# Patient Record
Sex: FEMALE | Race: WHITE | NOT HISPANIC OR LATINO
[De-identification: names, ages, dates, MRNs, and addresses within clinical notes are randomized per-mention and may not be internally consistent; named-entity substitution may affect disease eponyms.]

---

## 2020-03-11 ENCOUNTER — APPOINTMENT (OUTPATIENT)
Dept: ORTHOPEDIC SURGERY | Facility: CLINIC | Age: 75
End: 2020-03-11
Payer: MEDICARE

## 2020-03-11 VITALS
WEIGHT: 175 LBS | HEART RATE: 72 BPM | SYSTOLIC BLOOD PRESSURE: 126 MMHG | HEIGHT: 66 IN | DIASTOLIC BLOOD PRESSURE: 83 MMHG | BODY MASS INDEX: 28.12 KG/M2

## 2020-03-11 DIAGNOSIS — M25.551 PAIN IN RIGHT HIP: ICD-10-CM

## 2020-03-11 DIAGNOSIS — S72.141D DISPLACED INTERTROCHANTERIC FRACTURE OF RIGHT FEMUR, SUBSEQUENT ENCOUNTER FOR CLOSED FRACTURE WITH ROUTINE HEALING: ICD-10-CM

## 2020-03-11 PROCEDURE — 73502 X-RAY EXAM HIP UNI 2-3 VIEWS: CPT | Mod: RT

## 2020-03-11 PROCEDURE — 20610 DRAIN/INJ JOINT/BURSA W/O US: CPT | Mod: RT

## 2020-03-11 PROCEDURE — 73562 X-RAY EXAM OF KNEE 3: CPT | Mod: RT

## 2020-03-11 PROCEDURE — 99204 OFFICE O/P NEW MOD 45 MIN: CPT | Mod: 25

## 2020-03-11 RX ORDER — FAMOTIDINE 10 MG/ML
VIAL (ML) INTRAVENOUS
Refills: 0 | Status: ACTIVE | COMMUNITY

## 2020-03-11 NOTE — PROCEDURE
[de-identified] : I injected the patient's right knee today with cortisone.\par \par I discussed at length with the patient the planned steroid and lidocaine injection. The risks, benefits, convalescence and alternatives were reviewed. The possible side effects discussed included but were not limited to: pain, swelling, heat, bleeding, and redness. Symptoms are generally mild but if they are extensive then contact the office. Giving pain relievers by mouth such as NSAIDs or Tylenol can generally treat the reactions to steroid and lidocaine. Rare cases of infection have been noted. Rash, hives and itching may occur post injection. If you have muscle pain or cramps, flushing and or swelling of the face, rapid heart beat, nausea, dizziness, fever, chills, headache, difficulty breathing, swelling in the arms or legs, or have a prickly feeling of your skin, contact a health care provider immediately. Following this discussion, the knee was prepped with Alcohol and under sterile condition the 80 mg Depo-Medrol and 6 cc Lidocaine injection was performed with a 20 gauge needle through a superolateral injection site. The needle was introduced into the joint, aspiration was performed to ensure intra-articular placement and the medication was injected. Upon withdrawal of the needle the site was cleaned with alcohol and a band aid applied. The patient tolerated the injection well and there were no adverse effects. Post injection instructions included no strenuous activity for 24 hours, cryotherapy and if there are any adverse effects to contact the office. \par \par \par

## 2020-03-11 NOTE — PHYSICAL EXAM
[Antalgic] : antalgic [LE] : Sensory: Intact in bilateral lower extremities [ALL] : dorsalis pedis, posterior tibial, femoral, popliteal, and radial 2+ and symmetric bilaterally [de-identified] : GENERAL APPEARANCE:   Well nourished and hydrated, pleasant, alert, and oriented x 4.  \par CARDIOVASCULAR:   No apparent abnormalities.  No lower leg edema.  No varicosities.  Pedal pulses are palpable.\par RESPIRATORY: Breathe sound clear and audible in all lobes. No wheezing, No accessory muscle use.\par NEUROLOGIC:   Sensation is normal, no muscle weakness in the upper or lower extremities.\par DERMATOLOGIC:   No apparent skin lesions, moist, warm, no rash.\par SPINE:   Cervical spine appears normal and moves freely; thoracic spine appears normal and moves freely; lumbosacral spine appears normal and moves freely, normal, nontender.\par MUSCULOSKELETAL:   Hands, wrists, and elbows are normal and move freely, shoulders are normal and move freely. \par  [de-identified] : right knee examination shows pain with straight leg raise. the knee range of motion is zero to a degree with pain at terminal flexion she has mild welling of the joint no erythema [de-identified] : 3V Xray of the right knee done in office today and reviewed by Dr. Micky Garcia demonstrates mild lateral and patellofemoral osteoarthritis of the right knee. \par 3V Xray of the right hip and pelvis done in office today and reviewed by Dr. Micky Garcia demonstrates retained intramedullary clifton with evidence of healed intertrochanteric femur fracture, no signs of hardware cutout or failure, no signs of AVN.

## 2020-03-11 NOTE — REVIEW OF SYSTEMS
[Joint Pain] : joint pain [Joint Stiffness] : joint stiffness [Joint Swelling] : joint swelling [Feeling Tired] : fatigue [Headache] : headache [Anxiety] : anxiety [Negative] : Heme/Lymph [FreeTextEntry9] : right knee

## 2020-03-11 NOTE — ADDENDUM
[FreeTextEntry1] : I, Radhames Moore, acted solely as a scribe for Dr. Micky Garcia on this date 03/11/2020.

## 2020-03-11 NOTE — HISTORY OF PRESENT ILLNESS
[Pain Location] : pain [Worsening] : worsening [___ mths] : [unfilled] month(s) ago [6] : a current pain level of 6/10 [Intermit.] : ~He/She~ states the symptoms seem to be intermittent [Walking] : worsened by walking [Heat] : relieved by heat [Rest] : relieved by rest [de-identified] : patient presents to the office with right lateral knee pain down to her right shin for in the past 2 months.\par Patient describes the pain is shooting and stabbing. if she rests and apply ice she feels better \par she  notes lateral knee swelling.\par patient has been taking ibuprofen which helps.\par patient had a history ofname of fracture from a fall  she underwent ORIF of femur fracture in 2010 by Dr. Ward\par

## 2020-03-11 NOTE — DISCUSSION/SUMMARY
[de-identified] : 74 year old  female with  mild lateral and patellofemoral osteoarthritis of the right knee. \par Today she elected to receive a cortisone injection in her right knee which she tolerated well. \par If pain persists despite cortisone injection she can call the office and we will obtain an MRI of the right knee for further evaluation.

## 2022-04-21 ENCOUNTER — EMERGENCY (EMERGENCY)
Facility: HOSPITAL | Age: 77
LOS: 1 days | Discharge: HOME | End: 2022-04-21
Attending: EMERGENCY MEDICINE
Payer: MEDICARE

## 2022-04-21 VITALS
WEIGHT: 139.99 LBS | DIASTOLIC BLOOD PRESSURE: 76 MMHG | TEMPERATURE: 98 F | SYSTOLIC BLOOD PRESSURE: 129 MMHG | RESPIRATION RATE: 18 BRPM | HEART RATE: 71 BPM | OXYGEN SATURATION: 100 % | HEIGHT: 64 IN

## 2022-04-21 PROCEDURE — 99285 EMERGENCY DEPT VISIT HI MDM: CPT | Mod: 25

## 2022-04-21 PROCEDURE — 12052 INTMD RPR FACE/MM 2.6-5.0 CM: CPT

## 2022-04-21 PROCEDURE — 72125 CT NECK SPINE W/O DYE: CPT | Mod: MA

## 2022-04-21 PROCEDURE — 70450 CT HEAD/BRAIN W/O DYE: CPT | Mod: MA

## 2022-04-21 PROCEDURE — 90715 TDAP VACCINE 7 YRS/> IM: CPT

## 2022-04-21 PROCEDURE — 72125 CT NECK SPINE W/O DYE: CPT | Mod: 26,MA

## 2022-04-21 PROCEDURE — 99284 EMERGENCY DEPT VISIT MOD MDM: CPT | Mod: 25

## 2022-04-21 PROCEDURE — 70450 CT HEAD/BRAIN W/O DYE: CPT | Mod: 26,MA

## 2022-04-21 PROCEDURE — 12042 INTMD RPR N-HF/GENIT2.6-7.5: CPT

## 2022-04-21 PROCEDURE — 90471 IMMUNIZATION ADMIN: CPT

## 2022-04-21 RX ORDER — TETANUS TOXOID, REDUCED DIPHTHERIA TOXOID AND ACELLULAR PERTUSSIS VACCINE, ADSORBED 5; 2.5; 8; 8; 2.5 [IU]/.5ML; [IU]/.5ML; UG/.5ML; UG/.5ML; UG/.5ML
0.5 SUSPENSION INTRAMUSCULAR ONCE
Refills: 0 | Status: COMPLETED | OUTPATIENT
Start: 2022-04-21 | End: 2022-04-21

## 2022-04-21 RX ADMIN — TETANUS TOXOID, REDUCED DIPHTHERIA TOXOID AND ACELLULAR PERTUSSIS VACCINE, ADSORBED 0.5 MILLILITER(S): 5; 2.5; 8; 8; 2.5 SUSPENSION INTRAMUSCULAR at 10:15

## 2022-04-21 NOTE — ED PROVIDER NOTE - PROGRESS NOTE DETAILS
Pts laceration repaired. Pts CT head and cervical spine negative for acute pathology. Pt made aware of thyroid nodule findings on CT and given copy of report to bring to her PCP for f/u. Pt stable for d/c.

## 2022-04-21 NOTE — ED PROVIDER NOTE - PHYSICAL EXAMINATION
Alert, lucid, and in no apparent distress. Pt is normocephalic, atraumatic.  3cm laceation of forehead; +muscle involvement; no step off  Pupils are equal, round,  lips pink, moist mucous membranes, tongue midline. Neck mild tenderness left lateral neck ; no midline tenderness  Lungs clear to auscultation. Heart regular rate and rhythm, normal S1, S2,  Abdomen is soft, nontender, no pulsatile mass, no masses, no distension,   Non-focal sensory, 5 out of 5 motor strength, no dysmetria, fluent, goal directed speech. CN2 to 12 intact. Skin without rash,   No submandibular adenopathy. Normal mentation, does not appear agitated

## 2022-04-21 NOTE — ED PROVIDER NOTE - NSFOLLOWUPINSTRUCTIONS_ED_ALL_ED_FT
suture removal in 5 days  head injury instructions  follow up with doctor in 3 - 5 days suture removal in 5 days  head injury instructions  follow up with doctor in 3 - 5 days    Follow up with your PCP regarding thyroid nodule findings on CT scan.

## 2022-04-21 NOTE — ED PROVIDER NOTE - CLINICAL SUMMARY MEDICAL DECISION MAKING FREE TEXT BOX
s/p fall with trauma to head + lac; suture lac, up date tetanus; ct of head and neck; ; dc with head injury and laceration instrutions

## 2022-04-21 NOTE — ED PROVIDER NOTE - PATIENT PORTAL LINK FT
You can access the FollowMyHealth Patient Portal offered by Health system by registering at the following website: [email or web address]. By joining [employer]’s FollowMyHealth portal, you will also be able to view your health information using other applications (apps) compatible with our system.

## 2022-04-21 NOTE — ED PROVIDER NOTE - CROS ED RESP ALL NEG
Continue with reflux precautions as discussed. Continue on the antacid. Please get labs done today and we will notify you of results. Make the appointment with Dr David Brothers now. Call if any questions or concerns. Return as scheduled or sooner as needed.
negative...

## 2022-04-21 NOTE — ED PROVIDER NOTE - OBJECTIVE STATEMENT
77 yo female no significant pmh  s/p mechanical fall hitting her head on a glass table; pt denies loc; however, with laceration to forehead; ; bleeding controlled;  pt noted headache and also neck pain; pt denies any chest pain, back pain or abdominal pain

## 2022-04-21 NOTE — ED PROVIDER NOTE - CARE PLAN
1 Principal Discharge DX:	Laceration of forehead without complication  Secondary Diagnosis:	Sprain of cervical neck

## 2022-07-30 ENCOUNTER — EMERGENCY (EMERGENCY)
Facility: HOSPITAL | Age: 77
LOS: 1 days | Discharge: HOME | End: 2022-07-30
Attending: EMERGENCY MEDICINE
Payer: MEDICARE

## 2022-07-30 VITALS
SYSTOLIC BLOOD PRESSURE: 102 MMHG | HEIGHT: 64 IN | WEIGHT: 145.06 LBS | RESPIRATION RATE: 20 BRPM | HEART RATE: 107 BPM | DIASTOLIC BLOOD PRESSURE: 64 MMHG | TEMPERATURE: 100 F | OXYGEN SATURATION: 95 %

## 2022-07-30 LAB
APPEARANCE UR: ABNORMAL
BACTERIA # UR AUTO: ABNORMAL
BILIRUB UR-MCNC: NEGATIVE — SIGNIFICANT CHANGE UP
COLOR SPEC: YELLOW — SIGNIFICANT CHANGE UP
DIFF PNL FLD: ABNORMAL
EPI CELLS # UR: SIGNIFICANT CHANGE UP
GLUCOSE UR QL: NEGATIVE — SIGNIFICANT CHANGE UP
KETONES UR-MCNC: NEGATIVE — SIGNIFICANT CHANGE UP
LEUKOCYTE ESTERASE UR-ACNC: ABNORMAL
NITRITE UR-MCNC: POSITIVE
PH UR: 6 — SIGNIFICANT CHANGE UP (ref 5–8)
PROT UR-MCNC: 30 MG/DL
RAPID RVP RESULT: SIGNIFICANT CHANGE UP
RBC CASTS # UR COMP ASSIST: SIGNIFICANT CHANGE UP /HPF (ref 0–4)
SARS-COV-2 RNA SPEC QL NAA+PROBE: SIGNIFICANT CHANGE UP
SP GR SPEC: 1.01 — SIGNIFICANT CHANGE UP (ref 1.01–1.02)
UROBILINOGEN FLD QL: NEGATIVE — SIGNIFICANT CHANGE UP
WBC UR QL: >50 /HPF (ref 0–5)

## 2022-07-30 PROCEDURE — 87186 SC STD MICRODIL/AGAR DIL: CPT

## 2022-07-30 PROCEDURE — 99053 MED SERV 10PM-8AM 24 HR FAC: CPT

## 2022-07-30 PROCEDURE — 99284 EMERGENCY DEPT VISIT MOD MDM: CPT

## 2022-07-30 PROCEDURE — 99285 EMERGENCY DEPT VISIT HI MDM: CPT

## 2022-07-30 PROCEDURE — 81001 URINALYSIS AUTO W/SCOPE: CPT

## 2022-07-30 PROCEDURE — 87086 URINE CULTURE/COLONY COUNT: CPT

## 2022-07-30 PROCEDURE — 0225U NFCT DS DNA&RNA 21 SARSCOV2: CPT

## 2022-07-30 RX ORDER — ACETAMINOPHEN 500 MG
975 TABLET ORAL ONCE
Refills: 0 | Status: COMPLETED | OUTPATIENT
Start: 2022-07-30 | End: 2022-07-30

## 2022-07-30 RX ORDER — CEPHALEXIN 500 MG
1 CAPSULE ORAL
Qty: 28 | Refills: 0
Start: 2022-07-30 | End: 2022-08-05

## 2022-07-30 RX ADMIN — Medication 975 MILLIGRAM(S): at 03:19

## 2022-07-30 NOTE — ED PROVIDER NOTE - CLINICAL SUMMARY MEDICAL DECISION MAKING FREE TEXT BOX
Resolved chills with borderline fever orally, constellation symptoms likely viral syndrome no other focal source of bacterial infection identified by history or exam, RVP sent for follow up, offered UA, CXR, labs however pt would just like to go home. Provided return precautions, follow up primary care.

## 2022-07-30 NOTE — ED ADULT NURSE NOTE - NSIMPLEMENTINTERV_GEN_ALL_ED
Implemented All Universal Safety Interventions:  [place] to call system. Call bell, personal items and telephone within reach. Instruct patient to call for assistance. Room bathroom lighting operational. Non-slip footwear when patient is off stretcher. Physically safe environment: no spills, clutter or unnecessary equipment. Stretcher in lowest position, wheels locked, appropriate side rails in place.

## 2022-07-30 NOTE — ED PROVIDER NOTE - PATIENT PORTAL LINK FT
You can access the FollowMyHealth Patient Portal offered by [facility] by registering at the following website: [email or web address]. By joining [employer]’s FollowMyHealth portal, you will also be able to view your health information using other applications (apps) compatible with our system.

## 2022-07-30 NOTE — ED PROVIDER NOTE - NSFOLLOWUPINSTRUCTIONS_ED_ALL_ED_FT
Take ibuprofen 600mg every 6 hours and/or acetaminophen 1000mg every 6 hours as needed for aches, pains or fever.  Follow up with your primary doctor as soon as possible  Return to the ER with any new, worsening or persistent symptoms.

## 2022-07-30 NOTE — ED ADULT TRIAGE NOTE - CHIEF COMPLAINT QUOTE
C/O fever, body aches, fatigue and headache for the past day. Skin is hot to the touch.  took Tylenol earlier tonight. Unknown sick contacts. Denies chest pain or SOB.

## 2022-07-30 NOTE — ED ADULT NURSE NOTE - OBJECTIVE STATEMENT
Patient complaint of fatigue, chills, headache and urinary frequency. Febrile at triage. States feeling better after arrival to ED . AOx3. No acute distress noted.

## 2022-07-30 NOTE — ED PROVIDER NOTE - OBJECTIVE STATEMENT
76yof w/ hypothyroid p/w 1 day of congestion and sinus pressure, tonight had a sudden onset of shaking chills prompting daughter to call 911. Feels better now without any intervention. She states she felt very fatigued and run down early today but otherwise in usual state of health. No chest pain, cough, shortness of breath, abdominal pain, urinary symptoms.

## 2022-12-14 PROBLEM — E03.9 HYPOTHYROIDISM, UNSPECIFIED: Chronic | Status: ACTIVE | Noted: 2022-07-30

## 2022-12-16 ENCOUNTER — APPOINTMENT (OUTPATIENT)
Dept: ORTHOPEDIC SURGERY | Facility: CLINIC | Age: 77
End: 2022-12-16

## 2022-12-16 VITALS
BODY MASS INDEX: 28.12 KG/M2 | SYSTOLIC BLOOD PRESSURE: 114 MMHG | HEIGHT: 66 IN | WEIGHT: 175 LBS | DIASTOLIC BLOOD PRESSURE: 68 MMHG | HEART RATE: 106 BPM

## 2022-12-16 PROCEDURE — 99214 OFFICE O/P EST MOD 30 MIN: CPT

## 2022-12-16 PROCEDURE — 73110 X-RAY EXAM OF WRIST: CPT | Mod: 50

## 2022-12-16 NOTE — DISCUSSION/SUMMARY
[FreeTextEntry1] : 1.  She will continue to wear her left wrist brace with thumb spica extension.\par \par #2 we discussed meloxicam and/or NSAIDs and other over-the-counter medications as well as the risk profile\par \par #3 I would like to see her back when she gets the results of the left wrist MRI

## 2022-12-16 NOTE — ASSESSMENT
[FreeTextEntry1] : ASSESSMENT:\par \par [She was accompanied today and was assisted with explaining their complaints today.]\par The patient comes in today with acute 1 week history of bilateral wrist pain improving on the right and severe on the left.  I am concerned that she might have either a scaphoid and/or a scapholunate interval ligament injury\par [I have diagnosed the patient today with a new diagnosis.]\par [This is likely to diminish bodily function for the extremity.] \par \par [I did send for further workup.]  This consists of an MRI of the left wrist to assess for scaphoid and/or scapholunate ligament injury\par \par She was adequately and thoroughly informed of my assessment of their current condition(s). \par \par \par \par

## 2022-12-16 NOTE — PHYSICAL EXAM
[de-identified] : She is well-appearing on examination\par \par Examination of the left wrist reveals mild to moderate tenderness with palpation of the distal radius.  She however has severe tenderness with compression of the dorsal scaphoid as well as the scapholunate interval.  She has pain with Ruvalcaba shift testing and guarding.\par \par Examination of the right wrist reveals tenderness at the level of the distal radius mild in nature. [de-identified] : [4] views of bilateral wrists were obtained today in my office and were seen by me and discussed with the patient. \par These show findings consistent with scapholunate widening on the left wrist as compared to the right.\par

## 2022-12-16 NOTE — HISTORY OF PRESENT ILLNESS
[FreeTextEntry1] : Amos is a pleasant 77-year-old female who presents 1 week after sustaining a fall onto bilateral wrists.  She has significant left wrist discomfort with swelling.  She has tried bracing however it is very painful for her.

## 2023-01-03 ENCOUNTER — APPOINTMENT (OUTPATIENT)
Dept: ORTHOPEDIC SURGERY | Facility: CLINIC | Age: 78
End: 2023-01-03
Payer: MEDICARE

## 2023-01-03 VITALS
HEART RATE: 90 BPM | SYSTOLIC BLOOD PRESSURE: 129 MMHG | DIASTOLIC BLOOD PRESSURE: 77 MMHG | BODY MASS INDEX: 28.12 KG/M2 | WEIGHT: 175 LBS | HEIGHT: 66 IN

## 2023-01-03 DIAGNOSIS — Z91.81 HISTORY OF FALLING: ICD-10-CM

## 2023-01-03 PROCEDURE — 20610 DRAIN/INJ JOINT/BURSA W/O US: CPT | Mod: RT

## 2023-01-03 PROCEDURE — 99214 OFFICE O/P EST MOD 30 MIN: CPT | Mod: 25

## 2023-01-03 PROCEDURE — 73562 X-RAY EXAM OF KNEE 3: CPT | Mod: 26,RT

## 2023-01-03 NOTE — PROCEDURE
[de-identified] : Patient received right knee 80mg cortisone injection for osteoarthritis \par I discussed at length with the patient the planned steroid and lidocaine injection. The risks, benefits, convalescence and alternatives were reviewed. The possible side effects discussed included but were not limited to: pain, swelling, heat, bleeding, and redness. Symptoms are generally mild but if they are extensive then contact the office. Giving pain relievers by mouth such as NSAIDs or Tylenol can generally treat the reactions to steroid and lidocaine. Rare cases of infection have been noted. Rash, hives and itching may occur post injection. If you have muscle pain or cramps, flushing and or swelling of the face, rapid heart beat, nausea, dizziness, fever, chills, headache, difficulty breathing, swelling in the arms or legs, or have a prickly feeling of your skin, contact a health care provider immediately. Following this discussion, the knee was prepped with Alcohol and under sterile condition the 80 mg Depo-Medrol and 6 cc Lidocaine injection was performed with a 20 gauge needle through a superolateral injection site. The needle was introduced into the joint, aspiration was performed to ensure intra-articular placement and the medication was injected. Upon withdrawal of the needle the site was cleaned with alcohol and a band aid applied. The patient tolerated the injection well and there were no adverse effects. Post injection instructions included no strenuous activity for 24 hours, cryotherapy and if there are any adverse effects to contact the office. \par

## 2023-01-03 NOTE — PHYSICAL EXAM
[de-identified] : GENERAL APPEARANCE: Well nourished and hydrated, pleasant, alert, and oriented x 3. Appears their stated age. \par HEENT: Normocephalic, extraocular eye motion intact. Nasal septum midline. Oral cavity clear. External auditory canal clear. \par RESPIRATORY: Breath sounds clear and audible in all lobes. No wheezing, No accessory muscle use.\par CARDIOVASCULAR: No apparent abnormalities. No lower leg edema. No varicosities. Pedal pulses are palpable.\par NEUROLOGIC: Sensation is normal, no muscle weakness in the upper or lower extremities.\par DERMATOLOGIC: No apparent skin lesions, moist, warm, no rash.\par SPINE: Cervical spine appears normal and moves freely; thoracic spine appears normal and moves freely; lumbosacral spine appears normal and moves freely, normal, nontender.\par MUSCULOSKELETAL: Hands, wrists, and elbows are normal and move freely, shoulders are normal and move freely. \par Musculoskeletal\par 5/5 motor strength in bilateral lower extremities. Sensory: Intact in bilateral lower extremities. DTRs: Biceps, brachioradialis, triceps, patellar, ankle and plantar 2+ and symmetric bilaterally. Pulses: dorsalis pedis, posterior tibial, femoral, popliteal, and radial 2+ and symmetric bilaterally. \par Constitutional: Alert and in no acute distress, but well-appearing. \par  [de-identified] : Right knee examination shows medial joint line tenderness she has localized mild hematoma in the medial aspect.  Range of motion is two -110 degree.  She is weightbearing and ambulating with mild antalgic gait. \par  [de-identified] : 3 views of the right knee x-ray shows moderate to advanced medial and patellofemoral osteoarthritis.  No  fracture was to visualized.\par

## 2023-01-03 NOTE — DISCUSSION/SUMMARY
[de-identified] : This is a 77-year-old female with moderate to advanced medial patellofemoral osteoarthritis with increased pain after a fall.  Her x-ray showed no fracture retained hardware is intact in the distal femur.  Patient opting for cortisone injection for temporarily pain relief I advised patients to utilize moist heat in the medial soft tissue hematoma area.  Patient is a future candidate for knee replacement but patient is not interested in a surgical treatment.  Patient will follow-up in 3 months for repeat evaluation .\par

## 2023-01-03 NOTE — HISTORY OF PRESENT ILLNESS
[Pain Location] : pain [Stable] : stable [___ mths] : [unfilled] month(s) ago [5] : a current pain level of 5/10 [de-identified] : This is a 77-year-old female who sustained a fall from missing a step onto the right knee about a month ago she has a medial knee pain she is walking without walking assistive device.  She had femur fracture with retained hardware she was worried about damaging the hardware.  She also landed on her hand with hand pain she has seen Dr. Delvalle and waiting for MRI to be scheduled.\par

## 2023-01-13 ENCOUNTER — APPOINTMENT (OUTPATIENT)
Dept: MRI IMAGING | Facility: CLINIC | Age: 78
End: 2023-01-13
Payer: MEDICARE

## 2023-01-13 ENCOUNTER — OUTPATIENT (OUTPATIENT)
Dept: OUTPATIENT SERVICES | Facility: HOSPITAL | Age: 78
LOS: 1 days | End: 2023-01-13

## 2023-01-13 DIAGNOSIS — M25.532 PAIN IN LEFT WRIST: ICD-10-CM

## 2023-01-13 PROCEDURE — 73221 MRI JOINT UPR EXTREM W/O DYE: CPT | Mod: 26,LT

## 2023-01-27 ENCOUNTER — APPOINTMENT (OUTPATIENT)
Dept: ORTHOPEDIC SURGERY | Facility: CLINIC | Age: 78
End: 2023-01-27
Payer: MEDICARE

## 2023-01-27 VITALS
BODY MASS INDEX: 22.02 KG/M2 | HEART RATE: 89 BPM | HEIGHT: 64 IN | DIASTOLIC BLOOD PRESSURE: 75 MMHG | WEIGHT: 129 LBS | SYSTOLIC BLOOD PRESSURE: 112 MMHG

## 2023-01-27 DIAGNOSIS — M25.532 PAIN IN LEFT WRIST: ICD-10-CM

## 2023-01-27 PROCEDURE — 99214 OFFICE O/P EST MOD 30 MIN: CPT

## 2023-01-27 NOTE — ASSESSMENT
[FreeTextEntry1] : ASSESSMENT:\par \par The patient comes in today with known findings consistent with a scapholunate ligament tear on clinical examination as well as radiographs and MRI.  At this point it is prudent to commence with a arthroscopy to assess the ligament and if in fact it is fully torn we will proceed with an open ligament reconstruction and capsulorrhaphy.\par \par [This is likely to diminish bodily function for the extremity.] \par \par She was adequately and thoroughly informed of my assessment of their current condition(s). \par \par Consent scapholunate repair surgery\par \par The patient presents with a [Left] sided scapholunate ligament injury.  Physical examination as well as imaging corroborate these findings.  At this point in time a discussion was had with surgical options consisting of arthroscopic assessment versus open surgery and or a combination of both.  This way the joint can be assessed as well as the ligament and repair as needed.  We had a discussion regarding the postoperative rehabilitation as well as wrist stiffness associated with this procedure and loss of motion.  We talked about the possibility of failure and widening of the scapholunate interval despite repair and the risk of chronic wrist arthritis and pain.\par \par \par Surgical Consent Discussion:\par \par I explained the risks and benefits of surgical intervention to the patient. The risks include, but are not limited to: pain, infection, swelling, stiffness, injury to underlying neurovascular structures, scar sensitivity, incomplete resolution of symptoms, the possibility of the need for future surgery and finally the need to comply with a post-operative occupational therapy protocol. The patient understands, agrees and informed consent was obtained. The patient’s surgery will be scheduled in the near future.\par \par \par

## 2023-01-27 NOTE — DISCUSSION/SUMMARY
[FreeTextEntry1] : 1.  The patient has elected to proceed with a left-sided wrist arthroscopy and scapholunate ligament repair

## 2023-01-27 NOTE — HISTORY OF PRESENT ILLNESS
[FreeTextEntry1] : mAos returns for follow-up.  She continues to complain of left wrist pain and presents with the results of an MRI.

## 2023-01-27 NOTE — PHYSICAL EXAM
[de-identified] : She is well-appearing on examination\par \par Examination of the left wrist reveals mild to moderate tenderness with palpation of the distal radius.  She however has severe tenderness with compression of the dorsal scaphoid as well as the scapholunate interval.  She has pain with Ruvalcaba shift testing with a mild palpable clunk\par  [de-identified] : We reviewed her prior x-rays from the last visit as well as showing scapholunate widening on the left wrist\par \par We also reviewed the findings of a recent left wrist MRI denoting a scapholunate ligament tearing with widening.  This I discussed with the patient

## 2023-03-22 NOTE — ED ADULT NURSE NOTE - CAS DISCH CONDITION
Ambulatory Care Coordination Note    ACM attempted 2nd outreach to reach patient for introduction to Associate Care Management related to ED visit 3/20/2023. HIPAA compliant message left requesting a return phone call at patients convenience. Unable to Reach Letter sent to patient via [employer]. Will continue to outreach patient.
Stable

## 2023-04-04 ENCOUNTER — APPOINTMENT (OUTPATIENT)
Dept: ORTHOPEDIC SURGERY | Facility: CLINIC | Age: 78
End: 2023-04-04
Payer: MEDICARE

## 2023-04-04 VITALS
HEIGHT: 64 IN | HEART RATE: 93 BPM | DIASTOLIC BLOOD PRESSURE: 76 MMHG | SYSTOLIC BLOOD PRESSURE: 121 MMHG | WEIGHT: 129 LBS | BODY MASS INDEX: 22.02 KG/M2

## 2023-04-04 PROCEDURE — 20610 DRAIN/INJ JOINT/BURSA W/O US: CPT | Mod: RT

## 2023-04-04 PROCEDURE — 99213 OFFICE O/P EST LOW 20 MIN: CPT | Mod: 25

## 2023-04-04 NOTE — DISCUSSION/SUMMARY
[de-identified] : 77 year old female with  moderate to advanced medial patellofemoral osteoarthritis and hx of fall.Patient opting for cortisone injection for temporarily pain relief she tolerated well. Patient is a future candidate for knee replacement but patient is not interested in a surgical treatment. Patient will follow-up in 3 months for repeat evaluation.\par  \par

## 2023-04-04 NOTE — HISTORY OF PRESENT ILLNESS
[Pain Location] : pain [5] : a current pain level of 5/10 [de-identified] : This is a 77-year-old female with right knee o.a in chronic pain. he has a medial knee pain she is walking without walking assistive device.  Patient describes the pain is shooting and stabbing. if she rests and apply ice she feels better \par she notes lateral knee swelling.\par patient has been taking ibuprofen which helps.\par patient had a history ofname of fracture from a fall she underwent ORIF of femur fracture in 2010 by Dr. Ward\par

## 2023-04-04 NOTE — PROCEDURE
[de-identified] : Patient received right knee 80mg cortisone injection for osteoarthritis \par I discussed at length with the patient the planned steroid and lidocaine injection. The risks, benefits, convalescence and alternatives were reviewed. The possible side effects discussed included but were not limited to: pain, swelling, heat, bleeding, and redness. Symptoms are generally mild but if they are extensive then contact the office. Giving pain relievers by mouth such as NSAIDs or Tylenol can generally treat the reactions to steroid and lidocaine. Rare cases of infection have been noted. Rash, hives and itching may occur post injection. If you have muscle pain or cramps, flushing and or swelling of the face, rapid heart beat, nausea, dizziness, fever, chills, headache, difficulty breathing, swelling in the arms or legs, or have a prickly feeling of your skin, contact a health care provider immediately. Following this discussion, the knee was prepped with Alcohol and under sterile condition the 80 mg Depo-Medrol and 6 cc Lidocaine injection was performed with a 20 gauge needle through a superolateral injection site. The needle was introduced into the joint, aspiration was performed to ensure intra-articular placement and the medication was injected. Upon withdrawal of the needle the site was cleaned with alcohol and a band aid applied. The patient tolerated the injection well and there were no adverse effects. Post injection instructions included no strenuous activity for 24 hours, cryotherapy and if there are any adverse effects to contact the office. \par

## 2023-04-04 NOTE — PHYSICAL EXAM
[de-identified] : GENERAL APPEARANCE: Well nourished and hydrated, pleasant, alert, and oriented x 3. Appears their stated age. \par HEENT: Normocephalic, extraocular eye motion intact. Nasal septum midline. Oral cavity clear. External auditory canal clear. \par RESPIRATORY: Breath sounds clear and audible in all lobes. No wheezing, No accessory muscle use.\par CARDIOVASCULAR: No apparent abnormalities. No lower leg edema. No varicosities. Pedal pulses are palpable.\par NEUROLOGIC: Sensation is normal, no muscle weakness in the upper or lower extremities.\par DERMATOLOGIC: No apparent skin lesions, moist, warm, no rash.\par SPINE: Cervical spine appears normal and moves freely; thoracic spine appears normal and moves freely; lumbosacral spine appears normal and moves freely, normal, nontender.\par MUSCULOSKELETAL: Hands, wrists, and elbows are normal and move freely, shoulders are normal and move freely. \par Musculoskeletal\par 5/5 motor strength in bilateral lower extremities. Sensory: Intact in bilateral lower extremities. DTRs: Biceps, brachioradialis, triceps, patellar, ankle and plantar 2+ and symmetric bilaterally. Pulses: dorsalis pedis, posterior tibial, femoral, popliteal, and radial 2+ and symmetric bilaterally. \par Constitutional: Alert and in no acute distress, but well-appearing. \par  [de-identified] : Right knee examination shows medial joint line tenderness she has discoloration at the old hematoma in the medial knee site. hematoma is resolved Range of motion is two -110 degree.  She is weightbearing and ambulating with mild antalgic gait. \par

## 2023-09-05 ENCOUNTER — APPOINTMENT (OUTPATIENT)
Dept: ORTHOPEDIC SURGERY | Facility: CLINIC | Age: 78
End: 2023-09-05
Payer: MEDICARE

## 2023-09-05 VITALS
SYSTOLIC BLOOD PRESSURE: 118 MMHG | BODY MASS INDEX: 22.02 KG/M2 | WEIGHT: 129 LBS | DIASTOLIC BLOOD PRESSURE: 78 MMHG | HEIGHT: 64 IN | HEART RATE: 91 BPM

## 2023-09-05 PROCEDURE — 20610 DRAIN/INJ JOINT/BURSA W/O US: CPT | Mod: RT

## 2023-09-05 NOTE — PROCEDURE
[de-identified] : Patient received right knee 80mg cortisone injection for osteoarthritis \par  I discussed at length with the patient the planned steroid and lidocaine injection. The risks, benefits, convalescence and alternatives were reviewed. The possible side effects discussed included but were not limited to: pain, swelling, heat, bleeding, and redness. Symptoms are generally mild but if they are extensive then contact the office. Giving pain relievers by mouth such as NSAIDs or Tylenol can generally treat the reactions to steroid and lidocaine. Rare cases of infection have been noted. Rash, hives and itching may occur post injection. If you have muscle pain or cramps, flushing and or swelling of the face, rapid heart beat, nausea, dizziness, fever, chills, headache, difficulty breathing, swelling in the arms or legs, or have a prickly feeling of your skin, contact a health care provider immediately. Following this discussion, the knee was prepped with Alcohol and under sterile condition the 80 mg Depo-Medrol and 6 cc Lidocaine injection was performed with a 20 gauge needle through a superolateral injection site. The needle was introduced into the joint, aspiration was performed to ensure intra-articular placement and the medication was injected. Upon withdrawal of the needle the site was cleaned with alcohol and a band aid applied. The patient tolerated the injection well and there were no adverse effects. Post injection instructions included no strenuous activity for 24 hours, cryotherapy and if there are any adverse effects to contact the office. \par

## 2023-09-05 NOTE — PHYSICAL EXAM
[de-identified] : GENERAL APPEARANCE: Well nourished and hydrated, pleasant, alert, and oriented x 3. Appears their stated age. \par  HEENT: Normocephalic, extraocular eye motion intact. Nasal septum midline. Oral cavity clear. External auditory canal clear. \par  RESPIRATORY: Breath sounds clear and audible in all lobes. No wheezing, No accessory muscle use.\par  CARDIOVASCULAR: No apparent abnormalities. No lower leg edema. No varicosities. Pedal pulses are palpable.\par  NEUROLOGIC: Sensation is normal, no muscle weakness in the upper or lower extremities.\par  DERMATOLOGIC: No apparent skin lesions, moist, warm, no rash.\par  SPINE: Cervical spine appears normal and moves freely; thoracic spine appears normal and moves freely; lumbosacral spine appears normal and moves freely, normal, nontender.\par  MUSCULOSKELETAL: Hands, wrists, and elbows are normal and move freely, shoulders are normal and move freely. \par  Musculoskeletal\par  5/5 motor strength in bilateral lower extremities. Sensory: Intact in bilateral lower extremities. DTRs: Biceps, brachioradialis, triceps, patellar, ankle and plantar 2+ and symmetric bilaterally. Pulses: dorsalis pedis, posterior tibial, femoral, popliteal, and radial 2+ and symmetric bilaterally. \par  Constitutional: Alert and in no acute distress, but well-appearing. \par   [de-identified] : Right knee examination shows medial joint line tenderness Range of motion is two -110 degree.  She is weightbearing and ambulating with mild antalgic gait.

## 2023-09-05 NOTE — DISCUSSION/SUMMARY
[de-identified] : repeat injection 77 year old female with  moderate to advanced medial patellofemoral osteoarthritis Patient opting for cortisone injection for temporarily pain relief she tolerated well. Patient is a future candidate for knee replacement but patient is not interested in a surgical treatment. Patient will follow-up in 3 months for repeat evaluation.

## 2023-09-05 NOTE — HISTORY OF PRESENT ILLNESS
[de-identified] : This is a 77-year-old female with right knee o.a in chronic pain. he has a medial knee pain she is walking without walking assistive device.  Patient describes the pain is shooting and stabbing. if she rests and apply ice she feels better  she notes lateral knee swelling. patient has been taking ibuprofen which helps. patient had a history ofname of fracture from a fall she underwent ORIF of femur fracture in 2010 by Dr. Ward  [5] : a current pain level of 5/10

## 2023-12-05 ENCOUNTER — APPOINTMENT (OUTPATIENT)
Dept: ORTHOPEDIC SURGERY | Facility: CLINIC | Age: 78
End: 2023-12-05
Payer: MEDICARE

## 2023-12-05 VITALS
BODY MASS INDEX: 22.02 KG/M2 | HEIGHT: 64 IN | DIASTOLIC BLOOD PRESSURE: 75 MMHG | WEIGHT: 129 LBS | HEART RATE: 83 BPM | SYSTOLIC BLOOD PRESSURE: 128 MMHG

## 2023-12-05 DIAGNOSIS — M17.11 UNILATERAL PRIMARY OSTEOARTHRITIS, RIGHT KNEE: ICD-10-CM

## 2023-12-05 PROCEDURE — 99213 OFFICE O/P EST LOW 20 MIN: CPT | Mod: 25

## 2023-12-05 PROCEDURE — 20610 DRAIN/INJ JOINT/BURSA W/O US: CPT | Mod: RT

## 2023-12-15 NOTE — DISCUSSION/SUMMARY
[de-identified] :  77 year old female with symptomatic right knee moderate to advanced medial  & patellofemoral osteoarthritis. She does understand definitive treatment would entail total knee arthroplasty.  She is not interested in pursuing surgical intervention at this time.  She has responded well to previous cortisone injections.    Plan: We did proceed with a repeat cortisone injection for the right knee today.  Patient will follow-up in 3 months for reevaluation.  All questions answered.

## 2023-12-15 NOTE — PHYSICAL EXAM
[de-identified] : GENERAL APPEARANCE: Well nourished and hydrated, pleasant, alert, and oriented x 3. Appears their stated age. \par  HEENT: Normocephalic, extraocular eye motion intact. Nasal septum midline. Oral cavity clear. External auditory canal clear. \par  RESPIRATORY: Breath sounds clear and audible in all lobes. No wheezing, No accessory muscle use.\par  CARDIOVASCULAR: No apparent abnormalities. No lower leg edema. No varicosities. Pedal pulses are palpable.\par  NEUROLOGIC: Sensation is normal, no muscle weakness in the upper or lower extremities.\par  DERMATOLOGIC: No apparent skin lesions, moist, warm, no rash.\par  SPINE: Cervical spine appears normal and moves freely; thoracic spine appears normal and moves freely; lumbosacral spine appears normal and moves freely, normal, nontender.\par  MUSCULOSKELETAL: Hands, wrists, and elbows are normal and move freely, shoulders are normal and move freely. \par  Musculoskeletal\par  5/5 motor strength in bilateral lower extremities. Sensory: Intact in bilateral lower extremities. DTRs: Biceps, brachioradialis, triceps, patellar, ankle and plantar 2+ and symmetric bilaterally. Pulses: dorsalis pedis, posterior tibial, femoral, popliteal, and radial 2+ and symmetric bilaterally. \par  Constitutional: Alert and in no acute distress, but well-appearing. \par   [de-identified] : Right knee examination shows no effusion, mild anterior medial and anterior lateral joint line tenderness Range of motion is 2 -110 degree.  No instability.  5 out of 5 strength.  Sensation grossly intact.

## 2023-12-15 NOTE — PROCEDURE
[de-identified] : I injected the RIGHT knee.  I discussed at length with the patient the planned steroid and lidocaine injection. The risks, benefits, convalescence and alternatives were reviewed. The possible side effects discussed included but were not limited to: pain, swelling, heat, bleeding, and redness. Symptoms are generally mild but if they are extensive then contact the office. Giving pain relievers by mouth such as NSAIDs or Tylenol can generally treat the reactions to steroid and lidocaine. Rare cases of infection have been noted. Rash, hives and itching may occur post injection. If you have muscle pain or cramps, flushing and or swelling of the face, rapid heart beat, nausea, dizziness, fever, chills, headache, difficulty breathing, swelling in the arms or legs, or have a prickly feeling of your skin, contact a health care provider immediately. Following this discussion, the knee was prepped with Alcohol and under sterile condition the 80 mg Depo-Medrol and 6 cc Lidocaine injection was performed with a 20 gauge needle through a infralateral injection site. The needle was introduced into the joint, aspiration was performed to ensure intra-articular placement and the medication was injected. Upon withdrawal of the needle the site was cleaned with alcohol and a band aid applied. The patient tolerated the injection well and there were no adverse effects. Post injection instructions included no strenuous activity for 24 hours, cryotherapy and if there are any adverse effects to contact the office.

## 2023-12-15 NOTE — HISTORY OF PRESENT ILLNESS
[de-identified] : Diamond is a  77-year-old female who does present today for follow-up evaluation of right knee osteoarthritis. patient had a history ofname of fracture from a fall she underwent ORIF of femur fracture in 2010 by Dr. Ward.  She received a cortisone injection 3 months ago which did provide her with significant relief of symptoms.  Her pain is slowly returned over the past 2 weeks.  No recent injury.  She reports intermittent, mild to moderate dull aching pain over the anterior and lateral aspects of the knee.  Exacerbated with activity.  No catching, locking or buckling.  No radiating pain or paresthesia.  She presents today inquiring about a repeat cortisone injection.

## 2024-01-30 ENCOUNTER — INPATIENT (INPATIENT)
Facility: HOSPITAL | Age: 79
LOS: 24 days | Discharge: HOME | DRG: 853 | End: 2024-02-24
Attending: SURGERY | Admitting: HOSPITALIST
Payer: MEDICARE

## 2024-01-30 VITALS
WEIGHT: 123.02 LBS | HEART RATE: 92 BPM | DIASTOLIC BLOOD PRESSURE: 86 MMHG | RESPIRATION RATE: 16 BRPM | HEIGHT: 64 IN | TEMPERATURE: 98 F | SYSTOLIC BLOOD PRESSURE: 143 MMHG

## 2024-01-30 DIAGNOSIS — K52.9 NONINFECTIVE GASTROENTERITIS AND COLITIS, UNSPECIFIED: ICD-10-CM

## 2024-01-30 LAB
ALBUMIN SERPL ELPH-MCNC: 3.4 G/DL — SIGNIFICANT CHANGE UP (ref 3.3–5.2)
ALP SERPL-CCNC: 105 U/L — SIGNIFICANT CHANGE UP (ref 40–120)
ALT FLD-CCNC: 6 U/L — SIGNIFICANT CHANGE UP
ANION GAP SERPL CALC-SCNC: 17 MMOL/L — SIGNIFICANT CHANGE UP (ref 5–17)
ANION GAP SERPL CALC-SCNC: 24 MMOL/L — HIGH (ref 5–17)
APPEARANCE UR: ABNORMAL
AST SERPL-CCNC: 15 U/L — SIGNIFICANT CHANGE UP
BACTERIA # UR AUTO: ABNORMAL /HPF
BASE EXCESS BLDV CALC-SCNC: -7 MMOL/L — LOW (ref -2–3)
BASOPHILS # BLD AUTO: 0.03 K/UL — SIGNIFICANT CHANGE UP (ref 0–0.2)
BASOPHILS NFR BLD AUTO: 0.3 % — SIGNIFICANT CHANGE UP (ref 0–2)
BILIRUB SERPL-MCNC: 0.4 MG/DL — SIGNIFICANT CHANGE UP (ref 0.4–2)
BILIRUB UR-MCNC: ABNORMAL
BUN SERPL-MCNC: 32.8 MG/DL — HIGH (ref 8–20)
BUN SERPL-MCNC: 32.9 MG/DL — HIGH (ref 8–20)
CA-I SERPL-SCNC: 1.09 MMOL/L — LOW (ref 1.15–1.33)
CALCIUM SERPL-MCNC: 8.1 MG/DL — LOW (ref 8.4–10.5)
CALCIUM SERPL-MCNC: 9.2 MG/DL — SIGNIFICANT CHANGE UP (ref 8.4–10.5)
CHLORIDE BLDV-SCNC: 107 MMOL/L — SIGNIFICANT CHANGE UP (ref 96–108)
CHLORIDE SERPL-SCNC: 101 MMOL/L — SIGNIFICANT CHANGE UP (ref 96–108)
CHLORIDE SERPL-SCNC: 104 MMOL/L — SIGNIFICANT CHANGE UP (ref 96–108)
CO2 SERPL-SCNC: 16 MMOL/L — LOW (ref 22–29)
CO2 SERPL-SCNC: 18 MMOL/L — LOW (ref 22–29)
COLOR SPEC: SIGNIFICANT CHANGE UP
CREAT SERPL-MCNC: 1.57 MG/DL — HIGH (ref 0.5–1.3)
CREAT SERPL-MCNC: 1.74 MG/DL — HIGH (ref 0.5–1.3)
DIFF PNL FLD: ABNORMAL
EGFR: 30 ML/MIN/1.73M2 — LOW
EGFR: 34 ML/MIN/1.73M2 — LOW
EOSINOPHIL # BLD AUTO: 0.28 K/UL — SIGNIFICANT CHANGE UP (ref 0–0.5)
EOSINOPHIL NFR BLD AUTO: 2.9 % — SIGNIFICANT CHANGE UP (ref 0–6)
FLUAV AG NPH QL: SIGNIFICANT CHANGE UP
FLUBV AG NPH QL: SIGNIFICANT CHANGE UP
GAS PNL BLDV: 136 MMOL/L — SIGNIFICANT CHANGE UP (ref 136–145)
GAS PNL BLDV: SIGNIFICANT CHANGE UP
GI PCR PANEL: SIGNIFICANT CHANGE UP
GLUCOSE BLDV-MCNC: 160 MG/DL — HIGH (ref 70–99)
GLUCOSE SERPL-MCNC: 122 MG/DL — HIGH (ref 70–99)
GLUCOSE SERPL-MCNC: 182 MG/DL — HIGH (ref 70–99)
GLUCOSE UR QL: NEGATIVE MG/DL — SIGNIFICANT CHANGE UP
HCO3 BLDV-SCNC: 17 MMOL/L — LOW (ref 22–29)
HCT VFR BLD CALC: 40.6 % — SIGNIFICANT CHANGE UP (ref 34.5–45)
HCT VFR BLDA CALC: 39 % — SIGNIFICANT CHANGE UP
HGB BLD CALC-MCNC: 13 G/DL — SIGNIFICANT CHANGE UP (ref 11.7–16.1)
HGB BLD-MCNC: 13.2 G/DL — SIGNIFICANT CHANGE UP (ref 11.5–15.5)
IMM GRANULOCYTES NFR BLD AUTO: 0.2 % — SIGNIFICANT CHANGE UP (ref 0–0.9)
KETONES UR-MCNC: ABNORMAL MG/DL
LACTATE BLDV-MCNC: 2.7 MMOL/L — HIGH (ref 0.5–2)
LACTATE BLDV-MCNC: 4.1 MMOL/L — CRITICAL HIGH (ref 0.5–2)
LEUKOCYTE ESTERASE UR-ACNC: ABNORMAL
LIDOCAIN IGE QN: 8 U/L — LOW (ref 22–51)
LYMPHOCYTES # BLD AUTO: 0.66 K/UL — LOW (ref 1–3.3)
LYMPHOCYTES # BLD AUTO: 6.9 % — LOW (ref 13–44)
MAGNESIUM SERPL-MCNC: 1.8 MG/DL — SIGNIFICANT CHANGE UP (ref 1.6–2.6)
MCHC RBC-ENTMCNC: 30.8 PG — SIGNIFICANT CHANGE UP (ref 27–34)
MCHC RBC-ENTMCNC: 32.5 GM/DL — SIGNIFICANT CHANGE UP (ref 32–36)
MCV RBC AUTO: 94.6 FL — SIGNIFICANT CHANGE UP (ref 80–100)
MONOCYTES # BLD AUTO: 0.29 K/UL — SIGNIFICANT CHANGE UP (ref 0–0.9)
MONOCYTES NFR BLD AUTO: 3.1 % — SIGNIFICANT CHANGE UP (ref 2–14)
NEUTROPHILS # BLD AUTO: 8.22 K/UL — HIGH (ref 1.8–7.4)
NEUTROPHILS NFR BLD AUTO: 86.6 % — HIGH (ref 43–77)
NITRITE UR-MCNC: NEGATIVE — SIGNIFICANT CHANGE UP
PCO2 BLDV: 26 MMHG — LOW (ref 39–42)
PH BLDV: 7.43 — SIGNIFICANT CHANGE UP (ref 7.32–7.43)
PH UR: 5.5 — SIGNIFICANT CHANGE UP (ref 5–8)
PLATELET # BLD AUTO: 302 K/UL — SIGNIFICANT CHANGE UP (ref 150–400)
PO2 BLDV: 134 MMHG — HIGH (ref 25–45)
POTASSIUM BLDV-SCNC: 3.5 MMOL/L — SIGNIFICANT CHANGE UP (ref 3.5–5.1)
POTASSIUM SERPL-MCNC: 3.1 MMOL/L — LOW (ref 3.5–5.3)
POTASSIUM SERPL-MCNC: 3.9 MMOL/L — SIGNIFICANT CHANGE UP (ref 3.5–5.3)
POTASSIUM SERPL-SCNC: 3.1 MMOL/L — LOW (ref 3.5–5.3)
POTASSIUM SERPL-SCNC: 3.9 MMOL/L — SIGNIFICANT CHANGE UP (ref 3.5–5.3)
PROT SERPL-MCNC: 7.6 G/DL — SIGNIFICANT CHANGE UP (ref 6.6–8.7)
PROT UR-MCNC: 300 MG/DL
RBC # BLD: 4.29 M/UL — SIGNIFICANT CHANGE UP (ref 3.8–5.2)
RBC # FLD: 14.2 % — SIGNIFICANT CHANGE UP (ref 10.3–14.5)
RBC CASTS # UR COMP ASSIST: 10 /HPF — HIGH (ref 0–4)
RSV RNA NPH QL NAA+NON-PROBE: SIGNIFICANT CHANGE UP
SAO2 % BLDV: 99.4 % — SIGNIFICANT CHANGE UP
SARS-COV-2 RNA SPEC QL NAA+PROBE: SIGNIFICANT CHANGE UP
SODIUM SERPL-SCNC: 138 MMOL/L — SIGNIFICANT CHANGE UP (ref 135–145)
SODIUM SERPL-SCNC: 141 MMOL/L — SIGNIFICANT CHANGE UP (ref 135–145)
SP GR SPEC: 1.03 — SIGNIFICANT CHANGE UP (ref 1–1.03)
SQUAMOUS # UR AUTO: 20 /HPF — HIGH (ref 0–5)
UROBILINOGEN FLD QL: 1 MG/DL — SIGNIFICANT CHANGE UP (ref 0.2–1)
WBC # BLD: 9.5 K/UL — SIGNIFICANT CHANGE UP (ref 3.8–10.5)
WBC # FLD AUTO: 9.5 K/UL — SIGNIFICANT CHANGE UP (ref 3.8–10.5)
WBC UR QL: 8 /HPF — HIGH (ref 0–5)

## 2024-01-30 PROCEDURE — 99223 1ST HOSP IP/OBS HIGH 75: CPT

## 2024-01-30 PROCEDURE — 71045 X-RAY EXAM CHEST 1 VIEW: CPT | Mod: 26

## 2024-01-30 PROCEDURE — 99285 EMERGENCY DEPT VISIT HI MDM: CPT

## 2024-01-30 PROCEDURE — 99497 ADVNCD CARE PLAN 30 MIN: CPT | Mod: 25

## 2024-01-30 PROCEDURE — 74177 CT ABD & PELVIS W/CONTRAST: CPT | Mod: 26,MA

## 2024-01-30 RX ORDER — CEFTRIAXONE 500 MG/1
1000 INJECTION, POWDER, FOR SOLUTION INTRAMUSCULAR; INTRAVENOUS EVERY 24 HOURS
Refills: 0 | Status: DISCONTINUED | OUTPATIENT
Start: 2024-01-30 | End: 2024-02-04

## 2024-01-30 RX ORDER — ONDANSETRON 8 MG/1
4 TABLET, FILM COATED ORAL ONCE
Refills: 0 | Status: COMPLETED | OUTPATIENT
Start: 2024-01-30 | End: 2024-01-30

## 2024-01-30 RX ORDER — SODIUM CHLORIDE 9 MG/ML
1000 INJECTION, SOLUTION INTRAVENOUS ONCE
Refills: 0 | Status: COMPLETED | OUTPATIENT
Start: 2024-01-30 | End: 2024-01-30

## 2024-01-30 RX ORDER — VANCOMYCIN HCL 1 G
1000 VIAL (EA) INTRAVENOUS ONCE
Refills: 0 | Status: COMPLETED | OUTPATIENT
Start: 2024-01-30 | End: 2024-01-30

## 2024-01-30 RX ORDER — MAGNESIUM SULFATE 500 MG/ML
2 VIAL (ML) INJECTION ONCE
Refills: 0 | Status: COMPLETED | OUTPATIENT
Start: 2024-01-30 | End: 2024-01-30

## 2024-01-30 RX ORDER — POTASSIUM CHLORIDE 20 MEQ
40 PACKET (EA) ORAL ONCE
Refills: 0 | Status: COMPLETED | OUTPATIENT
Start: 2024-01-30 | End: 2024-01-30

## 2024-01-30 RX ORDER — CEFEPIME 1 G/1
INJECTION, POWDER, FOR SOLUTION INTRAMUSCULAR; INTRAVENOUS
Refills: 0 | Status: DISCONTINUED | OUTPATIENT
Start: 2024-01-30 | End: 2024-01-30

## 2024-01-30 RX ORDER — HYDROMORPHONE HYDROCHLORIDE 2 MG/ML
0.5 INJECTION INTRAMUSCULAR; INTRAVENOUS; SUBCUTANEOUS EVERY 6 HOURS
Refills: 0 | Status: DISCONTINUED | OUTPATIENT
Start: 2024-01-30 | End: 2024-02-06

## 2024-01-30 RX ORDER — CEFEPIME 1 G/1
2000 INJECTION, POWDER, FOR SOLUTION INTRAMUSCULAR; INTRAVENOUS ONCE
Refills: 0 | Status: COMPLETED | OUTPATIENT
Start: 2024-01-30 | End: 2024-01-30

## 2024-01-30 RX ORDER — ACETAMINOPHEN 500 MG
1000 TABLET ORAL ONCE
Refills: 0 | Status: COMPLETED | OUTPATIENT
Start: 2024-01-30 | End: 2024-01-30

## 2024-01-30 RX ORDER — LANOLIN ALCOHOL/MO/W.PET/CERES
3 CREAM (GRAM) TOPICAL AT BEDTIME
Refills: 0 | Status: DISCONTINUED | OUTPATIENT
Start: 2024-01-30 | End: 2024-02-10

## 2024-01-30 RX ORDER — HEPARIN SODIUM 5000 [USP'U]/ML
5000 INJECTION INTRAVENOUS; SUBCUTANEOUS EVERY 12 HOURS
Refills: 0 | Status: DISCONTINUED | OUTPATIENT
Start: 2024-01-30 | End: 2024-02-10

## 2024-01-30 RX ORDER — METRONIDAZOLE 500 MG
500 TABLET ORAL ONCE
Refills: 0 | Status: COMPLETED | OUTPATIENT
Start: 2024-01-30 | End: 2024-01-30

## 2024-01-30 RX ORDER — ACETAMINOPHEN 500 MG
650 TABLET ORAL EVERY 6 HOURS
Refills: 0 | Status: DISCONTINUED | OUTPATIENT
Start: 2024-01-30 | End: 2024-02-10

## 2024-01-30 RX ORDER — METRONIDAZOLE 500 MG
500 TABLET ORAL EVERY 8 HOURS
Refills: 0 | Status: DISCONTINUED | OUTPATIENT
Start: 2024-01-30 | End: 2024-02-04

## 2024-01-30 RX ORDER — POTASSIUM CHLORIDE 20 MEQ
10 PACKET (EA) ORAL
Refills: 0 | Status: DISCONTINUED | OUTPATIENT
Start: 2024-01-30 | End: 2024-02-02

## 2024-01-30 RX ADMIN — Medication 40 MILLIEQUIVALENT(S): at 21:26

## 2024-01-30 RX ADMIN — CEFEPIME 2000 MILLIGRAM(S): 1 INJECTION, POWDER, FOR SOLUTION INTRAMUSCULAR; INTRAVENOUS at 15:39

## 2024-01-30 RX ADMIN — ONDANSETRON 4 MILLIGRAM(S): 8 TABLET, FILM COATED ORAL at 14:47

## 2024-01-30 RX ADMIN — Medication 1000 MILLIGRAM(S): at 16:28

## 2024-01-30 RX ADMIN — SODIUM CHLORIDE 1000 MILLILITER(S): 9 INJECTION, SOLUTION INTRAVENOUS at 17:34

## 2024-01-30 RX ADMIN — SODIUM CHLORIDE 1000 MILLILITER(S): 9 INJECTION, SOLUTION INTRAVENOUS at 15:29

## 2024-01-30 RX ADMIN — Medication 100 MILLIEQUIVALENT(S): at 19:45

## 2024-01-30 RX ADMIN — Medication 250 MILLIGRAM(S): at 16:37

## 2024-01-30 RX ADMIN — Medication 650 MILLIGRAM(S): at 21:51

## 2024-01-30 RX ADMIN — SODIUM CHLORIDE 1000 MILLILITER(S): 9 INJECTION, SOLUTION INTRAVENOUS at 18:36

## 2024-01-30 RX ADMIN — Medication 25 GRAM(S): at 19:48

## 2024-01-30 RX ADMIN — Medication 0.5 MILLIGRAM(S): at 14:47

## 2024-01-30 RX ADMIN — Medication 100 MILLIEQUIVALENT(S): at 21:34

## 2024-01-30 RX ADMIN — Medication 100 MILLIGRAM(S): at 15:31

## 2024-01-30 RX ADMIN — Medication 400 MILLIGRAM(S): at 15:28

## 2024-01-30 RX ADMIN — Medication 500 MILLIGRAM(S): at 17:00

## 2024-01-30 NOTE — ED PROVIDER NOTE - PROGRESS NOTE DETAILS
JK - CT c/f enterocolitis vs. terminal ileitis. CT c/f MERVAT, AGMA secondary to mervat and lactic acidosis; improving s/p IVF, triple abx coverage. Lactic acidosis improving. Electrolytes repleted. GI study sent given voluminous diarrhea. Will admit to medicine for further monitoring.

## 2024-01-30 NOTE — H&P ADULT - NSHPSOURCEINFOTX_GEN_ALL_CORE
Pt seen and examined prior to midnight. Pt seen and examined prior to midnight.  Sister Kika cell: [phone], work: [phone]

## 2024-01-30 NOTE — H&P ADULT - NSICDXPASTMEDICALHX_GEN_ALL_CORE_FT
PAST MEDICAL HISTORY:  Cigarette smoker     Hyperlipidemia     Hypertension     Hypothyroid     Major depression     Osteoarthritis     Peripheral arterial disease

## 2024-01-30 NOTE — ED ADULT NURSE REASSESSMENT NOTE - NS ED NURSE REASSESS COMMENT FT1
Assumed care of pt at 19:15 as stated in report from MICHELLE Izaguirre. Charting as noted. Patient A&O x4, pt denies any pain/discomfort, denies CP/SOB. Updated on the plan of care. Call bell within reach, bed locked in lowest position. IV site flushed w/ NS. No redness, swelling or pain noted to site. No signs of acute distress noted, safety maintained. Pt remains on CM in NSR.

## 2024-01-30 NOTE — H&P ADULT - CONVERSATION DETAILS
Pt AOx3, makes her own medical decisions.  Pt has not previously thought about her wishes in a medical emergency wants more time to think about it.  Pt amenable to receiving resuscitative measures with CPR, mechanical ventilation in event of cardiac arrest for now.  Pt aware to notify medical team of any change in decision.  Pt is FULL CODE.

## 2024-01-30 NOTE — ED PROVIDER NOTE - WR ORDER STATUS 1
Patient calling to state bed bugs have left bumps that are itchy.    Patient looking for a cream or something to help with this.    Writer sent patient to triage as well to get clinical advice and doc to help the covering provider with what's happening as the provider is out today.    Call Back   [phone]    Performed

## 2024-01-30 NOTE — ED PROVIDER NOTE - CLINICAL SUMMARY MEDICAL DECISION MAKING FREE TEXT BOX
Patient is a 77 y/o F, every-day (1/2 pack/day) smoker, w/ PMH of HTN, HLD, anxiety, and hypothyroidism who presents to the ED with a 1 day history of abdominal pain. Febrile, tachycardic, vss otherwise       Will hydrate, give antipyretics, IVF, broad spectrum abx, check labs, r.o electrolyte abnormalities, bcx vbg to r.o sepsis vs. bacteremia and end organ dysfunction, CT A/P to r.o diverticulitis vs. colitis vs. gastroenteritis, reassess.

## 2024-01-30 NOTE — H&P ADULT - NSHPLABSRESULTS_GEN_ALL_CORE
01-30    138  |  104  |  32.9<H>  ----------------------------<  122<H>  3.1<L>   |  18.0<L>  |  1.57<H>    Ca    8.1<L>      30 Jan 2024 17:40  Mg     1.8     01-30    TPro  7.6  /  Alb  3.4  /  TBili  0.4  /  DBili  x   /  AST  15  /  ALT  6   /  AlkPhos  105  01-30                            13.2   9.50  )-----------( 302      ( 30 Jan 2024 14:45 )             40.6    EKG- sinus tachycardia, , QTc 501

## 2024-01-30 NOTE — H&P ADULT - ASSESSMENT
78yoF hx active smoker, HTN, HLD, presenting with abdominal pain, non-bloody watery diarrhea, on admission found to be febrile w/ Tmax 105, tachycardic with CT A/P showing diffuse small bowel and colonic inflammation, most pronounced in the right lower quadrant involving the distal ileum concerning for Crohn’s disease    Sepsis due to enterocolitis  -CT A/P as above, etiology presumably infectious given acuity   -s/p vancomycin, flagyl, cefepime by ED  -Start ceftriaxone  -Avoid quinolone use due to prolonged QT  -Continue w/ flagyl   -Clear liquid diet for now  -Lactate 4.1, s/p 3L IVF, repeat 2.7, will trend  -Blood cx and GI PCR pending  -Check inflammatory markers  -GI consulted for AM given concern for Crohn’s on imaging    Electrolyte abnormality  -K+ 3.1, receiving IV/PO repletion by ED  -Corrected Ca WNL  -Prolonged QTc on EKG, repeat after repletion  -Telemetry    MERVAT w/ AGMA  -Pre-renal from hypovolemia  -Improving with IVF  -Hold ACEi, resume when resolves  -Avoid nephrotoxins     Hx PAD/HTN/HLD  -Resume ASA, cilostazol and statin  -Hold enalapril due to MERVAT    Hx hypothyroidism  -Levothyroxine 75mcg daily     Hx depression/anxiety  -Sertraline 200mg daily    Hx active smoker  -Nicotine patch    Prophylactic measure  -Heparin 5000U BID 78yoF hx active smoker, HTN, HLD, PAD, presenting with abdominal pain, non-bloody watery diarrhea, on admission found to be febrile w/ Tmax 105, tachycardic with CT A/P showing diffuse small bowel and colonic inflammation, most pronounced in the right lower quadrant involving the distal ileum concerning for Crohn’s disease    Sepsis due to enterocolitis  -CT A/P as above, etiology presumably infectious given acuity   -s/p vancomycin, flagyl, cefepime by ED  -Start ceftriaxone  -Avoid quinolone use due to prolonged QT  -Continue w/ flagyl   -Clear liquid diet for now  -Lactate 4.1, s/p 3L IVF, repeat 2.7, will trend  -Blood cx and GI PCR pending  -Check inflammatory markers  -GI consulted for AM given concern for Crohn’s on imaging    Electrolyte abnormality  -K+ 3.1, receiving IV/PO repletion by ED  -Corrected Ca WNL  -Prolonged QTc on EKG, repeat after repletion  -Telemetry    MERVAT w/ AGMA  -Pre-renal from hypovolemia  -Improving with IVF  -Hold ACEi, resume when resolves  -Avoid nephrotoxins     Hx PAD, HTN, HLD  -Resume ASA, cilostazol and statin  -Hold enalapril due to MERVAT    Hx hypothyroidism  -Levothyroxine 75mcg daily     Hx depression/anxiety  -Sertraline 200mg daily    Hx active smoker  -Nicotine patch    Prophylactic measure  -Heparin 5000U BID 78yoF hx active smoker, HTN, HLD, PAD, presenting with abdominal pain, non-bloody watery diarrhea, on admission found to be febrile w/ Tmax 105, tachycardic with CT A/P showing diffuse small bowel and colonic inflammation, most pronounced in the right lower quadrant involving the distal ileum concerning for Crohn’s disease    Sepsis due to enterocolitis  -CT A/P as above, etiology presumably infectious given acuity   -s/p vancomycin, flagyl, cefepime by ED  -Start ceftriaxone  -Avoid quinolone use due to prolonged QT  -Continue w/ flagyl   -Clear liquid diet for now  -Lactate 4.1, s/p 3L IVF, repeat 2.7, will trend  -Blood cx and GI PCR pending  -Check inflammatory markers  -GI consulted for AM given concern for Crohn’s on imaging    Electrolyte abnormality  -K+ 3.1, receiving IV/PO repletion by ED  -Corrected Ca WNL  -Prolonged QTc on EKG, repeat after repletion  -Telemetry    MERVAT w/ AGMA  -Pre-renal from hypovolemia  -Improving with IVF  -Hold ACEi, resume when resolves  -Avoid nephrotoxins     Hx PAD, HTN, HLD  -Resume ASA, cilostazol and statin  -Hold enalapril due to MERVAT    Hx hypothyroidism  -Levothyroxine 75mcg daily     Hx depression/anxiety  -Sertraline 200mg daily    Hx overactive bladder  -Myrbetriq 50mg daily    Hx active smoker  -Nicotine patch    Prophylactic measure  -Heparin 5000U BID

## 2024-01-30 NOTE — ED PROVIDER NOTE - PHYSICAL EXAMINATION
Gen: no acute distress  Head: normocephalic, atraumatic  Lung: CTAB, no respiratory distress, no wheezing, rales, rhonchi  CV: Tachycardic, regular rhythm   Abd: soft, ttp in the lower quadrant, no peritoneal signs   MSK: No edema, no visible deformities, full range of motion in all 4 extremities  Neuro: No focal neurologic deficits

## 2024-01-30 NOTE — ED PROVIDER NOTE - OBJECTIVE STATEMENT
Patient is a 79 y/o every-day (1/2 pack/day) smoker F w/ PMH of HTN, HLD, anxiety, and hypothyroidism who presents to the ED with a 1 day history of abdominal pain. Questionable history per pt.'s sister, who has not been with her. Sister notes patient developed severe abdominal pain yesterday afternoon; patient believes strained abdominal muscle is responsible for the pain. However, sister reports subjective fever yesterday as well persistent NB diarrhea. Sister reports no vomiting yesterday. Patient denies hx. of abdominal surgery, drinking, or recent NSAID use. Denies CP, SOB, headache, numbness, or tingling.     ED: Patient vomited NBNB in front of staff. Very anxious/shaking. Given IV zofran for nausea, IV ativan for anxiety. 1L LR bolus.     Meds: Atorvastatin 40 qD, Famotidine 20 BID, Levothyroxine 75 qD, Meloxicam 15 qD, Enalapril 5 qD, Cilostazol 100 BID, Sertraline 100 BID, ASA 81 qD, Myrbetriq ER 50 qD. Patient is a 77 y/o F, every-day (1/2 pack/day) smoker, w/ PMH of HTN, HLD, anxiety, and hypothyroidism who presents to the ED with a 1 day history of abdominal pain. Questionable history per pt.'s sister, who has not been with her during this time. Sister notes patient developed severe abdominal pain yesterday afternoon; patient believes she a abdominal muscle is responsible for her pain. However, sister reports subjective fever yesterday as well persistent non-bloody diarrhea throughout yesterday afternoon and evening. Sister denies patient vomiting yesterday. Patient denies hx. of abdominal surgery, drinking, or recent NSAID use. Denies chills, cough, CP, SOB, headache, numbness, tingling, cough, congestion, dysuria, hematuria, hematochezia, hematemesis, recent travel, sick contacts, or leg swelling.    ED: Patient vomited NBNB in front of staff. Very anxious/shaking. Given IV zofran for nausea, IV ativan for anxiety. 1L LR bolus.     Meds: Atorvastatin 40 qD, Famotidine 20 BID, Levothyroxine 75 qD, Meloxicam 15 qD, Enalapril 5 qD, Cilostazol 100 BID, Sertraline 100 BID, ASA 81 qD, Myrbetriq ER 50 qD. Patient is a 79 y/o F, every-day (1/2 pack/day) smoker, w/ PMH of HTN, HLD, anxiety, and hypothyroidism who presents to the ED with a 1 day history of abdominal pain. Questionable history per pt.'s sister, who has not been with her during this time. Sister notes patient developed severe abdominal pain yesterday afternoon; patient believes she a abdominal muscle is responsible for her pain. However, sister reports subjective fever yesterday as well persistent non-bloody diarrhea throughout yesterday afternoon and evening. Sister denies patient vomiting yesterday. Patient denies hx. of abdominal surgery, drinking, or recent NSAID use. Denies chills, cough, CP, SOB, headache, numbness, tingling, cough, congestion, dysuria, hematuria, hematochezia, hematemesis, recent travel, sick contacts, or leg swelling.    ED: Temp: 105F. Patient vomited NBNB in front of staff. Very anxious/shaking. Given IV zofran for nausea, IV ativan for anxiety. 1L LR bolus.     Meds: Atorvastatin 40 qD, Famotidine 20 BID, Levothyroxine 75 qD, Meloxicam 15 qD, Enalapril 5 qD, Cilostazol 100 BID, Sertraline 100 BID, ASA 81 qD, Myrbetriq ER 50 qD.

## 2024-01-30 NOTE — H&P ADULT - NSHPPHYSICALEXAM_GEN_ALL_CORE
Vital Signs Last 24 Hrs  T(C): 36.9 (30 Jan 2024 21:57), Max: 40.6 (30 Jan 2024 15:22)  T(F): 98.5 (30 Jan 2024 21:57), Max: 105.1 (30 Jan 2024 15:22)  HR: 99 (30 Jan 2024 21:41) (92 - 122)  BP: 118/73 (30 Jan 2024 21:41) (97/71 - 143/86)  BP(mean): --  RR: 20 (30 Jan 2024 21:41) (16 - 22)  SpO2: 96% (30 Jan 2024 21:41) (92% - 97%)    Parameters below as of 30 Jan 2024 21:41  Patient On (Oxygen Delivery Method): room air    GENERAL:  Tired appearing elderly female, not in acute distress  EYES:  Clear conjunctiva, extraocular movement intact  ENT: Dry mucous membranes  RESP:  Non-labored breathing pattern, lungs clear to ausculation in anterior fields  CV: Regular rate and rhythm, no murmurs appreciated, no lower extremity edema  GI: Soft, lower abdominal tenderness, non-distended  NEURO: Awake, alert, conversant, able to lift arms and legs against gravity, light touch sensation grossly intact  PSYCH: Calm, cooperative  SKIN: No rash or lesions, warm and dry

## 2024-01-30 NOTE — ED ADULT NURSE NOTE - OBJECTIVE STATEMENT
pt alert and oriented x4 states on friday she reached for something and has had abd pain since with  vomitting. respirations even unlabored. pt tremulous, states she has anxiety

## 2024-01-30 NOTE — ED PROVIDER NOTE - ATTENDING CONTRIBUTION TO CARE
I, Filiberto Ro, performed a face to face bedside interview with this patient regarding history of present illness, and completed an independent physical examination. I personally made/approved the management plan and take responsibility for the patient management. I have communicated the patient’s plan of care and disposition with the resident.  78 year old female presents with 1 day of abd pain, diarrhea, generalized weakness  Gen: NAD, well appearing  CV: RRR  Pul: CTA b/l  Abd: Soft, non-distended, b/l lower  quadrant ttp  Neuro: no focal deficits  Pt met sepsis criteria with suspected intra-abd source, admitted

## 2024-01-30 NOTE — H&P ADULT - HISTORY OF PRESENT ILLNESS
78yoF hx active smoker, HTN, HLD, presenting with abdominal pain and diarrhea.  She reports few days of lower abdominal pain followed by onset of watery, non-bloody diarrhea and nausea and vomiting for the past day.  Pt also reports subjective fevers.  On admission, pt febrile with Tmax of 105, tachycardic with HR in 120s.  Labs show MERVAT, AMGA, hypokalemia. CT A/P remarkable for diffuse small bowel and colonic inflammation, most pronounced in the right lower quadrant involving the distal ileum concerning for Crohn’s disease. Sister at bedside reports FHx of colitis in mother but not sure if she had IBD diagnosis. 78yoF hx active smoker, HTN, HLD, PAD, presenting with abdominal pain and diarrhea.  She reports few days of lower abdominal pain followed by onset of watery, non-bloody diarrhea and nausea and vomiting for the past day.  Pt also reports subjective fevers.  On admission, pt febrile with Tmax of 105, tachycardic with HR in 120s.  Labs show MERVAT, AMGA, hypokalemia. CT A/P remarkable for diffuse small bowel and colonic inflammation, most pronounced in the right lower quadrant involving the distal ileum concerning for Crohn’s disease. Sister at bedside reports FHx of colitis in mother but not sure if she had IBD diagnosis.

## 2024-01-30 NOTE — H&P ADULT - TIME BILLING
chart review, patient encounter, chart documentation.  Plan discussed with patient and sister at bedside.

## 2024-01-31 DIAGNOSIS — Z98.890 OTHER SPECIFIED POSTPROCEDURAL STATES: Chronic | ICD-10-CM

## 2024-01-31 LAB
ALBUMIN SERPL ELPH-MCNC: 2.8 G/DL — LOW (ref 3.3–5.2)
ALP SERPL-CCNC: 71 U/L — SIGNIFICANT CHANGE UP (ref 40–120)
ALT FLD-CCNC: 6 U/L — SIGNIFICANT CHANGE UP
ANION GAP SERPL CALC-SCNC: 15 MMOL/L — SIGNIFICANT CHANGE UP (ref 5–17)
ANISOCYTOSIS BLD QL: SLIGHT — SIGNIFICANT CHANGE UP
AST SERPL-CCNC: 21 U/L — SIGNIFICANT CHANGE UP
BASOPHILS # BLD AUTO: 0 K/UL — SIGNIFICANT CHANGE UP (ref 0–0.2)
BASOPHILS NFR BLD AUTO: 0 % — SIGNIFICANT CHANGE UP (ref 0–2)
BILIRUB SERPL-MCNC: 0.3 MG/DL — LOW (ref 0.4–2)
BUN SERPL-MCNC: 35.5 MG/DL — HIGH (ref 8–20)
CALCIUM SERPL-MCNC: 8.4 MG/DL — SIGNIFICANT CHANGE UP (ref 8.4–10.5)
CHLORIDE SERPL-SCNC: 105 MMOL/L — SIGNIFICANT CHANGE UP (ref 96–108)
CO2 SERPL-SCNC: 19 MMOL/L — LOW (ref 22–29)
CREAT SERPL-MCNC: 1.45 MG/DL — HIGH (ref 0.5–1.3)
CRP SERPL-MCNC: 360 MG/L — HIGH
EGFR: 37 ML/MIN/1.73M2 — LOW
EOSINOPHIL # BLD AUTO: 0 K/UL — SIGNIFICANT CHANGE UP (ref 0–0.5)
EOSINOPHIL NFR BLD AUTO: 0 % — SIGNIFICANT CHANGE UP (ref 0–6)
ERYTHROCYTE [SEDIMENTATION RATE] IN BLOOD: 69 MM/HR — HIGH (ref 0–20)
GLUCOSE SERPL-MCNC: 87 MG/DL — SIGNIFICANT CHANGE UP (ref 70–99)
HCT VFR BLD CALC: 32.7 % — LOW (ref 34.5–45)
HCV AB S/CO SERPL IA: 0.09 S/CO — SIGNIFICANT CHANGE UP (ref 0–0.99)
HCV AB SERPL-IMP: SIGNIFICANT CHANGE UP
HGB BLD-MCNC: 10.9 G/DL — LOW (ref 11.5–15.5)
LACTATE SERPL-SCNC: 1 MMOL/L — SIGNIFICANT CHANGE UP (ref 0.5–2)
LYMPHOCYTES # BLD AUTO: 0.55 K/UL — LOW (ref 1–3.3)
LYMPHOCYTES # BLD AUTO: 3.5 % — LOW (ref 13–44)
MACROCYTES BLD QL: SLIGHT — SIGNIFICANT CHANGE UP
MANUAL SMEAR VERIFICATION: SIGNIFICANT CHANGE UP
MCHC RBC-ENTMCNC: 31.2 PG — SIGNIFICANT CHANGE UP (ref 27–34)
MCHC RBC-ENTMCNC: 33.3 GM/DL — SIGNIFICANT CHANGE UP (ref 32–36)
MCV RBC AUTO: 93.7 FL — SIGNIFICANT CHANGE UP (ref 80–100)
METAMYELOCYTES # FLD: 1.7 % — HIGH (ref 0–0)
MONOCYTES # BLD AUTO: 0.41 K/UL — SIGNIFICANT CHANGE UP (ref 0–0.9)
MONOCYTES NFR BLD AUTO: 2.6 % — SIGNIFICANT CHANGE UP (ref 2–14)
NEUTROPHILS # BLD AUTO: 14.52 K/UL — HIGH (ref 1.8–7.4)
NEUTROPHILS NFR BLD AUTO: 73.1 % — SIGNIFICANT CHANGE UP (ref 43–77)
NEUTS BAND # BLD: 19.1 % — HIGH (ref 0–8)
OVALOCYTES BLD QL SMEAR: SLIGHT — SIGNIFICANT CHANGE UP
PLAT MORPH BLD: NORMAL — SIGNIFICANT CHANGE UP
PLATELET # BLD AUTO: 214 K/UL — SIGNIFICANT CHANGE UP (ref 150–400)
POIKILOCYTOSIS BLD QL AUTO: SLIGHT — SIGNIFICANT CHANGE UP
POLYCHROMASIA BLD QL SMEAR: SLIGHT — SIGNIFICANT CHANGE UP
POTASSIUM SERPL-MCNC: 3.8 MMOL/L — SIGNIFICANT CHANGE UP (ref 3.5–5.3)
POTASSIUM SERPL-SCNC: 3.8 MMOL/L — SIGNIFICANT CHANGE UP (ref 3.5–5.3)
PROCALCITONIN SERPL-MCNC: 57.98 NG/ML — HIGH (ref 0.02–0.1)
PROT SERPL-MCNC: 6.3 G/DL — LOW (ref 6.6–8.7)
RBC # BLD: 3.49 M/UL — LOW (ref 3.8–5.2)
RBC # FLD: 14.1 % — SIGNIFICANT CHANGE UP (ref 10.3–14.5)
RBC BLD AUTO: ABNORMAL
SODIUM SERPL-SCNC: 139 MMOL/L — SIGNIFICANT CHANGE UP (ref 135–145)
WBC # BLD: 15.75 K/UL — HIGH (ref 3.8–10.5)
WBC # FLD AUTO: 15.75 K/UL — HIGH (ref 3.8–10.5)

## 2024-01-31 PROCEDURE — 99223 1ST HOSP IP/OBS HIGH 75: CPT

## 2024-01-31 PROCEDURE — 93010 ELECTROCARDIOGRAM REPORT: CPT | Mod: 76

## 2024-01-31 PROCEDURE — 99233 SBSQ HOSP IP/OBS HIGH 50: CPT

## 2024-01-31 RX ORDER — SERTRALINE 25 MG/1
200 TABLET, FILM COATED ORAL DAILY
Refills: 0 | Status: DISCONTINUED | OUTPATIENT
Start: 2024-01-31 | End: 2024-02-10

## 2024-01-31 RX ORDER — ATORVASTATIN CALCIUM 80 MG/1
40 TABLET, FILM COATED ORAL AT BEDTIME
Refills: 0 | Status: DISCONTINUED | OUTPATIENT
Start: 2024-01-31 | End: 2024-02-10

## 2024-01-31 RX ORDER — FAMOTIDINE 10 MG/ML
20 INJECTION INTRAVENOUS
Refills: 0 | Status: DISCONTINUED | OUTPATIENT
Start: 2024-01-31 | End: 2024-02-01

## 2024-01-31 RX ORDER — MIRABEGRON 50 MG/1
50 TABLET, EXTENDED RELEASE ORAL DAILY
Refills: 0 | Status: DISCONTINUED | OUTPATIENT
Start: 2024-01-31 | End: 2024-02-10

## 2024-01-31 RX ORDER — NICOTINE POLACRILEX 2 MG
1 GUM BUCCAL DAILY
Refills: 0 | Status: DISCONTINUED | OUTPATIENT
Start: 2024-01-31 | End: 2024-02-10

## 2024-01-31 RX ORDER — ASPIRIN/CALCIUM CARB/MAGNESIUM 324 MG
81 TABLET ORAL DAILY
Refills: 0 | Status: DISCONTINUED | OUTPATIENT
Start: 2024-01-31 | End: 2024-02-10

## 2024-01-31 RX ORDER — LEVOTHYROXINE SODIUM 125 MCG
75 TABLET ORAL DAILY
Refills: 0 | Status: DISCONTINUED | OUTPATIENT
Start: 2024-01-31 | End: 2024-02-07

## 2024-01-31 RX ORDER — CILOSTAZOL 100 MG/1
100 TABLET ORAL
Refills: 0 | Status: DISCONTINUED | OUTPATIENT
Start: 2024-01-31 | End: 2024-02-10

## 2024-01-31 RX ORDER — ALPRAZOLAM 0.25 MG
0.25 TABLET ORAL
Refills: 0 | Status: DISCONTINUED | OUTPATIENT
Start: 2024-01-31 | End: 2024-02-06

## 2024-01-31 RX ORDER — SODIUM CHLORIDE 9 MG/ML
1000 INJECTION INTRAMUSCULAR; INTRAVENOUS; SUBCUTANEOUS
Refills: 0 | Status: DISCONTINUED | OUTPATIENT
Start: 2024-01-31 | End: 2024-02-01

## 2024-01-31 RX ADMIN — HEPARIN SODIUM 5000 UNIT(S): 5000 INJECTION INTRAVENOUS; SUBCUTANEOUS at 17:33

## 2024-01-31 RX ADMIN — HEPARIN SODIUM 5000 UNIT(S): 5000 INJECTION INTRAVENOUS; SUBCUTANEOUS at 05:41

## 2024-01-31 RX ADMIN — MIRABEGRON 50 MILLIGRAM(S): 50 TABLET, EXTENDED RELEASE ORAL at 11:02

## 2024-01-31 RX ADMIN — FAMOTIDINE 20 MILLIGRAM(S): 10 INJECTION INTRAVENOUS at 17:32

## 2024-01-31 RX ADMIN — HYDROMORPHONE HYDROCHLORIDE 0.5 MILLIGRAM(S): 2 INJECTION INTRAMUSCULAR; INTRAVENOUS; SUBCUTANEOUS at 14:24

## 2024-01-31 RX ADMIN — ATORVASTATIN CALCIUM 40 MILLIGRAM(S): 80 TABLET, FILM COATED ORAL at 21:47

## 2024-01-31 RX ADMIN — Medication 100 MILLIGRAM(S): at 05:42

## 2024-01-31 RX ADMIN — HYDROMORPHONE HYDROCHLORIDE 0.5 MILLIGRAM(S): 2 INJECTION INTRAMUSCULAR; INTRAVENOUS; SUBCUTANEOUS at 13:24

## 2024-01-31 RX ADMIN — CILOSTAZOL 100 MILLIGRAM(S): 100 TABLET ORAL at 17:33

## 2024-01-31 RX ADMIN — Medication 650 MILLIGRAM(S): at 11:02

## 2024-01-31 RX ADMIN — CEFTRIAXONE 1000 MILLIGRAM(S): 500 INJECTION, POWDER, FOR SOLUTION INTRAMUSCULAR; INTRAVENOUS at 01:21

## 2024-01-31 RX ADMIN — SERTRALINE 200 MILLIGRAM(S): 25 TABLET, FILM COATED ORAL at 11:01

## 2024-01-31 RX ADMIN — FAMOTIDINE 20 MILLIGRAM(S): 10 INJECTION INTRAVENOUS at 05:41

## 2024-01-31 RX ADMIN — CILOSTAZOL 100 MILLIGRAM(S): 100 TABLET ORAL at 06:22

## 2024-01-31 RX ADMIN — HYDROMORPHONE HYDROCHLORIDE 0.5 MILLIGRAM(S): 2 INJECTION INTRAMUSCULAR; INTRAVENOUS; SUBCUTANEOUS at 20:20

## 2024-01-31 RX ADMIN — Medication 75 MICROGRAM(S): at 05:41

## 2024-01-31 RX ADMIN — Medication 100 MILLIGRAM(S): at 21:47

## 2024-01-31 RX ADMIN — Medication 650 MILLIGRAM(S): at 12:02

## 2024-01-31 RX ADMIN — Medication 100 MILLIGRAM(S): at 01:20

## 2024-01-31 RX ADMIN — Medication 100 MILLIGRAM(S): at 13:25

## 2024-01-31 RX ADMIN — Medication 0.25 MILLIGRAM(S): at 13:24

## 2024-01-31 RX ADMIN — Medication 81 MILLIGRAM(S): at 11:01

## 2024-01-31 RX ADMIN — SODIUM CHLORIDE 75 MILLILITER(S): 9 INJECTION INTRAMUSCULAR; INTRAVENOUS; SUBCUTANEOUS at 05:41

## 2024-01-31 RX ADMIN — Medication 0.25 MILLIGRAM(S): at 22:23

## 2024-01-31 RX ADMIN — HYDROMORPHONE HYDROCHLORIDE 0.5 MILLIGRAM(S): 2 INJECTION INTRAMUSCULAR; INTRAVENOUS; SUBCUTANEOUS at 06:25

## 2024-01-31 NOTE — PROGRESS NOTE ADULT - SUBJECTIVE AND OBJECTIVE BOX
YAMILETH SUSAN  ----------------------------------------  The patient was seen earlier at bedside. Patient with enterocolitis and acute kidney injury. Noted some abdominal discomfort.    Vital Signs Last 24 Hrs  T(C): 37.3 (31 Jan 2024 11:06), Max: 40.6 (30 Jan 2024 15:22)  T(F): 99.1 (31 Jan 2024 11:06), Max: 105.1 (30 Jan 2024 15:22)  HR: 81 (31 Jan 2024 11:06) (81 - 122)  BP: 105/69 (31 Jan 2024 11:06) (97/71 - 121/77)  BP(mean): --  RR: 20 (31 Jan 2024 11:06) (20 - 22)  SpO2: 93% (31 Jan 2024 11:06) (92% - 97%)    Parameters below as of 31 Jan 2024 11:06  Patient On (Oxygen Delivery Method): room air    PHYSICAL EXAMINATION:  ----------------------------------------  General appearance: No acute distress, Awake, Alert  HEENT: Normocephalic, Atraumatic, Conjunctiva clear, EOMI  Neck: Supple, No JVD, No tenderness  Lungs: Breath sound equal bilaterally, No wheezes, No rales  Cardiovascular: S1S2, Regular rhythm  Abdomen: Soft, Nontender, Nondistended, No guarding/rebound, Positive bowel sounds  Extremities: No clubbing, No cyanosis, No edema, No calf tenderness  Neuro: Strength equal bilaterally, No tremors  Psychiatric: Appropriate mood, Normal affect    LABORATORY STUDIES:  ----------------------------------------             10.9   15.75 )-----------( 214      ( 31 Jan 2024 02:45 )             32.7     01-31    139  |  105  |  35.5<H>  ----------------------------<  87  3.8   |  19.0<L>  |  1.45<H>    Ca    8.4      31 Jan 2024 02:45  Mg     1.8     01-30    TPro  6.3<L>  /  Alb  2.8<L>  /  TBili  0.3<L>  /  DBili  x   /  AST  21  /  ALT  6   /  AlkPhos  71  01-31    LIVER FUNCTIONS - ( 31 Jan 2024 02:45 )  Alb: 2.8 g/dL / Pro: 6.3 g/dL / ALK PHOS: 71 U/L / ALT: 6 U/L / AST: 21 U/L / GGT: x           Urinalysis Basic - ( 31 Jan 2024 02:45 )  Color: x / Appearance: x / SG: x / pH: x  Gluc: 87 mg/dL / Ketone: x  / Bili: x / Urobili: x   Blood: x / Protein: x / Nitrite: x   Leuk Esterase: x / RBC: x / WBC x   Sq Epi: x / Non Sq Epi: x / Bacteria: x    MEDICATIONS  (STANDING):  aspirin enteric coated 81 milliGRAM(s) Oral daily  atorvastatin 40 milliGRAM(s) Oral at bedtime  cefTRIAXone Injectable. 1000 milliGRAM(s) IV Push every 24 hours  cilostazol 100 milliGRAM(s) Oral two times a day  famotidine    Tablet 20 milliGRAM(s) Oral two times a day  heparin   Injectable 5000 Unit(s) SubCutaneous every 12 hours  levothyroxine 75 MICROGram(s) Oral daily  metroNIDAZOLE  IVPB 500 milliGRAM(s) IV Intermittent every 8 hours  mirabegron ER 50 milliGRAM(s) Oral daily  nicotine -  14 mG/24Hr(s) Patch 1 Patch Transdermal daily  potassium chloride  10 mEq/100 mL IVPB 10 milliEquivalent(s) IV Intermittent every 1 hour  sertraline 200 milliGRAM(s) Oral daily  sodium chloride 0.9%. 1000 milliLiter(s) (75 mL/Hr) IV Continuous <Continuous>    MEDICATIONS  (PRN):  acetaminophen     Tablet .. 650 milliGRAM(s) Oral every 6 hours PRN Temp greater or equal to 38C (100.4F), Mild Pain (1 - 3), Moderate Pain (4 - 6)  ALPRAZolam 0.25 milliGRAM(s) Oral two times a day PRN anxiety  HYDROmorphone  Injectable 0.5 milliGRAM(s) IV Push every 6 hours PRN Severe Pain (7 - 10)  melatonin 3 milliGRAM(s) Oral at bedtime PRN Insomnia      ASSESSMENT / PLAN:  ----------------------------------------  78yoF hx active smoker, HTN, HLD, PAD, presenting with abdominal pain, non-bloody watery diarrhea, on admission found to be febrile w/ Tmax 105, tachycardic with CT A/P showing diffuse small bowel and colonic inflammation, most pronounced in the right lower quadrant involving the distal ileum concerning for Crohn’s disease    Sepsis / Enterocolitis  - CT abdomen noted small bowel and colonic thickening with inflammation and stranding  - On ceftriaxone and metronidazole  - Lactic acid level improved on repeat studies  - On a clear liquid diet  - Gastroenterology evaluation noted   - Pending stool calprotectin and Cdiff PCR    Acute kidney injury  - Enalapril was held  - Improved with intravenous fluids  - Monitoring renal function    Hypokalemia  - Improved with supplementation    Hypertension / Peripheral artery disease  - Close blood pressure monitoring  - On aspirin and cilostazol  - On atorvastatin    Hypothyroidism  - On levothyroxine    Anxiety / Depression  - On sertraline  - Alprazolam as needed    Overactive bladder  - On mirabegron

## 2024-01-31 NOTE — CONSULT NOTE ADULT - ASSESSMENT
YAMILETH DAVIS is a 78y Female with PMHx of active smoking, HTN, HLD, PAD, presenting with abdominal pain, non-bloody watery diarrhea, on admission found to be febrile w/ Tmax 105, tachycardic with CT A/P showing diffuse small bowel and colonic inflammation, most pronounced in the right lower quadrant involving the distal ileum concerning for Crohn’s disease    #Sepsis due to Enterocolitis, possible IBD (Crohns?)  CT A/P 1/30/24, etiology presumably infectious given acuity   - Continue CTX/Flagyl  - Avoid quinolone use due to prolonged QT  - Continue Clear Liquid Diet  - Lactate 4.1, s/p 3L IVF, repeat 2.7, and now 1.0 in blood  - Continue workup for infectious source, WBC uptrending  - Drop in Hgb likely due to hemoconcentration; no concerns for active bleeding  - GI PCR (-); LFTs normal; Lipase normal; no concerns for appendicitis  - Consider CDiff; Blood Cx; and further infectious workup  - Order Stool Calprotectin  - ESR: 69; Procal: 58; CRP: 360 -> can be elevated in setting of any infection, non-specific  - CT A/P shows diffuse bowel thickening, can consider eventual EGD/Colonoscopy down the line, but need to isolate infectious source  - Treatment for Crohn's can be considered further down the line given above reasons and treatment with high risks (steroids, immunomodulators)   - Will follow

## 2024-01-31 NOTE — PATIENT PROFILE ADULT - FUNCTIONAL ASSESSMENT - BASIC MOBILITY 1.
Results addressed in separate encounter. 4 = No assist / stand by assistance 3 = A little assistance

## 2024-01-31 NOTE — PATIENT PROFILE ADULT - FUNCTIONAL ASSESSMENT - BASIC MOBILITY 6.
3-calculated by average/Not able to assess (calculate score using [facility] averaging method)  3-calculated by average/Not able to assess (calculate score using [facility] averaging method)

## 2024-01-31 NOTE — PATIENT PROFILE ADULT - FALL HARM RISK - PATIENT NEEDS ASSISTANCE
Standing/Walking/Toileting Standing/Walking/Toileting/Moving from bed to chair Walking/Toileting/Moving from bed to chair

## 2024-01-31 NOTE — CONSULT NOTE ADULT - SUBJECTIVE AND OBJECTIVE BOX
Chief Complaint:  Patient is a 78y old  Female who presents with a chief complaint of Sepsis due to enterocolitis (30 Jan 2024 21:57)    Patient is a 78y old  Female who presents with a chief complaint of Sepsis due to enterocolitis (30 Jan 2024 21:57)    HPI:  78yoF with PMHx of active smoking, HTN, HLD, PAD, presenting with abdominal pain and diarrhea.  She reports 2-3 days of lower abdominal pain followed by onset of watery, non-bloody diarrhea 2-3 times per day and nausea/vomiting for the past day.  Pt also reports subjective fevers. On admission, pt febrile with Tmax of 105, tachycardic with HR in 120s.  Labs show MERVAT, AMGA, hypokalemia. CT A/P remarkable for diffuse small bowel and colonic inflammation, most pronounced in the right lower quadrant involving the distal ileum concerning for Crohn’s disease. Patient reports FHx of colitis in mother but not sure if she had IBD diagnosis. Patient has never had EGD/Colonoscopy in past      PAST MEDICAL & SURGICAL HISTORY:  Hypothyroid  Hypertension  Peripheral arterial disease  Cigarette smoker  Major depression  Osteoarthritis  Hyperlipidemia  S/P ORIF (open reduction internal fixation) fracture          REVIEW OF SYSTEMS:   General: Negative  HEENT: Negative  CV: Negative  Respiratory: Negative  GI: See HPI  : Negative  MSK: Negative  Hematologic: Negative  Skin: Negative    MEDICATIONS:   MEDICATIONS  (STANDING):  aspirin enteric coated 81 milliGRAM(s) Oral daily  atorvastatin 40 milliGRAM(s) Oral at bedtime  cefTRIAXone Injectable. 1000 milliGRAM(s) IV Push every 24 hours  cilostazol 100 milliGRAM(s) Oral two times a day  famotidine    Tablet 20 milliGRAM(s) Oral two times a day  heparin   Injectable 5000 Unit(s) SubCutaneous every 12 hours  levothyroxine 75 MICROGram(s) Oral daily  metroNIDAZOLE  IVPB 500 milliGRAM(s) IV Intermittent every 8 hours  mirabegron ER 50 milliGRAM(s) Oral daily  nicotine -  14 mG/24Hr(s) Patch 1 Patch Transdermal daily  potassium chloride  10 mEq/100 mL IVPB 10 milliEquivalent(s) IV Intermittent every 1 hour  sertraline 200 milliGRAM(s) Oral daily  sodium chloride 0.9%. 1000 milliLiter(s) (75 mL/Hr) IV Continuous <Continuous>    MEDICATIONS  (PRN):  acetaminophen     Tablet .. 650 milliGRAM(s) Oral every 6 hours PRN Temp greater or equal to 38C (100.4F), Mild Pain (1 - 3), Moderate Pain (4 - 6)  HYDROmorphone  Injectable 0.5 milliGRAM(s) IV Push every 6 hours PRN Severe Pain (7 - 10)  melatonin 3 milliGRAM(s) Oral at bedtime PRN Insomnia          DIET:  Diet, Clear Liquid (01-30-24 @ 21:08) [Active]          ALLERGIES:   Allergies    No Known Allergies    Intolerances        Substance Use:   ( X ) never used  (  ) other:  Tobacco Usage:  (   ) never smoked   (   ) former smoker   ( X  ) current smoker  (     ) pack year  (        ) last cigarette date  Alcohol Usage: None    Family History   IBD (  ) Yes   ( X ) No  GI Malignancy (  )  Yes    ( X ) No    Health Management  Last Colonoscopy: Never  Last Endoscopy: Never    VITAL SIGNS:   Vital Signs Last 24 Hrs  T(C): 37 (31 Jan 2024 07:49), Max: 40.6 (30 Jan 2024 15:22)  T(F): 98.6 (31 Jan 2024 07:49), Max: 105.1 (30 Jan 2024 15:22)  HR: 81 (31 Jan 2024 07:49) (81 - 122)  BP: 121/77 (31 Jan 2024 07:49) (97/71 - 143/86)  BP(mean): --  RR: 20 (31 Jan 2024 07:49) (16 - 22)  SpO2: 96% (31 Jan 2024 07:49) (92% - 97%)    Parameters below as of 31 Jan 2024 07:49  Patient On (Oxygen Delivery Method): room air      I&O's Summary      PHYSICAL EXAM:   GENERAL:  No acute distress  HEENT:  NC/AT, conjunctiva clear, sclera anicteric  CHEST:  No increased effort  HEART:  Regular rate  ABDOMEN:  Soft, non-distended, normoactive bowel sounds; diffuse abdominal tenderness, mostly in RLQ 8/10 with slight palpation with guarding, diffuse pain in epigastric region/LLQ/RLQ. No rebound tenderness. No pain on RUQ/LUQ  EXTREMITIES: No edema  SKIN:  Warm, dry  NEURO:  Calm, cooperative    LABS:                        10.9   15.75 )-----------( 214      ( 31 Jan 2024 02:45 )             32.7     Hemoglobin: 10.9 g/dL (01-31-24 @ 02:45)  Hemoglobin: 13.2 g/dL (01-30-24 @ 14:45)    01-31    139  |  105  |  35.5<H>  ----------------------------<  87  3.8   |  19.0<L>  |  1.45<H>    Ca    8.4      31 Jan 2024 02:45  Mg     1.8     01-30    TPro  6.3<L>  /  Alb  2.8<L>  /  TBili  0.3<L>  /  DBili  x   /  AST  21  /  ALT  6   /  AlkPhos  71  01-31    LIVER FUNCTIONS - ( 31 Jan 2024 02:45 )  Alb: 2.8 g/dL / Pro: 6.3 g/dL / ALK PHOS: 71 U/L / ALT: 6 U/L / AST: 21 U/L / GGT: x                 Lipase: 8 U/L (01-30-24 @ 14:45)      GI PCR Panel: NotDetec (01-30-24 @ 15:17)          RADIOLOGY & ADDITIONAL STUDIES:      ACC: [id number] EXAM:  CT ABDOMEN AND PELVIS IC   ORDERED BY: BERNARDO FAN     PROCEDURE DATE:  01/30/2024          INTERPRETATION:  CLINICAL INFORMATION: Abdominal pain    COMPARISON: None.    CONTRAST/COMPLICATIONS:  IV Contrast: Omnipaque 350  78 ccadministered   22 cc discarded  Oral Contrast: NONE  Complications: None reported at time of study completion    PROCEDURE:  CT of the Abdomen and Pelvis was performed.  Sagittal and coronal reformats were performed.    FINDINGS:  LOWER CHEST: Bibasilar atelectasis.    LIVER: Within normal limits.  BILE DUCTS: Normal caliber.  GALLBLADDER: Within normal limits.  SPLEEN: Within normal limits.  PANCREAS: Within normal limits.  ADRENALS: Within normal limits.  KIDNEYS/URETERS: No renal stones or hydronephrosis. Bilateral renal cysts.    BLADDER: Minimally distended.  REPRODUCTIVE ORGANS: Uterus and adnexa within normal limits.    BOWEL: Small hiatal hernia. No bowel obstruction. Diffusely dilated small   bowel with air-fluid levels measuring up to 2.4 cm in diameter. Diffuse   small bowel wall thickening with surrounding inflammation, worse in the   right lower quadrant involving the distal ileum. Colon is decompressed   but there appears to be diffuse colonic wall thickening and pericolonic  stranding. Colonic diverticulosis without evidence of acute   diverticulitis. The appendix is not seen.  PERITONEUM: Mild abdominopelvic ascites.  VESSELS: Within normal limits.  RETROPERITONEUM/LYMPH NODES: No lymphadenopathy.  ABDOMINAL WALL: Within normal limits.  BONES: Right proximal femur ORIF. Degenerative changes.    IMPRESSION:  Diffuse small bowel and colonic inflammation, most pronounced in the   right lower quadrant involving the distal ileum. Findings may be related   to infectiousor inflammatory enterocolitis (including Crohn's disease).    Small volume abdominopelvic ascites, likely reactive.    --- End of Report ---            MEGA LIEBERMAN MD; Attending Radiologist  This document has been electronically signed. Jan 30 2024  7:25PM  01-30-24 @ 19:04

## 2024-01-31 NOTE — PATIENT PROFILE ADULT - FALL HARM RISK - HARM RISK INTERVENTIONS
Assistance with ambulation/Assistance OOB with selected safe patient handling equipment/Communicate Risk of Fall with Harm to all staff/Reinforce activity limits and safety measures with patient and family/Tailored Fall Risk Interventions/Visual Cue: Yellow wristband and red socks/Bed in lowest position, wheels locked, appropriate side rails in place/Call bell, personal items and telephone in reach/Instruct patient to call for assistance before getting out of bed or chair/Non-slip footwear when patient is out of bed/[place] to call system/Physically safe environment - no spills, clutter or unnecessary equipment/Purposeful Proactive Rounding/Room/bathroom lighting operational, light cord in reach Assistance with ambulation/Assistance OOB with selected safe patient handling equipment/Communicate Risk of Fall with Harm to all staff/Discuss with provider need for PT consult/Monitor gait and stability/Provide patient with walking aids - walker, cane, crutches/Reinforce activity limits and safety measures with patient and family/Tailored Fall Risk Interventions/Visual Cue: Yellow wristband and red socks/Bed in lowest position, wheels locked, appropriate side rails in place/Call bell, personal items and telephone in reach/Instruct patient to call for assistance before getting out of bed or chair/Non-slip footwear when patient is out of bed/[place] to call system/Physically safe environment - no spills, clutter or unnecessary equipment/Purposeful Proactive Rounding/Room/bathroom lighting operational, light cord in reach Assistance with ambulation/Assistance OOB with selected safe patient handling equipment/Communicate Risk of Fall with Harm to all staff/Reinforce activity limits and safety measures with patient and family/Tailored Fall Risk Interventions/Visual Cue: Yellow wristband and red socks/Bed in lowest position, wheels locked, appropriate side rails in place/Call bell, personal items and telephone in reach/Instruct patient to call for assistance before getting out of bed or chair/Non-slip footwear when patient is out of bed/[place] to call system/Physically safe environment - no spills, clutter or unnecessary equipment/Purposeful Proactive Rounding/Room/bathroom lighting operational, light cord in reach

## 2024-01-31 NOTE — CONSULT NOTE ADULT - ATTENDING COMMENTS
I agree with assessment and plan as above. 79 yo F who presented with acute onset abdominal pain, diarrhea, nausea and vomiting. In ED, febrile to 105. Labs significant for leukocytosis and elevated inflammatory markers. GI PCR is negative. Obtain cdiff and continue full infectious work up - F/u blood and urine cultures, RVP. F/u fecal calprotectin, although would be elevated in setting of infection, regardless of possibility of underlying IBD. Continue IVF and IV antibiotics to cover for colitis. No plans for urgent endoscopic work up, would defer until resolution of colitis (6-8 wks).

## 2024-02-01 DIAGNOSIS — K52.9 NONINFECTIVE GASTROENTERITIS AND COLITIS, UNSPECIFIED: ICD-10-CM

## 2024-02-01 LAB
ANION GAP SERPL CALC-SCNC: 12 MMOL/L — SIGNIFICANT CHANGE UP (ref 5–17)
ANION GAP SERPL CALC-SCNC: 14 MMOL/L — SIGNIFICANT CHANGE UP (ref 5–17)
BUN SERPL-MCNC: 42.6 MG/DL — HIGH (ref 8–20)
BUN SERPL-MCNC: 45.6 MG/DL — HIGH (ref 8–20)
CALCIUM SERPL-MCNC: 8.1 MG/DL — LOW (ref 8.4–10.5)
CALCIUM SERPL-MCNC: 8.6 MG/DL — SIGNIFICANT CHANGE UP (ref 8.4–10.5)
CHLORIDE SERPL-SCNC: 108 MMOL/L — SIGNIFICANT CHANGE UP (ref 96–108)
CHLORIDE SERPL-SCNC: 109 MMOL/L — HIGH (ref 96–108)
CO2 SERPL-SCNC: 18 MMOL/L — LOW (ref 22–29)
CO2 SERPL-SCNC: 19 MMOL/L — LOW (ref 22–29)
CREAT SERPL-MCNC: 1.49 MG/DL — HIGH (ref 0.5–1.3)
CREAT SERPL-MCNC: 1.58 MG/DL — HIGH (ref 0.5–1.3)
CULTURE RESULTS: SIGNIFICANT CHANGE UP
EGFR: 34 ML/MIN/1.73M2 — LOW
EGFR: 36 ML/MIN/1.73M2 — LOW
GLUCOSE SERPL-MCNC: 98 MG/DL — SIGNIFICANT CHANGE UP (ref 70–99)
GLUCOSE SERPL-MCNC: 98 MG/DL — SIGNIFICANT CHANGE UP (ref 70–99)
HCT VFR BLD CALC: 29.8 % — LOW (ref 34.5–45)
HGB BLD-MCNC: 9.6 G/DL — LOW (ref 11.5–15.5)
MAGNESIUM SERPL-MCNC: 2 MG/DL — SIGNIFICANT CHANGE UP (ref 1.8–2.6)
MCHC RBC-ENTMCNC: 30 PG — SIGNIFICANT CHANGE UP (ref 27–34)
MCHC RBC-ENTMCNC: 32.2 GM/DL — SIGNIFICANT CHANGE UP (ref 32–36)
MCV RBC AUTO: 93.1 FL — SIGNIFICANT CHANGE UP (ref 80–100)
PHOSPHATE SERPL-MCNC: 2.6 MG/DL — SIGNIFICANT CHANGE UP (ref 2.4–4.7)
PLATELET # BLD AUTO: 235 K/UL — SIGNIFICANT CHANGE UP (ref 150–400)
POTASSIUM SERPL-MCNC: 3.4 MMOL/L — LOW (ref 3.5–5.3)
POTASSIUM SERPL-MCNC: 3.5 MMOL/L — SIGNIFICANT CHANGE UP (ref 3.5–5.3)
POTASSIUM SERPL-SCNC: 3.4 MMOL/L — LOW (ref 3.5–5.3)
POTASSIUM SERPL-SCNC: 3.5 MMOL/L — SIGNIFICANT CHANGE UP (ref 3.5–5.3)
RBC # BLD: 3.2 M/UL — LOW (ref 3.8–5.2)
RBC # FLD: 14.3 % — SIGNIFICANT CHANGE UP (ref 10.3–14.5)
SODIUM SERPL-SCNC: 139 MMOL/L — SIGNIFICANT CHANGE UP (ref 135–145)
SODIUM SERPL-SCNC: 140 MMOL/L — SIGNIFICANT CHANGE UP (ref 135–145)
SPECIMEN SOURCE: SIGNIFICANT CHANGE UP
WBC # BLD: 13.4 K/UL — HIGH (ref 3.8–10.5)
WBC # FLD AUTO: 13.4 K/UL — HIGH (ref 3.8–10.5)

## 2024-02-01 PROCEDURE — 99233 SBSQ HOSP IP/OBS HIGH 50: CPT

## 2024-02-01 RX ORDER — ONDANSETRON 8 MG/1
4 TABLET, FILM COATED ORAL ONCE
Refills: 0 | Status: COMPLETED | OUTPATIENT
Start: 2024-02-01 | End: 2024-02-01

## 2024-02-01 RX ORDER — SODIUM CHLORIDE 9 MG/ML
1000 INJECTION INTRAMUSCULAR; INTRAVENOUS; SUBCUTANEOUS
Refills: 0 | Status: DISCONTINUED | OUTPATIENT
Start: 2024-02-01 | End: 2024-02-02

## 2024-02-01 RX ORDER — CALCIUM CARBONATE 500(1250)
2 TABLET ORAL ONCE
Refills: 0 | Status: COMPLETED | OUTPATIENT
Start: 2024-02-01 | End: 2024-02-01

## 2024-02-01 RX ORDER — ACETAMINOPHEN 500 MG
1000 TABLET ORAL ONCE
Refills: 0 | Status: COMPLETED | OUTPATIENT
Start: 2024-02-01 | End: 2024-02-01

## 2024-02-01 RX ORDER — ONDANSETRON 8 MG/1
4 TABLET, FILM COATED ORAL ONCE
Refills: 0 | Status: DISCONTINUED | OUTPATIENT
Start: 2024-02-01 | End: 2024-02-01

## 2024-02-01 RX ORDER — SIMETHICONE 80 MG/1
80 TABLET, CHEWABLE ORAL ONCE
Refills: 0 | Status: COMPLETED | OUTPATIENT
Start: 2024-02-01 | End: 2024-02-01

## 2024-02-01 RX ORDER — PANTOPRAZOLE SODIUM 20 MG/1
40 TABLET, DELAYED RELEASE ORAL
Refills: 0 | Status: DISCONTINUED | OUTPATIENT
Start: 2024-02-01 | End: 2024-02-06

## 2024-02-01 RX ORDER — POTASSIUM CHLORIDE 20 MEQ
10 PACKET (EA) ORAL
Refills: 0 | Status: COMPLETED | OUTPATIENT
Start: 2024-02-01 | End: 2024-02-01

## 2024-02-01 RX ADMIN — Medication 100 MILLIEQUIVALENT(S): at 05:46

## 2024-02-01 RX ADMIN — Medication 400 MILLIGRAM(S): at 01:07

## 2024-02-01 RX ADMIN — ONDANSETRON 4 MILLIGRAM(S): 8 TABLET, FILM COATED ORAL at 18:38

## 2024-02-01 RX ADMIN — Medication 100 MILLIGRAM(S): at 13:50

## 2024-02-01 RX ADMIN — ONDANSETRON 4 MILLIGRAM(S): 8 TABLET, FILM COATED ORAL at 00:16

## 2024-02-01 RX ADMIN — HYDROMORPHONE HYDROCHLORIDE 0.5 MILLIGRAM(S): 2 INJECTION INTRAMUSCULAR; INTRAVENOUS; SUBCUTANEOUS at 12:23

## 2024-02-01 RX ADMIN — CILOSTAZOL 100 MILLIGRAM(S): 100 TABLET ORAL at 17:16

## 2024-02-01 RX ADMIN — CEFTRIAXONE 1000 MILLIGRAM(S): 500 INJECTION, POWDER, FOR SOLUTION INTRAMUSCULAR; INTRAVENOUS at 01:07

## 2024-02-01 RX ADMIN — Medication 100 MILLIEQUIVALENT(S): at 03:12

## 2024-02-01 RX ADMIN — CILOSTAZOL 100 MILLIGRAM(S): 100 TABLET ORAL at 05:47

## 2024-02-01 RX ADMIN — SODIUM CHLORIDE 100 MILLILITER(S): 9 INJECTION INTRAMUSCULAR; INTRAVENOUS; SUBCUTANEOUS at 13:51

## 2024-02-01 RX ADMIN — Medication 2 TABLET(S): at 20:14

## 2024-02-01 RX ADMIN — HEPARIN SODIUM 5000 UNIT(S): 5000 INJECTION INTRAVENOUS; SUBCUTANEOUS at 17:16

## 2024-02-01 RX ADMIN — SERTRALINE 200 MILLIGRAM(S): 25 TABLET, FILM COATED ORAL at 13:51

## 2024-02-01 RX ADMIN — Medication 100 MILLIGRAM(S): at 05:47

## 2024-02-01 RX ADMIN — HEPARIN SODIUM 5000 UNIT(S): 5000 INJECTION INTRAVENOUS; SUBCUTANEOUS at 05:46

## 2024-02-01 RX ADMIN — Medication 0.25 MILLIGRAM(S): at 20:19

## 2024-02-01 RX ADMIN — Medication 81 MILLIGRAM(S): at 13:51

## 2024-02-01 RX ADMIN — Medication 75 MICROGRAM(S): at 05:46

## 2024-02-01 RX ADMIN — SIMETHICONE 80 MILLIGRAM(S): 80 TABLET, CHEWABLE ORAL at 17:16

## 2024-02-01 RX ADMIN — MIRABEGRON 50 MILLIGRAM(S): 50 TABLET, EXTENDED RELEASE ORAL at 13:50

## 2024-02-01 RX ADMIN — Medication 100 MILLIGRAM(S): at 23:08

## 2024-02-01 RX ADMIN — HYDROMORPHONE HYDROCHLORIDE 0.5 MILLIGRAM(S): 2 INJECTION INTRAMUSCULAR; INTRAVENOUS; SUBCUTANEOUS at 23:09

## 2024-02-01 RX ADMIN — Medication 100 MILLIEQUIVALENT(S): at 04:17

## 2024-02-01 RX ADMIN — ATORVASTATIN CALCIUM 40 MILLIGRAM(S): 80 TABLET, FILM COATED ORAL at 23:09

## 2024-02-01 RX ADMIN — FAMOTIDINE 20 MILLIGRAM(S): 10 INJECTION INTRAVENOUS at 05:47

## 2024-02-01 RX ADMIN — HYDROMORPHONE HYDROCHLORIDE 0.5 MILLIGRAM(S): 2 INJECTION INTRAMUSCULAR; INTRAVENOUS; SUBCUTANEOUS at 13:30

## 2024-02-01 NOTE — PROGRESS NOTE ADULT - SUBJECTIVE AND OBJECTIVE BOX
Chief Complaint:  Patient is a 78y old  Female who presents with a chief complaint of Sepsis due to enterocolitis (31 Jan 2024 14:16)    Interval HPI/ 24 hr events: Patient seen and examined at bedside. Requesting to advance diet as feeling of not being full, tolerating diet. Patient with complaints of reflux. Reports 3 nonbloody bowel episodes of diarrhea with associated with abdomina cramping. Denies nausea, vomiting, hematemesis, hematochezia, melena. Vitals are overall stable, leukocytosis improving, afebrile overnight.      REVIEW OF SYSTEMS:   General: Negative  HEENT: Negative  CV: Negative  Respiratory: Negative  GI: See HPI  : Negative  MSK: Negative  Hematologic: Negative  Skin: Negative    MEDICATIONS:   MEDICATIONS  (STANDING):  aspirin enteric coated 81 milliGRAM(s) Oral daily  atorvastatin 40 milliGRAM(s) Oral at bedtime  cefTRIAXone Injectable. 1000 milliGRAM(s) IV Push every 24 hours  cilostazol 100 milliGRAM(s) Oral two times a day  famotidine    Tablet 20 milliGRAM(s) Oral two times a day  heparin   Injectable 5000 Unit(s) SubCutaneous every 12 hours  levothyroxine 75 MICROGram(s) Oral daily  metroNIDAZOLE  IVPB 500 milliGRAM(s) IV Intermittent every 8 hours  mirabegron ER 50 milliGRAM(s) Oral daily  nicotine -  14 mG/24Hr(s) Patch 1 Patch Transdermal daily  potassium chloride  10 mEq/100 mL IVPB 10 milliEquivalent(s) IV Intermittent every 1 hour  sertraline 200 milliGRAM(s) Oral daily  sodium chloride 0.9%. 1000 milliLiter(s) (100 mL/Hr) IV Continuous <Continuous>    MEDICATIONS  (PRN):  acetaminophen     Tablet .. 650 milliGRAM(s) Oral every 6 hours PRN Temp greater or equal to 38C (100.4F), Mild Pain (1 - 3), Moderate Pain (4 - 6)  ALPRAZolam 0.25 milliGRAM(s) Oral two times a day PRN anxiety  HYDROmorphone  Injectable 0.5 milliGRAM(s) IV Push every 6 hours PRN Severe Pain (7 - 10)  melatonin 3 milliGRAM(s) Oral at bedtime PRN Insomnia      DIET:  Diet, Regular:   Fiber/Residue Restricted (LOWFIBER)  Lactose Restricted (Milk Sugar Intoler.) (02-01-24 @ 09:15) [Active]        ALLERGIES:   Allergies    No Known Allergies    Intolerances        VITAL SIGNS:   Vital Signs Last 24 Hrs  T(C): 36.9 (01 Feb 2024 08:03), Max: 37.3 (31 Jan 2024 11:06)  T(F): 98.4 (01 Feb 2024 08:03), Max: 99.1 (31 Jan 2024 11:06)  HR: 67 (01 Feb 2024 08:03) (67 - 116)  BP: 99/60 (01 Feb 2024 08:03) (99/60 - 138/80)  BP(mean): --  RR: 20 (01 Feb 2024 08:03) (20 - 20)  SpO2: 97% (01 Feb 2024 08:03) (90% - 97%)    Parameters below as of 01 Feb 2024 08:03  Patient On (Oxygen Delivery Method): room air      I&O's Summary      PHYSICAL EXAM:   GENERAL:  No acute distress  HEENT:  NC/AT, conjunctiva clear, sclera anicteric  CHEST:  No increased effort  HEART:  Regular rate  ABDOMEN:  Soft, + tenderness, non-distended, normoactive bowel sounds, no rebound or guarding  EXTREMITIES: No edema  SKIN:  Warm, dry  NEURO:  Calm, cooperative    LABS:                        9.6    13.40 )-----------( 235      ( 01 Feb 2024 02:53 )             29.8     Hemoglobin: 9.6 g/dL (02-01-24 @ 02:53)  Hemoglobin: 10.9 g/dL (01-31-24 @ 02:45)  Hemoglobin: 13.2 g/dL (01-30-24 @ 14:45)    02-01    139  |  109<H>  |  45.6<H>  ----------------------------<  98  3.4<L>   |  19.0<L>  |  1.58<H>    Ca    8.1<L>      01 Feb 2024 02:53  Phos  2.6     01-31  Mg     2.0     01-31    TPro  6.3<L>  /  Alb  2.8<L>  /  TBili  0.3<L>  /  DBili  x   /  AST  21  /  ALT  6   /  AlkPhos  71  01-31    LIVER FUNCTIONS - ( 31 Jan 2024 02:45 )  Alb: 2.8 g/dL / Pro: 6.3 g/dL / ALK PHOS: 71 U/L / ALT: 6 U/L / AST: 21 U/L / GGT: x           Culture - Urine (collected 30 Jan 2024 15:40)  Source: Clean Catch Clean Catch (Midstream)  Final Report (01 Feb 2024 00:37):    <10,000 CFU/mL Normal Urogenital Nimco    Culture - Blood (collected 30 Jan 2024 15:22)  Source: .Blood Blood  Preliminary Report (31 Jan 2024 22:02):    No growth at 24 hours    Culture - Blood (collected 30 Jan 2024 15:17)  Source: .Blood Blood  Preliminary Report (31 Jan 2024 22:02):    No growth at 24 hours    Hepatitis C Virus S/CO Ratio: 0.09 S/CO (01-31-24 @ 02:45)      GI PCR Panel: NotDetec (01-30-24 @ 15:17)    RADIOLOGY & ADDITIONAL STUDIES:      ACC: [id number] EXAM:  CT ABDOMEN AND PELVIS IC   ORDERED BY: BERNARDO FAN     PROCEDURE DATE:  01/30/2024          INTERPRETATION:  CLINICAL INFORMATION: Abdominal pain    COMPARISON: None.    CONTRAST/COMPLICATIONS:  IV Contrast: Omnipaque 350  78 ccadministered   22 cc discarded  Oral Contrast: NONE  Complications: None reported at time of study completion    PROCEDURE:  CT of the Abdomen and Pelvis was performed.  Sagittal and coronal reformats were performed.    FINDINGS:  LOWER CHEST: Bibasilar atelectasis.    LIVER: Within normal limits.  BILE DUCTS: Normal caliber.  GALLBLADDER: Within normal limits.  SPLEEN: Within normal limits.  PANCREAS: Within normal limits.  ADRENALS: Within normal limits.  KIDNEYS/URETERS: No renal stones or hydronephrosis. Bilateral renal cysts.    BLADDER: Minimally distended.  REPRODUCTIVE ORGANS: Uterus and adnexa within normal limits.    BOWEL: Small hiatal hernia. No bowel obstruction. Diffusely dilated small   bowel with air-fluid levels measuring up to 2.4 cm in diameter. Diffuse   small bowel wall thickening with surrounding inflammation, worse in the   right lower quadrant involving the distal ileum. Colon is decompressed   but there appears to be diffuse colonic wall thickening and pericolonic  stranding. Colonic diverticulosis without evidence of acute   diverticulitis. The appendix is not seen.  PERITONEUM: Mild abdominopelvic ascites.  VESSELS: Within normal limits.  RETROPERITONEUM/LYMPH NODES: No lymphadenopathy.  ABDOMINAL WALL: Within normal limits.  BONES: Right proximal femur ORIF. Degenerative changes.    IMPRESSION:  Diffuse small bowel and colonic inflammation, most pronounced in the   right lower quadrant involving the distal ileum. Findings may be related   to infectiousor inflammatory enterocolitis (including Crohn's disease).    Small volume abdominopelvic ascites, likely reactive.    --- End of Report ---      MEGA LIEBERMAN MD; Attending Radiologist  This document has been electronically signed. Jan 30 2024  7:25PM  01-30-24 @ 19:04       Chief Complaint:  Patient is a 78y old  Female who presents with a chief complaint of Sepsis due to enterocolitis (31 Jan 2024 14:16)    Interval HPI/ 24 hr events: Patient seen and examined at bedside. Requesting to advance diet as feeling of not being full, tolerating diet. Patient with complaints of reflux. Reports 3 nonbloody bowel episodes of diarrhea with associated with abdomina cramping. Denies nausea, vomiting, hematemesis, hematochezia, melena. Vitals are overall stable, leukocytosis improving, afebrile overnight.      REVIEW OF SYSTEMS:   General: Negative  HEENT: Negative  CV: Negative  Respiratory: Negative  GI: See HPI  : Negative  MSK: Negative  Hematologic: Negative  Skin: Negative    MEDICATIONS:   MEDICATIONS  (STANDING):  aspirin enteric coated 81 milliGRAM(s) Oral daily  atorvastatin 40 milliGRAM(s) Oral at bedtime  cefTRIAXone Injectable. 1000 milliGRAM(s) IV Push every 24 hours  cilostazol 100 milliGRAM(s) Oral two times a day  famotidine    Tablet 20 milliGRAM(s) Oral two times a day  heparin   Injectable 5000 Unit(s) SubCutaneous every 12 hours  levothyroxine 75 MICROGram(s) Oral daily  metroNIDAZOLE  IVPB 500 milliGRAM(s) IV Intermittent every 8 hours  mirabegron ER 50 milliGRAM(s) Oral daily  nicotine -  14 mG/24Hr(s) Patch 1 Patch Transdermal daily  potassium chloride  10 mEq/100 mL IVPB 10 milliEquivalent(s) IV Intermittent every 1 hour  sertraline 200 milliGRAM(s) Oral daily  sodium chloride 0.9%. 1000 milliLiter(s) (100 mL/Hr) IV Continuous <Continuous>    MEDICATIONS  (PRN):  acetaminophen     Tablet .. 650 milliGRAM(s) Oral every 6 hours PRN Temp greater or equal to 38C (100.4F), Mild Pain (1 - 3), Moderate Pain (4 - 6)  ALPRAZolam 0.25 milliGRAM(s) Oral two times a day PRN anxiety  HYDROmorphone  Injectable 0.5 milliGRAM(s) IV Push every 6 hours PRN Severe Pain (7 - 10)  melatonin 3 milliGRAM(s) Oral at bedtime PRN Insomnia      DIET:  Diet, Regular:   Fiber/Residue Restricted (LOWFIBER)  Lactose Restricted (Milk Sugar Intoler.) (02-01-24 @ 09:15) [Active]        ALLERGIES:   Allergies    No Known Allergies    Intolerances        VITAL SIGNS:   Vital Signs Last 24 Hrs  T(C): 36.9 (01 Feb 2024 08:03), Max: 37.3 (31 Jan 2024 11:06)  T(F): 98.4 (01 Feb 2024 08:03), Max: 99.1 (31 Jan 2024 11:06)  HR: 67 (01 Feb 2024 08:03) (67 - 116)  BP: 99/60 (01 Feb 2024 08:03) (99/60 - 138/80)  BP(mean): --  RR: 20 (01 Feb 2024 08:03) (20 - 20)  SpO2: 97% (01 Feb 2024 08:03) (90% - 97%)    Parameters below as of 01 Feb 2024 08:03  Patient On (Oxygen Delivery Method): room air      I&O's Summary      PHYSICAL EXAM:   GENERAL:  No acute distress  HEENT:  NC/AT, conjunctiva clear, sclera anicteric  CHEST:  No increased effort  HEART:  Regular rate  ABDOMEN:  Soft, + tenderness, non-distended, normoactive bowel sounds, no rebound or guarding  EXTREMITIES: No edema  SKIN:  Warm, dry  NEURO:  Calm, cooperative    LABS:                        9.6    13.40 )-----------( 235      ( 01 Feb 2024 02:53 )             29.8     Hemoglobin: 9.6 g/dL (02-01-24 @ 02:53)  Hemoglobin: 10.9 g/dL (01-31-24 @ 02:45)  Hemoglobin: 13.2 g/dL (01-30-24 @ 14:45)    02-01    139  |  109<H>  |  45.6<H>  ----------------------------<  98  3.4<L>   |  19.0<L>  |  1.58<H>    Ca    8.1<L>      01 Feb 2024 02:53  Phos  2.6     01-31  Mg     2.0     01-31    TPro  6.3<L>  /  Alb  2.8<L>  /  TBili  0.3<L>  /  DBili  x   /  AST  21  /  ALT  6   /  AlkPhos  71  01-31    LIVER FUNCTIONS - ( 31 Jan 2024 02:45 )  Alb: 2.8 g/dL / Pro: 6.3 g/dL / ALK PHOS: 71 U/L / ALT: 6 U/L / AST: 21 U/L / GGT: x           Culture - Urine (collected 30 Jan 2024 15:40)  Source: Clean Catch Clean Catch (Midstream)  Final Report (01 Feb 2024 00:37):    <10,000 CFU/mL Normal Urogenital Nimco    Culture - Blood (collected 30 Jan 2024 15:22)  Source: .Blood Blood  Preliminary Report (31 Jan 2024 22:02):    No growth at 24 hours    Culture - Blood (collected 30 Jan 2024 15:17)  Source: .Blood Blood  Preliminary Report (31 Jan 2024 22:02):    No growth at 24 hours    Hepatitis C Virus S/CO Ratio: 0.09 S/CO (01-31-24 @ 02:45)      GI PCR Panel: NotDetec (01-30-24 @ 15:17)    RADIOLOGY & ADDITIONAL STUDIES:      ACC: [id number] EXAM:  CT ABDOMEN AND PELVIS IC   ORDERED BY: BERNARDO FAN     PROCEDURE DATE:  01/30/2024          INTERPRETATION:  CLINICAL INFORMATION: Abdominal pain    COMPARISON: None.    CONTRAST/COMPLICATIONS:  IV Contrast: Omnipaque 350  78 cc administered   22 cc discarded  Oral Contrast: NONE  Complications: None reported at time of study completion    PROCEDURE:  CT of the Abdomen and Pelvis was performed.  Sagittal and coronal reformats were performed.    FINDINGS:  LOWER CHEST: Bibasilar atelectasis.    LIVER: Within normal limits.  BILE DUCTS: Normal caliber.  GALLBLADDER: Within normal limits.  SPLEEN: Within normal limits.  PANCREAS: Within normal limits.  ADRENALS: Within normal limits.  KIDNEYS/URETERS: No renal stones or hydronephrosis. Bilateral renal cysts.    BLADDER: Minimally distended.  REPRODUCTIVE ORGANS: Uterus and adnexa within normal limits.    BOWEL: Small hiatal hernia. No bowel obstruction. Diffusely dilated small   bowel with air-fluid levels measuring up to 2.4 cm in diameter. Diffuse   small bowel wall thickening with surrounding inflammation, worse in the   right lower quadrant involving the distal ileum. Colon is decompressed   but there appears to be diffuse colonic wall thickening and pericolonic  stranding. Colonic diverticulosis without evidence of acute   diverticulitis. The appendix is not seen.  PERITONEUM: Mild abdominopelvic ascites.  VESSELS: Within normal limits.  RETROPERITONEUM/LYMPH NODES: No lymphadenopathy.  ABDOMINAL WALL: Within normal limits.  BONES: Right proximal femur ORIF. Degenerative changes.    IMPRESSION:  Diffuse small bowel and colonic inflammation, most pronounced in the   right lower quadrant involving the distal ileum. Findings may be related   to infectious or inflammatory enterocolitis (including Crohn's disease).    Small volume abdominopelvic ascites, likely reactive.    --- End of Report ---      MEGA LIEBERMAN MD; Attending Radiologist  This document has been electronically signed. Jan 30 2024  7:25PM  01-30-24 @ 19:04

## 2024-02-01 NOTE — PROGRESS NOTE ADULT - SUBJECTIVE AND OBJECTIVE BOX
YAMILETH SUSAN  ----------------------------------------  The patient was seen earlier at bedside. Patient with enterocolitis. Mild abdominal discomfort but hungry. Denied chest pain or dyspnea.    Vital Signs Last 24 Hrs  T(C): 36.8 (01 Feb 2024 12:17), Max: 37.1 (31 Jan 2024 23:37)  T(F): 98.2 (01 Feb 2024 12:17), Max: 98.8 (31 Jan 2024 23:37)  HR: 105 (01 Feb 2024 12:17) (67 - 116)  BP: 121/75 (01 Feb 2024 12:17) (99/60 - 138/80)  BP(mean): 90 (01 Feb 2024 12:17) (90 - 90)  RR: 20 (01 Feb 2024 12:17) (20 - 20)  SpO2: 93% (01 Feb 2024 12:17) (90% - 97%)    Parameters below as of 01 Feb 2024 12:17  Patient On (Oxygen Delivery Method): nasal cannula  O2 Flow (L/min): 2    PHYSICAL EXAMINATION:  ----------------------------------------  General appearance: No acute distress, Awake, Alert  HEENT: Normocephalic, Atraumatic, Conjunctiva clear, EOMI  Neck: Supple, No JVD, No tenderness  Lungs: Breath sound equal bilaterally, No wheezes, No rales  Cardiovascular: S1S2, Regular rhythm  Abdomen: Soft, Nontender, Nondistended, No guarding/rebound, Positive bowel sounds  Extremities: No clubbing, No cyanosis, No edema, No calf tenderness  Neuro: Strength equal bilaterally, No tremors  Psychiatric: Appropriate mood, Normal affect    LABORATORY STUDIES:  ----------------------------------------             9.6    13.40 )-----------( 235      ( 01 Feb 2024 02:53 )             29.8     02-01    139  |  109<H>  |  45.6<H>  ----------------------------<  98  3.4<L>   |  19.0<L>  |  1.58<H>    Ca    8.1<L>      01 Feb 2024 02:53  Phos  2.6     01-31  Mg     2.0     01-31    TPro  6.3<L>  /  Alb  2.8<L>  /  TBili  0.3<L>  /  DBili  x   /  AST  21  /  ALT  6   /  AlkPhos  71  01-31    LIVER FUNCTIONS - ( 31 Jan 2024 02:45 )  Alb: 2.8 g/dL / Pro: 6.3 g/dL / ALK PHOS: 71 U/L / ALT: 6 U/L / AST: 21 U/L / GGT: x           Urinalysis Basic - ( 01 Feb 2024 02:53 )  Color: x / Appearance: x / SG: x / pH: x  Gluc: 98 mg/dL / Ketone: x  / Bili: x / Urobili: x   Blood: x / Protein: x / Nitrite: x   Leuk Esterase: x / RBC: x / WBC x   Sq Epi: x / Non Sq Epi: x / Bacteria: x    Culture - Urine (collected 30 Jan 2024 15:40)  Source: Clean Catch Clean Catch (Midstream)  Final Report (01 Feb 2024 00:37):    <10,000 CFU/mL Normal Urogenital Nimco    Culture - Blood (collected 30 Jan 2024 15:22)  Source: .Blood Blood  Preliminary Report (31 Jan 2024 22:02):    No growth at 24 hours    Culture - Blood (collected 30 Jan 2024 15:17)  Source: .Blood Blood  Preliminary Report (31 Jan 2024 22:02):    No growth at 24 hours    MEDICATIONS  (STANDING):  aspirin enteric coated 81 milliGRAM(s) Oral daily  atorvastatin 40 milliGRAM(s) Oral at bedtime  cefTRIAXone Injectable. 1000 milliGRAM(s) IV Push every 24 hours  cilostazol 100 milliGRAM(s) Oral two times a day  heparin   Injectable 5000 Unit(s) SubCutaneous every 12 hours  levothyroxine 75 MICROGram(s) Oral daily  metroNIDAZOLE  IVPB 500 milliGRAM(s) IV Intermittent every 8 hours  mirabegron ER 50 milliGRAM(s) Oral daily  nicotine -  14 mG/24Hr(s) Patch 1 Patch Transdermal daily  pantoprazole    Tablet 40 milliGRAM(s) Oral before breakfast  potassium chloride  10 mEq/100 mL IVPB 10 milliEquivalent(s) IV Intermittent every 1 hour  sertraline 200 milliGRAM(s) Oral daily  sodium chloride 0.9%. 1000 milliLiter(s) (100 mL/Hr) IV Continuous <Continuous>    MEDICATIONS  (PRN):  acetaminophen     Tablet .. 650 milliGRAM(s) Oral every 6 hours PRN Temp greater or equal to 38C (100.4F), Mild Pain (1 - 3), Moderate Pain (4 - 6)  ALPRAZolam 0.25 milliGRAM(s) Oral two times a day PRN anxiety  HYDROmorphone  Injectable 0.5 milliGRAM(s) IV Push every 6 hours PRN Severe Pain (7 - 10)  melatonin 3 milliGRAM(s) Oral at bedtime PRN Insomnia      ASSESSMENT / PLAN:  ----------------------------------------  78yoF hx active smoker, HTN, HLD, PAD, presenting with abdominal pain, non-bloody watery diarrhea, on admission found to be febrile w/ Tmax 105, tachycardic with CT A/P showing diffuse small bowel and colonic inflammation, most pronounced in the right lower quadrant involving the distal ileum concerning for Crohn’s disease    Sepsis / Enterocolitis  - CT abdomen noted small bowel and colonic thickening with inflammation and stranding  - On ceftriaxone and metronidazole  - Lactic acid level improved on repeat studies  - Discussed with Gastroenterology, to advance the diet as tolerated  - Pending stool calprotectin results    Acute kidney injury  - Enalapril was held  - Improved with intravenous fluids  - Monitoring renal function    Hypokalemia  - Improved with supplementation    Hypertension / Peripheral artery disease  - Close blood pressure monitoring  - On aspirin and cilostazol  - On atorvastatin    Hypothyroidism  - On levothyroxine    Anxiety / Depression  - On sertraline  - Alprazolam as needed    Overactive bladder  - On mirabegron YAMILETH SUSAN  ----------------------------------------  The patient was seen earlier at bedside. Patient with enterocolitis. Mild abdominal discomfort but hungry. Denied chest pain or dyspnea.    Vital Signs Last 24 Hrs  T(C): 36.8 (01 Feb 2024 12:17), Max: 37.1 (31 Jan 2024 23:37)  T(F): 98.2 (01 Feb 2024 12:17), Max: 98.8 (31 Jan 2024 23:37)  HR: 105 (01 Feb 2024 12:17) (67 - 116)  BP: 121/75 (01 Feb 2024 12:17) (99/60 - 138/80)  BP(mean): 90 (01 Feb 2024 12:17) (90 - 90)  RR: 20 (01 Feb 2024 12:17) (20 - 20)  SpO2: 93% (01 Feb 2024 12:17) (90% - 97%)    Parameters below as of 01 Feb 2024 12:17  Patient On (Oxygen Delivery Method): nasal cannula  O2 Flow (L/min): 2    PHYSICAL EXAMINATION:  ----------------------------------------  General appearance: No acute distress, Awake, Alert  HEENT: Normocephalic, Atraumatic, Conjunctiva clear, EOMI  Neck: Supple, No JVD, No tenderness  Lungs: Breath sound equal bilaterally, No wheezes, No rales  Cardiovascular: S1S2, Regular rhythm  Abdomen: Soft, Nontender, Nondistended, No guarding/rebound, Positive bowel sounds  Extremities: No clubbing, No cyanosis, No edema, No calf tenderness  Neuro: Strength equal bilaterally, No tremors  Psychiatric: Appropriate mood, Normal affect    LABORATORY STUDIES:  ----------------------------------------             9.6    13.40 )-----------( 235      ( 01 Feb 2024 02:53 )             29.8     02-01    139  |  109<H>  |  45.6<H>  ----------------------------<  98  3.4<L>   |  19.0<L>  |  1.58<H>    Ca    8.1<L>      01 Feb 2024 02:53  Phos  2.6     01-31  Mg     2.0     01-31    TPro  6.3<L>  /  Alb  2.8<L>  /  TBili  0.3<L>  /  DBili  x   /  AST  21  /  ALT  6   /  AlkPhos  71  01-31    LIVER FUNCTIONS - ( 31 Jan 2024 02:45 )  Alb: 2.8 g/dL / Pro: 6.3 g/dL / ALK PHOS: 71 U/L / ALT: 6 U/L / AST: 21 U/L / GGT: x           Urinalysis Basic - ( 01 Feb 2024 02:53 )  Color: x / Appearance: x / SG: x / pH: x  Gluc: 98 mg/dL / Ketone: x  / Bili: x / Urobili: x   Blood: x / Protein: x / Nitrite: x   Leuk Esterase: x / RBC: x / WBC x   Sq Epi: x / Non Sq Epi: x / Bacteria: x    Culture - Urine (collected 30 Jan 2024 15:40)  Source: Clean Catch Clean Catch (Midstream)  Final Report (01 Feb 2024 00:37):    <10,000 CFU/mL Normal Urogenital Nimco    Culture - Blood (collected 30 Jan 2024 15:22)  Source: .Blood Blood  Preliminary Report (31 Jan 2024 22:02):    No growth at 24 hours    Culture - Blood (collected 30 Jan 2024 15:17)  Source: .Blood Blood  Preliminary Report (31 Jan 2024 22:02):    No growth at 24 hours    MEDICATIONS  (STANDING):  aspirin enteric coated 81 milliGRAM(s) Oral daily  atorvastatin 40 milliGRAM(s) Oral at bedtime  cefTRIAXone Injectable. 1000 milliGRAM(s) IV Push every 24 hours  cilostazol 100 milliGRAM(s) Oral two times a day  heparin   Injectable 5000 Unit(s) SubCutaneous every 12 hours  levothyroxine 75 MICROGram(s) Oral daily  metroNIDAZOLE  IVPB 500 milliGRAM(s) IV Intermittent every 8 hours  mirabegron ER 50 milliGRAM(s) Oral daily  nicotine -  14 mG/24Hr(s) Patch 1 Patch Transdermal daily  pantoprazole    Tablet 40 milliGRAM(s) Oral before breakfast  potassium chloride  10 mEq/100 mL IVPB 10 milliEquivalent(s) IV Intermittent every 1 hour  sertraline 200 milliGRAM(s) Oral daily  sodium chloride 0.9%. 1000 milliLiter(s) (100 mL/Hr) IV Continuous <Continuous>    MEDICATIONS  (PRN):  acetaminophen     Tablet .. 650 milliGRAM(s) Oral every 6 hours PRN Temp greater or equal to 38C (100.4F), Mild Pain (1 - 3), Moderate Pain (4 - 6)  ALPRAZolam 0.25 milliGRAM(s) Oral two times a day PRN anxiety  HYDROmorphone  Injectable 0.5 milliGRAM(s) IV Push every 6 hours PRN Severe Pain (7 - 10)  melatonin 3 milliGRAM(s) Oral at bedtime PRN Insomnia      ASSESSMENT / PLAN:  ----------------------------------------  78yoF hx active smoker, HTN, HLD, PAD, presenting with abdominal pain, non-bloody watery diarrhea, on admission found to be febrile w/ Tmax 105, tachycardic with CT A/P showing diffuse small bowel and colonic inflammation, most pronounced in the right lower quadrant involving the distal ileum concerning for Crohn’s disease    Sepsis / Enterocolitis  - Some improvement in leukocytosis, afebrile  - CT abdomen noted small bowel and colonic thickening with inflammation and stranding  - On ceftriaxone and metronidazole  - Lactic acid level improved on repeat studies  - Discussed with Gastroenterology, to advance the diet as tolerated  - Pending stool calprotectin results    Acute kidney injury  - Enalapril was held  - Improved with intravenous fluids  - Monitoring renal function    Hypokalemia  - Improved with supplementation    Hypertension / Peripheral artery disease  - Close blood pressure monitoring  - On aspirin and cilostazol  - On atorvastatin    Hypothyroidism  - On levothyroxine    Anxiety / Depression  - On sertraline  - Alprazolam as needed    Overactive bladder  - On mirabegron

## 2024-02-01 NOTE — CHART NOTE - NSCHARTNOTEFT_GEN_A_CORE
PA NOTE-MEDICINE (LATE ENTRY)    Called by RN due to Pt experiencing frequent PVCs along with a run of Bigeminy.    79 yo F hx active smoker, HTN, HLD, PAD, presenting with abdominal pain, non-bloody watery diarrhea, on admission found to be febrile w/ Tmax 105, tachycardic with CT A/P showing diffuse small bowel and colonic inflammation, most pronounced in the right lower quadrant involving the distal ileum concerning for Crohn’s disease Dx with Sepsis / Enterocolitis  CT abdomen noted small bowel and colonic thickening with inflammation and stranding.  Currently receiving ceftriaxone and metronidazole for Colitis.     Pt Denies: CP SOB Feelings of Palpations Dizziness But states + Nausea     T(C): 37.1 (31 Jan 2024 23:37), Max: 37.3 (31 Jan 2024 11:06)  T(F): 98.8 (31 Jan 2024 23:37), Max: 99.1 (31 Jan 2024 11:06)  HR: 116 (31 Jan 2024 23:37) (81 - 116)  BP: 138/80 (31 Jan 2024 23:37) (103/68 - 138/80)  RR: 20 (31 Jan 2024 23:37) (20 - 20)  SpO2: 90% (31 Jan 2024 23:37) (90% - 96%) RA   Ordered 2 L nc     General: WDWN Female Uncomfortable States + Abd Pain + Anxious     Cardiac: S1S2 + = Sl Tachy   Lungs: CTA No R/R/W or Crackles B/L   Abd: ND + Sl tenderness Lower mid Abdomen No Rigidity/Rebound + BS x  4 Q   Integument: No pallor Warm/dry  Ext: No C/C/E x 4 Ext     A/P Eval Pt due to Run of frequent PVCs and short self limiting run of Bigeminy   Pt given PRN Xanax  EKG Stat  BMP Stat  Mag Stat  Phos Stat    EKG: Sinus @ 121 No Acute Changes from previous (Unofficial-Official read Pending)                      01-31    140  |  108  |  42.6<H>  ----------------------------<  98  3.5   |  18.0<L>  |  1.49<H>    Ca    8.6      31 Jan 2024 23:54  Phos  2.6     01-31  Mg     2.0     01-31    Repleted K to Maintain level > 4 (K Riders 10 meq x 3 ordered stat)   Gave additional Dilaudid 0.5 mg IVP x 1 Stat  Zofran 4 mg IVP x 1 Stat  With good effect     Continue to Monitor Pt  Recall PA for any Reoccurrence or for any changes in Pt status  Will sign out to AM Team

## 2024-02-01 NOTE — PROGRESS NOTE ADULT - PROBLEM SELECTOR PLAN 1
continue antibiotics  C diff pending. GI PCR negative  diarrhea and cramping improving. No fevers  advance diet to low fiber, lactose free. I discussed this with Dr foster today

## 2024-02-01 NOTE — PROGRESS NOTE ADULT - ASSESSMENT
78yoF hx active smoker, HTN, HLD, PAD, presenting with abdominal pain, non-bloody watery diarrhea, on admission found to be febrile w/ Tmax 105, tachycardic.    #Sepsis due to enterocolitis  - 1/30/2024 CT A/P w/ IC: Diffuse small bowel and colonic inflammation, most pronounced in the   right lower quadrant involving the distal ileum  - afebrile overnight  - leukocytosis improving, WBC 13.40  - GI PCR : negative  - Calprotectin pending  - C. diff cancelled by lab as sample deemed inappropriate  - Recommend advancing diet to low fiber, lactose free  - Continue antibiotics  - Recommend PPI   - Follow up outpatient 6-8 weeks for endoscopic work- up    #GERD  - Recommend PPI  - elevate head of bed  -  78yoF hx active smoker, HTN, HLD, PAD, presenting with abdominal pain, non-bloody watery diarrhea, on admission found to be febrile w/ Tmax 105, tachycardic.    #Sepsis due to enterocolitis  - 1/30/2024 CT A/P w/ IC: Diffuse small bowel and colonic inflammation, most pronounced in the   right lower quadrant involving the distal ileum  - afebrile overnight  - leukocytosis improving, WBC 13.40  - GI PCR : negative  - Calprotectin pending  - C. diff cancelled by lab as sample deemed inappropriate  - blood culture X 2: negative  - Urine culture: negative   - Recommend advancing diet to low fiber, lactose free  - Continue CTX/Flagyl  - Continue IVF  - Recommend PPI, elevate head of bed  - Follow up outpatient 6-8 weeks for endoscopic work- up  _________________________________________________________________  Assessment and recommendations are final when note is signed by the attending physician.

## 2024-02-02 DIAGNOSIS — I47.10 SUPRAVENTRICULAR TACHYCARDIA, UNSPECIFIED: ICD-10-CM

## 2024-02-02 LAB
ALBUMIN SERPL ELPH-MCNC: 2.6 G/DL — LOW (ref 3.3–5.2)
ALBUMIN SERPL ELPH-MCNC: 2.7 G/DL — LOW (ref 3.3–5.2)
ANION GAP SERPL CALC-SCNC: 11 MMOL/L — SIGNIFICANT CHANGE UP (ref 5–17)
ANION GAP SERPL CALC-SCNC: 13 MMOL/L — SIGNIFICANT CHANGE UP (ref 5–17)
ANION GAP SERPL CALC-SCNC: 14 MMOL/L — SIGNIFICANT CHANGE UP (ref 5–17)
BUN SERPL-MCNC: 35 MG/DL — HIGH (ref 8–20)
BUN SERPL-MCNC: 38.2 MG/DL — HIGH (ref 8–20)
BUN SERPL-MCNC: 42.8 MG/DL — HIGH (ref 8–20)
CALCIUM SERPL-MCNC: 8.8 MG/DL — SIGNIFICANT CHANGE UP (ref 8.4–10.5)
CALCIUM SERPL-MCNC: 8.9 MG/DL — SIGNIFICANT CHANGE UP (ref 8.4–10.5)
CALCIUM SERPL-MCNC: 9.3 MG/DL — SIGNIFICANT CHANGE UP (ref 8.4–10.5)
CHLORIDE SERPL-SCNC: 108 MMOL/L — SIGNIFICANT CHANGE UP (ref 96–108)
CHLORIDE SERPL-SCNC: 110 MMOL/L — HIGH (ref 96–108)
CHLORIDE SERPL-SCNC: 111 MMOL/L — HIGH (ref 96–108)
CO2 SERPL-SCNC: 17 MMOL/L — LOW (ref 22–29)
CO2 SERPL-SCNC: 18 MMOL/L — LOW (ref 22–29)
CO2 SERPL-SCNC: 18 MMOL/L — LOW (ref 22–29)
CREAT SERPL-MCNC: 1.07 MG/DL — SIGNIFICANT CHANGE UP (ref 0.5–1.3)
CREAT SERPL-MCNC: 1.12 MG/DL — SIGNIFICANT CHANGE UP (ref 0.5–1.3)
CREAT SERPL-MCNC: 1.28 MG/DL — SIGNIFICANT CHANGE UP (ref 0.5–1.3)
EGFR: 43 ML/MIN/1.73M2 — LOW
EGFR: 51 ML/MIN/1.73M2 — LOW
EGFR: 54 ML/MIN/1.73M2 — LOW
GLUCOSE SERPL-MCNC: 118 MG/DL — HIGH (ref 70–99)
GLUCOSE SERPL-MCNC: 81 MG/DL — SIGNIFICANT CHANGE UP (ref 70–99)
GLUCOSE SERPL-MCNC: 89 MG/DL — SIGNIFICANT CHANGE UP (ref 70–99)
HCT VFR BLD CALC: 27.8 % — LOW (ref 34.5–45)
HGB BLD-MCNC: 8.7 G/DL — LOW (ref 11.5–15.5)
MAGNESIUM SERPL-MCNC: 1.9 MG/DL — SIGNIFICANT CHANGE UP (ref 1.6–2.6)
MAGNESIUM SERPL-MCNC: 2 MG/DL — SIGNIFICANT CHANGE UP (ref 1.8–2.6)
MAGNESIUM SERPL-MCNC: 2.1 MG/DL — SIGNIFICANT CHANGE UP (ref 1.8–2.6)
MCHC RBC-ENTMCNC: 30 PG — SIGNIFICANT CHANGE UP (ref 27–34)
MCHC RBC-ENTMCNC: 31.3 GM/DL — LOW (ref 32–36)
MCV RBC AUTO: 95.9 FL — SIGNIFICANT CHANGE UP (ref 80–100)
PHOSPHATE SERPL-MCNC: 2.5 MG/DL — SIGNIFICANT CHANGE UP (ref 2.4–4.7)
PHOSPHATE SERPL-MCNC: 2.6 MG/DL — SIGNIFICANT CHANGE UP (ref 2.4–4.7)
PHOSPHATE SERPL-MCNC: 2.6 MG/DL — SIGNIFICANT CHANGE UP (ref 2.4–4.7)
PLATELET # BLD AUTO: 219 K/UL — SIGNIFICANT CHANGE UP (ref 150–400)
POTASSIUM SERPL-MCNC: 3.3 MMOL/L — LOW (ref 3.5–5.3)
POTASSIUM SERPL-MCNC: 3.8 MMOL/L — SIGNIFICANT CHANGE UP (ref 3.5–5.3)
POTASSIUM SERPL-MCNC: 3.9 MMOL/L — SIGNIFICANT CHANGE UP (ref 3.5–5.3)
POTASSIUM SERPL-SCNC: 3.3 MMOL/L — LOW (ref 3.5–5.3)
POTASSIUM SERPL-SCNC: 3.8 MMOL/L — SIGNIFICANT CHANGE UP (ref 3.5–5.3)
POTASSIUM SERPL-SCNC: 3.9 MMOL/L — SIGNIFICANT CHANGE UP (ref 3.5–5.3)
RBC # BLD: 2.9 M/UL — LOW (ref 3.8–5.2)
RBC # FLD: 14.5 % — SIGNIFICANT CHANGE UP (ref 10.3–14.5)
SODIUM SERPL-SCNC: 139 MMOL/L — SIGNIFICANT CHANGE UP (ref 135–145)
SODIUM SERPL-SCNC: 140 MMOL/L — SIGNIFICANT CHANGE UP (ref 135–145)
SODIUM SERPL-SCNC: 141 MMOL/L — SIGNIFICANT CHANGE UP (ref 135–145)
WBC # BLD: 16.16 K/UL — HIGH (ref 3.8–10.5)
WBC # FLD AUTO: 16.16 K/UL — HIGH (ref 3.8–10.5)

## 2024-02-02 PROCEDURE — 99233 SBSQ HOSP IP/OBS HIGH 50: CPT

## 2024-02-02 PROCEDURE — 93010 ELECTROCARDIOGRAM REPORT: CPT

## 2024-02-02 PROCEDURE — 99222 1ST HOSP IP/OBS MODERATE 55: CPT

## 2024-02-02 PROCEDURE — 99254 IP/OBS CNSLTJ NEW/EST MOD 60: CPT

## 2024-02-02 RX ORDER — POTASSIUM PHOSPHATE, MONOBASIC POTASSIUM PHOSPHATE, DIBASIC 236; 224 MG/ML; MG/ML
15 INJECTION, SOLUTION INTRAVENOUS ONCE
Refills: 0 | Status: COMPLETED | OUTPATIENT
Start: 2024-02-02 | End: 2024-02-03

## 2024-02-02 RX ORDER — SODIUM CHLORIDE 9 MG/ML
500 INJECTION, SOLUTION INTRAVENOUS
Refills: 0 | Status: COMPLETED | OUTPATIENT
Start: 2024-02-02 | End: 2024-02-02

## 2024-02-02 RX ORDER — POTASSIUM CHLORIDE 20 MEQ
10 PACKET (EA) ORAL
Refills: 0 | Status: COMPLETED | OUTPATIENT
Start: 2024-02-02 | End: 2024-02-02

## 2024-02-02 RX ORDER — MAGNESIUM OXIDE 400 MG ORAL TABLET 241.3 MG
400 TABLET ORAL
Refills: 0 | Status: COMPLETED | OUTPATIENT
Start: 2024-02-02 | End: 2024-02-02

## 2024-02-02 RX ORDER — SODIUM,POTASSIUM PHOSPHATES 278-250MG
1 POWDER IN PACKET (EA) ORAL ONCE
Refills: 0 | Status: COMPLETED | OUTPATIENT
Start: 2024-02-02 | End: 2024-02-02

## 2024-02-02 RX ORDER — MAGNESIUM OXIDE 400 MG ORAL TABLET 241.3 MG
400 TABLET ORAL ONCE
Refills: 0 | Status: COMPLETED | OUTPATIENT
Start: 2024-02-02 | End: 2024-02-02

## 2024-02-02 RX ORDER — MAGNESIUM SULFATE 500 MG/ML
1 VIAL (ML) INJECTION ONCE
Refills: 0 | Status: COMPLETED | OUTPATIENT
Start: 2024-02-02 | End: 2024-02-02

## 2024-02-02 RX ADMIN — Medication 100 MILLIGRAM(S): at 22:30

## 2024-02-02 RX ADMIN — Medication 1 PACKET(S): at 18:08

## 2024-02-02 RX ADMIN — HYDROMORPHONE HYDROCHLORIDE 0.5 MILLIGRAM(S): 2 INJECTION INTRAMUSCULAR; INTRAVENOUS; SUBCUTANEOUS at 18:24

## 2024-02-02 RX ADMIN — Medication 100 MILLIGRAM(S): at 13:29

## 2024-02-02 RX ADMIN — MAGNESIUM OXIDE 400 MG ORAL TABLET 400 MILLIGRAM(S): 241.3 TABLET ORAL at 18:14

## 2024-02-02 RX ADMIN — Medication 100 GRAM(S): at 18:11

## 2024-02-02 RX ADMIN — CILOSTAZOL 100 MILLIGRAM(S): 100 TABLET ORAL at 18:10

## 2024-02-02 RX ADMIN — Medication 100 MILLIEQUIVALENT(S): at 10:33

## 2024-02-02 RX ADMIN — MAGNESIUM OXIDE 400 MG ORAL TABLET 400 MILLIGRAM(S): 241.3 TABLET ORAL at 13:29

## 2024-02-02 RX ADMIN — CEFTRIAXONE 1000 MILLIGRAM(S): 500 INJECTION, POWDER, FOR SOLUTION INTRAMUSCULAR; INTRAVENOUS at 01:03

## 2024-02-02 RX ADMIN — ATORVASTATIN CALCIUM 40 MILLIGRAM(S): 80 TABLET, FILM COATED ORAL at 22:30

## 2024-02-02 RX ADMIN — SERTRALINE 200 MILLIGRAM(S): 25 TABLET, FILM COATED ORAL at 18:12

## 2024-02-02 RX ADMIN — Medication 0.25 MILLIGRAM(S): at 18:08

## 2024-02-02 RX ADMIN — Medication 81 MILLIGRAM(S): at 13:29

## 2024-02-02 RX ADMIN — Medication 100 MILLIEQUIVALENT(S): at 09:39

## 2024-02-02 RX ADMIN — MIRABEGRON 50 MILLIGRAM(S): 50 TABLET, EXTENDED RELEASE ORAL at 18:10

## 2024-02-02 RX ADMIN — CILOSTAZOL 100 MILLIGRAM(S): 100 TABLET ORAL at 05:44

## 2024-02-02 RX ADMIN — HEPARIN SODIUM 5000 UNIT(S): 5000 INJECTION INTRAVENOUS; SUBCUTANEOUS at 18:11

## 2024-02-02 RX ADMIN — PANTOPRAZOLE SODIUM 40 MILLIGRAM(S): 20 TABLET, DELAYED RELEASE ORAL at 05:44

## 2024-02-02 RX ADMIN — HYDROMORPHONE HYDROCHLORIDE 0.5 MILLIGRAM(S): 2 INJECTION INTRAMUSCULAR; INTRAVENOUS; SUBCUTANEOUS at 18:09

## 2024-02-02 RX ADMIN — SODIUM CHLORIDE 50 MILLILITER(S): 9 INJECTION, SOLUTION INTRAVENOUS at 08:52

## 2024-02-02 RX ADMIN — HEPARIN SODIUM 5000 UNIT(S): 5000 INJECTION INTRAVENOUS; SUBCUTANEOUS at 05:44

## 2024-02-02 RX ADMIN — Medication 75 MICROGRAM(S): at 05:44

## 2024-02-02 RX ADMIN — Medication 100 MILLIGRAM(S): at 05:43

## 2024-02-02 RX ADMIN — Medication 100 MILLIEQUIVALENT(S): at 08:52

## 2024-02-02 RX ADMIN — Medication 1 PACKET(S): at 08:37

## 2024-02-02 RX ADMIN — MAGNESIUM OXIDE 400 MG ORAL TABLET 400 MILLIGRAM(S): 241.3 TABLET ORAL at 08:59

## 2024-02-02 NOTE — DIETITIAN NUTRITION RISK NOTIFICATION - TREATMENT: THE FOLLOWING DIET HAS BEEN RECOMMENDED
Diet, Regular:   Fiber/Residue Restricted (LOWFIBER)  Lactose Restricted (Milk Sugar Intoler.) (02-01-24 @ 09:15) [Active]

## 2024-02-02 NOTE — CONSULT NOTE ADULT - ASSESSMENT
78y old  Female PMH active smoker, HTN, HLD, PAD, presenting with abdominal pain and diarrhea. Being treated for enterocolitis. Cardiology consulted for three episodes of paroxysmal SVT, rates up to 150.  Denies symptoms during episodes, was in the bed at time of occurrence.

## 2024-02-02 NOTE — CHART NOTE - NSCHARTNOTEFT_GEN_A_CORE
Provider first notified by RN via MS teams message at ~08:20 for a patient with burst of PSVT with  for ~5seconds. Per RN patient in NAD, VSS, resting comfortably in bed without any complaints or exam changes. Provider immediately assessed patient bedside who is appreciated to be in NAD, HR elevated to 110s - 120s on arrival, VS otherwise stable, resting comfortably in bed without any complaints or exam changes. Patient adamantly denies: chest pain, palpitations, SOB, difficulty breathing, lightheadedness, dizziness, and weakness. Per patient "I feel fine and I've always had a galloping heart." Patient denies all symptoms and reports that she was completely asymptomatic at the time of the event.     Vital Signs Last 24 Hrs  T(C): 36.9 (02 Feb 2024 08:10), Max: 37.6 (02 Feb 2024 05:00)  T(F): 98.5 (02 Feb 2024 08:10), Max: 99.6 (02 Feb 2024 05:00)  HR: 116 (02 Feb 2024 08:10) (66 - 120)  BP: 140/71 (02 Feb 2024 08:10) (102/64 - 140/71)  BP(mean): 90 (01 Feb 2024 12:17) (90 - 90)  RR: 18 (02 Feb 2024 08:10) (18 - 20)  SpO2: 93% (02 Feb 2024 08:10) (91% - 97%)    Parameters below as of 02 Feb 2024 08:10  Patient On (Oxygen Delivery Method): nasal cannula  O2 Flow (L/min): 2    Physical Exam:  General: NAD, NCAT, resting comfortably in bed  Cardio: normal S1, S2, no MRG appreciated  Lungs: CTAB, no abnormal breath sounds appreciated, respirations unlabored on 2L NC   Abdomen: soft, non-tender, non-distended, +BS  Extremities: no pedal edema appreciated b/l, negative Homans sign b/l  Neuro: AOX4, no focal deficits appreciated     Plan:  -STAT EKG ordered and reviewed, EKG is grossly unchanged from prior study -rhythm noted to be sinus tachycardia with .   -Electrolytes reviewed and supplemented.  -Potassium level noted to be 3.3 and Phos level 2.5 - repleted with 10meq K-Riders 1 dose q1 hr x 3 doses.  Additionally, one potassium phos packet also ordered to allow for adequate supplementation.   -Mg level 2.0, however, based on telemetry review patient is appreciated to have had several episodes of NSVT. Magnesium Oxide 400mg x 1 STAT ordered and Magnesium Oxide 400mg at lunch ordered x 1 dose to maintain Mg level >2.0  -Keep K >4.0, Keep Phos >3.0 and Mg >2.0  -Repeat labs ordered for 15:00  -AM labs ordered  -Cardiology consult called   -TTE ordered STAT d/w RN patient must go on monitor  -RN to continue to monitor  -RN to call provider with any acute changes / questions /concerns      The above patient and plan was discussed at length with Dr. Orellana and MICHELLE Erwin.

## 2024-02-02 NOTE — CONSULT NOTE ADULT - SUBJECTIVE AND OBJECTIVE BOX
[facility]                                              CARDIOLOGY AT [place] [address]                                             Telephone: [phone]. Fax:[phone]                                                       CARDIOLOGY CONSULTATION NOTE                                                                                             History obtained by: Patient and medical record  Community Cardiologist: none   obtained: Yes [  ] No [  x]  Reason for Consultation: SVT  Available out pt records reviewed: Yes [x  ] No [  ]    HPI:  Patient is a 78y old  Female PMH active smoker, HTN, HLD, PAD, presenting with abdominal pain and diarrhea. Being treated for enterocolitis. Cardiology consulted for three episodes of paroxysmal SVT, rates up to 150.  Denies symptoms during episodes, was in the bed at time of occurrence.  Denies CP, SOB, LE edema,          CARDIAC TESTING   ECHO:    STRESS:    CATH:     ELECTROPHYSIOLOGY:     PAST MEDICAL HISTORY  Hypothyroid    Hypertension    Peripheral arterial disease    Cigarette smoker    Major depression    Osteoarthritis    Hyperlipidemia        PAST SURGICAL HISTORY  S/P ORIF (open reduction internal fixation) fracture        SOCIAL HISTORY:  Denies alcohol/drugs  CIGARETTES:   active smoker   ALCOHOL:  DRUGS:    FAMILY HISTORY:  Family history of colitis in mother      Family History of Cardiovascular Disease:  Yes [  ] No [x ]  Coronary Artery Disease in first degree relative: Yes [  ] No [  x]  Sudden Cardiac Death in First degree relative: Yes [  ] No [ x ]    HOME MEDICATIONS:  aspirin 81 mg oral tablet: 1 tab(s) orally once a day (31 Jan 2024 01:03)  atorvastatin 40 mg oral tablet: 1 tab(s) orally once a day (30 Jan 2024 20:44)  cilostazol 100 mg oral tablet: 1 tab(s) orally 2 times a day (30 Jan 2024 20:44)  enalapril 5 mg oral tablet: 1 tab(s) orally once a day (30 Jan 2024 20:44)  famotidine 20 mg oral tablet: 1 tab(s) orally 2 times a day (30 Jan 2024 20:44)  levothyroxine 75 mcg (0.075 mg) oral tablet: 1 tab(s) orally once a day (30 Jan 2024 20:44)  meloxicam 15 mg oral tablet: 1 tab(s) orally once a day (30 Jan 2024 20:44)  Myrbetriq 50 mg oral tablet, extended release: 1 tab(s) orally once a day (30 Jan 2024 20:44)  sertraline 100 mg oral tablet: 2 tab(s) orally once a day (30 Jan 2024 20:44)      CURRENT CARDIAC MEDICATIONS:      CURRENT OTHER MEDICATIONS:  acetaminophen     Tablet .. 650 milliGRAM(s) Oral every 6 hours PRN Temp greater or equal to 38C (100.4F), Mild Pain (1 - 3), Moderate Pain (4 - 6)  ALPRAZolam 0.25 milliGRAM(s) Oral two times a day PRN anxiety  HYDROmorphone  Injectable 0.5 milliGRAM(s) IV Push every 6 hours PRN Severe Pain (7 - 10)  melatonin 3 milliGRAM(s) Oral at bedtime PRN Insomnia  sertraline 200 milliGRAM(s) Oral daily  pantoprazole    Tablet 40 milliGRAM(s) Oral before breakfast  aspirin enteric coated 81 milliGRAM(s) Oral daily  atorvastatin 40 milliGRAM(s) Oral at bedtime  cefTRIAXone Injectable. 1000 milliGRAM(s) IV Push every 24 hours, Stop order after: 7 Days  cilostazol 100 milliGRAM(s) Oral two times a day  heparin   Injectable 5000 Unit(s) SubCutaneous every 12 hours  lactated ringers. 500 milliLiter(s) (50 mL/Hr) IV Continuous <Continuous>, Stop order after: 3 Hours  levothyroxine 75 MICROGram(s) Oral daily  magnesium oxide 400 milliGRAM(s) Oral with lunch, Stop order after: 1 Doses  metroNIDAZOLE  IVPB 500 milliGRAM(s) IV Intermittent every 8 hours  mirabegron ER 50 milliGRAM(s) Oral daily  nicotine -  14 mG/24Hr(s) Patch 1 Patch Transdermal daily  potassium chloride  10 mEq/100 mL IVPB 10 milliEquivalent(s) IV Intermittent every 1 hour, Stop order after: 3 Doses      ALLERGIES:   No Known Allergies      REVIEW OF SYMPTOMS:   CONSTITUTIONAL: No fever, no chills, no weight loss, no weight gain, no fatigue   ENMT:  No vertigo; No sinus or throat pain  NECK: No pain or stiffness  CARDIOVASCULAR: No chest pain, no dyspnea, no syncope/presyncope, no palpitations, no dizziness, no Orthopnea, no Paroxsymal nocturnal dyspnea  RESPIRATORY: no Shortness of breath, no cough, no wheezing  : No dysuria, no hematuria   GI: No dark color stool, no nausea, no diarrhea, no constipation, no abdominal pain   NEURO: No headache, no slurred speech   MUSCULOSKELETAL: No joint pain or swelling; No muscle, back, or extremity pain  PSYCH: No agitation, no anxiety.    ALL OTHER REVIEW OF SYSTEMS ARE NEGATIVE.    VITAL SIGNS:  T(C): 36.9 (02-02-24 @ 08:10), Max: 37.6 (02-02-24 @ 05:00)  T(F): 98.5 (02-02-24 @ 08:10), Max: 99.6 (02-02-24 @ 05:00)  HR: 116 (02-02-24 @ 08:10) (66 - 120)  BP: 140/71 (02-02-24 @ 08:10) (102/64 - 140/71)  RR: 18 (02-02-24 @ 08:10) (18 - 20)  SpO2: 93% (02-02-24 @ 08:10) (91% - 97%)    INTAKE AND OUTPUT:       PHYSICAL EXAM:  Constitutional: Comfortable . No acute distress.   HEENT: Atraumatic and normocephalic , neck is supple . no JVD. No carotid bruit.  CNS: A&Ox3. No focal deficits.   Respiratory: CTAB, unlabored   Cardiovascular: RRR normal s1 s2. No murmur. No rubs or gallop.  Gastrointestinal: Soft, non-tender. +Bowel sounds.   Extremities: 2+ Peripheral Pulses, No clubbing, cyanosis, or edema  Psychiatric: Calm . no agitation.   Skin: Warm and dry, no ulcers on extremities     LABS:                            8.7    16.16 )-----------( 219      ( 02 Feb 2024 05:43 )             27.8     02-02    141  |  110<H>  |  42.8<H>  ----------------------------<  89  3.3<L>   |  18.0<L>  |  1.28    Ca    8.8      02 Feb 2024 05:43  Phos  2.5     02-02  Mg     2.0     02-02        Urinalysis Basic - ( 02 Feb 2024 05:43 )    Color: x / Appearance: x / SG: x / pH: x  Gluc: 89 mg/dL / Ketone: x  / Bili: x / Urobili: x   Blood: x / Protein: x / Nitrite: x   Leuk Esterase: x / RBC: x / WBC x   Sq Epi: x / Non Sq Epi: x / Bacteria: x              INTERPRETATION OF TELEMETRY: SR / SVT    ECG: SR  Prior ECG: Yes [ x ] No [  ]                                                    [facility]                                              CARDIOLOGY AT [place] [address]                                             Telephone: [phone]. Fax:[phone]                                                       CARDIOLOGY CONSULTATION NOTE                                                                                             History obtained by: Patient and medical record  Community Cardiologist: none   obtained: Yes [  ] No [  x]  Reason for Consultation: SVT  Available out pt records reviewed: Yes [x  ] No [  ]    HPI:  Patient is a 78y old  Female PMH active smoker, HTN, HLD, PAD, presenting with abdominal pain and diarrhea. Being treated for enterocolitis. Cardiology consulted for three episodes of paroxysmal SVT, rates up to 150.  Denies symptoms during episodes, was in the bed at time of occurrence.  Denies CP, SOB, LE edema,          CARDIAC TESTING   ECHO:    STRESS:    CATH:     ELECTROPHYSIOLOGY:     PAST MEDICAL HISTORY  Hypothyroid    Hypertension    Peripheral arterial disease    Cigarette smoker    Major depression    Osteoarthritis    Hyperlipidemia        PAST SURGICAL HISTORY  S/P ORIF (open reduction internal fixation) fracture        SOCIAL HISTORY:  Denies alcohol/drugs  CIGARETTES:   active smoker   ALCOHOL:  DRUGS:    FAMILY HISTORY:  Family history of colitis in mother      Family History of Cardiovascular Disease:  Yes [  ] No [x ]  Coronary Artery Disease in first degree relative: Yes [  ] No [  x]  Sudden Cardiac Death in First degree relative: Yes [  ] No [ x ]    HOME MEDICATIONS:  aspirin 81 mg oral tablet: 1 tab(s) orally once a day (31 Jan 2024 01:03)  atorvastatin 40 mg oral tablet: 1 tab(s) orally once a day (30 Jan 2024 20:44)  cilostazol 100 mg oral tablet: 1 tab(s) orally 2 times a day (30 Jan 2024 20:44)  enalapril 5 mg oral tablet: 1 tab(s) orally once a day (30 Jan 2024 20:44)  famotidine 20 mg oral tablet: 1 tab(s) orally 2 times a day (30 Jan 2024 20:44)  levothyroxine 75 mcg (0.075 mg) oral tablet: 1 tab(s) orally once a day (30 Jan 2024 20:44)  meloxicam 15 mg oral tablet: 1 tab(s) orally once a day (30 Jan 2024 20:44)  Myrbetriq 50 mg oral tablet, extended release: 1 tab(s) orally once a day (30 Jan 2024 20:44)  sertraline 100 mg oral tablet: 2 tab(s) orally once a day (30 Jan 2024 20:44)      CURRENT CARDIAC MEDICATIONS:      CURRENT OTHER MEDICATIONS:  acetaminophen     Tablet .. 650 milliGRAM(s) Oral every 6 hours PRN Temp greater or equal to 38C (100.4F), Mild Pain (1 - 3), Moderate Pain (4 - 6)  ALPRAZolam 0.25 milliGRAM(s) Oral two times a day PRN anxiety  HYDROmorphone  Injectable 0.5 milliGRAM(s) IV Push every 6 hours PRN Severe Pain (7 - 10)  melatonin 3 milliGRAM(s) Oral at bedtime PRN Insomnia  sertraline 200 milliGRAM(s) Oral daily  pantoprazole    Tablet 40 milliGRAM(s) Oral before breakfast  aspirin enteric coated 81 milliGRAM(s) Oral daily  atorvastatin 40 milliGRAM(s) Oral at bedtime  cefTRIAXone Injectable. 1000 milliGRAM(s) IV Push every 24 hours, Stop order after: 7 Days  cilostazol 100 milliGRAM(s) Oral two times a day  heparin   Injectable 5000 Unit(s) SubCutaneous every 12 hours  lactated ringers. 500 milliLiter(s) (50 mL/Hr) IV Continuous <Continuous>, Stop order after: 3 Hours  levothyroxine 75 MICROGram(s) Oral daily  magnesium oxide 400 milliGRAM(s) Oral with lunch, Stop order after: 1 Doses  metroNIDAZOLE  IVPB 500 milliGRAM(s) IV Intermittent every 8 hours  mirabegron ER 50 milliGRAM(s) Oral daily  nicotine -  14 mG/24Hr(s) Patch 1 Patch Transdermal daily  potassium chloride  10 mEq/100 mL IVPB 10 milliEquivalent(s) IV Intermittent every 1 hour, Stop order after: 3 Doses      ALLERGIES:   No Known Allergies      REVIEW OF SYMPTOMS:   CONSTITUTIONAL: No fever, no chills, no weight loss, no weight gain, no fatigue   ENMT:  No vertigo; No sinus or throat pain  NECK: No pain or stiffness  CARDIOVASCULAR: No chest pain, no dyspnea, no syncope/presyncope, no palpitations, no dizziness, no Orthopnea, no Paroxsymal nocturnal dyspnea  RESPIRATORY: no Shortness of breath, no cough, no wheezing  : No dysuria, no hematuria   GI: No dark color stool, no nausea, no diarrhea, no constipation, no abdominal pain   NEURO: No headache, no slurred speech   MUSCULOSKELETAL: No joint pain or swelling; No muscle, back, or extremity pain  PSYCH: No agitation, no anxiety.    ALL OTHER REVIEW OF SYSTEMS ARE NEGATIVE.    VITAL SIGNS:  T(C): 36.9 (02-02-24 @ 08:10), Max: 37.6 (02-02-24 @ 05:00)  T(F): 98.5 (02-02-24 @ 08:10), Max: 99.6 (02-02-24 @ 05:00)  HR: 116 (02-02-24 @ 08:10) (66 - 120)  BP: 140/71 (02-02-24 @ 08:10) (102/64 - 140/71)  RR: 18 (02-02-24 @ 08:10) (18 - 20)  SpO2: 93% (02-02-24 @ 08:10) (91% - 97%)    INTAKE AND OUTPUT:       PHYSICAL EXAM:  Constitutional: Comfortable . No acute distress.   HEENT: Atraumatic and normocephalic , neck is supple . no JVD. No carotid bruit.  CNS: A&Ox3. No focal deficits.   Respiratory: CTAB, unlabored   Cardiovascular: RRR normal s1 s2. +murmur. No rubs or gallop.  Gastrointestinal: Soft, non-tender. +Bowel sounds.   Extremities: 2+ Peripheral Pulses, No clubbing, cyanosis, or edema  Psychiatric: Calm . no agitation.   Skin: Warm and dry, no ulcers on extremities     LABS:                            8.7    16.16 )-----------( 219      ( 02 Feb 2024 05:43 )             27.8     02-02    141  |  110<H>  |  42.8<H>  ----------------------------<  89  3.3<L>   |  18.0<L>  |  1.28    Ca    8.8      02 Feb 2024 05:43  Phos  2.5     02-02  Mg     2.0     02-02        Urinalysis Basic - ( 02 Feb 2024 05:43 )    Color: x / Appearance: x / SG: x / pH: x  Gluc: 89 mg/dL / Ketone: x  / Bili: x / Urobili: x   Blood: x / Protein: x / Nitrite: x   Leuk Esterase: x / RBC: x / WBC x   Sq Epi: x / Non Sq Epi: x / Bacteria: x              INTERPRETATION OF TELEMETRY: SR / SVT    ECG: SR  Prior ECG: Yes [ x ] No [  ]

## 2024-02-02 NOTE — PROGRESS NOTE ADULT - SUBJECTIVE AND OBJECTIVE BOX
YAMILETH SUSAN  ----------------------------------------  The patient was seen earlier at bedside. Patient with enterocolitis. Offered no complaints. Denied abdominal pain, tolerated oral intake. Denied chest pain or palpitations. Episode of tachycardia noted.    Vital Signs Last 24 Hrs  T(C): 36.9 (02 Feb 2024 08:10), Max: 37.6 (02 Feb 2024 05:00)  T(F): 98.5 (02 Feb 2024 08:10), Max: 99.6 (02 Feb 2024 05:00)  HR: 116 (02 Feb 2024 08:10) (66 - 120)  BP: 140/71 (02 Feb 2024 08:10) (102/64 - 140/71)  BP(mean): 90 (01 Feb 2024 12:17) (90 - 90)  RR: 18 (02 Feb 2024 08:10) (18 - 20)  SpO2: 93% (02 Feb 2024 08:10) (91% - 97%)    Parameters below as of 02 Feb 2024 08:10  Patient On (Oxygen Delivery Method): nasal cannula  O2 Flow (L/min): 2    PHYSICAL EXAMINATION:  ----------------------------------------  General appearance: No acute distress, Awake, Alert  HEENT: Normocephalic, Atraumatic, Conjunctiva clear, EOMI  Neck: Supple, No JVD, No tenderness  Lungs: Breath sound equal bilaterally, No wheezes, No rales  Cardiovascular: S1S2, Regular rhythm  Abdomen: Soft, Nontender, Nondistended, No guarding/rebound, Positive bowel sounds  Extremities: No clubbing, No cyanosis, No edema, No calf tenderness  Neuro: Strength equal bilaterally, No tremors  Psychiatric: Appropriate mood, Normal affect    LABORATORY STUDIES:  ----------------------------------------             8.7    16.16 )-----------( 219      ( 02 Feb 2024 05:43 )             27.8     02-02    141  |  110<H>  |  42.8<H>  ----------------------------<  89  3.3<L>   |  18.0<L>  |  1.28    Ca    8.8      02 Feb 2024 05:43  Phos  2.5     02-02  Mg     2.0     02-02    Urinalysis Basic - ( 02 Feb 2024 05:43 )  Color: x / Appearance: x / SG: x / pH: x  Gluc: 89 mg/dL / Ketone: x  / Bili: x / Urobili: x   Blood: x / Protein: x / Nitrite: x   Leuk Esterase: x / RBC: x / WBC x   Sq Epi: x / Non Sq Epi: x / Bacteria: x    Culture - Urine (collected 30 Jan 2024 15:40)  Source: Clean Catch Clean Catch (Midstream)  Final Report (01 Feb 2024 00:37):    <10,000 CFU/mL Normal Urogenital Nimco    Culture - Blood (collected 30 Jan 2024 15:22)  Source: .Blood Blood  Preliminary Report (01 Feb 2024 22:02):    No growth at 48 Hours    Culture - Blood (collected 30 Jan 2024 15:17)  Source: .Blood Blood  Preliminary Report (01 Feb 2024 22:02):    No growth at 48 Hours    MEDICATIONS  (STANDING):  aspirin enteric coated 81 milliGRAM(s) Oral daily  atorvastatin 40 milliGRAM(s) Oral at bedtime  cefTRIAXone Injectable. 1000 milliGRAM(s) IV Push every 24 hours  cilostazol 100 milliGRAM(s) Oral two times a day  heparin   Injectable 5000 Unit(s) SubCutaneous every 12 hours  lactated ringers. 500 milliLiter(s) (50 mL/Hr) IV Continuous <Continuous>  levothyroxine 75 MICROGram(s) Oral daily  magnesium oxide 400 milliGRAM(s) Oral with lunch  metroNIDAZOLE  IVPB 500 milliGRAM(s) IV Intermittent every 8 hours  mirabegron ER 50 milliGRAM(s) Oral daily  nicotine -  14 mG/24Hr(s) Patch 1 Patch Transdermal daily  pantoprazole    Tablet 40 milliGRAM(s) Oral before breakfast  sertraline 200 milliGRAM(s) Oral daily    MEDICATIONS  (PRN):  acetaminophen     Tablet .. 650 milliGRAM(s) Oral every 6 hours PRN Temp greater or equal to 38C (100.4F), Mild Pain (1 - 3), Moderate Pain (4 - 6)  ALPRAZolam 0.25 milliGRAM(s) Oral two times a day PRN anxiety  HYDROmorphone  Injectable 0.5 milliGRAM(s) IV Push every 6 hours PRN Severe Pain (7 - 10)  melatonin 3 milliGRAM(s) Oral at bedtime PRN Insomnia      ASSESSMENT / PLAN:  ----------------------------------------  78yoF hx active smoker, HTN, HLD, PAD, presenting with abdominal pain, non-bloody watery diarrhea, on admission found to be febrile w/ Tmax 105, tachycardic with CT A/P showing diffuse small bowel and colonic inflammation, most pronounced in the right lower quadrant involving the distal ileum concerning for Crohn’s disease. Empiric antibiotics were initiated for enterocolitis and intravenous fluids for acute kidney injury. The diet was advanced with improving symptoms.    Sepsis / Enterocolitis  - Afebrile with persistent leukocytosis  - CT abdomen noted small bowel and colonic thickening with inflammation and stranding  - On ceftriaxone and metronidazole  - Lactic acid level improved on repeat studies  - Tolerating oral intake  - Pending stool calprotectin results    SVT  - Potassium and magnesium supplementation  - Pending echocardiogram  - Cardiology evaluation noted    Acute kidney injury  - Enalapril was held on admission  - Renal function improved with intravenous fluids    Hypokalemia  - For further supplementation of potassium    Hypertension / Peripheral artery disease  - Close blood pressure monitoring  - On aspirin and cilostazol  - On atorvastatin    Hypothyroidism  - On levothyroxine    Anxiety / Depression  - On sertraline  - Alprazolam as needed    Overactive bladder  - On mirabegron

## 2024-02-02 NOTE — DIETITIAN INITIAL EVALUATION ADULT - OTHER INFO
78yoF hx active smoker, HTN, HLD, PAD, presenting with abdominal pain, non-bloody watery diarrhea, on admission found to be febrile w/ Tmax 105, tachycardic with CT A/P showing diffuse small bowel and colonic inflammation, most pronounced in the right lower quadrant involving the distal ileum concerning for Crohn’s disease.

## 2024-02-02 NOTE — CONSULT NOTE ADULT - PROBLEM SELECTOR RECOMMENDATION 9
-Three episodes up to 150, self resolved  -Asymptomatic  -Having potassium repleted. On PO mag  -Diarrhea is improving. Is eating  -Noted anemia, may be a driving factor  -TTE pend -Three episodes up to 150, self resolved  -Asymptomatic  -Having potassium repleted. On PO mag  -Diarrhea is improving. Is eating  -Noted anemia, may be a driving factor  -TTE pend  -If having recurrent episodes despite electrolyte supplementation. Start toprol 25

## 2024-02-02 NOTE — DIETITIAN INITIAL EVALUATION ADULT - PERTINENT LABORATORY DATA
02-02    141  |  110<H>  |  42.8<H>  ----------------------------<  89  3.3<L>   |  18.0<L>  |  1.28    Ca    8.8      02 Feb 2024 05:43  Phos  2.5     02-02  Mg     2.0     02-02

## 2024-02-02 NOTE — DIETITIAN INITIAL EVALUATION ADULT - PERTINENT MEDS FT
MEDICATIONS  (STANDING):  aspirin enteric coated 81 milliGRAM(s) Oral daily  atorvastatin 40 milliGRAM(s) Oral at bedtime  cefTRIAXone Injectable. 1000 milliGRAM(s) IV Push every 24 hours  cilostazol 100 milliGRAM(s) Oral two times a day  heparin   Injectable 5000 Unit(s) SubCutaneous every 12 hours  lactated ringers. 500 milliLiter(s) (50 mL/Hr) IV Continuous <Continuous>  levothyroxine 75 MICROGram(s) Oral daily  magnesium oxide 400 milliGRAM(s) Oral with lunch  metroNIDAZOLE  IVPB 500 milliGRAM(s) IV Intermittent every 8 hours  mirabegron ER 50 milliGRAM(s) Oral daily  nicotine -  14 mG/24Hr(s) Patch 1 Patch Transdermal daily  pantoprazole    Tablet 40 milliGRAM(s) Oral before breakfast  potassium chloride  10 mEq/100 mL IVPB 10 milliEquivalent(s) IV Intermittent every 1 hour  sertraline 200 milliGRAM(s) Oral daily    MEDICATIONS  (PRN):  acetaminophen     Tablet .. 650 milliGRAM(s) Oral every 6 hours PRN Temp greater or equal to 38C (100.4F), Mild Pain (1 - 3), Moderate Pain (4 - 6)  ALPRAZolam 0.25 milliGRAM(s) Oral two times a day PRN anxiety  HYDROmorphone  Injectable 0.5 milliGRAM(s) IV Push every 6 hours PRN Severe Pain (7 - 10)  melatonin 3 milliGRAM(s) Oral at bedtime PRN Insomnia

## 2024-02-02 NOTE — DIETITIAN INITIAL EVALUATION ADULT - ADD RECOMMEND
Continue diet as ordered  Diet education provided  Provide HBV protein sources  Bowel regimen as needed

## 2024-02-02 NOTE — DIETITIAN INITIAL EVALUATION ADULT - ORAL INTAKE PTA/DIET HISTORY
Spoke with pt who reports weight loss and decreased po intake with abd pain and diarrhea now improving. Pt currently follows low fiber, lactose free diet and provided pt with reinforcement education materials.

## 2024-02-03 LAB
ALBUMIN SERPL ELPH-MCNC: 2.8 G/DL — LOW (ref 3.3–5.2)
ANION GAP SERPL CALC-SCNC: 13 MMOL/L — SIGNIFICANT CHANGE UP (ref 5–17)
ANION GAP SERPL CALC-SCNC: 15 MMOL/L — SIGNIFICANT CHANGE UP (ref 5–17)
BUN SERPL-MCNC: 31.5 MG/DL — HIGH (ref 8–20)
BUN SERPL-MCNC: 32.4 MG/DL — HIGH (ref 8–20)
CALCIUM SERPL-MCNC: 8.7 MG/DL — SIGNIFICANT CHANGE UP (ref 8.4–10.5)
CALCIUM SERPL-MCNC: 9 MG/DL — SIGNIFICANT CHANGE UP (ref 8.4–10.5)
CHLORIDE SERPL-SCNC: 110 MMOL/L — HIGH (ref 96–108)
CHLORIDE SERPL-SCNC: 111 MMOL/L — HIGH (ref 96–108)
CO2 SERPL-SCNC: 18 MMOL/L — LOW (ref 22–29)
CO2 SERPL-SCNC: 18 MMOL/L — LOW (ref 22–29)
CREAT SERPL-MCNC: 1.01 MG/DL — SIGNIFICANT CHANGE UP (ref 0.5–1.3)
CREAT SERPL-MCNC: 1.12 MG/DL — SIGNIFICANT CHANGE UP (ref 0.5–1.3)
EGFR: 51 ML/MIN/1.73M2 — LOW
EGFR: 58 ML/MIN/1.73M2 — LOW
GLUCOSE SERPL-MCNC: 105 MG/DL — HIGH (ref 70–99)
GLUCOSE SERPL-MCNC: 94 MG/DL — SIGNIFICANT CHANGE UP (ref 70–99)
HCT VFR BLD CALC: 28 % — LOW (ref 34.5–45)
HGB BLD-MCNC: 9.2 G/DL — LOW (ref 11.5–15.5)
MAGNESIUM SERPL-MCNC: 1.9 MG/DL — SIGNIFICANT CHANGE UP (ref 1.6–2.6)
MAGNESIUM SERPL-MCNC: 2 MG/DL — SIGNIFICANT CHANGE UP (ref 1.6–2.6)
MCHC RBC-ENTMCNC: 30 PG — SIGNIFICANT CHANGE UP (ref 27–34)
MCHC RBC-ENTMCNC: 32.9 GM/DL — SIGNIFICANT CHANGE UP (ref 32–36)
MCV RBC AUTO: 91.2 FL — SIGNIFICANT CHANGE UP (ref 80–100)
PHOSPHATE SERPL-MCNC: 2.7 MG/DL — SIGNIFICANT CHANGE UP (ref 2.4–4.7)
PHOSPHATE SERPL-MCNC: 3.7 MG/DL — SIGNIFICANT CHANGE UP (ref 2.4–4.7)
PLATELET # BLD AUTO: 280 K/UL — SIGNIFICANT CHANGE UP (ref 150–400)
POTASSIUM SERPL-MCNC: 3.8 MMOL/L — SIGNIFICANT CHANGE UP (ref 3.5–5.3)
POTASSIUM SERPL-MCNC: 3.8 MMOL/L — SIGNIFICANT CHANGE UP (ref 3.5–5.3)
POTASSIUM SERPL-SCNC: 3.8 MMOL/L — SIGNIFICANT CHANGE UP (ref 3.5–5.3)
POTASSIUM SERPL-SCNC: 3.8 MMOL/L — SIGNIFICANT CHANGE UP (ref 3.5–5.3)
RBC # BLD: 3.07 M/UL — LOW (ref 3.8–5.2)
RBC # FLD: 14.8 % — HIGH (ref 10.3–14.5)
SODIUM SERPL-SCNC: 141 MMOL/L — SIGNIFICANT CHANGE UP (ref 135–145)
SODIUM SERPL-SCNC: 144 MMOL/L — SIGNIFICANT CHANGE UP (ref 135–145)
WBC # BLD: 20.61 K/UL — HIGH (ref 3.8–10.5)
WBC # FLD AUTO: 20.61 K/UL — HIGH (ref 3.8–10.5)

## 2024-02-03 PROCEDURE — 99233 SBSQ HOSP IP/OBS HIGH 50: CPT

## 2024-02-03 RX ORDER — ONDANSETRON 8 MG/1
4 TABLET, FILM COATED ORAL ONCE
Refills: 0 | Status: DISCONTINUED | OUTPATIENT
Start: 2024-02-03 | End: 2024-02-03

## 2024-02-03 RX ORDER — ONDANSETRON 8 MG/1
4 TABLET, FILM COATED ORAL ONCE
Refills: 0 | Status: COMPLETED | OUTPATIENT
Start: 2024-02-03 | End: 2024-02-03

## 2024-02-03 RX ORDER — METOCLOPRAMIDE HCL 10 MG
10 TABLET ORAL ONCE
Refills: 0 | Status: COMPLETED | OUTPATIENT
Start: 2024-02-03 | End: 2024-02-03

## 2024-02-03 RX ORDER — POTASSIUM CHLORIDE 20 MEQ
40 PACKET (EA) ORAL ONCE
Refills: 0 | Status: COMPLETED | OUTPATIENT
Start: 2024-02-03 | End: 2024-02-03

## 2024-02-03 RX ORDER — MAGNESIUM SULFATE 500 MG/ML
1 VIAL (ML) INJECTION ONCE
Refills: 0 | Status: COMPLETED | OUTPATIENT
Start: 2024-02-03 | End: 2024-02-03

## 2024-02-03 RX ORDER — POTASSIUM CHLORIDE 20 MEQ
10 PACKET (EA) ORAL
Refills: 0 | Status: COMPLETED | OUTPATIENT
Start: 2024-02-03 | End: 2024-02-03

## 2024-02-03 RX ADMIN — HEPARIN SODIUM 5000 UNIT(S): 5000 INJECTION INTRAVENOUS; SUBCUTANEOUS at 17:15

## 2024-02-03 RX ADMIN — Medication 100 MILLIGRAM(S): at 21:15

## 2024-02-03 RX ADMIN — Medication 81 MILLIGRAM(S): at 17:14

## 2024-02-03 RX ADMIN — Medication 75 MICROGRAM(S): at 05:49

## 2024-02-03 RX ADMIN — Medication 100 MILLIEQUIVALENT(S): at 12:51

## 2024-02-03 RX ADMIN — Medication 100 MILLIGRAM(S): at 13:45

## 2024-02-03 RX ADMIN — ONDANSETRON 4 MILLIGRAM(S): 8 TABLET, FILM COATED ORAL at 12:51

## 2024-02-03 RX ADMIN — Medication 100 GRAM(S): at 21:04

## 2024-02-03 RX ADMIN — CEFTRIAXONE 1000 MILLIGRAM(S): 500 INJECTION, POWDER, FOR SOLUTION INTRAMUSCULAR; INTRAVENOUS at 01:31

## 2024-02-03 RX ADMIN — SERTRALINE 200 MILLIGRAM(S): 25 TABLET, FILM COATED ORAL at 13:45

## 2024-02-03 RX ADMIN — Medication 100 MILLIEQUIVALENT(S): at 10:59

## 2024-02-03 RX ADMIN — HEPARIN SODIUM 5000 UNIT(S): 5000 INJECTION INTRAVENOUS; SUBCUTANEOUS at 05:49

## 2024-02-03 RX ADMIN — Medication 40 MILLIEQUIVALENT(S): at 21:03

## 2024-02-03 RX ADMIN — ATORVASTATIN CALCIUM 40 MILLIGRAM(S): 80 TABLET, FILM COATED ORAL at 21:04

## 2024-02-03 RX ADMIN — Medication 10 MILLIGRAM(S): at 16:40

## 2024-02-03 RX ADMIN — CILOSTAZOL 100 MILLIGRAM(S): 100 TABLET ORAL at 17:14

## 2024-02-03 RX ADMIN — POTASSIUM PHOSPHATE, MONOBASIC POTASSIUM PHOSPHATE, DIBASIC 62.5 MILLIMOLE(S): 236; 224 INJECTION, SOLUTION INTRAVENOUS at 01:31

## 2024-02-03 RX ADMIN — PANTOPRAZOLE SODIUM 40 MILLIGRAM(S): 20 TABLET, DELAYED RELEASE ORAL at 05:50

## 2024-02-03 RX ADMIN — Medication 0.25 MILLIGRAM(S): at 21:43

## 2024-02-03 RX ADMIN — SODIUM CHLORIDE 50 MILLILITER(S): 9 INJECTION, SOLUTION INTRAVENOUS at 13:54

## 2024-02-03 RX ADMIN — Medication 0.25 MILLIGRAM(S): at 14:42

## 2024-02-03 RX ADMIN — MIRABEGRON 50 MILLIGRAM(S): 50 TABLET, EXTENDED RELEASE ORAL at 13:45

## 2024-02-03 RX ADMIN — Medication 100 MILLIEQUIVALENT(S): at 13:53

## 2024-02-03 RX ADMIN — CILOSTAZOL 100 MILLIGRAM(S): 100 TABLET ORAL at 05:49

## 2024-02-03 RX ADMIN — Medication 100 MILLIGRAM(S): at 05:48

## 2024-02-03 NOTE — PROGRESS NOTE ADULT - ASSESSMENT
78yoF hx active smoker, HTN, HLD, PAD, presenting with abdominal pain, non-bloody watery diarrhea, on admission found to be febrile w/ Tmax 105, tachycardic with CT A/P showing diffuse small bowel and colonic inflammation, most pronounced in the right lower quadrant involving the distal ileum concerning for Crohn’s disease. Empiric antibiotics were initiated for enterocolitis and intravenous fluids for acute kidney injury. The diet was advanced with improving symptoms.    Enterocolitis  - Sepsis improved   - Afebrile but persistent leukocytosis (worse to 20 today)   - CT abdomen noted small bowel and colonic thickening with inflammation and stranding  - On ceftriaxone and metronidazole  - Lactic acid level improved on repeat studies  - Pt reports she is hungry but also nauseous   - Pending stool calprotectin results  - If leukocytosis continues to worsen will recall GI tomorrow     SVT  - Potassium and magnesium supplementation  - Pending echocardiogram  - Cardiology evaluation noted  - Keep K > 4 and Mag > 2   - Per Cardio, if after repletion of electrolytes if there is still pSVT would recommend Lopressor 25mg po q 12hrs     Acute kidney injury  - Enalapril was held on admission  - Renal function improved with intravenous fluids    Hypokalemia  - c/w supplementation of potassium and monitor     Hypertension / Peripheral artery disease  - Monitor blood pressure   - On aspirin and cilostazol  - On atorvastatin    Hypothyroidism  - On levothyroxine    Anxiety / Depression  - On sertraline  - Alprazolam as needed    Overactive bladder  - On mirabegron    DVT ppx - Heparin SQ

## 2024-02-03 NOTE — PROGRESS NOTE ADULT - SUBJECTIVE AND OBJECTIVE BOX
[facility] Division of Hospital Medicine    Chief Complaint: Enterocolitis     SUBJECTIVE / OVERNIGHT EVENTS:   Pt reports nausea but also is hungry and wants to eat     Patient denies chest pain, SOB, abd pain, fever, chills, dysuria or any other complaints. All remainder ROS negative.     MEDICATIONS  (STANDING):  aspirin enteric coated 81 milliGRAM(s) Oral daily  atorvastatin 40 milliGRAM(s) Oral at bedtime  cefTRIAXone Injectable. 1000 milliGRAM(s) IV Push every 24 hours  cilostazol 100 milliGRAM(s) Oral two times a day  heparin   Injectable 5000 Unit(s) SubCutaneous every 12 hours  levothyroxine 75 MICROGram(s) Oral daily  metroNIDAZOLE  IVPB 500 milliGRAM(s) IV Intermittent every 8 hours  mirabegron ER 50 milliGRAM(s) Oral daily  nicotine -  14 mG/24Hr(s) Patch 1 Patch Transdermal daily  pantoprazole    Tablet 40 milliGRAM(s) Oral before breakfast  sertraline 200 milliGRAM(s) Oral daily    MEDICATIONS  (PRN):  acetaminophen     Tablet .. 650 milliGRAM(s) Oral every 6 hours PRN Temp greater or equal to 38C (100.4F), Mild Pain (1 - 3), Moderate Pain (4 - 6)  ALPRAZolam 0.25 milliGRAM(s) Oral two times a day PRN anxiety  HYDROmorphone  Injectable 0.5 milliGRAM(s) IV Push every 6 hours PRN Severe Pain (7 - 10)  melatonin 3 milliGRAM(s) Oral at bedtime PRN Insomnia        I&O's Summary    02 Feb 2024 07:01  -  03 Feb 2024 07:00  --------------------------------------------------------  IN: 0 mL / OUT: 1100 mL / NET: -1100 mL    03 Feb 2024 07:01  -  03 Feb 2024 16:46  --------------------------------------------------------  IN: 0 mL / OUT: 500 mL / NET: -500 mL        PHYSICAL EXAM:  Vital Signs Last 24 Hrs  T(C): 37.2 (03 Feb 2024 11:41), Max: 37.5 (03 Feb 2024 07:49)  T(F): 99 (03 Feb 2024 11:41), Max: 99.5 (03 Feb 2024 07:49)  HR: 86 (03 Feb 2024 11:41) (63 - 120)  BP: 158/93 (03 Feb 2024 14:42) (125/75 - 158/93)  BP(mean): --  RR: 18 (03 Feb 2024 11:41) (18 - 18)  SpO2: 99% (03 Feb 2024 11:41) (91% - 99%)    Parameters below as of 03 Feb 2024 11:41  Patient On (Oxygen Delivery Method): nasal cannula  O2 Flow (L/min): 3        CONSTITUTIONAL: NAD, lying in bed  RESPIRATORY: Normal respiratory effort; lungs are clear to auscultation bilaterally  CARDIOVASCULAR: Regular rate and rhythm, normal S1 and S2   ABDOMEN: Nontender to palpation, normoactive bowel sounds  MUSCULOSKELETAL:  No clubbing or cyanosis of digits   PSYCH: A+O to person, place, and time; anxious affect   NEUROLOGY: No gross sensory or motor deficits         LABS:                        9.2    20.61 )-----------( 280      ( 03 Feb 2024 06:30 )             28.0     02-03    144  |  111<H>  |  32.4<H>  ----------------------------<  94  3.8   |  18.0<L>  |  1.12    Ca    9.0      03 Feb 2024 06:30  Phos  3.7     02-03  Mg     2.0     02-03    TPro  x   /  Alb  2.8<L>  /  TBili  x   /  DBili  x   /  AST  x   /  ALT  x   /  AlkPhos  x   02-03          Urinalysis Basic - ( 03 Feb 2024 06:30 )    Color: x / Appearance: x / SG: x / pH: x  Gluc: 94 mg/dL / Ketone: x  / Bili: x / Urobili: x   Blood: x / Protein: x / Nitrite: x   Leuk Esterase: x / RBC: x / WBC x   Sq Epi: x / Non Sq Epi: x / Bacteria: x

## 2024-02-04 DIAGNOSIS — R10.9 UNSPECIFIED ABDOMINAL PAIN: ICD-10-CM

## 2024-02-04 DIAGNOSIS — R14.0 ABDOMINAL DISTENSION (GASEOUS): ICD-10-CM

## 2024-02-04 DIAGNOSIS — K52.9 NONINFECTIVE GASTROENTERITIS AND COLITIS, UNSPECIFIED: ICD-10-CM

## 2024-02-04 LAB
ANION GAP SERPL CALC-SCNC: 16 MMOL/L — SIGNIFICANT CHANGE UP (ref 5–17)
BUN SERPL-MCNC: 31.1 MG/DL — HIGH (ref 8–20)
CALCIUM SERPL-MCNC: 9 MG/DL — SIGNIFICANT CHANGE UP (ref 8.4–10.5)
CHLORIDE SERPL-SCNC: 112 MMOL/L — HIGH (ref 96–108)
CO2 SERPL-SCNC: 17 MMOL/L — LOW (ref 22–29)
CREAT SERPL-MCNC: 1.15 MG/DL — SIGNIFICANT CHANGE UP (ref 0.5–1.3)
CRP SERPL-MCNC: 380 MG/L — HIGH
CULTURE RESULTS: SIGNIFICANT CHANGE UP
CULTURE RESULTS: SIGNIFICANT CHANGE UP
EGFR: 49 ML/MIN/1.73M2 — LOW
ERYTHROCYTE [SEDIMENTATION RATE] IN BLOOD: 114 MM/HR — HIGH (ref 0–20)
GLUCOSE SERPL-MCNC: 96 MG/DL — SIGNIFICANT CHANGE UP (ref 70–99)
HCT VFR BLD CALC: 26.7 % — LOW (ref 34.5–45)
HGB BLD-MCNC: 8.7 G/DL — LOW (ref 11.5–15.5)
LACTATE SERPL-SCNC: 1.1 MMOL/L — SIGNIFICANT CHANGE UP (ref 0.5–2)
MAGNESIUM SERPL-MCNC: 2.2 MG/DL — SIGNIFICANT CHANGE UP (ref 1.6–2.6)
MCHC RBC-ENTMCNC: 29.7 PG — SIGNIFICANT CHANGE UP (ref 27–34)
MCHC RBC-ENTMCNC: 32.6 GM/DL — SIGNIFICANT CHANGE UP (ref 32–36)
MCV RBC AUTO: 91.1 FL — SIGNIFICANT CHANGE UP (ref 80–100)
PHOSPHATE SERPL-MCNC: 3 MG/DL — SIGNIFICANT CHANGE UP (ref 2.4–4.7)
PLATELET # BLD AUTO: 310 K/UL — SIGNIFICANT CHANGE UP (ref 150–400)
POTASSIUM SERPL-MCNC: 4.2 MMOL/L — SIGNIFICANT CHANGE UP (ref 3.5–5.3)
POTASSIUM SERPL-SCNC: 4.2 MMOL/L — SIGNIFICANT CHANGE UP (ref 3.5–5.3)
RBC # BLD: 2.93 M/UL — LOW (ref 3.8–5.2)
RBC # FLD: 14.8 % — HIGH (ref 10.3–14.5)
SODIUM SERPL-SCNC: 145 MMOL/L — SIGNIFICANT CHANGE UP (ref 135–145)
SPECIMEN SOURCE: SIGNIFICANT CHANGE UP
SPECIMEN SOURCE: SIGNIFICANT CHANGE UP
WBC # BLD: 19.89 K/UL — HIGH (ref 3.8–10.5)
WBC # FLD AUTO: 19.89 K/UL — HIGH (ref 3.8–10.5)

## 2024-02-04 PROCEDURE — 99233 SBSQ HOSP IP/OBS HIGH 50: CPT

## 2024-02-04 PROCEDURE — 93306 TTE W/DOPPLER COMPLETE: CPT | Mod: 26

## 2024-02-04 RX ORDER — PIPERACILLIN AND TAZOBACTAM 4; .5 G/20ML; G/20ML
3.38 INJECTION, POWDER, LYOPHILIZED, FOR SOLUTION INTRAVENOUS ONCE
Refills: 0 | Status: COMPLETED | OUTPATIENT
Start: 2024-02-05 | End: 2024-02-05

## 2024-02-04 RX ORDER — SODIUM BICARBONATE 1 MEQ/ML
650 SYRINGE (ML) INTRAVENOUS THREE TIMES A DAY
Refills: 0 | Status: DISCONTINUED | OUTPATIENT
Start: 2024-02-04 | End: 2024-02-07

## 2024-02-04 RX ORDER — PIPERACILLIN AND TAZOBACTAM 4; .5 G/20ML; G/20ML
3.38 INJECTION, POWDER, LYOPHILIZED, FOR SOLUTION INTRAVENOUS ONCE
Refills: 0 | Status: DISCONTINUED | OUTPATIENT
Start: 2024-02-04 | End: 2024-02-04

## 2024-02-04 RX ORDER — PIPERACILLIN AND TAZOBACTAM 4; .5 G/20ML; G/20ML
3.38 INJECTION, POWDER, LYOPHILIZED, FOR SOLUTION INTRAVENOUS EVERY 8 HOURS
Refills: 0 | Status: DISCONTINUED | OUTPATIENT
Start: 2024-02-05 | End: 2024-02-10

## 2024-02-04 RX ORDER — SODIUM CHLORIDE 9 MG/ML
1000 INJECTION, SOLUTION INTRAVENOUS
Refills: 0 | Status: DISCONTINUED | OUTPATIENT
Start: 2024-02-04 | End: 2024-02-04

## 2024-02-04 RX ORDER — SODIUM CHLORIDE 9 MG/ML
1000 INJECTION, SOLUTION INTRAVENOUS
Refills: 0 | Status: DISCONTINUED | OUTPATIENT
Start: 2024-02-04 | End: 2024-02-07

## 2024-02-04 RX ORDER — ACETAMINOPHEN 500 MG
1000 TABLET ORAL ONCE
Refills: 0 | Status: COMPLETED | OUTPATIENT
Start: 2024-02-04 | End: 2024-02-04

## 2024-02-04 RX ADMIN — Medication 100 MILLIGRAM(S): at 14:51

## 2024-02-04 RX ADMIN — Medication 650 MILLIGRAM(S): at 14:50

## 2024-02-04 RX ADMIN — SODIUM CHLORIDE 125 MILLILITER(S): 9 INJECTION, SOLUTION INTRAVENOUS at 16:19

## 2024-02-04 RX ADMIN — MIRABEGRON 50 MILLIGRAM(S): 50 TABLET, EXTENDED RELEASE ORAL at 11:41

## 2024-02-04 RX ADMIN — Medication 650 MILLIGRAM(S): at 02:54

## 2024-02-04 RX ADMIN — CILOSTAZOL 100 MILLIGRAM(S): 100 TABLET ORAL at 05:43

## 2024-02-04 RX ADMIN — Medication 81 MILLIGRAM(S): at 17:14

## 2024-02-04 RX ADMIN — Medication 650 MILLIGRAM(S): at 01:04

## 2024-02-04 RX ADMIN — CEFTRIAXONE 1000 MILLIGRAM(S): 500 INJECTION, POWDER, FOR SOLUTION INTRAMUSCULAR; INTRAVENOUS at 01:10

## 2024-02-04 RX ADMIN — HEPARIN SODIUM 5000 UNIT(S): 5000 INJECTION INTRAVENOUS; SUBCUTANEOUS at 05:43

## 2024-02-04 RX ADMIN — ATORVASTATIN CALCIUM 40 MILLIGRAM(S): 80 TABLET, FILM COATED ORAL at 21:30

## 2024-02-04 RX ADMIN — Medication 75 MICROGRAM(S): at 05:42

## 2024-02-04 RX ADMIN — PANTOPRAZOLE SODIUM 40 MILLIGRAM(S): 20 TABLET, DELAYED RELEASE ORAL at 05:43

## 2024-02-04 RX ADMIN — CILOSTAZOL 100 MILLIGRAM(S): 100 TABLET ORAL at 17:14

## 2024-02-04 RX ADMIN — HEPARIN SODIUM 5000 UNIT(S): 5000 INJECTION INTRAVENOUS; SUBCUTANEOUS at 17:14

## 2024-02-04 RX ADMIN — SERTRALINE 200 MILLIGRAM(S): 25 TABLET, FILM COATED ORAL at 11:42

## 2024-02-04 RX ADMIN — Medication 400 MILLIGRAM(S): at 20:03

## 2024-02-04 RX ADMIN — Medication 100 MILLIGRAM(S): at 05:45

## 2024-02-04 RX ADMIN — Medication 650 MILLIGRAM(S): at 21:30

## 2024-02-04 RX ADMIN — Medication 1000 MILLIGRAM(S): at 21:00

## 2024-02-04 NOTE — PROGRESS NOTE ADULT - SUBJECTIVE AND OBJECTIVE BOX
Pt seen and examined  for worsening diffuse abdominal pain and distention and nausea.    Patient is a 78y old  Female who presents with a chief complaint of Sepsis due to enterocolitis. GI asked to re-evaluate pt. for worsening abdominal pain and distention.    HPI:  78yoF with PMHx of active smoking, HTN, HLD, PAD, presenting with abdominal pain and diarrhea.  She reports 2-3 days of lower abdominal pain followed by onset of watery, non-bloody diarrhea 2-3 times per day and nausea/vomiting for the past day.  Pt also reports subjective fevers. On admission, pt febrile with Tmax of 105, tachycardic with HR in 120s.  Labs on admission showed MERVAT, AMGA, hypokalemia. CT A/P done on admission, 01/30/24, remarkable for diffuse small bowel and colonic inflammation, most pronounced in the right lower quadrant involving the distal ileum concerning for Crohn’s disease. Patient reports FHx of colitis in mother but not sure if she had IBD diagnosis. Patient has never had EGD/Colonoscopy in past. She was treated on admission with IV Ceftriaxone and Metronidazole with symptomatic improvement but today began experiencing worsening abdominal pain and distention and nausea According to her nurse she had a large watery BM earlier today. Stool for GI PCR negative. Fecal calprotectin still pending. Stool for [facility]. [facility] PCR rejected because stool was too formed. Pt. appears in distress when seen just now. Family and nurse at bedside when seen.      PAST MEDICAL & SURGICAL HISTORY:  Hypothyroid  Hypertension  Peripheral arterial disease  Cigarette smoker  Major depression  Osteoarthritis  Hyperlipidemia  S/P ORIF (open reduction internal fixation) fracture          REVIEW OF SYSTEMS:   General: Negative  HEENT: Negative  CV: Negative  Respiratory: Negative  GI: Worsening diffuse abdominal pain and distention with nausea w/o vomiting with large watery BM earlier today.  : Negative  MSK: Negative  Hematologic: Negative  Skin: Negative  Remainder of 14 point ROS: Negative.        MEDICATIONS:  MEDICATIONS  (STANDING):  aspirin enteric coated 81 milliGRAM(s) Oral daily  atorvastatin 40 milliGRAM(s) Oral at bedtime  cefTRIAXone Injectable. 1000 milliGRAM(s) IV Push every 24 hours  cilostazol 100 milliGRAM(s) Oral two times a day  heparin   Injectable 5000 Unit(s) SubCutaneous every 12 hours  lactated ringers. 1000 milliLiter(s) (75 mL/Hr) IV Continuous <Continuous>  levothyroxine 75 MICROGram(s) Oral daily  metroNIDAZOLE  IVPB 500 milliGRAM(s) IV Intermittent every 8 hours  mirabegron ER 50 milliGRAM(s) Oral daily  nicotine -  14 mG/24Hr(s) Patch 1 Patch Transdermal daily  pantoprazole    Tablet 40 milliGRAM(s) Oral before breakfast  sertraline 200 milliGRAM(s) Oral daily  sodium bicarbonate 650 milliGRAM(s) Oral three times a day    MEDICATIONS  (PRN):  acetaminophen     Tablet .. 650 milliGRAM(s) Oral every 6 hours PRN Temp greater or equal to 38C (100.4F), Mild Pain (1 - 3), Moderate Pain (4 - 6)  ALPRAZolam 0.25 milliGRAM(s) Oral two times a day PRN anxiety  HYDROmorphone  Injectable 0.5 milliGRAM(s) IV Push every 6 hours PRN Severe Pain (7 - 10)  melatonin 3 milliGRAM(s) Oral at bedtime PRN Insomnia      Allergies    No Known Allergies    Intolerances        Vital Signs Last 24 Hrs  T(C): 36.8 (04 Feb 2024 11:50), Max: 38 (04 Feb 2024 01:18)  T(F): 98.3 (04 Feb 2024 11:50), Max: 100.4 (04 Feb 2024 01:18)  HR: 103 (04 Feb 2024 11:50) (73 - 114)  BP: 140/80 (04 Feb 2024 11:50) (112/70 - 154/83)  BP(mean): --  RR: 18 (04 Feb 2024 11:50) (18 - 20)  SpO2: 94% (04 Feb 2024 11:50) (93% - 119%)    Parameters below as of 04 Feb 2024 11:50  Patient On (Oxygen Delivery Method): nasal cannula  O2 Flow (L/min): 3      02-03 @ 07:01  -  02-04 @ 07:00  --------------------------------------------------------  IN: 0 mL / OUT: 800 mL / NET: -800 mL    02-04 @ 07:01  -  02-04 @ 15:00  --------------------------------------------------------  IN: 0 mL / OUT: 250 mL / NET: -250 mL        PHYSICAL EXAM:    General: Well developed; in distress when seen.  HEENT: MMM, conjunctiva pink and sclera anicteric.  Lungs: clear to auscultation bilaterally.  Cor: RRR S1, S2 only.  Gastrointestinal: Abdomen: Significantly distended with diffuse severe abdominal tenderness, hypoactive bowel sounds; No hepatosplenomegaly  Extremities: no cyanosis, clubbing or edema.  Skin: Warm and dry. No obvious rash  Neuro: Pt. a + o x 3.    LABS:      CBC Full  -  ( 04 Feb 2024 06:02 )  WBC Count : 19.89 K/uL  RBC Count : 2.93 M/uL  Hemoglobin : 8.7 g/dL  Hematocrit : 26.7 %  Platelet Count - Automated : 310 K/uL  Mean Cell Volume : 91.1 fl  Mean Cell Hemoglobin : 29.7 pg  Mean Cell Hemoglobin Concentration : 32.6 gm/dL  Auto Neutrophil # : x  Auto Lymphocyte # : x  Auto Monocyte # : x  Auto Eosinophil # : x  Auto Basophil # : x  Auto Neutrophil % : x  Auto Lymphocyte % : x  Auto Monocyte % : x  Auto Eosinophil % : x  Auto Basophil % : x    02-04    145  |  112<H>  |  31.1<H>  ----------------------------<  96  4.2   |  17.0<L>  |  1.15    Ca    9.0      04 Feb 2024 06:02  Phos  3.0     02-04  Mg     2.2     02-04    TPro  x   /  Alb  2.8<L>  /  TBili  x   /  DBili  x   /  AST  x   /  ALT  x   /  AlkPhos  x   02-03          Urinalysis Basic - ( 04 Feb 2024 06:02 )    Color: x / Appearance: x / SG: x / pH: x  Gluc: 96 mg/dL / Ketone: x  / Bili: x / Urobili: x   Blood: x / Protein: x / Nitrite: x   Leuk Esterase: x / RBC: x / WBC x   Sq Epi: x / Non Sq Epi: x / Bacteria: x                RADIOLOGY & ADDITIONAL STUDIES (The following images were personally reviewed):< from: CT Abdomen and Pelvis w/ IV Cont (01.30.24 @ 19:04) >  ACC: [id number] EXAM:  CT ABDOMEN AND PELVIS IC   ORDERED BY: BERNARDO FAN     PROCEDURE DATE:  01/30/2024          INTERPRETATION:  CLINICAL INFORMATION: Abdominal pain    COMPARISON: None.    CONTRAST/COMPLICATIONS:  IV Contrast: Omnipaque 350  78 ccadministered   22 cc discarded  Oral Contrast: NONE  Complications: None reported at time of study completion    PROCEDURE:  CT of the Abdomen and Pelvis was performed.  Sagittal and coronal reformats were performed.    FINDINGS:  LOWER CHEST: Bibasilar atelectasis.    LIVER: Within normal limits.  BILE DUCTS: Normal caliber.  GALLBLADDER: Within normal limits.  SPLEEN: Within normal limits.  PANCREAS: Within normal limits.  ADRENALS: Within normal limits.  KIDNEYS/URETERS: No renal stones or hydronephrosis. Bilateral renal cysts.    BLADDER: Minimally distended.  REPRODUCTIVE ORGANS: Uterus and adnexa within normal limits.    BOWEL: Small hiatal hernia. No bowel obstruction. Diffusely dilated small   bowel with air-fluid levels measuring up to 2.4 cm in diameter. Diffuse   small bowel wall thickening with surrounding inflammation, worse in the   right lower quadrant involving the distal ileum. Colon is decompressed   but there appears to be diffuse colonic wall thickening and pericolonic  stranding. Colonic diverticulosis without evidence of acute   diverticulitis. The appendix is not seen.  PERITONEUM: Mild abdominopelvic ascites.  VESSELS: Within normal limits.  RETROPERITONEUM/LYMPH NODES: No lymphadenopathy.  ABDOMINAL WALL: Within normal limits.  BONES: Right proximal femur ORIF. Degenerative changes.    IMPRESSION:  Diffuse small bowel and colonic inflammation, most pronounced in the   right lower quadrant involving the distal ileum. Findings may be related   to infectiousor inflammatory enterocolitis (including Crohn's disease).    Small volume abdominopelvic ascites, likely reactive.    --- End of Report ---            MEGA LIEBERMAN MD; Attending Radiologist  This document has been electronically signed. Jan 30 2024  7:25PM    < end of copied text >

## 2024-02-04 NOTE — PROGRESS NOTE ADULT - ASSESSMENT
78yoF hx active smoker, HTN, HLD, PAD, presenting with abdominal pain, non-bloody watery diarrhea, on admission found to be febrile w/ Tmax 105, tachycardic with CT A/P showing diffuse small bowel and colonic inflammation, most pronounced in the right lower quadrant involving the distal ileum concerning for Crohn’s disease. Empiric antibiotics were initiated for enterocolitis and intravenous fluids for acute kidney injury. The diet was advanced with improving symptoms.    Enterocolitis  - worsening abd pain and leukocytosis   - CT abdomen initially noted small bowel and colonic thickening with inflammation and stranding  - On ceftriaxone and metronidazole, change to Zosyn per GI recs today   - serial abd exams   - Lactic acid , ESR, CRP ordered   - Pending stool calprotectin results  - NPO, IVF ordered  - repeat CT A/P ordered  - GI follow up appreciated  - Surgery consulted     SVT  - Potassium and magnesium supplementation  - Echocardiogram reviewed   - Cardiology evaluation noted  - Keep K > 4 and Mag > 2   - Per Cardio, if after repletion of electrolytes if there is still pSVT would recommend Lopressor 25mg po q 12hrs     Acute kidney injury  - Enalapril was held on admission  - Renal function improved with intravenous fluids    Hypokalemia  - c/w supplementation of potassium and monitor     Hypertension / Peripheral artery disease  - Monitor blood pressure   - On aspirin and cilostazol  - On atorvastatin    Hypothyroidism  - On levothyroxine    Anxiety / Depression  - On sertraline  - Alprazolam as needed    Overactive bladder  - On mirabegron    DVT ppx - Heparin SQ

## 2024-02-04 NOTE — PROGRESS NOTE ADULT - PROBLEM SELECTOR PLAN 1
Pt was improving with IV antibiotics but today had significant worsening of her condition with the development of severe diffuse abdominal pain and distention with nausea and leukocytosis. She did have a large watery BM earlier today. Rule out worsening enterocolitis +/or partial SBO +/or bowel perforation. Recommend urgent repeat abdominal and pelvic CT with oral contrast to re-evaluate her previously seen enterocolitis on CT scan done 01/30/24. Keep NPO. Would also obtain surgical consult in the event she has either an SBO or bowel perforation. IV hydration. Case and management d/w Dr. Ward and pt's sister and nurse who were at the bedside. Repeat labs ordered for the AM. Would also change antibiotics from current IV Ceftriaxone and Metronidazole to IV Zosyn. Pt was improving with IV antibiotics but today had significant worsening of her condition with the development of severe diffuse abdominal pain and distention with nausea and leukocytosis. She did have a large watery BM earlier today. Rule out worsening enterocolitis +/or partial SBO +/or bowel perforation. Recommend urgent repeat abdominal and pelvic CT with oral contrast to re-evaluate her previously seen enterocolitis on CT scan done 01/30/24. Keep NPO. Would also obtain surgical consult in the event she has either an SBO or bowel perforation. IV hydration. Case and management d/w Dr. Ward and pt's sister and nurse who were at the bedside. Repeat labs ordered for the AM. Would also change antibiotics from current IV Ceftriaxone and Metronidazole to IV Zosyn. IVF rate increased from LR 75 cc./hr. to LR @ 125 cc./hr. CRP and ESR and serum lactate also ordered.

## 2024-02-04 NOTE — CONSULT NOTE ADULT - ASSESSMENT
ASSESSMENT: 77yo F admitted with likely infectious enterocolitis now found to have increasing abdominal distension and pain. Pt is not peritonitic on exam at this time, no need for emergent surgical intervention. Will await results of CT A/P with PO and IV consult for further recommendations. Our team has called CT scan to expedite.     PLAN:   **to be updated once scan has resulted**   ASSESSMENT: 77yo F admitted with likely infectious enterocolitis now found to have increasing abdominal distension and pain. Pt is not peritonitic on exam at this time, no need for emergent surgical intervention. CT scan demonstrating dilated bowel with transition point in RLQ with small amount of contrast distal to transition point suggesting partial SBO.     PLAN:   - recommend NGT placement to wall suction  - monitor bowel function  - serial abdominal exams  - recommend continued abx  - NPO, IVF  - surgery will follow    Pt seen and plan discussed with attending, Dr. Miller

## 2024-02-04 NOTE — PROGRESS NOTE ADULT - SUBJECTIVE AND OBJECTIVE BOX
[facility] Division of Hospital Medicine    Chief Complaint: Enterocolitis     SUBJECTIVE / OVERNIGHT EVENTS:   Pt reports nausea and worsening abd pain today  had a large BM this afternoon     Patient denies chest pain, SOB, fever, chills, dysuria or any other complaints. All remainder ROS negative.     MEDICATIONS  (STANDING):  aspirin enteric coated 81 milliGRAM(s) Oral daily  atorvastatin 40 milliGRAM(s) Oral at bedtime  cilostazol 100 milliGRAM(s) Oral two times a day  heparin   Injectable 5000 Unit(s) SubCutaneous every 12 hours  lactated ringers. 1000 milliLiter(s) (125 mL/Hr) IV Continuous <Continuous>  levothyroxine 75 MICROGram(s) Oral daily  mirabegron ER 50 milliGRAM(s) Oral daily  nicotine -  14 mG/24Hr(s) Patch 1 Patch Transdermal daily  pantoprazole    Tablet 40 milliGRAM(s) Oral before breakfast  sertraline 200 milliGRAM(s) Oral daily  sodium bicarbonate 650 milliGRAM(s) Oral three times a day    MEDICATIONS  (PRN):  acetaminophen     Tablet .. 650 milliGRAM(s) Oral every 6 hours PRN Temp greater or equal to 38C (100.4F), Mild Pain (1 - 3), Moderate Pain (4 - 6)  ALPRAZolam 0.25 milliGRAM(s) Oral two times a day PRN anxiety  HYDROmorphone  Injectable 0.5 milliGRAM(s) IV Push every 6 hours PRN Severe Pain (7 - 10)  melatonin 3 milliGRAM(s) Oral at bedtime PRN Insomnia        I&O's Summary    03 Feb 2024 07:01  -  04 Feb 2024 07:00  --------------------------------------------------------  IN: 0 mL / OUT: 800 mL / NET: -800 mL    04 Feb 2024 07:01  -  04 Feb 2024 16:50  --------------------------------------------------------  IN: 0 mL / OUT: 250 mL / NET: -250 mL        PHYSICAL EXAM:  Vital Signs Last 24 Hrs  T(C): 36.9 (04 Feb 2024 16:35), Max: 38 (04 Feb 2024 01:18)  T(F): 98.4 (04 Feb 2024 16:35), Max: 100.4 (04 Feb 2024 01:18)  HR: 113 (04 Feb 2024 16:35) (73 - 114)  BP: 154/84 (04 Feb 2024 16:35) (112/70 - 154/84)  BP(mean): --  RR: 18 (04 Feb 2024 16:35) (18 - 20)  SpO2: 91% (04 Feb 2024 16:35) (91% - 119%)    Parameters below as of 04 Feb 2024 16:35  Patient On (Oxygen Delivery Method): nasal cannula  O2 Flow (L/min): 3        CONSTITUTIONAL: NAD, lying in bed  RESPIRATORY: Normal respiratory effort; lungs are clear to auscultation bilaterally  CARDIOVASCULAR: Regular rate and rhythm, normal S1 and S2   ABDOMEN: distended, TTP diffusely   MUSCULOSKELETAL:  No clubbing or cyanosis of digits   PSYCH: A+O to person, place, and time; anxious affect   NEUROLOGY: No gross sensory or motor deficits         LABS:                        8.7    19.89 )-----------( 310      ( 04 Feb 2024 06:02 )             26.7     02-04    145  |  112<H>  |  31.1<H>  ----------------------------<  96  4.2   |  17.0<L>  |  1.15    Ca    9.0      04 Feb 2024 06:02  Phos  3.0     02-04  Mg     2.2     02-04    TPro  x   /  Alb  2.8<L>  /  TBili  x   /  DBili  x   /  AST  x   /  ALT  x   /  AlkPhos  x   02-03          Urinalysis Basic - ( 04 Feb 2024 06:02 )    Color: x / Appearance: x / SG: x / pH: x  Gluc: 96 mg/dL / Ketone: x  / Bili: x / Urobili: x   Blood: x / Protein: x / Nitrite: x   Leuk Esterase: x / RBC: x / WBC x   Sq Epi: x / Non Sq Epi: x / Bacteria: x

## 2024-02-04 NOTE — CONSULT NOTE ADULT - SUBJECTIVE AND OBJECTIVE BOX
**delayed entry note as pt was seen at 1700 today**    SUBJECTIVE: 77yo F admitted for septic enterocolitis having continued abdominal pain and distension. Pt admitted to medicine service 1/30 presenting with abdominal pain, watery diarrhea, Tmax 105, tachycardia with diffuse small bowel and colonic inflammation most pronounced in the lower quadrant. Also found to have MERVAT with electrolyte abnormalities. She was started on antibiotics and resuscitated appropriately. GI was consulted and deemed this likely infectious enteritis and she was continued on antibiotics. She was tolerating low fiber diet. Today, surgery consulted given increasing abdominal distension and pain. Pt states that she has been eating and having bowel movements but is feeling more bloated and pain has worsened. WBC has uptrended to 20. CT A/P with PO contrast pending.     Vitals:  T(C): 37.3 (02-04-24 @ 19:03), Max: 38 (02-04-24 @ 01:18)  T(F): 99.2 (02-04-24 @ 19:03), Max: 100.4 (02-04-24 @ 01:18)  HR: 140 (02-04-24 @ 19:03) (101 - 140)  BP: 148/91 (02-04-24 @ 19:03) (112/70 - 154/84)  ABP: --  ABP(mean): --  RR: 18 (02-04-24 @ 19:03) (18 - 20)  SpO2: 93% (02-04-24 @ 19:03) (91% - 94%)    LABS:  cret                        8.7    19.89 )-----------( 310      ( 04 Feb 2024 06:02 )             26.7     02-04    145  |  112<H>  |  31.1<H>  ----------------------------<  96  4.2   |  17.0<L>  |  1.15    Ca    9.0      04 Feb 2024 06:02  Phos  3.0     02-04  Mg     2.2     02-04    TPro  x   /  Alb  2.8<L>  /  TBili  x   /  DBili  x   /  AST  x   /  ALT  x   /  AlkPhos  x   02-03        GENERAL: NAD, lying in bed comfortably  HEAD:  Atraumatic, normocephalic  NECK: Supple, no JVD  HEART: RRR  LUNGS: Unlabored respirations. No conversational dyspnea.   ABDOMEN: Soft, distended, diffusely tender to palpation. No peritoneal signs. No guarding, no rebound tenderness.   EXTREMITIES: No clubbing, cyanosis, or edema  NERVOUS SYSTEM:  A&Ox3, no focal deficits   SKIN: No rashes or lesions

## 2024-02-04 NOTE — CHART NOTE - NSCHARTNOTEFT_GEN_A_CORE
Called by RN for tachycardia 140s.     Pt seen and examined at bedside with sister Kika present. On monitor pt is ST in the 120s. Pt complaining of  mod to severe abd pain that has been worsening throughout the day. Had an episode of loose BM earlier today. Pt denies any chest pain, SOB, N/V, dizziness or any other complaints at this time. Pt has CT scan w/ oral contrast pending to r/o SBO/perforation. Surgery was consulted during the day.    Vital Signs  T(C): 37.3 (04 Feb 2024 19:03), Max: 38 (04 Feb 2024 01:18)  T(F): 99.2 (04 Feb 2024 19:03), Max: 100.4 (04 Feb 2024 01:18)  HR: 140 (04 Feb 2024 19:03) (101 - 140)  BP: 148/91 (04 Feb 2024 19:03) (112/70 - 154/84)  RR: 18 (04 Feb 2024 19:03) (18 - 20)  SpO2: 93% (04 Feb 2024 19:03) (91% - 94%)    Parameters below as of 04 Feb 2024 19:03  Patient On (Oxygen Delivery Method): nasal cannula  O2 Flow (L/min): 3    PHYSICAL EXAM:  GENERAL: Pt lying in bed, uncomfortable in NAD  CHEST/LUNG: Unlabored respirations. Lungs clear to auscultation bilaterally; No rales, rhonchi or wheezing.   HEART: +S1, S2, Tachycardic, regular rhythm   ABDOMEN: Hypoactive BS. +mod abd distension, severe abd tenderness in epigastric and lower Rt and left quadrants. No guarding or rigidity    EXTREMITIES:  2+ Peripheral Pulses, brisk capillary refill.   NERVOUS SYSTEM: speech clear. Answers questions appropriately.     Spoke with Rei from CT scan at [phone] to express urgent need for scan and he will be sending down oral contrast now  CT scan ordered with PO contrast, will add IV contrast as well to r/o AAA  Tachycardia 2/2 pain. Ofirmev Ig given  Discussed with Daughter Kika who would like updates throughout the night, will follow up with her with results  Pt was seen by Surgery who was consulted, awaiting further recs   Discussed above with RN and Dr. Coleman  Will cont to monitor closely

## 2024-02-05 LAB
ANION GAP SERPL CALC-SCNC: 15 MMOL/L — SIGNIFICANT CHANGE UP (ref 5–17)
BASOPHILS # BLD AUTO: 0.02 K/UL — SIGNIFICANT CHANGE UP (ref 0–0.2)
BASOPHILS NFR BLD AUTO: 0.1 % — SIGNIFICANT CHANGE UP (ref 0–2)
BUN SERPL-MCNC: 31.3 MG/DL — HIGH (ref 8–20)
BURR CELLS BLD QL SMEAR: PRESENT — SIGNIFICANT CHANGE UP
CALCIUM SERPL-MCNC: 8.8 MG/DL — SIGNIFICANT CHANGE UP (ref 8.4–10.5)
CALPROTECTIN STL-MCNT: 252 UG/G — HIGH (ref 0–120)
CHLORIDE SERPL-SCNC: 111 MMOL/L — HIGH (ref 96–108)
CO2 SERPL-SCNC: 19 MMOL/L — LOW (ref 22–29)
CREAT SERPL-MCNC: 1 MG/DL — SIGNIFICANT CHANGE UP (ref 0.5–1.3)
CRP SERPL-MCNC: 327 MG/L — HIGH
EGFR: 58 ML/MIN/1.73M2 — LOW
EOSINOPHIL # BLD AUTO: 0.01 K/UL — SIGNIFICANT CHANGE UP (ref 0–0.5)
EOSINOPHIL NFR BLD AUTO: 0.1 % — SIGNIFICANT CHANGE UP (ref 0–6)
ERYTHROCYTE [SEDIMENTATION RATE] IN BLOOD: 116 MM/HR — HIGH (ref 0–20)
GLUCOSE SERPL-MCNC: 96 MG/DL — SIGNIFICANT CHANGE UP (ref 70–99)
HCT VFR BLD CALC: 26.3 % — LOW (ref 34.5–45)
HGB BLD-MCNC: 8.9 G/DL — LOW (ref 11.5–15.5)
IMM GRANULOCYTES NFR BLD AUTO: 0.6 % — SIGNIFICANT CHANGE UP (ref 0–0.9)
LACTATE SERPL-SCNC: 0.8 MMOL/L — SIGNIFICANT CHANGE UP (ref 0.5–2)
LACTATE SERPL-SCNC: 1 MMOL/L — SIGNIFICANT CHANGE UP (ref 0.5–2)
LYMPHOCYTES # BLD AUTO: 0.46 K/UL — LOW (ref 1–3.3)
LYMPHOCYTES # BLD AUTO: 2.9 % — LOW (ref 13–44)
MAGNESIUM SERPL-MCNC: 2 MG/DL — SIGNIFICANT CHANGE UP (ref 1.6–2.6)
MANUAL SMEAR VERIFICATION: SIGNIFICANT CHANGE UP
MCHC RBC-ENTMCNC: 31.1 PG — SIGNIFICANT CHANGE UP (ref 27–34)
MCHC RBC-ENTMCNC: 33.8 GM/DL — SIGNIFICANT CHANGE UP (ref 32–36)
MCV RBC AUTO: 92 FL — SIGNIFICANT CHANGE UP (ref 80–100)
MONOCYTES # BLD AUTO: 1.39 K/UL — HIGH (ref 0–0.9)
MONOCYTES NFR BLD AUTO: 8.7 % — SIGNIFICANT CHANGE UP (ref 2–14)
NEUTROPHILS # BLD AUTO: 13.99 K/UL — HIGH (ref 1.8–7.4)
NEUTROPHILS NFR BLD AUTO: 87.6 % — HIGH (ref 43–77)
PHOSPHATE SERPL-MCNC: 3.4 MG/DL — SIGNIFICANT CHANGE UP (ref 2.4–4.7)
PLAT MORPH BLD: NORMAL — SIGNIFICANT CHANGE UP
PLATELET # BLD AUTO: 329 K/UL — SIGNIFICANT CHANGE UP (ref 150–400)
POIKILOCYTOSIS BLD QL AUTO: SIGNIFICANT CHANGE UP
POTASSIUM SERPL-MCNC: 3.9 MMOL/L — SIGNIFICANT CHANGE UP (ref 3.5–5.3)
POTASSIUM SERPL-SCNC: 3.9 MMOL/L — SIGNIFICANT CHANGE UP (ref 3.5–5.3)
RBC # BLD: 2.86 M/UL — LOW (ref 3.8–5.2)
RBC # FLD: 14.9 % — HIGH (ref 10.3–14.5)
RBC BLD AUTO: ABNORMAL
SODIUM SERPL-SCNC: 145 MMOL/L — SIGNIFICANT CHANGE UP (ref 135–145)
WBC # BLD: 15.97 K/UL — HIGH (ref 3.8–10.5)
WBC # FLD AUTO: 15.97 K/UL — HIGH (ref 3.8–10.5)

## 2024-02-05 PROCEDURE — 71045 X-RAY EXAM CHEST 1 VIEW: CPT | Mod: 26

## 2024-02-05 PROCEDURE — 99233 SBSQ HOSP IP/OBS HIGH 50: CPT

## 2024-02-05 PROCEDURE — 74177 CT ABD & PELVIS W/CONTRAST: CPT | Mod: 26

## 2024-02-05 PROCEDURE — 99232 SBSQ HOSP IP/OBS MODERATE 35: CPT

## 2024-02-05 RX ADMIN — Medication 0.25 MILLIGRAM(S): at 00:08

## 2024-02-05 RX ADMIN — HEPARIN SODIUM 5000 UNIT(S): 5000 INJECTION INTRAVENOUS; SUBCUTANEOUS at 17:33

## 2024-02-05 RX ADMIN — SODIUM CHLORIDE 125 MILLILITER(S): 9 INJECTION, SOLUTION INTRAVENOUS at 17:33

## 2024-02-05 RX ADMIN — PIPERACILLIN AND TAZOBACTAM 25 GRAM(S): 4; .5 INJECTION, POWDER, LYOPHILIZED, FOR SOLUTION INTRAVENOUS at 13:29

## 2024-02-05 RX ADMIN — HEPARIN SODIUM 5000 UNIT(S): 5000 INJECTION INTRAVENOUS; SUBCUTANEOUS at 06:39

## 2024-02-05 RX ADMIN — PIPERACILLIN AND TAZOBACTAM 25 GRAM(S): 4; .5 INJECTION, POWDER, LYOPHILIZED, FOR SOLUTION INTRAVENOUS at 03:28

## 2024-02-05 RX ADMIN — HYDROMORPHONE HYDROCHLORIDE 0.5 MILLIGRAM(S): 2 INJECTION INTRAMUSCULAR; INTRAVENOUS; SUBCUTANEOUS at 18:37

## 2024-02-05 RX ADMIN — HYDROMORPHONE HYDROCHLORIDE 0.5 MILLIGRAM(S): 2 INJECTION INTRAMUSCULAR; INTRAVENOUS; SUBCUTANEOUS at 18:22

## 2024-02-05 RX ADMIN — PIPERACILLIN AND TAZOBACTAM 25 GRAM(S): 4; .5 INJECTION, POWDER, LYOPHILIZED, FOR SOLUTION INTRAVENOUS at 21:39

## 2024-02-05 RX ADMIN — SODIUM CHLORIDE 125 MILLILITER(S): 9 INJECTION, SOLUTION INTRAVENOUS at 03:06

## 2024-02-05 NOTE — PROGRESS NOTE ADULT - SUBJECTIVE AND OBJECTIVE BOX
Chief Complaint:  Patient is a 78y old  Female who presents with a chief complaint of Sepsis due to enterocolitis (04 Feb 2024 23:38)      HPI/ 24 hr events: Patient seen and examined at bedside. She had NG tube placement and thinks that her abdominal distention is a little better since placement. CT AP done for worsening pain and distention shows partial SBO.       REVIEW OF SYSTEMS:   General: Negative  HEENT: Negative  CV: Negative  Respiratory: Negative  GI: See HPI  : Negative  MSK: Negative  Hematologic: Negative  Skin: Negative    MEDICATIONS:   MEDICATIONS  (STANDING):  aspirin enteric coated 81 milliGRAM(s) Oral daily  atorvastatin 40 milliGRAM(s) Oral at bedtime  cilostazol 100 milliGRAM(s) Oral two times a day  heparin   Injectable 5000 Unit(s) SubCutaneous every 12 hours  lactated ringers. 1000 milliLiter(s) (125 mL/Hr) IV Continuous <Continuous>  levothyroxine 75 MICROGram(s) Oral daily  mirabegron ER 50 milliGRAM(s) Oral daily  nicotine -  14 mG/24Hr(s) Patch 1 Patch Transdermal daily  pantoprazole    Tablet 40 milliGRAM(s) Oral before breakfast  piperacillin/tazobactam IVPB.. 3.375 Gram(s) IV Intermittent every 8 hours  sertraline 200 milliGRAM(s) Oral daily  sodium bicarbonate 650 milliGRAM(s) Oral three times a day    MEDICATIONS  (PRN):  acetaminophen     Tablet .. 650 milliGRAM(s) Oral every 6 hours PRN Temp greater or equal to 38C (100.4F), Mild Pain (1 - 3), Moderate Pain (4 - 6)  ALPRAZolam 0.25 milliGRAM(s) Oral two times a day PRN anxiety  HYDROmorphone  Injectable 0.5 milliGRAM(s) IV Push every 6 hours PRN Severe Pain (7 - 10)  melatonin 3 milliGRAM(s) Oral at bedtime PRN Insomnia            DIET:  Diet, NPO:   Except Medications (02-04-24 @ 14:31) [Active]          ALLERGIES:   Allergies    No Known Allergies    Intolerances        VITAL SIGNS:   Vital Signs Last 24 Hrs  T(C): 36.6 (05 Feb 2024 04:45), Max: 37.3 (04 Feb 2024 19:03)  T(F): 97.8 (05 Feb 2024 04:45), Max: 99.2 (04 Feb 2024 19:03)  HR: 114 (05 Feb 2024 04:45) (103 - 140)  BP: 163/81 (05 Feb 2024 04:45) (140/80 - 163/81)  BP(mean): --  RR: 18 (05 Feb 2024 04:45) (18 - 18)  SpO2: 93% (05 Feb 2024 04:45) (91% - 94%)    Parameters below as of 05 Feb 2024 04:45  Patient On (Oxygen Delivery Method): nasal cannula  O2 Flow (L/min): 3    I&O's Summary    04 Feb 2024 07:01  -  05 Feb 2024 07:00  --------------------------------------------------------  IN: 0 mL / OUT: 450 mL / NET: -450 mL        PHYSICAL EXAM:   GENERAL:  No acute distress  HEENT:  NC/AT, conjunctiva clear, sclera anicteric  CHEST:  No increased effort  HEART:  Regular rate  ABDOMEN:  Soft, non-tender, non-distended, normoactive bowel sounds, no rebound or guarding  EXTREMITIES: No edema  SKIN:  Warm, dry  NEURO:  Calm, cooperative    LABS:                        8.9    15.97 )-----------( 329      ( 05 Feb 2024 04:40 )             26.3     Hemoglobin: 8.9 g/dL (02-05-24 @ 04:40)  Hemoglobin: 8.7 g/dL (02-04-24 @ 06:02)  Hemoglobin: 9.2 g/dL (02-03-24 @ 06:30)    02-05    145  |  111<H>  |  31.3<H>  ----------------------------<  96  3.9   |  19.0<L>  |  1.00    Ca    8.8      05 Feb 2024 04:40  Phos  3.4     02-05  Mg     2.0     02-05                    C-Reactive Protein, Serum: 327 mg/L (02-05-24 @ 04:40)  C-Reactive Protein, Serum: 380 mg/L (02-04-24 @ 15:56)                                RADIOLOGY & ADDITIONAL STUDIES:      ******PRELIMINARY REPORT******      ******PRELIMINARY REPORT******         ACC: [id number] EXAM:  CT ABDOMEN AND PELVIS OC IC   ORDERED BY: MARICARMEN MEYER     PROCEDURE DATE:  02/05/2024    ******PRELIMINARY REPORT******      ******PRELIMINARY REPORT******           INTERPRETATION:  PRELIM:    Comparison to CT of the abdomen and pelvis from 1/30/2024.    Persistent small bilateral pleural effusions with adjacent compressive   atelectasis.    Increased dilatation of proximal loops of small bowel to a transition   point in the right lower quadrant (series 4, images ) with small   amount of administered intravenous contrast transiting past the   transition point suggesting a partial small bowel obstruction.    No pneumoperitoneum.    Small volume abdominal and pelvic ascites.    No abdominal aortic aneurysm.    Findings discussed with DUKE Meyer at 3:29 AM on 2/5/2024.        ******PRELIMINARY REPORT******      ******PRELIMINARY REPORT******         AMEENA GUPTA MD; Attending Radiologist  This document is a PRELIMINARY interpretation and is pending final   attending approval. Feb 5 2024  3:34AM  02-05-24 @ 02:56               Chief Complaint:  Patient is a 78y old  Female who presents with a chief complaint of Sepsis due to enterocolitis (04 Feb 2024 23:38)      HPI/ 24 hr events: Patient seen and examined at bedside. She had NG tube placement and thinks that her abdominal distention is a little better since placement. CT AP done for worsening pain and distention shows partial SBO.       REVIEW OF SYSTEMS:   General: Negative  HEENT: Negative  CV: Negative  Respiratory: Negative  GI: See HPI  : Negative  MSK: Negative  Hematologic: Negative  Skin: Negative    MEDICATIONS:   MEDICATIONS  (STANDING):  aspirin enteric coated 81 milliGRAM(s) Oral daily  atorvastatin 40 milliGRAM(s) Oral at bedtime  cilostazol 100 milliGRAM(s) Oral two times a day  heparin   Injectable 5000 Unit(s) SubCutaneous every 12 hours  lactated ringers. 1000 milliLiter(s) (125 mL/Hr) IV Continuous <Continuous>  levothyroxine 75 MICROGram(s) Oral daily  mirabegron ER 50 milliGRAM(s) Oral daily  nicotine -  14 mG/24Hr(s) Patch 1 Patch Transdermal daily  pantoprazole    Tablet 40 milliGRAM(s) Oral before breakfast  piperacillin/tazobactam IVPB.. 3.375 Gram(s) IV Intermittent every 8 hours  sertraline 200 milliGRAM(s) Oral daily  sodium bicarbonate 650 milliGRAM(s) Oral three times a day    MEDICATIONS  (PRN):  acetaminophen     Tablet .. 650 milliGRAM(s) Oral every 6 hours PRN Temp greater or equal to 38C (100.4F), Mild Pain (1 - 3), Moderate Pain (4 - 6)  ALPRAZolam 0.25 milliGRAM(s) Oral two times a day PRN anxiety  HYDROmorphone  Injectable 0.5 milliGRAM(s) IV Push every 6 hours PRN Severe Pain (7 - 10)  melatonin 3 milliGRAM(s) Oral at bedtime PRN Insomnia            DIET:  Diet, NPO:   Except Medications (02-04-24 @ 14:31) [Active]          ALLERGIES:   Allergies    No Known Allergies    Intolerances        VITAL SIGNS:   Vital Signs Last 24 Hrs  T(C): 36.6 (05 Feb 2024 04:45), Max: 37.3 (04 Feb 2024 19:03)  T(F): 97.8 (05 Feb 2024 04:45), Max: 99.2 (04 Feb 2024 19:03)  HR: 114 (05 Feb 2024 04:45) (103 - 140)  BP: 163/81 (05 Feb 2024 04:45) (140/80 - 163/81)  BP(mean): --  RR: 18 (05 Feb 2024 04:45) (18 - 18)  SpO2: 93% (05 Feb 2024 04:45) (91% - 94%)    Parameters below as of 05 Feb 2024 04:45  Patient On (Oxygen Delivery Method): nasal cannula  O2 Flow (L/min): 3    I&O's Summary    04 Feb 2024 07:01  -  05 Feb 2024 07:00  --------------------------------------------------------  IN: 0 mL / OUT: 450 mL / NET: -450 mL        PHYSICAL EXAM:   GENERAL:  No acute distress  HEENT:  NG tube in place  CHEST:  No increased effort  HEART:  Regular rate  ABDOMEN:  Soft, minimally distended, diffuse TTP, no rebound or guarding  EXTREMITIES: No edema  SKIN:  Warm, dry  NEURO:  Calm, cooperative    LABS:                        8.9    15.97 )-----------( 329      ( 05 Feb 2024 04:40 )             26.3     Hemoglobin: 8.9 g/dL (02-05-24 @ 04:40)  Hemoglobin: 8.7 g/dL (02-04-24 @ 06:02)  Hemoglobin: 9.2 g/dL (02-03-24 @ 06:30)    02-05    145  |  111<H>  |  31.3<H>  ----------------------------<  96  3.9   |  19.0<L>  |  1.00    Ca    8.8      05 Feb 2024 04:40  Phos  3.4     02-05  Mg     2.0     02-05                    C-Reactive Protein, Serum: 327 mg/L (02-05-24 @ 04:40)  C-Reactive Protein, Serum: 380 mg/L (02-04-24 @ 15:56)                                RADIOLOGY & ADDITIONAL STUDIES:      ******PRELIMINARY REPORT******      ******PRELIMINARY REPORT******         ACC: [id number] EXAM:  CT ABDOMEN AND PELVIS OC IC   ORDERED BY: MARICARMEN MEYER     PROCEDURE DATE:  02/05/2024    ******PRELIMINARY REPORT******      ******PRELIMINARY REPORT******           INTERPRETATION:  PRELIM:    Comparison to CT of the abdomen and pelvis from 1/30/2024.    Persistent small bilateral pleural effusions with adjacent compressive   atelectasis.    Increased dilatation of proximal loops of small bowel to a transition   point in the right lower quadrant (series 4, images ) with small   amount of administered intravenous contrast transiting past the   transition point suggesting a partial small bowel obstruction.    No pneumoperitoneum.    Small volume abdominal and pelvic ascites.    No abdominal aortic aneurysm.    Findings discussed with DUKE Meyer at 3:29 AM on 2/5/2024.        ******PRELIMINARY REPORT******      ******PRELIMINARY REPORT******         AMEENA GUPTA MD; Attending Radiologist  This document is a PRELIMINARY interpretation and is pending final   attending approval. Feb 5 2024  3:34AM  02-05-24 @ 02:56

## 2024-02-05 NOTE — PROGRESS NOTE ADULT - SUBJECTIVE AND OBJECTIVE BOX
[facility] Division of Hospital Medicine    Chief Complaint: Enterocolitis     SUBJECTIVE / OVERNIGHT EVENTS:   Pt reports she feels slightly better today     Patient denies chest pain, SOB, fever, chills, dysuria or any other complaints. All remainder ROS negative.     MEDICATIONS  (STANDING):  aspirin enteric coated 81 milliGRAM(s) Oral daily  atorvastatin 40 milliGRAM(s) Oral at bedtime  cilostazol 100 milliGRAM(s) Oral two times a day  heparin   Injectable 5000 Unit(s) SubCutaneous every 12 hours  lactated ringers. 1000 milliLiter(s) (125 mL/Hr) IV Continuous <Continuous>  levothyroxine 75 MICROGram(s) Oral daily  mirabegron ER 50 milliGRAM(s) Oral daily  nicotine -  14 mG/24Hr(s) Patch 1 Patch Transdermal daily  pantoprazole    Tablet 40 milliGRAM(s) Oral before breakfast  piperacillin/tazobactam IVPB.. 3.375 Gram(s) IV Intermittent every 8 hours  sertraline 200 milliGRAM(s) Oral daily  sodium bicarbonate 650 milliGRAM(s) Oral three times a day    MEDICATIONS  (PRN):  acetaminophen     Tablet .. 650 milliGRAM(s) Oral every 6 hours PRN Temp greater or equal to 38C (100.4F), Mild Pain (1 - 3), Moderate Pain (4 - 6)  ALPRAZolam 0.25 milliGRAM(s) Oral two times a day PRN anxiety  HYDROmorphone  Injectable 0.5 milliGRAM(s) IV Push every 6 hours PRN Severe Pain (7 - 10)  melatonin 3 milliGRAM(s) Oral at bedtime PRN Insomnia        I&O's Summary    04 Feb 2024 07:01  -  05 Feb 2024 07:00  --------------------------------------------------------  IN: 0 mL / OUT: 450 mL / NET: -450 mL        PHYSICAL EXAM:  Vital Signs Last 24 Hrs  T(C): 36.8 (05 Feb 2024 16:50), Max: 36.9 (04 Feb 2024 20:00)  T(F): 98.3 (05 Feb 2024 16:50), Max: 98.4 (04 Feb 2024 20:00)  HR: 112 (05 Feb 2024 16:50) (112 - 118)  BP: 157/87 (05 Feb 2024 16:50) (149/87 - 165/94)  BP(mean): --  RR: 18 (05 Feb 2024 16:50) (18 - 18)  SpO2: 98% (05 Feb 2024 16:50) (88% - 98%)    Parameters below as of 05 Feb 2024 16:50  Patient On (Oxygen Delivery Method): nasal cannula        CONSTITUTIONAL: NAD, lying in bed  RESPIRATORY: Normal respiratory effort; lungs are clear to auscultation bilaterally  CARDIOVASCULAR: Regular rate and rhythm, normal S1 and S2   ABDOMEN: distention improved, TTP diffusely   MUSCULOSKELETAL:  No clubbing or cyanosis of digits   PSYCH: A+O to person, place, and time; anxious affect   NEUROLOGY: No gross sensory or motor deficits         LABS:                        8.9    15.97 )-----------( 329      ( 05 Feb 2024 04:40 )             26.3     02-05    145  |  111<H>  |  31.3<H>  ----------------------------<  96  3.9   |  19.0<L>  |  1.00    Ca    8.8      05 Feb 2024 04:40  Phos  3.4     02-05  Mg     2.0     02-05        Urinalysis Basic - ( 05 Feb 2024 04:40 )    Color: x / Appearance: x / SG: x / pH: x  Gluc: 96 mg/dL / Ketone: x  / Bili: x / Urobili: x   Blood: x / Protein: x / Nitrite: x   Leuk Esterase: x / RBC: x / WBC x   Sq Epi: x / Non Sq Epi: x / Bacteria: x

## 2024-02-05 NOTE — PROGRESS NOTE ADULT - ASSESSMENT
79 yo F who presented with R sided abdominal pain, found to have sepsis and CT finding of enterocolitis. Initially improving on IV antibiotics, but over the weekend began having worsening abd pain and distention. CT AP done shows partial SBO. Now s/p NGT placement.      79 yo F who presented with R sided abdominal pain, found to have sepsis and CT finding of enterocolitis. Initially improving on IV antibiotics, but over the weekend began having worsening abd pain and distention. Repeat CT AP done shows partial SBO. Now s/p NGT placement.     - Continue NGT to LIWS  - Keep NPO   - Serial abd exams  - Continue IV antibiotics  - Monitor for bm  - Surgery is following, follow up recs  - No plans for colonoscopy at this time in setting of SBO and active colitis

## 2024-02-05 NOTE — PROGRESS NOTE ADULT - SUBJECTIVE AND OBJECTIVE BOX
SURGERY PROGRESS NOTE    Subjective:   Patient examined at bedside this AM. Reports she is uncomfortable 2/2 tube and abdominal distension. Cannot recall last flatus or BM. No N/V     Objective:  Vital Signs  T(C): 36.7 (02-05 @ 08:32), Max: 37.3 (02-04 @ 19:03)  HR: 118 (02-05 @ 08:32) (113 - 140)  BP: 165/94 (02-05 @ 08:32) (148/91 - 165/94)  RR: 18 (02-05 @ 08:32) (18 - 18)  SpO2: 88% (02-05 @ 08:32) (88% - 94%)  02-04-24 @ 07:01  -  02-05-24 @ 07:00  --------------------------------------------------------  IN:  Total IN: 0 mL    OUT:    Voided (mL): 450 mL  Total OUT: 450 mL    Total NET: -450 mL          Physical Exam:  General: alert and oriented, NAD  Resp: airway patent, respirations unlabored  CVS: regular rate and rhythm  Abdomen: softly distended, tender to palpation throughout abdomen, guarding but not peritonitic  Extremities: no edema  Skin: warm, dry, appropriate color    Labs:                        8.9    15.97 )-----------( 329      ( 05 Feb 2024 04:40 )             26.3   02-05    145  |  111<H>  |  31.3<H>  ----------------------------<  96  3.9   |  19.0<L>  |  1.00    Ca    8.8      05 Feb 2024 04:40  Phos  3.4     02-05  Mg     2.0     02-05

## 2024-02-05 NOTE — PROGRESS NOTE ADULT - ASSESSMENT
78yoF hx active smoker, HTN, HLD, PAD, presenting with abdominal pain, non-bloody watery diarrhea, on admission found to be febrile w/ Tmax 105, tachycardic with CT A/P showing diffuse small bowel and colonic inflammation, most pronounced in the right lower quadrant involving the distal ileum concerning for Crohn’s disease. Empiric antibiotics were initiated for enterocolitis and intravenous fluids for acute kidney injury.     Partial SBO  - CT A/P 2/4 showing partial SBO   - Now s/p NGT placement  - NPO, IVF   - Serial abd exams  - Continue with IV Zosyn   - Appreciate GI and Surgery follow     Enterocolitis  - CT abdomen initially noted small bowel and colonic thickening with inflammation and stranding  - On ceftriaxone and metronidazole, changed to Zosyn per GI recs  - Pending stool calprotectin results  - GI follow up appreciated    SVT  - Potassium and magnesium supplementation  - Echocardiogram reviewed   - Cardiology evaluation noted  - Keep K > 4 and Mag > 2   - Per Cardio, if after repletion of electrolytes if there is still pSVT would recommend Lopressor 25mg po q 12hrs     Acute kidney injury  - Enalapril on hold   - Renal function improved with intravenous fluids    Hypokalemia  - c/w supplementation of potassium and monitor     Hypertension / Peripheral artery disease  - Monitor blood pressure   - On aspirin and cilostazol  - On atorvastatin    Hypothyroidism  - On levothyroxine    Anxiety / Depression  - On sertraline  - Alprazolam as needed    Overactive bladder  - On mirabegron    DVT ppx - Heparin SQ

## 2024-02-05 NOTE — PROGRESS NOTE ADULT - ASSESSMENT
Assessment:   77yo Female admitted with enterocolitis with increasing abdominal distension and pain CT c/f dilated bowel with TP in RLQ s/o partial SBO.    Plan:   - No acute surgical intervention  - NGT to wall suction, LIWS vs LCWS; should be functional for benefits  - cont abx   - NPO/IVF  - Serial abdominal exams

## 2024-02-06 LAB
ANION GAP SERPL CALC-SCNC: 14 MMOL/L — SIGNIFICANT CHANGE UP (ref 5–17)
BASOPHILS # BLD AUTO: 0.02 K/UL — SIGNIFICANT CHANGE UP (ref 0–0.2)
BASOPHILS NFR BLD AUTO: 0.1 % — SIGNIFICANT CHANGE UP (ref 0–2)
BUN SERPL-MCNC: 27.2 MG/DL — HIGH (ref 8–20)
CALCIUM SERPL-MCNC: 8.6 MG/DL — SIGNIFICANT CHANGE UP (ref 8.4–10.5)
CHLORIDE SERPL-SCNC: 112 MMOL/L — HIGH (ref 96–108)
CO2 SERPL-SCNC: 22 MMOL/L — SIGNIFICANT CHANGE UP (ref 22–29)
CREAT SERPL-MCNC: 0.97 MG/DL — SIGNIFICANT CHANGE UP (ref 0.5–1.3)
EGFR: 61 ML/MIN/1.73M2 — SIGNIFICANT CHANGE UP
EOSINOPHIL # BLD AUTO: 0.04 K/UL — SIGNIFICANT CHANGE UP (ref 0–0.5)
EOSINOPHIL NFR BLD AUTO: 0.3 % — SIGNIFICANT CHANGE UP (ref 0–6)
GLUCOSE SERPL-MCNC: 93 MG/DL — SIGNIFICANT CHANGE UP (ref 70–99)
HCT VFR BLD CALC: 29.9 % — LOW (ref 34.5–45)
HGB BLD-MCNC: 9.6 G/DL — LOW (ref 11.5–15.5)
IMM GRANULOCYTES NFR BLD AUTO: 1 % — HIGH (ref 0–0.9)
LYMPHOCYTES # BLD AUTO: 0.56 K/UL — LOW (ref 1–3.3)
LYMPHOCYTES # BLD AUTO: 3.8 % — LOW (ref 13–44)
MAGNESIUM SERPL-MCNC: 1.8 MG/DL — SIGNIFICANT CHANGE UP (ref 1.6–2.6)
MCHC RBC-ENTMCNC: 29.4 PG — SIGNIFICANT CHANGE UP (ref 27–34)
MCHC RBC-ENTMCNC: 32.1 GM/DL — SIGNIFICANT CHANGE UP (ref 32–36)
MCV RBC AUTO: 91.7 FL — SIGNIFICANT CHANGE UP (ref 80–100)
MONOCYTES # BLD AUTO: 1.25 K/UL — HIGH (ref 0–0.9)
MONOCYTES NFR BLD AUTO: 8.5 % — SIGNIFICANT CHANGE UP (ref 2–14)
NEUTROPHILS # BLD AUTO: 12.71 K/UL — HIGH (ref 1.8–7.4)
NEUTROPHILS NFR BLD AUTO: 86.3 % — HIGH (ref 43–77)
PHOSPHATE SERPL-MCNC: 3.4 MG/DL — SIGNIFICANT CHANGE UP (ref 2.4–4.7)
PLATELET # BLD AUTO: 384 K/UL — SIGNIFICANT CHANGE UP (ref 150–400)
POTASSIUM SERPL-MCNC: 3.6 MMOL/L — SIGNIFICANT CHANGE UP (ref 3.5–5.3)
POTASSIUM SERPL-SCNC: 3.6 MMOL/L — SIGNIFICANT CHANGE UP (ref 3.5–5.3)
RBC # BLD: 3.26 M/UL — LOW (ref 3.8–5.2)
RBC # FLD: 14.9 % — HIGH (ref 10.3–14.5)
SODIUM SERPL-SCNC: 148 MMOL/L — HIGH (ref 135–145)
WBC # BLD: 14.72 K/UL — HIGH (ref 3.8–10.5)
WBC # FLD AUTO: 14.72 K/UL — HIGH (ref 3.8–10.5)

## 2024-02-06 PROCEDURE — 99233 SBSQ HOSP IP/OBS HIGH 50: CPT

## 2024-02-06 RX ORDER — PANTOPRAZOLE SODIUM 20 MG/1
40 TABLET, DELAYED RELEASE ORAL
Refills: 0 | Status: DISCONTINUED | OUTPATIENT
Start: 2024-02-06 | End: 2024-02-10

## 2024-02-06 RX ADMIN — HYDROMORPHONE HYDROCHLORIDE 0.5 MILLIGRAM(S): 2 INJECTION INTRAMUSCULAR; INTRAVENOUS; SUBCUTANEOUS at 21:16

## 2024-02-06 RX ADMIN — Medication 0.25 MILLIGRAM(S): at 14:17

## 2024-02-06 RX ADMIN — PIPERACILLIN AND TAZOBACTAM 25 GRAM(S): 4; .5 INJECTION, POWDER, LYOPHILIZED, FOR SOLUTION INTRAVENOUS at 05:26

## 2024-02-06 RX ADMIN — PIPERACILLIN AND TAZOBACTAM 25 GRAM(S): 4; .5 INJECTION, POWDER, LYOPHILIZED, FOR SOLUTION INTRAVENOUS at 14:07

## 2024-02-06 RX ADMIN — SODIUM CHLORIDE 125 MILLILITER(S): 9 INJECTION, SOLUTION INTRAVENOUS at 13:37

## 2024-02-06 RX ADMIN — HEPARIN SODIUM 5000 UNIT(S): 5000 INJECTION INTRAVENOUS; SUBCUTANEOUS at 18:22

## 2024-02-06 RX ADMIN — HYDROMORPHONE HYDROCHLORIDE 0.5 MILLIGRAM(S): 2 INJECTION INTRAMUSCULAR; INTRAVENOUS; SUBCUTANEOUS at 20:32

## 2024-02-06 RX ADMIN — HEPARIN SODIUM 5000 UNIT(S): 5000 INJECTION INTRAVENOUS; SUBCUTANEOUS at 05:26

## 2024-02-06 RX ADMIN — PIPERACILLIN AND TAZOBACTAM 25 GRAM(S): 4; .5 INJECTION, POWDER, LYOPHILIZED, FOR SOLUTION INTRAVENOUS at 21:14

## 2024-02-06 NOTE — PROGRESS NOTE ADULT - ASSESSMENT
78yoF hx active smoker, HTN, HLD, PAD, presenting with abdominal pain, non-bloody watery diarrhea, on admission found to be febrile w/ Tmax 105, tachycardic with CT A/P showing diffuse small bowel and colonic inflammation, most pronounced in the right lower quadrant involving the distal ileum concerning for Crohn’s disease. Empiric antibiotics were initiated for enterocolitis and intravenous fluids for acute kidney injury.     Partial SBO  - CT A/P 2/4 showing partial SBO   - Now s/p NGT placement   - NPO, IVF   - Serial abd exams   - Continue with IV Zosyn   - Appreciate GI and Surgery follow     Enterocolitis  - CT abdomen initially noted small bowel and colonic thickening with inflammation and stranding  - On ceftriaxone and metronidazole, changed to Zosyn per GI recs  - Pending stool calprotectin results  - GI follow up appreciated    SVT  - Potassium and magnesium supplementation  - Echocardiogram reviewed   - Cardiology evaluation noted  - Keep K > 4 and Mag > 2   - Per Cardio, if after repletion of electrolytes if there is still pSVT would recommend Lopressor 25mg po q 12hrs     Acute kidney injury  - Enalapril on hold   - Renal function improved with intravenous fluids    Hypokalemia  - c/w supplementation of potassium and monitor     Hypertension / Peripheral artery disease  - Monitor blood pressure   - On aspirin and cilostazol  - On atorvastatin    Hypothyroidism  - On levothyroxine    Anxiety / Depression  - On sertraline  - Alprazolam as needed    Overactive bladder  - On mirabegron    DVT ppx - Heparin SQ   Pt wants sister to be called for updates Kika Silva C [phone] / Office [phone]

## 2024-02-06 NOTE — PROGRESS NOTE ADULT - SUBJECTIVE AND OBJECTIVE BOX
[facility] Division of Hospital Medicine     Chief Complaint: Enterocolitis     SUBJECTIVE / OVERNIGHT EVENTS:   Pt reports she feels better today   Abd pain is improved     Patient denies chest pain, SOB, fever, chills, dysuria or any other complaints. All remainder ROS negative.     MEDICATIONS  (STANDING):   aspirin enteric coated 81 milliGRAM(s) Oral daily  atorvastatin 40 milliGRAM(s) Oral at bedtime  cilostazol 100 milliGRAM(s) Oral two times a day  heparin   Injectable 5000 Unit(s) SubCutaneous every 12 hours  lactated ringers. 1000 milliLiter(s) (125 mL/Hr) IV Continuous <Continuous>  levothyroxine 75 MICROGram(s) Oral daily  mirabegron ER 50 milliGRAM(s) Oral daily  nicotine -  14 mG/24Hr(s) Patch 1 Patch Transdermal daily  pantoprazole    Tablet 40 milliGRAM(s) Oral before breakfast  piperacillin/tazobactam IVPB.. 3.375 Gram(s) IV Intermittent every 8 hours  sertraline 200 milliGRAM(s) Oral daily  sodium bicarbonate 650 milliGRAM(s) Oral three times a day    MEDICATIONS  (PRN):   acetaminophen     Tablet .. 650 milliGRAM(s) Oral every 6 hours PRN Temp greater or equal to 38C (100.4F), Mild Pain (1 - 3), Moderate Pain (4 - 6)  HYDROmorphone  Injectable 0.5 milliGRAM(s) IV Push every 6 hours PRN Severe Pain (7 - 10)  LORazepam   Injectable 0.25 milliGRAM(s) IV Push two times a day PRN Anxiety  melatonin 3 milliGRAM(s) Oral at bedtime PRN Insomnia        I&O's Summary     05 Feb 2024 07:01  -  06 Feb 2024 07:00  --------------------------------------------------------  IN: 0 mL / OUT: 850 mL / NET: -850 mL        PHYSICAL EXAM:   Vital Signs Last 24 Hrs  T(C): 36.7 (06 Feb 2024 13:20), Max: 36.9 (06 Feb 2024 00:19)  T(F): 98.1 (06 Feb 2024 13:20), Max: 98.5 (06 Feb 2024 00:19)  HR: 96 (06 Feb 2024 13:20) (96 - 112)  BP: 156/84 (06 Feb 2024 13:20) (124/80 - 168/91)  BP(mean): --  RR: 18 (06 Feb 2024 13:20) (18 - 18)  SpO2: 94% (06 Feb 2024 13:20) (93% - 98%)    Parameters below as of 06 Feb 2024 13:20  Patient On (Oxygen Delivery Method): nasal cannula  O2 Flow (L/min): 3        CONSTITUTIONAL: NAD, lying in bed   ENMT: +NGT in place   RESPIRATORY: Normal respiratory effort; lungs are clear to auscultation bilaterally  CARDIOVASCULAR: Regular rate and rhythm, normal S1 and S2   ABDOMEN: distention improved, TTP diffusely   MUSCULOSKELETAL:  No clubbing or cyanosis of digits   PSYCH: A+O to person, place, and time; anxious affect   NEUROLOGY: No gross sensory or motor deficits         LABS:                        9.6    14.72 )-----------( 384      ( 06 Feb 2024 06:00 )             29.9     02-06    148<H>  |  112<H>  |  27.2<H>  ----------------------------<  93  3.6   |  22.0  |  0.97    Ca    8.6      06 Feb 2024 06:00  Phos  3.4     02-06  Mg     1.8     02-06            Urinalysis Basic - ( 06 Feb 2024 06:00 )    Color: x / Appearance: x / SG: x / pH: x  Gluc: 93 mg/dL / Ketone: x  / Bili: x / Urobili: x   Blood: x / Protein: x / Nitrite: x   Leuk Esterase: x / RBC: x / WBC x   Sq Epi: x / Non Sq Epi: x / Bacteria: x

## 2024-02-06 NOTE — PROGRESS NOTE PEDS - NUTRITIONAL ASSESSMENT
This patient has been assessed with a concern for Malnutrition and has been determined to have a diagnosis/diagnoses of Moderate protein-calorie malnutrition.    This patient is being managed with:   Diet NPO-  Entered: Feb 5 2024 12:02PM

## 2024-02-06 NOTE — PROGRESS NOTE PEDS - ASSESSMENT
79yo Female admitted with enterocolitis with increasing abdominal distension and pain CT c/f dilated bowel with TP in RLQ s/o partial SBO.    Plan:   - No acute surgical intervention  - NGT to wall suction, LIWS vs LCWS; should be functional for benefits  - PLEASE REPLACE on PPI  PLEASE ENSURE THE NGT IS ATTACHED TO THE CORRECT WALL SUCTION  - cont abx   - NPO/IVF  - Serial abdominal exams

## 2024-02-06 NOTE — PROGRESS NOTE PEDS - SUBJECTIVE AND OBJECTIVE BOX
INTERVAL HPI/OVERNIGHT EVENTS:    Patient evaluated at bedside. No acute distress. No acute events overnight.  Patient reports feeling better  still has bowel function  NGT output 850    MEDICATIONS  (STANDING):  aspirin enteric coated 81 milliGRAM(s) Oral daily  atorvastatin 40 milliGRAM(s) Oral at bedtime  cilostazol 100 milliGRAM(s) Oral two times a day  heparin   Injectable 5000 Unit(s) SubCutaneous every 12 hours  lactated ringers. 1000 milliLiter(s) (125 mL/Hr) IV Continuous <Continuous>  levothyroxine 75 MICROGram(s) Oral daily  mirabegron ER 50 milliGRAM(s) Oral daily  nicotine -  14 mG/24Hr(s) Patch 1 Patch Transdermal daily  pantoprazole    Tablet 40 milliGRAM(s) Oral before breakfast  piperacillin/tazobactam IVPB.. 3.375 Gram(s) IV Intermittent every 8 hours  sertraline 200 milliGRAM(s) Oral daily  sodium bicarbonate 650 milliGRAM(s) Oral three times a day    MEDICATIONS  (PRN):  acetaminophen     Tablet .. 650 milliGRAM(s) Oral every 6 hours PRN Temp greater or equal to 38C (100.4F), Mild Pain (1 - 3), Moderate Pain (4 - 6)  ALPRAZolam 0.25 milliGRAM(s) Oral two times a day PRN anxiety  HYDROmorphone  Injectable 0.5 milliGRAM(s) IV Push every 6 hours PRN Severe Pain (7 - 10)  melatonin 3 milliGRAM(s) Oral at bedtime PRN Insomnia      Vital Signs Last 24 Hrs  T(C): 36.7 (06 Feb 2024 05:15), Max: 36.9 (06 Feb 2024 00:19)  T(F): 98.1 (06 Feb 2024 05:15), Max: 98.5 (06 Feb 2024 00:19)  HR: 96 (06 Feb 2024 05:15) (96 - 118)  BP: 163/93 (06 Feb 2024 05:15) (124/80 - 168/91)  BP(mean): --  RR: 18 (06 Feb 2024 05:15) (18 - 18)  SpO2: 96% (06 Feb 2024 05:15) (88% - 98%)    Parameters below as of 06 Feb 2024 05:15  Patient On (Oxygen Delivery Method): nasal cannula  O2 Flow (L/min): 3      Physical Exam:  General: alert and oriented, NAD  NGT to wall suction with thick gasrtic output  Resp: airway patent, respirations unlabored  CVS: regular rate and rhythm  Abdomen: softly distended, tender to palpation throughout abdomen, guarding but not peritonitic  Extremities: no edema  Skin: warm, dry, appropriate color      I&O's Detail    05 Feb 2024 07:01  -  06 Feb 2024 07:00  --------------------------------------------------------  IN:  Total IN: 0 mL    OUT:    Nasogastric/Oral tube (mL): 850 mL  Total OUT: 850 mL    Total NET: -850 mL          LABS:                        9.6    14.72 )-----------( 384      ( 06 Feb 2024 06:00 )             29.9     02-06    148<H>  |  112<H>  |  27.2<H>  ----------------------------<  93  3.6   |  22.0  |  0.97    Ca    8.6      06 Feb 2024 06:00  Phos  3.4     02-06  Mg     1.8     02-06        Urinalysis Basic - ( 06 Feb 2024 06:00 )    Color: x / Appearance: x / SG: x / pH: x  Gluc: 93 mg/dL / Ketone: x  / Bili: x / Urobili: x   Blood: x / Protein: x / Nitrite: x   Leuk Esterase: x / RBC: x / WBC x   Sq Epi: x / Non Sq Epi: x / Bacteria: x        RADIOLOGY & ADDITIONAL STUDIES:

## 2024-02-07 DIAGNOSIS — K56.609 UNSPECIFIED INTESTINAL OBSTRUCTION, UNSPECIFIED AS TO PARTIAL VERSUS COMPLETE OBSTRUCTION: ICD-10-CM

## 2024-02-07 LAB
ANION GAP SERPL CALC-SCNC: 14 MMOL/L — SIGNIFICANT CHANGE UP (ref 5–17)
BASOPHILS # BLD AUTO: 0.02 K/UL — SIGNIFICANT CHANGE UP (ref 0–0.2)
BASOPHILS NFR BLD AUTO: 0.1 % — SIGNIFICANT CHANGE UP (ref 0–2)
BUN SERPL-MCNC: 21.9 MG/DL — HIGH (ref 8–20)
CALCIUM SERPL-MCNC: 8.6 MG/DL — SIGNIFICANT CHANGE UP (ref 8.4–10.5)
CHLORIDE SERPL-SCNC: 109 MMOL/L — HIGH (ref 96–108)
CO2 SERPL-SCNC: 25 MMOL/L — SIGNIFICANT CHANGE UP (ref 22–29)
CREAT SERPL-MCNC: 0.7 MG/DL — SIGNIFICANT CHANGE UP (ref 0.5–1.3)
EGFR: 90 ML/MIN/1.73M2 — SIGNIFICANT CHANGE UP
EOSINOPHIL # BLD AUTO: 0.07 K/UL — SIGNIFICANT CHANGE UP (ref 0–0.5)
EOSINOPHIL NFR BLD AUTO: 0.4 % — SIGNIFICANT CHANGE UP (ref 0–6)
GLUCOSE BLDC GLUCOMTR-MCNC: 103 MG/DL — HIGH (ref 70–99)
GLUCOSE SERPL-MCNC: 87 MG/DL — SIGNIFICANT CHANGE UP (ref 70–99)
HCT VFR BLD CALC: 35.5 % — SIGNIFICANT CHANGE UP (ref 34.5–45)
HGB BLD-MCNC: 11.2 G/DL — LOW (ref 11.5–15.5)
IMM GRANULOCYTES NFR BLD AUTO: 0.9 % — SIGNIFICANT CHANGE UP (ref 0–0.9)
LYMPHOCYTES # BLD AUTO: 0.61 K/UL — LOW (ref 1–3.3)
LYMPHOCYTES # BLD AUTO: 3.6 % — LOW (ref 13–44)
MAGNESIUM SERPL-MCNC: 1.7 MG/DL — LOW (ref 1.8–2.6)
MCHC RBC-ENTMCNC: 29.6 PG — SIGNIFICANT CHANGE UP (ref 27–34)
MCHC RBC-ENTMCNC: 31.5 GM/DL — LOW (ref 32–36)
MCV RBC AUTO: 93.9 FL — SIGNIFICANT CHANGE UP (ref 80–100)
MONOCYTES # BLD AUTO: 1.09 K/UL — HIGH (ref 0–0.9)
MONOCYTES NFR BLD AUTO: 6.5 % — SIGNIFICANT CHANGE UP (ref 2–14)
NEUTROPHILS # BLD AUTO: 14.9 K/UL — HIGH (ref 1.8–7.4)
NEUTROPHILS NFR BLD AUTO: 88.5 % — HIGH (ref 43–77)
PHOSPHATE SERPL-MCNC: 3 MG/DL — SIGNIFICANT CHANGE UP (ref 2.4–4.7)
PLATELET # BLD AUTO: 399 K/UL — SIGNIFICANT CHANGE UP (ref 150–400)
POTASSIUM SERPL-MCNC: 3.3 MMOL/L — LOW (ref 3.5–5.3)
POTASSIUM SERPL-SCNC: 3.3 MMOL/L — LOW (ref 3.5–5.3)
RBC # BLD: 3.78 M/UL — LOW (ref 3.8–5.2)
RBC # FLD: 15 % — HIGH (ref 10.3–14.5)
SODIUM SERPL-SCNC: 148 MMOL/L — HIGH (ref 135–145)
WBC # BLD: 16.85 K/UL — HIGH (ref 3.8–10.5)
WBC # FLD AUTO: 16.85 K/UL — HIGH (ref 3.8–10.5)

## 2024-02-07 PROCEDURE — 99233 SBSQ HOSP IP/OBS HIGH 50: CPT

## 2024-02-07 PROCEDURE — 74018 RADEX ABDOMEN 1 VIEW: CPT | Mod: 26

## 2024-02-07 RX ORDER — POTASSIUM CHLORIDE 20 MEQ
10 PACKET (EA) ORAL
Refills: 0 | Status: COMPLETED | OUTPATIENT
Start: 2024-02-07 | End: 2024-02-07

## 2024-02-07 RX ORDER — MAGNESIUM SULFATE 500 MG/ML
1 VIAL (ML) INJECTION ONCE
Refills: 0 | Status: DISCONTINUED | OUTPATIENT
Start: 2024-02-07 | End: 2024-02-10

## 2024-02-07 RX ORDER — BUDESONIDE AND FORMOTEROL FUMARATE DIHYDRATE 160; 4.5 UG/1; UG/1
2 AEROSOL RESPIRATORY (INHALATION)
Refills: 0 | Status: DISCONTINUED | OUTPATIENT
Start: 2024-02-07 | End: 2024-02-10

## 2024-02-07 RX ORDER — IPRATROPIUM/ALBUTEROL SULFATE 18-103MCG
3 AEROSOL WITH ADAPTER (GRAM) INHALATION EVERY 6 HOURS
Refills: 0 | Status: DISCONTINUED | OUTPATIENT
Start: 2024-02-07 | End: 2024-02-10

## 2024-02-07 RX ORDER — LEVOTHYROXINE SODIUM 125 MCG
37.5 TABLET ORAL AT BEDTIME
Refills: 0 | Status: DISCONTINUED | OUTPATIENT
Start: 2024-02-07 | End: 2024-02-10

## 2024-02-07 RX ORDER — ACETAMINOPHEN 500 MG
1000 TABLET ORAL ONCE
Refills: 0 | Status: COMPLETED | OUTPATIENT
Start: 2024-02-07 | End: 2024-02-07

## 2024-02-07 RX ORDER — SODIUM CHLORIDE 9 MG/ML
1000 INJECTION, SOLUTION INTRAVENOUS
Refills: 0 | Status: DISCONTINUED | OUTPATIENT
Start: 2024-02-07 | End: 2024-02-10

## 2024-02-07 RX ORDER — IOHEXOL 300 MG/ML
60 INJECTION, SOLUTION INTRAVENOUS ONCE
Refills: 0 | Status: DISCONTINUED | OUTPATIENT
Start: 2024-02-07 | End: 2024-02-10

## 2024-02-07 RX ADMIN — PANTOPRAZOLE SODIUM 40 MILLIGRAM(S): 20 TABLET, DELAYED RELEASE ORAL at 09:13

## 2024-02-07 RX ADMIN — PIPERACILLIN AND TAZOBACTAM 25 GRAM(S): 4; .5 INJECTION, POWDER, LYOPHILIZED, FOR SOLUTION INTRAVENOUS at 05:10

## 2024-02-07 RX ADMIN — HEPARIN SODIUM 5000 UNIT(S): 5000 INJECTION INTRAVENOUS; SUBCUTANEOUS at 17:35

## 2024-02-07 RX ADMIN — HEPARIN SODIUM 5000 UNIT(S): 5000 INJECTION INTRAVENOUS; SUBCUTANEOUS at 05:11

## 2024-02-07 RX ADMIN — BUDESONIDE AND FORMOTEROL FUMARATE DIHYDRATE 2 PUFF(S): 160; 4.5 AEROSOL RESPIRATORY (INHALATION) at 20:55

## 2024-02-07 RX ADMIN — Medication 1000 MILLIGRAM(S): at 17:05

## 2024-02-07 RX ADMIN — Medication 3 MILLILITER(S): at 20:57

## 2024-02-07 RX ADMIN — Medication 100 MILLIEQUIVALENT(S): at 13:45

## 2024-02-07 RX ADMIN — Medication 37.5 MICROGRAM(S): at 22:30

## 2024-02-07 RX ADMIN — PIPERACILLIN AND TAZOBACTAM 25 GRAM(S): 4; .5 INJECTION, POWDER, LYOPHILIZED, FOR SOLUTION INTRAVENOUS at 22:39

## 2024-02-07 RX ADMIN — PIPERACILLIN AND TAZOBACTAM 25 GRAM(S): 4; .5 INJECTION, POWDER, LYOPHILIZED, FOR SOLUTION INTRAVENOUS at 13:42

## 2024-02-07 RX ADMIN — Medication 400 MILLIGRAM(S): at 15:51

## 2024-02-07 RX ADMIN — SODIUM CHLORIDE 75 MILLILITER(S): 9 INJECTION, SOLUTION INTRAVENOUS at 15:53

## 2024-02-07 NOTE — PROGRESS NOTE ADULT - SUBJECTIVE AND OBJECTIVE BOX
HOSPITALIST PROGRESS NOTE    YAMILETH DE JESUSR  [id number]  78yFemale    Patient is a 78y old  Female who presents with a chief complaint of Sepsis due to enterocolitis (07 Feb 2024 12:38)      SUBJECTIVE:   Chart reviewed since admission, discussed with Dr Álvarez    78 year old female with HTN, Dyslipidemia, PAD, Hypothyroidism, Overactive bladder and Anxiety presented with abdominal pain, fever and diarrhea.   She was admitted for Enterocolitis and treated for sepsis and MERVAT. Hospital course then significant for bowel obstruction.    Patient seen and examined at bedside earlier today for enterocolitis, SBO, Hypernatremia, hypokalemia.  Thirsty, feels hungry.   Denies any nausea, vomiting, abdominal pain  Denies passing stool or flatus  Denies any fever or chills          OBJECTIVE:  Vital Signs Last 24 Hrs  T(C): 36.6 (07 Feb 2024 08:47), Max: 36.7 (07 Feb 2024 04:30)  T(F): 97.9 (07 Feb 2024 08:47), Max: 98 (07 Feb 2024 04:30)  HR: 93 (07 Feb 2024 08:47) (93 - 96)  BP: 167/96 (07 Feb 2024 08:47) (162/95 - 167/96)   RR: 18 (07 Feb 2024 08:47) (18 - 18)  SpO2: 93% (07 Feb 2024 08:47) (93% - 98%)    Parameters below as of 07 Feb 2024 08:47  Patient On (Oxygen Delivery Method): nasal cannula  O2 Flow (L/min): 3      PHYSICAL EXAMINATION  General: Elderly ill appearing female sitting up in bed, sister at bedside  HEENT:  NGT with dark bilious drainage. 3LNC  NECK:  Supple  CVS: regular rate and rhythm S1 S2  RESP:  Fair air entry bilaterally  GI:  Soft with tenderness diffusely on palpation. BS+  : No suprapubic tenderness  MSK:  Moves all extremities  CNS:  Awake, aware of being in hospital, follows commands though generalized weakness  INTEG:  Warm dry skin  PSYCH:  Fatigued    MONITOR:  CAPILLARY BLOOD GLUCOSE            I&O's Summary    06 Feb 2024 07:01  -  07 Feb 2024 07:00  --------------------------------------------------------  IN: 0 mL / OUT: 350 mL / NET: -350 mL                            11.2   16.85 )-----------( 399      ( 07 Feb 2024 06:37 )             35.5       02-07    148<H>  |  109<H>  |  21.9<H>  ----------------------------<  87  3.3<L>   |  25.0  |  0.70    Ca    8.6      07 Feb 2024 06:37  Phos  3.0     02-07  Mg     1.7     02-07          Urinalysis Basic - ( 07 Feb 2024 06:37 )    Color: x / Appearance: x / SG: x / pH: x  Gluc: 87 mg/dL / Ketone: x  / Bili: x / Urobili: x   Blood: x / Protein: x / Nitrite: x   Leuk Esterase: x / RBC: x / WBC x   Sq Epi: x / Non Sq Epi: x / Bacteria: x      Calprotectin, Stool: 252: Concentration Interpretation Follow-Up GI PCR Panel Stool (01.30.24 @ 15:17)   GI PCR Panel: NotDetec:     Culture:  Culture - Blood (01.30.24 @ 15:22)   Specimen Source: .Blood Blood  Culture Results:   No growth at 5 days    Culture - Blood (01.30.24 @ 15:17)   Specimen Source: .Blood Blood  Culture Results:   No growth at 5 days    Culture - Urine (01.30.24 @ 15:40)   Specimen Source: Clean Catch Clean Catch (Midstream)  Culture Results:   <10,000 CFU/mL Normal Urogenital Nimco    TTE:  < from: TTE W or WO Ultrasound Enhancing Agent (02.04.24 @ 12:12) >  CONCLUSIONS:      1. Left ventricular wall thickness is normal. Left ventricular systolic function is normal with an ejection fraction visually estimated at 60 to 65 %.   2. There is mild (grade 1) left ventricular diastolic dysfunction, with normal filling pressure.   3. Normal right ventricular cavity size and normal systolic function.   4. The left atrium is normal.   5. There is mild calcification of the mitral valve annulus.   6. Mild mitral valve leaflet calcification.   7. No mitral regurgitation.   8. Mild aortic stenosis.   9. Trace aortic regurgitation.  10. Mild tricuspid regurgitation.  11. No pericardial effusion seen.  12. Estimated pulmonary artery systolic pressure is 52 mmHg, consistent with moderate pulmonary hypertension.    < end of copied text >      RADIOLOGY    < from: Xray Chest 1 View- PORTABLE-Urgent (Xray Chest 1 View- PORTABLE-Urgent .) (01.30.24 @ 15:41) >  IMPRESSION: Minimal left base linear atelectasis at this time.    < end of copied text >    < from: CT Abdomen and Pelvis w/ IV Cont (01.30.24 @ 19:04) >  IMPRESSION:  Diffuse small bowel and colonic inflammation, most pronounced in the   right lower quadrant involving the distal ileum. Findings may be related   to infectiousor inflammatory enterocolitis (including Crohn's disease).    Small volume abdominopelvic ascites, likely reactive.    --- End of Report ---    < end of copied text >    < from: CT Abdomen and Pelvis w/ Oral Cont and w/ IV Cont (02.05.24 @ 02:56) >  IMPRESSION:    Findings suggestive of at least a partial small bowel obstruction.    Nonspecific mesenteric edema/interloop free fluid.    < end of copied text >      < from: Xray Chest 1 View- PORTABLE-Urgent (Xray Chest 1 View- PORTABLE-Urgent .) (02.05.24 @ 06:42) >  IMPRESSION:  Distal portion of enteric tube is looped on itself in the   stomach. The tip is in the stomach.    Low lung volumes.    Bibasilar subsegmental atelectasis and bilateral trace pleural effusions.    < end of copied text >    MEDICATIONS  (STANDING):  aspirin enteric coated 81 milliGRAM(s) Oral daily  atorvastatin 40 milliGRAM(s) Oral at bedtime  cilostazol 100 milliGRAM(s) Oral two times a day  dextrose 5% 1000 milliLiter(s) (75 mL/Hr) IV Continuous <Continuous>  enalapril 5 milliGRAM(s) Oral daily  heparin   Injectable 5000 Unit(s) SubCutaneous every 12 hours  levothyroxine 75 MICROGram(s) Oral daily  magnesium sulfate  IVPB 1 Gram(s) IV Intermittent once  mirabegron ER 50 milliGRAM(s) Oral daily  nicotine -  14 mG/24Hr(s) Patch 1 Patch Transdermal daily  pantoprazole  Injectable 40 milliGRAM(s) IV Push with breakfast  piperacillin/tazobactam IVPB.. 3.375 Gram(s) IV Intermittent every 8 hours  sertraline 200 milliGRAM(s) Oral daily  sodium bicarbonate 650 milliGRAM(s) Oral three times a day      MEDICATIONS  (PRN):  acetaminophen     Tablet .. 650 milliGRAM(s) Oral every 6 hours PRN Temp greater or equal to 38C (100.4F), Mild Pain (1 - 3), Moderate Pain (4 - 6)  LORazepam   Injectable 0.25 milliGRAM(s) IV Push two times a day PRN Anxiety  melatonin 3 milliGRAM(s) Oral at bedtime PRN Insomnia

## 2024-02-07 NOTE — PROGRESS NOTE ADULT - SUBJECTIVE AND OBJECTIVE BOX
SURGERY PROGRESS NOTE    Subjective:   Patient examined at bedside this AM. Reports she is feeling well this morning. No N/V, fevers, chills overnight. Pain is well controlled and resolving. No flatus or BM to report. No acute events overnight    Objective:  Vital Signs  T(C): 36.6 (02-07 @ 08:47), Max: 36.7 (02-06 @ 13:20)  HR: 93 (02-07 @ 08:47) (93 - 96)  BP: 167/96 (02-07 @ 08:47) (156/84 - 167/96)  RR: 18 (02-07 @ 08:47) (18 - 18)  SpO2: 93% (02-07 @ 08:47) (93% - 98%)  02-06-24 @ 07:01  -  02-07-24 @ 07:00  --------------------------------------------------------  IN:  Total IN: 0 mL    OUT:    Nasogastric/Oral tube (mL): 350 mL  Total OUT: 350 mL    Total NET: -350 mL      Physical Exam:  General: alert and oriented, NAD  Resp: airway patent, respirations unlabored  Abdomen: soft, nontender, nondistended, NGT in place with scant content in canister  Extremities: no edema  Skin: warm, dry, appropriate color      Labs:                        11.2   16.85 )-----------( 399      ( 07 Feb 2024 06:37 )             35.5   02-07    148<H>  |  109<H>  |  21.9<H>  ----------------------------<  87  3.3<L>   |  25.0  |  0.70    Ca    8.6      07 Feb 2024 06:37  Phos  3.0     02-07  Mg     1.7     02-07

## 2024-02-07 NOTE — CHART NOTE - NSCHARTNOTEFT_GEN_A_CORE
Source: Patient, chart, and family     Current Diet: Diet, NPO (02-05-24 @ 12:02)    Patient with no complaints at this time     PO intake: NPO    Current Weight:  1/30 123 lbs    2/2 136 lbs     Pertinent Medications: MEDICATIONS  (STANDING):  dextrose 5% 1000 milliLiter(s) (75 mL/Hr) IV Continuous <Continuous>  levothyroxine 75 MICROGram(s) Oral daily  magnesium sulfate  IVPB 1 Gram(s) IV Intermittent once  pantoprazole  Injectable 40 milliGRAM(s) IV Push with breakfast  piperacillin/tazobactam IVPB.. 3.375 Gram(s) IV Intermittent every 8 hours  sodium bicarbonate 650 milliGRAM(s) Oral three times a day    Pertinent Labs: 02-07 Na148 mmol/L<H> Glu 87 mg/dL K+ 3.3 mmol/L<L> Cr  0.70 mg/dL BUN 21.9 mg/dL<H> Phos 3.0 mg/dL    Nutrition focused physical exam conducted previously - with signs of malnutrition present    Subcutaneous fat loss: [x ] Orbital fat pads region, [x ]Buccal fat region, [ ]Triceps region,  [ ]Ribs region    Muscle wasting: [ x]Temples region, [ x]Clavicle region, [x ]Shoulder region, [ ]Scapula region, [ ]Interosseous region,  [ ]thigh region, [ ]Calf region    Hospital Course: 78yoF hx active smoker, HTN, HLD, PAD, presenting with abdominal pain, non-bloody watery diarrhea, on admission found to be febrile w/ Tmax 105, tachycardic with CT A/P showing diffuse small bowel and colonic inflammation, most pronounced in the right lower quadrant involving the distal ileum concerning for Crohn’s disease. Empiric antibiotics were initiated for enterocolitis and intravenous fluids for acute kidney injury.     Estimated Needs:   [x ] no change since previous assessment  [ ] recalculated:     Current Nutrition Diagnosis: Chronic Moderate Malnutrition  related to inadequate energy intake in setting of enteritis, possible Crohns dx as evidenced by altered GI function, 20% weight loss, meets<75%EER>1 mo, mod fat/muscle depletion.    Patient currently NPO secondary to partial SBO, NGT in place for suction. Pt reports she is looking forward to food, previously receiving fiber restricted diet. Recommend resuming diet when medically appropriate. When pt was eating she had poor appetite/PO intakes, only eating about 1 meal per day. Pt requesting Ensure oral nutrition supplement BID when diet is advanced. No current c/o N/V/C/D. RD remains available as needed.      Recommendations:   1) Resume previously diet when medically possible.  2) Add Ensure Plus High Protein BID when diet is advanced.   3) Add MVI/MIN.  4) Obtain daily weights to monitor trends.     Monitoring and Evaluation:   [ ] PO intake [ ] Tolerance to diet prescription [X] Weights  [X] Follow up per protocol [X] Labs: chem 8, mg, phos, H/H

## 2024-02-07 NOTE — PROGRESS NOTE ADULT - ASSESSMENT
79 y/o F who presented with R sided abdominal pain, found to have sepsis and CT finding of enterocolitis.    #Enterocolitis  #SBO  CT A/P w/ IV Cont (01.30.24)- Diffuse small bowel and colonic inflammation, most pronounced in the RLQ involving the distal ileum. Findings may be related to infectious or inflammatory enterocolitis (including Crohn's disease).  CT A/P w/ Oral Cont and w/ IV Cont (02.05.24)- partial small bowel obstruction, nonspecific mesenteric edema/interloop free fluid.    - Surgery team recommendations appreciated  - Monitor for return of bowel function  - Serial abdominal exams  - Continue IV antibiotics    - No plans for colonoscopy at this time in setting of SBO and active colitis, will need to follow up as an outpatient for colonoscopy  _________________________________________________________________  Assessment and recommendations are final when note is signed by the attending physician.

## 2024-02-07 NOTE — PROGRESS NOTE ADULT - ASSESSMENT
Assessment:   79yo Female admitted for enteritis causing partial bowel obstruction.     Plan:   - NGT can be removed  - Start reglan 10mg/day  - Start on CLD  - Will continue to follow

## 2024-02-07 NOTE — PROGRESS NOTE ADULT - ASSESSMENT
78yoF hx active smoker, HTN, HLD, PAD, presenting with abdominal pain, non-bloody watery diarrhea  Febrile (Tm105) and tachycardic. MERVAT, AGMA and Hyperkalemia present  CT A/P showing diffuse small bowel and colonic inflammation, most pronounced in the right lower quadrant involving the distal ileum concerning for Crohn’s disease.   Empiric antibiotics were initiated for enterocolitis and intravenous fluids for acute kidney injury.   Hospital stay then complicated by SBO and patient made NPO, NGT placed      # Partial SBO  CT A/P 2/4 showing partial SBO   - Now s/p NGT placement   - NPO, IVF   - Serial abd exams   - Serial imaging  - GI and Surgery follow     # Enterocolitis  CT abdomen initially noted small bowel and colonic thickening with inflammation and stranding  GI PCR (-), Blood Culture (-)  Elevated Fecal Calprotectin 252  - On ceftriaxone and metronidazole, changed to Zosyn per GI recs  - GI follow up appreciated    # Hypernatremia  # Hypokalemia  # hypomagnesemia  - IVF changed to D5W with potassium  - Magnesium Sulfate  - Monitor lytes    # SVT  TTE with normal function, moderate pulmonary hypertension  Currently monitor SR with PAC  - Potassium and magnesium supplementation  - Cardiology evaluation noted  - Keep K > 4 and Mag > 2   - Per Cardio, if after repletion of electrolytes if there is still pSVT would recommend Lopressor 25mg po q 12hrs     # Hypoxia, likely underlying COPD  # Smoker  - Supplemental O2  - Add ICS/LAMA  - Albuterol PRN  - Nicotine replacement     # Acute kidney injury  Resolved  - Renal function improved with intravenous fluids     # Hypertension / Peripheral artery disease  - Monitor blood pressure   - On aspirin and cilostazol, atorvastatin (medications to be resumed once on diet)    # Hypothyroidism  - On levothyroxine    # Anxiety / Depression  - On sertraline  - Alprazolam as needed    # Overactive bladder  - On mirabegron    VTE prophylaxis - Heparin SQ   Pt wants sister to be called for updates Kika Silva C [phone] / Office [phone]    78yoF hx active smoker, HTN, HLD, PAD, presenting with abdominal pain, non-bloody watery diarrhea  Febrile (Tm105) and tachycardic. MERVAT, AGMA and Hyperkalemia present  CT A/P showing diffuse small bowel and colonic inflammation, most pronounced in the right lower quadrant involving the distal ileum concerning for Crohn’s disease.   Empiric antibiotics were initiated for enterocolitis and intravenous fluids for acute kidney injury.   Hospital stay then complicated by SBO and patient made NPO, NGT placed      # Partial SBO  CT A/P 2/4 showing partial SBO   - Now s/p NGT placement   - NPO, IVF (oral medications on hold for now)  - Serial abd exams   - Serial imaging  - GI and Surgery follow     # Enterocolitis  CT abdomen initially noted small bowel and colonic thickening with inflammation and stranding  GI PCR (-), Blood Culture (-)  Elevated Fecal Calprotectin 252  - On ceftriaxone and metronidazole, changed to Zosyn per GI recs  - GI follow up appreciated    # Hypernatremia  # Hypokalemia  # hypomagnesemia  - IVF changed to D5W with potassium  - Magnesium Sulfate  - Monitor lytes    # SVT  TTE with normal function, moderate pulmonary hypertension  Currently monitor SR with PAC  - Potassium and magnesium supplementation  - Cardiology evaluation noted  - Keep K > 4 and Mag > 2   - Per Cardio, if after repletion of electrolytes if there is still PSVT would recommend Lopressor 25mg po q 12hrs     # Hypoxia, likely underlying COPD  # Smoker  - Supplemental O2  - Add ICS/LAMA  - Albuterol PRN  - Nicotine replacement     # Acute kidney injury  Resolved  - Renal function improved with intravenous fluids     # Hypertension / Peripheral artery disease  - Monitor blood pressure   - On aspirin and cilostazol, atorvastatin (medications to be resumed once on diet)    # Hypothyroidism  - On levothyroxine    # Anxiety / Depression  - On sertraline  - Alprazolam as needed    # Overactive bladder  - On mirabegron    VTE prophylaxis - Heparin SQ   Pt wants sister to be called for updates Kika Silva C [phone] / Office [phone]     Discussed with patient and sister at bedside

## 2024-02-07 NOTE — PROGRESS NOTE ADULT - SUBJECTIVE AND OBJECTIVE BOX
Chief Complaint:  Patient is a 78y old  Female who presents with a chief complaint of Sepsis due to enterocolitis (07 Feb 2024 10:57)      HPI/ 24 hr events: Patient seen and examined at bedside. She has not passed flatus or had BM. Her abdominal pain is improved. NGT remains in place. Denies vomiting. Vitals are overall stable.     REVIEW OF SYSTEMS:   General: Negative  HEENT: Negative  CV: Negative  Respiratory: Negative  GI: See HPI  : Negative  MSK: Negative  Hematologic: Negative  Skin: Negative    MEDICATIONS:   MEDICATIONS  (STANDING):  aspirin enteric coated 81 milliGRAM(s) Oral daily  atorvastatin 40 milliGRAM(s) Oral at bedtime  cilostazol 100 milliGRAM(s) Oral two times a day  dextrose 5% 1000 milliLiter(s) (75 mL/Hr) IV Continuous <Continuous>  enalapril 5 milliGRAM(s) Oral daily  heparin   Injectable 5000 Unit(s) SubCutaneous every 12 hours  levothyroxine 75 MICROGram(s) Oral daily  magnesium sulfate  IVPB 1 Gram(s) IV Intermittent once  mirabegron ER 50 milliGRAM(s) Oral daily  nicotine -  14 mG/24Hr(s) Patch 1 Patch Transdermal daily  pantoprazole  Injectable 40 milliGRAM(s) IV Push with breakfast  piperacillin/tazobactam IVPB.. 3.375 Gram(s) IV Intermittent every 8 hours  potassium chloride  10 mEq/100 mL IVPB 10 milliEquivalent(s) IV Intermittent every 1 hour  sertraline 200 milliGRAM(s) Oral daily  sodium bicarbonate 650 milliGRAM(s) Oral three times a day    MEDICATIONS  (PRN):  acetaminophen     Tablet .. 650 milliGRAM(s) Oral every 6 hours PRN Temp greater or equal to 38C (100.4F), Mild Pain (1 - 3), Moderate Pain (4 - 6)  LORazepam   Injectable 0.25 milliGRAM(s) IV Push two times a day PRN Anxiety  melatonin 3 milliGRAM(s) Oral at bedtime PRN Insomnia            DIET:  Diet, NPO (02-05-24 @ 12:02) [Active]          ALLERGIES:   Allergies    No Known Allergies    Intolerances        VITAL SIGNS:   Vital Signs Last 24 Hrs  T(C): 36.6 (07 Feb 2024 08:47), Max: 36.7 (06 Feb 2024 13:20)  T(F): 97.9 (07 Feb 2024 08:47), Max: 98.1 (06 Feb 2024 13:20)  HR: 93 (07 Feb 2024 08:47) (93 - 96)  BP: 167/96 (07 Feb 2024 08:47) (156/84 - 167/96)  BP(mean): --  RR: 18 (07 Feb 2024 08:47) (18 - 18)  SpO2: 93% (07 Feb 2024 08:47) (93% - 98%)    Parameters below as of 07 Feb 2024 08:47  Patient On (Oxygen Delivery Method): nasal cannula  O2 Flow (L/min): 3    I&O's Summary    06 Feb 2024 07:01  -  07 Feb 2024 07:00  --------------------------------------------------------  IN: 0 mL / OUT: 350 mL / NET: -350 mL        PHYSICAL EXAM:   GENERAL:  No acute distress  HEENT:  NC/AT, conjunctiva clear, sclera anicteric, NGT present   CHEST:  No increased effort  HEART:  Regular rate  ABDOMEN:  Soft, non-tender, non-distended, no rebound or guarding  SKIN:  Warm, dry  NEURO:  Calm, cooperative    LABS:                        11.2   16.85 )-----------( 399      ( 07 Feb 2024 06:37 )             35.5     Hemoglobin: 11.2 g/dL (02-07-24 @ 06:37)  Hemoglobin: 9.6 g/dL (02-06-24 @ 06:00)  Hemoglobin: 8.9 g/dL (02-05-24 @ 04:40)    02-07    148<H>  |  109<H>  |  21.9<H>  ----------------------------<  87  3.3<L>   |  25.0  |  0.70    Ca    8.6      07 Feb 2024 06:37  Phos  3.0     02-07  Mg     1.7     02-07                                                  RADIOLOGY & ADDITIONAL STUDIES:      ACC: [id number] EXAM:  CT ABDOMEN AND PELVIS OC IC   ORDERED BY: MARICARMEN MARIN     PROCEDURE DATE:  02/05/2024          INTERPRETATION:  PRELIM:    Comparison to CT of the abdomen and pelvis from 1/30/2024.    Persistent small bilateral pleural effusions with adjacent compressive   atelectasis.    CLINICAL INFORMATION: Worsening abdominal pain and distention.  Suspected infectious enterocolitis    COMPARISON: CT scan abdomen and pelvis 1/30/2024.    CONTRAST/COMPLICATIONS:  IV Contrast: Omnipaque 350  85 cc administered   15 cc discarded  Oral Contrast: Omnipaque 300   Fruit 2o  Complications: None reported at time of study completion    PROCEDURE:  CT of the Abdomen and Pelvis was performed.  Sagittal and coronal reformats were performed.    FINDINGS:    LOWER CHEST:  Small bilateral pleural effusions with bibasilar compressive atelectasis.    LIVER: Within normal limits.  BILE DUCTS: Normal caliber.  GALLBLADDER: Distended.  SPLEEN: Within normal limits.  PANCREAS: Within normal limits.  ADRENALS:  1.9 cm indeterminate left adrenal nodule.  KIDNEYS/URETERS:  Bilateral renal cysts.  1.6 cm indeterminate hypodense lesion mid to lower pole left kidney.  Small hypodense lesions bilaterally are too small for characterization.  No hydronephrosis.    BLADDER: Within normal limits.  REPRODUCTIVE ORGANS:  The uterus and adnexa appear within normal limits.    BOWEL:  Distended stomach with dilated air-fluid filled small bowel loops.  There appears to be at least one transition in the right lower quadrant   (4:87, 5:25).  There appears to be small amount of enteric contrast transited distal to   this point.  The right colon is fluid-filled.  There is left-sided colonic diverticulosis.  The appendix is not clearly visualized.  PERITONEUM:  There is small amount of abdominal pelvic free fluid.   Mesenteric edema and interloop fluid.  No extraluminal gas/air.    VESSELS: Atherosclerotic changes.  RETROPERITONEUM/LYMPH NODES: No lymphadenopathy.    ABDOMINAL WALL: Within normal limits.    BONES: Degenerative changes.  Partially imaged ORIF right proximal femur.    IMPRESSION:    Findings suggestive of at least a partial small bowel obstruction.    Nonspecific mesenteric edema/interloop free fluid.    Other findings as discussed above.    This study was preliminary reported by the ED radiologist on 2/5/2024,   3:29 AM.      --- End of Report ---            HONEY SALGUERO MD; Attending Radiologist  This document has been electronically signed. Feb 5 2024 11:20AM  02-05-24 @ 02:56               Chief Complaint:  Patient is a 78y old  Female who presents with a chief complaint of Sepsis due to enterocolitis (07 Feb 2024 10:57)      HPI/ 24 hr events: Patient seen and examined at bedside. She has not passed flatus or had BM.  ( the flow chart said 1 BM, but as per pt, sister at bedside and nurse, there was no BM) Her abdominal pain is improved. NGT remains in place. Denies vomiting. Vitals are overall stable.     REVIEW OF SYSTEMS:   General: Negative  HEENT: Negative  CV: Negative  Respiratory: Negative  GI: See HPI  : Negative  MSK: Negative  Hematologic: Negative  Skin: Negative    MEDICATIONS:   MEDICATIONS  (STANDING):  aspirin enteric coated 81 milliGRAM(s) Oral daily  atorvastatin 40 milliGRAM(s) Oral at bedtime  cilostazol 100 milliGRAM(s) Oral two times a day  dextrose 5% 1000 milliLiter(s) (75 mL/Hr) IV Continuous <Continuous>  enalapril 5 milliGRAM(s) Oral daily  heparin   Injectable 5000 Unit(s) SubCutaneous every 12 hours  levothyroxine 75 MICROGram(s) Oral daily  magnesium sulfate  IVPB 1 Gram(s) IV Intermittent once  mirabegron ER 50 milliGRAM(s) Oral daily  nicotine -  14 mG/24Hr(s) Patch 1 Patch Transdermal daily  pantoprazole  Injectable 40 milliGRAM(s) IV Push with breakfast  piperacillin/tazobactam IVPB.. 3.375 Gram(s) IV Intermittent every 8 hours  potassium chloride  10 mEq/100 mL IVPB 10 milliEquivalent(s) IV Intermittent every 1 hour  sertraline 200 milliGRAM(s) Oral daily  sodium bicarbonate 650 milliGRAM(s) Oral three times a day    MEDICATIONS  (PRN):  acetaminophen     Tablet .. 650 milliGRAM(s) Oral every 6 hours PRN Temp greater or equal to 38C (100.4F), Mild Pain (1 - 3), Moderate Pain (4 - 6)  LORazepam   Injectable 0.25 milliGRAM(s) IV Push two times a day PRN Anxiety  melatonin 3 milliGRAM(s) Oral at bedtime PRN Insomnia            DIET:  Diet, NPO (02-05-24 @ 12:02) [Active]          ALLERGIES:   Allergies    No Known Allergies    Intolerances        VITAL SIGNS:   Vital Signs Last 24 Hrs  T(C): 36.6 (07 Feb 2024 08:47), Max: 36.7 (06 Feb 2024 13:20)  T(F): 97.9 (07 Feb 2024 08:47), Max: 98.1 (06 Feb 2024 13:20)  HR: 93 (07 Feb 2024 08:47) (93 - 96)  BP: 167/96 (07 Feb 2024 08:47) (156/84 - 167/96)  BP(mean): --  RR: 18 (07 Feb 2024 08:47) (18 - 18)  SpO2: 93% (07 Feb 2024 08:47) (93% - 98%)    Parameters below as of 07 Feb 2024 08:47  Patient On (Oxygen Delivery Method): nasal cannula  O2 Flow (L/min): 3    I&O's Summary    06 Feb 2024 07:01  -  07 Feb 2024 07:00  --------------------------------------------------------  IN: 0 mL / OUT: 350 mL / NET: -350 mL        PHYSICAL EXAM:   GENERAL:  No acute distress  HEENT:  NC/AT, conjunctiva clear, sclera anicteric, NGT present   CHEST:  No increased effort  HEART:  Regular rate  ABDOMEN:  Soft, non-tender, non-distended, no rebound or guarding  SKIN:  Warm, dry  NEURO:  Calm, cooperative    LABS:                        11.2   16.85 )-----------( 399      ( 07 Feb 2024 06:37 )             35.5     Hemoglobin: 11.2 g/dL (02-07-24 @ 06:37)  Hemoglobin: 9.6 g/dL (02-06-24 @ 06:00)  Hemoglobin: 8.9 g/dL (02-05-24 @ 04:40)    02-07    148<H>  |  109<H>  |  21.9<H>  ----------------------------<  87  3.3<L>   |  25.0  |  0.70    Ca    8.6      07 Feb 2024 06:37  Phos  3.0     02-07  Mg     1.7     02-07                                                  RADIOLOGY & ADDITIONAL STUDIES:      ACC: [id number] EXAM:  CT ABDOMEN AND PELVIS OC IC   ORDERED BY: MARICARMEN MARIN     PROCEDURE DATE:  02/05/2024          INTERPRETATION:  PRELIM:    Comparison to CT of the abdomen and pelvis from 1/30/2024.    Persistent small bilateral pleural effusions with adjacent compressive   atelectasis.    CLINICAL INFORMATION: Worsening abdominal pain and distention.  Suspected infectious enterocolitis    COMPARISON: CT scan abdomen and pelvis 1/30/2024.    CONTRAST/COMPLICATIONS:  IV Contrast: Omnipaque 350  85 cc administered   15 cc discarded  Oral Contrast: Omnipaque 300   Fruit 2o  Complications: None reported at time of study completion    PROCEDURE:  CT of the Abdomen and Pelvis was performed.  Sagittal and coronal reformats were performed.    FINDINGS:    LOWER CHEST:  Small bilateral pleural effusions with bibasilar compressive atelectasis.    LIVER: Within normal limits.  BILE DUCTS: Normal caliber.  GALLBLADDER: Distended.  SPLEEN: Within normal limits.  PANCREAS: Within normal limits.  ADRENALS:  1.9 cm indeterminate left adrenal nodule.  KIDNEYS/URETERS:  Bilateral renal cysts.  1.6 cm indeterminate hypodense lesion mid to lower pole left kidney.  Small hypodense lesions bilaterally are too small for characterization.  No hydronephrosis.    BLADDER: Within normal limits.  REPRODUCTIVE ORGANS:  The uterus and adnexa appear within normal limits.    BOWEL:  Distended stomach with dilated air-fluid filled small bowel loops.  There appears to be at least one transition in the right lower quadrant   (4:87, 5:25).  There appears to be small amount of enteric contrast transited distal to   this point.  The right colon is fluid-filled.  There is left-sided colonic diverticulosis.  The appendix is not clearly visualized.  PERITONEUM:  There is small amount of abdominal pelvic free fluid.   Mesenteric edema and interloop fluid.  No extraluminal gas/air.    VESSELS: Atherosclerotic changes.  RETROPERITONEUM/LYMPH NODES: No lymphadenopathy.    ABDOMINAL WALL: Within normal limits.    BONES: Degenerative changes.  Partially imaged ORIF right proximal femur.    IMPRESSION:    Findings suggestive of at least a partial small bowel obstruction.    Nonspecific mesenteric edema/interloop free fluid.    Other findings as discussed above.    This study was preliminary reported by the ED radiologist on 2/5/2024,   3:29 AM.      --- End of Report ---            HONEY SALGUERO MD; Attending Radiologist  This document has been electronically signed. Feb 5 2024 11:20AM  02-05-24 @ 02:56

## 2024-02-07 NOTE — PROGRESS NOTE ADULT - PROBLEM SELECTOR PLAN 1
SBO   Small bowel obstruction–transition zone in the right lower quadrant  No history of Crohn's disease in the past    Clinically not much improvement.  No bowel movement, no flatus, abdomen is distended.  Does say abdominal discomfort is somewhat better    Abdominal exam–abdomen is still distended with hypoactive, high-pitched bowel sounds.  NG tube to suction    Assessment and plan  Enteritis/small bowel obstruction  Not much improvement with NG tube suction.  Patient has been here for about 1 week  Leukocytosis 16,000 continue to be elevated  CBC ordered for tomorrow  I reviewed the surgical note (has not been cosigned by the attending yet ).  They suggest D/Cing  NG tube and starting clear liquids.  However I spoke to surgical attending Dr. Perez.  I am concerned as patient still has abdominal distention 1 week after admission with exam still suggestive of of small bowel obstruction.  CAT scan x 2 showing transition zone in the right lower quadrant.  Dr. Peralta will reevaluate  continue NGT suction for now

## 2024-02-08 LAB
ALBUMIN SERPL ELPH-MCNC: 2.1 G/DL — LOW (ref 3.3–5.2)
ALP SERPL-CCNC: 64 U/L — SIGNIFICANT CHANGE UP (ref 40–120)
ALT FLD-CCNC: 9 U/L — SIGNIFICANT CHANGE UP
ANION GAP SERPL CALC-SCNC: 10 MMOL/L — SIGNIFICANT CHANGE UP (ref 5–17)
AST SERPL-CCNC: 20 U/L — SIGNIFICANT CHANGE UP
BASOPHILS # BLD AUTO: 0.03 K/UL — SIGNIFICANT CHANGE UP (ref 0–0.2)
BASOPHILS NFR BLD AUTO: 0.2 % — SIGNIFICANT CHANGE UP (ref 0–2)
BILIRUB SERPL-MCNC: 0.3 MG/DL — LOW (ref 0.4–2)
BUN SERPL-MCNC: 18.6 MG/DL — SIGNIFICANT CHANGE UP (ref 8–20)
CALCIUM SERPL-MCNC: 8.3 MG/DL — LOW (ref 8.4–10.5)
CHLORIDE SERPL-SCNC: 105 MMOL/L — SIGNIFICANT CHANGE UP (ref 96–108)
CO2 SERPL-SCNC: 28 MMOL/L — SIGNIFICANT CHANGE UP (ref 22–29)
CREAT SERPL-MCNC: 0.75 MG/DL — SIGNIFICANT CHANGE UP (ref 0.5–1.3)
EGFR: 82 ML/MIN/1.73M2 — SIGNIFICANT CHANGE UP
EOSINOPHIL # BLD AUTO: 0.05 K/UL — SIGNIFICANT CHANGE UP (ref 0–0.5)
EOSINOPHIL NFR BLD AUTO: 0.3 % — SIGNIFICANT CHANGE UP (ref 0–6)
GLUCOSE BLDC GLUCOMTR-MCNC: 120 MG/DL — HIGH (ref 70–99)
GLUCOSE BLDC GLUCOMTR-MCNC: 91 MG/DL — SIGNIFICANT CHANGE UP (ref 70–99)
GLUCOSE BLDC GLUCOMTR-MCNC: 93 MG/DL — SIGNIFICANT CHANGE UP (ref 70–99)
GLUCOSE BLDC GLUCOMTR-MCNC: 99 MG/DL — SIGNIFICANT CHANGE UP (ref 70–99)
GLUCOSE SERPL-MCNC: 123 MG/DL — HIGH (ref 70–99)
HCT VFR BLD CALC: 31.3 % — LOW (ref 34.5–45)
HGB BLD-MCNC: 10.5 G/DL — LOW (ref 11.5–15.5)
IMM GRANULOCYTES NFR BLD AUTO: 0.9 % — SIGNIFICANT CHANGE UP (ref 0–0.9)
LYMPHOCYTES # BLD AUTO: 0.63 K/UL — LOW (ref 1–3.3)
LYMPHOCYTES # BLD AUTO: 3.6 % — LOW (ref 13–44)
MCHC RBC-ENTMCNC: 30.7 PG — SIGNIFICANT CHANGE UP (ref 27–34)
MCHC RBC-ENTMCNC: 33.5 GM/DL — SIGNIFICANT CHANGE UP (ref 32–36)
MCV RBC AUTO: 91.5 FL — SIGNIFICANT CHANGE UP (ref 80–100)
MONOCYTES # BLD AUTO: 0.97 K/UL — HIGH (ref 0–0.9)
MONOCYTES NFR BLD AUTO: 5.5 % — SIGNIFICANT CHANGE UP (ref 2–14)
NEUTROPHILS # BLD AUTO: 15.76 K/UL — HIGH (ref 1.8–7.4)
NEUTROPHILS NFR BLD AUTO: 89.5 % — HIGH (ref 43–77)
PLATELET # BLD AUTO: 379 K/UL — SIGNIFICANT CHANGE UP (ref 150–400)
POTASSIUM SERPL-MCNC: 3.5 MMOL/L — SIGNIFICANT CHANGE UP (ref 3.5–5.3)
POTASSIUM SERPL-SCNC: 3.5 MMOL/L — SIGNIFICANT CHANGE UP (ref 3.5–5.3)
PROT SERPL-MCNC: 5.6 G/DL — LOW (ref 6.6–8.7)
RBC # BLD: 3.42 M/UL — LOW (ref 3.8–5.2)
RBC # FLD: 14.6 % — HIGH (ref 10.3–14.5)
SODIUM SERPL-SCNC: 143 MMOL/L — SIGNIFICANT CHANGE UP (ref 135–145)
WBC # BLD: 17.6 K/UL — HIGH (ref 3.8–10.5)
WBC # FLD AUTO: 17.6 K/UL — HIGH (ref 3.8–10.5)

## 2024-02-08 PROCEDURE — 74018 RADEX ABDOMEN 1 VIEW: CPT | Mod: 26,76

## 2024-02-08 PROCEDURE — 99233 SBSQ HOSP IP/OBS HIGH 50: CPT

## 2024-02-08 RX ORDER — HYDROMORPHONE HYDROCHLORIDE 2 MG/ML
1 INJECTION INTRAMUSCULAR; INTRAVENOUS; SUBCUTANEOUS EVERY 4 HOURS
Refills: 0 | Status: DISCONTINUED | OUTPATIENT
Start: 2024-02-08 | End: 2024-02-10

## 2024-02-08 RX ORDER — HYDROMORPHONE HYDROCHLORIDE 2 MG/ML
0.5 INJECTION INTRAMUSCULAR; INTRAVENOUS; SUBCUTANEOUS
Refills: 0 | Status: DISCONTINUED | OUTPATIENT
Start: 2024-02-08 | End: 2024-02-10

## 2024-02-08 RX ADMIN — Medication 3 MILLILITER(S): at 03:26

## 2024-02-08 RX ADMIN — HEPARIN SODIUM 5000 UNIT(S): 5000 INJECTION INTRAVENOUS; SUBCUTANEOUS at 05:09

## 2024-02-08 RX ADMIN — PIPERACILLIN AND TAZOBACTAM 25 GRAM(S): 4; .5 INJECTION, POWDER, LYOPHILIZED, FOR SOLUTION INTRAVENOUS at 22:00

## 2024-02-08 RX ADMIN — PIPERACILLIN AND TAZOBACTAM 25 GRAM(S): 4; .5 INJECTION, POWDER, LYOPHILIZED, FOR SOLUTION INTRAVENOUS at 05:09

## 2024-02-08 RX ADMIN — Medication 3 MILLILITER(S): at 09:07

## 2024-02-08 RX ADMIN — PIPERACILLIN AND TAZOBACTAM 25 GRAM(S): 4; .5 INJECTION, POWDER, LYOPHILIZED, FOR SOLUTION INTRAVENOUS at 14:56

## 2024-02-08 RX ADMIN — Medication 3 MILLILITER(S): at 15:38

## 2024-02-08 RX ADMIN — Medication 37.5 MICROGRAM(S): at 22:00

## 2024-02-08 RX ADMIN — Medication 0.25 MILLIGRAM(S): at 21:57

## 2024-02-08 RX ADMIN — HEPARIN SODIUM 5000 UNIT(S): 5000 INJECTION INTRAVENOUS; SUBCUTANEOUS at 18:19

## 2024-02-08 RX ADMIN — SODIUM CHLORIDE 75 MILLILITER(S): 9 INJECTION, SOLUTION INTRAVENOUS at 05:09

## 2024-02-08 RX ADMIN — BUDESONIDE AND FORMOTEROL FUMARATE DIHYDRATE 2 PUFF(S): 160; 4.5 AEROSOL RESPIRATORY (INHALATION) at 09:07

## 2024-02-08 NOTE — PROVIDER CONTACT NOTE (OTHER) - SITUATION
MT alerted RN that pt had 9 beats of vtach on the monitor, VSS, leads checked for right placement. pt denies CP or SOB, pt went right into baseline rhythm. MD Avery Brown made awar
MT alerted RN that pt had a burst of PSVT , pt was asleep, RN assessed pt at bedside

## 2024-02-08 NOTE — PROGRESS NOTE ADULT - ATTENDING COMMENTS
8yoF hx active smoker, HTN, HLD, PAD, presenting with abdominal pain, non-bloody watery diarrhea  Febrile (Tm105) and tachycardic. MERVAT, AGMA and Hyperkalemia present  CT A/P showing diffuse small bowel and colonic inflammation, most pronounced in the right lower quadrant involving the distal ileum concerning for Crohn’s disease.   Empiric antibiotics were initiated for enterocolitis and intravenous fluids for acute kidney injury.   Hospital stay then complicated by SBO and patient made NPO, NGT placed.  NGT clamped earlier today; patient admits to flatus and bowel movement, though unclear about consistency      # Partial SBO  # Enterocolitis  # Hypernatremia  # Hypokalemia  # hypomagnesemia  # SVT  # Hypoxia, likely underlying COPD  # Smoker  # Acute kidney injury. Resolved  # Hypertension / Peripheral artery disease  # Hypothyroidism  # Anxiety / Depression  # Overactive bladder  - NGT clamped, small sips liquids  - For OR in am for exploratory laparotomy; no absolute medical contraindication  - IV antibiotics  - IVF changed to D5W , replenish lytes  - Supplemental O2  - Added ICS/LAMA  - Albuterol PRN  - Nicotine replacement   - Antihypertensives  - LT4  - VTE prophylaxis

## 2024-02-08 NOTE — PROGRESS NOTE ADULT - ASSESSMENT
77yo Female admitted for enteritis causing partial bowel obstruction.     Plan:     - recommend CLD/ NGT removal    - monitor BF   - replete electrolytes PRN   - encourage OOB/ ambulate   - rest of care by primary team    79yo Female admitted for enteritis causing partial bowel obstruction.     Plan:     - NPO after midnight for diagnostic lap tomorrow 2/9  -please preop accordingly

## 2024-02-08 NOTE — PROGRESS NOTE ADULT - SUBJECTIVE AND OBJECTIVE BOX
Patient is a 78y old  Female who presents with a chief complaint of Sepsis due to enterocolitis (08 Feb 2024 09:01)    INTERVAL HPI/OVERNIGHT EVENTS:  - No acute events overnight    SUBJECTIVE:   -Patient seen and examined at bedside.     ROS: All negative except as listed above.    VITAL SIGNS:  ICU Vital Signs Last 24 Hrs  T(C): 36.6 (08 Feb 2024 08:39), Max: 36.6 (07 Feb 2024 16:10)  T(F): 97.8 (08 Feb 2024 08:39), Max: 97.9 (07 Feb 2024 20:55)  HR: 90 (08 Feb 2024 09:23) (78 - 99)  BP: 133/81 (08 Feb 2024 08:39) (132/83 - 150/80)  RR: 18 (08 Feb 2024 08:39) (18 - 18)  SpO2: 93% (08 Feb 2024 08:39) (93% - 97%)    O2 Parameters below as of 08 Feb 2024 09:23  Patient On (Oxygen Delivery Method): nasal cannula, 2L    Plateau pressure:   P/F ratio:     02-07 @ 07:01  -  02-08 @ 07:00  --------------------------------------------------------  IN: 0 mL / OUT: 300 mL / NET: -300 mL    CAPILLARY BLOOD GLUCOSE    POCT Blood Glucose.: 91 mg/dL (08 Feb 2024 12:04)    ECG: reviewed.    PHYSICAL EXAM:    General: Elderly ill appearing female sitting up in bed, sister at bedside  HEENT:  NGT. +NC  NECK:  Supple  CVS: regular rate and rhythm S1 S2  RESP:  Fair air entry bilaterally  GI:  Soft with tenderness diffusely on palpation. BS+  : No suprapubic tenderness  MSK:  Moves all extremities  CNS:  Awake, aware of being in hospital, follows commands though generalized weakness  INTEG:  Warm dry skin  PSYCH:  Fatigued    MEDICATIONS:  MEDICATIONS  (STANDING):  albuterol/ipratropium for Nebulization 3 milliLiter(s) Nebulizer every 6 hours  aspirin enteric coated 81 milliGRAM(s) Oral daily  atorvastatin 40 milliGRAM(s) Oral at bedtime  budesonide  80 MICROgram(s)/formoterol 4.5 MICROgram(s) Inhaler 2 Puff(s) Inhalation two times a day  cilostazol 100 milliGRAM(s) Oral two times a day  dextrose 5% 1000 milliLiter(s) (75 mL/Hr) IV Continuous <Continuous>  enalapril 5 milliGRAM(s) Oral daily  heparin   Injectable 5000 Unit(s) SubCutaneous every 12 hours  iohexol 300 mG (iodine)/mL Oral Solution 60 milliLiter(s) Oral once  levothyroxine Injectable 37.5 MICROGram(s) IV Push at bedtime  magnesium sulfate  IVPB 1 Gram(s) IV Intermittent once  mirabegron ER 50 milliGRAM(s) Oral daily  nicotine -  14 mG/24Hr(s) Patch 1 Patch Transdermal daily  pantoprazole  Injectable 40 milliGRAM(s) IV Push with breakfast  piperacillin/tazobactam IVPB.. 3.375 Gram(s) IV Intermittent every 8 hours  sertraline 200 milliGRAM(s) Oral daily    MEDICATIONS  (PRN):  acetaminophen     Tablet .. 650 milliGRAM(s) Oral every 6 hours PRN Temp greater or equal to 38C (100.4F), Mild Pain (1 - 3), Moderate Pain (4 - 6)  HYDROmorphone  Injectable 0.5 milliGRAM(s) IV Push every 3 hours PRN Moderate Pain (4 - 6)  HYDROmorphone  Injectable 1 milliGRAM(s) IV Push every 4 hours PRN Severe Pain (7 - 10)  LORazepam   Injectable 0.25 milliGRAM(s) IV Push two times a day PRN Anxiety  melatonin 3 milliGRAM(s) Oral at bedtime PRN Insomnia      ALLERGIES:  Allergies    No Known Allergies    Intolerances        LABS:                        10.5   17.60 )-----------( 379      ( 08 Feb 2024 06:25 )             31.3     02-08    143  |  105  |  18.6  ----------------------------<  123<H>  3.5   |  28.0  |  0.75    Ca    8.3<L>      08 Feb 2024 06:25  Phos  3.0     02-07  Mg     1.7     02-07    TPro  5.6<L>  /  Alb  2.1<L>  /  TBili  0.3<L>  /  DBili  x   /  AST  20  /  ALT  9   /  AlkPhos  64  02-08    Urinalysis Basic - ( 08 Feb 2024 06:25 )    Color: x / Appearance: x / SG: x / pH: x  Gluc: 123 mg/dL / Ketone: x  / Bili: x / Urobili: x   Blood: x / Protein: x / Nitrite: x   Leuk Esterase: x / RBC: x / WBC x   Sq Epi: x / Non Sq Epi: x / Bacteria: x    Micro:    Culture - Blood (collected 01-30-24 @ 15:22)  Source: .Blood Blood  Final Report (02-04-24 @ 22:00):    No growth at 5 days    Culture - Blood (collected 01-30-24 @ 15:17)  Source: .Blood Blood  Final Report (02-04-24 @ 22:00):    No growth at 5 days          RADIOLOGY & ADDITIONAL TESTS: Reviewed. Patient is a 78y old  Female who presents with a chief complaint of Sepsis due to enterocolitis (08 Feb 2024 09:01)    INTERVAL HPI/ OVERNIGHT EVENTS:  - No acute events overnight    SUBJECTIVE:   - Patient seen and examined at bedside.   - Reports restful sleep  - Endorses discomfort with NGT and NC   - Endorses diarrhea the previous day  - Denies n/v, SOB, CP, cough    ROS: All negative except as listed above.    VITAL SIGNS:  ICU Vital Signs Last 24 Hrs  T(C): 36.6 (08 Feb 2024 08:39), Max: 36.6 (07 Feb 2024 16:10)  T(F): 97.8 (08 Feb 2024 08:39), Max: 97.9 (07 Feb 2024 20:55)  HR: 90 (08 Feb 2024 09:23) (78 - 99)  BP: 133/81 (08 Feb 2024 08:39) (132/83 - 150/80)  RR: 18 (08 Feb 2024 08:39) (18 - 18)  SpO2: 93% (08 Feb 2024 08:39) (93% - 97%)    O2 Parameters below as of 08 Feb 2024 09:23  Patient On (Oxygen Delivery Method): nasal cannula, 2L    Plateau pressure:   P/F ratio:     02-07 @ 07:01  -  02-08 @ 07:00  --------------------------------------------------------  IN: 0 mL / OUT: 300 mL / NET: -300 mL    CAPILLARY BLOOD GLUCOSE    POCT Blood Glucose.: 91 mg/dL (08 Feb 2024 12:04)  ECG: reviewed.    PHYSICAL EXAM:  GENERAL: Elderly ill appearing female sitting up in bed, sister at bedside  HEENT:  NGT. +NC  NECK:  Supple  CVS: regular rate and rhythm S1 S2  RESP:  Fair air entry bilaterally  GI:  Soft with tenderness diffusely on palpation. BS+  : No suprapubic tenderness  MSK:  Moves all extremities  CNS:  Awake, aware of being in hospital, follows commands though generalized weakness  INTEG:  Warm dry skin  PSYCH:  Fatigued    MEDICATIONS:  MEDICATIONS  (STANDING):  albuterol/ipratropium for Nebulization 3 milliLiter(s) Nebulizer every 6 hours  aspirin enteric coated 81 milliGRAM(s) Oral daily  atorvastatin 40 milliGRAM(s) Oral at bedtime  budesonide  80 MICROgram(s)/formoterol 4.5 MICROgram(s) Inhaler 2 Puff(s) Inhalation two times a day  cilostazol 100 milliGRAM(s) Oral two times a day  dextrose 5% 1000 milliLiter(s) (75 mL/Hr) IV Continuous <Continuous>  enalapril 5 milliGRAM(s) Oral daily  heparin   Injectable 5000 Unit(s) SubCutaneous every 12 hours  iohexol 300 mG (iodine)/mL Oral Solution 60 milliLiter(s) Oral once  levothyroxine Injectable 37.5 MICROGram(s) IV Push at bedtime  magnesium sulfate  IVPB 1 Gram(s) IV Intermittent once  mirabegron ER 50 milliGRAM(s) Oral daily  nicotine -  14 mG/24Hr(s) Patch 1 Patch Transdermal daily  pantoprazole  Injectable 40 milliGRAM(s) IV Push with breakfast  piperacillin/tazobactam IVPB.. 3.375 Gram(s) IV Intermittent every 8 hours  sertraline 200 milliGRAM(s) Oral daily    MEDICATIONS  (PRN):  acetaminophen     Tablet .. 650 milliGRAM(s) Oral every 6 hours PRN Temp greater or equal to 38C (100.4F), Mild Pain (1 - 3), Moderate Pain (4 - 6)  HYDROmorphone  Injectable 0.5 milliGRAM(s) IV Push every 3 hours PRN Moderate Pain (4 - 6)  HYDROmorphone  Injectable 1 milliGRAM(s) IV Push every 4 hours PRN Severe Pain (7 - 10)  LORazepam   Injectable 0.25 milliGRAM(s) IV Push two times a day PRN Anxiety  melatonin 3 milliGRAM(s) Oral at bedtime PRN Insomnia    ALLERGIES:  Allergies    No Known Allergies  Intolerances    LABS:                        10.5   17.60 )-----------( 379      ( 08 Feb 2024 06:25 )             31.3     02-08    143  |  105  |  18.6  ----------------------------<  123<H>  3.5   |  28.0  |  0.75    Ca    8.3<L>      08 Feb 2024 06:25  Phos  3.0     02-07  Mg     1.7     02-07    TPro  5.6<L>  /  Alb  2.1<L>  /  TBili  0.3<L>  /  DBili  x   /  AST  20  /  ALT  9   /  AlkPhos  64  02-08    Urinalysis Basic - ( 08 Feb 2024 06:25 )    Color: x / Appearance: x / SG: x / pH: x  Gluc: 123 mg/dL / Ketone: x  / Bili: x / Urobili: x   Blood: x / Protein: x / Nitrite: x   Leuk Esterase: x / RBC: x / WBC x   Sq Epi: x / Non Sq Epi: x / Bacteria: x    Micro:    Culture - Blood (collected 01-30-24 @ 15:22)  Source: .Blood Blood  Final Report (02-04-24 @ 22:00):    No growth at 5 days    Culture - Blood (collected 01-30-24 @ 15:17)  Source: .Blood Blood  Final Report (02-04-24 @ 22:00):    No growth at 5 days    RADIOLOGY & ADDITIONAL TESTS: Reviewed. Patient is a 78y old  Female who presents with a chief complaint of Sepsis due to enterocolitis (08 Feb 2024 09:01)    INTERVAL HPI/ OVERNIGHT EVENTS:  - No acute events overnight    SUBJECTIVE:   - Patient seen and examined at bedside.   - Reports restful sleep  - Endorses discomfort with NGT and NC   - Endorses diarrhea the previous day  - Denies n/v, SOB, CP, cough    ROS: All negative except as listed above.    VITAL SIGNS:  ICU Vital Signs Last 24 Hrs  T(C): 36.6 (08 Feb 2024 08:39), Max: 36.6 (07 Feb 2024 16:10)  T(F): 97.8 (08 Feb 2024 08:39), Max: 97.9 (07 Feb 2024 20:55)  HR: 90 (08 Feb 2024 09:23) (78 - 99)  BP: 133/81 (08 Feb 2024 08:39) (132/83 - 150/80)  RR: 18 (08 Feb 2024 08:39) (18 - 18)  SpO2: 93% (08 Feb 2024 08:39) (93% - 97%)    O2 Parameters below as of 08 Feb 2024 09:23  Patient On (Oxygen Delivery Method): nasal cannula, 2L    Plateau pressure:   P/F ratio:     02-07 @ 07:01  -  02-08 @ 07:00  --------------------------------------------------------  IN: 0 mL / OUT: 300 mL / NET: -300 mL    CAPILLARY BLOOD GLUCOSE    POCT Blood Glucose.: 91 mg/dL (08 Feb 2024 12:04)  ECG: reviewed.    PHYSICAL EXAM:  GENERAL: Elderly ill appearing female sitting up in bed  HEENT:  NGT. +NC  NECK:  Supple  CVS: regular rate and rhythm S1 S2  RESP:  Fair air entry bilaterally  GI:  Soft with tenderness diffusely on palpation. BS+  : No suprapubic tenderness  MSK:  Moves all extremities  CNS:  Awake, aware of being in hospital, follows commands though generalized weakness  INTEG:  Warm dry skin  PSYCH:  Fatigued    MEDICATIONS:  MEDICATIONS  (STANDING):  albuterol/ipratropium for Nebulization 3 milliLiter(s) Nebulizer every 6 hours  aspirin enteric coated 81 milliGRAM(s) Oral daily  atorvastatin 40 milliGRAM(s) Oral at bedtime  budesonide  80 MICROgram(s)/formoterol 4.5 MICROgram(s) Inhaler 2 Puff(s) Inhalation two times a day  cilostazol 100 milliGRAM(s) Oral two times a day  dextrose 5% 1000 milliLiter(s) (75 mL/Hr) IV Continuous <Continuous>  enalapril 5 milliGRAM(s) Oral daily  heparin   Injectable 5000 Unit(s) SubCutaneous every 12 hours  iohexol 300 mG (iodine)/mL Oral Solution 60 milliLiter(s) Oral once  levothyroxine Injectable 37.5 MICROGram(s) IV Push at bedtime  magnesium sulfate  IVPB 1 Gram(s) IV Intermittent once  mirabegron ER 50 milliGRAM(s) Oral daily  nicotine -  14 mG/24Hr(s) Patch 1 Patch Transdermal daily  pantoprazole  Injectable 40 milliGRAM(s) IV Push with breakfast  piperacillin/tazobactam IVPB.. 3.375 Gram(s) IV Intermittent every 8 hours  sertraline 200 milliGRAM(s) Oral daily    MEDICATIONS  (PRN):  acetaminophen     Tablet .. 650 milliGRAM(s) Oral every 6 hours PRN Temp greater or equal to 38C (100.4F), Mild Pain (1 - 3), Moderate Pain (4 - 6)  HYDROmorphone  Injectable 0.5 milliGRAM(s) IV Push every 3 hours PRN Moderate Pain (4 - 6)  HYDROmorphone  Injectable 1 milliGRAM(s) IV Push every 4 hours PRN Severe Pain (7 - 10)  LORazepam   Injectable 0.25 milliGRAM(s) IV Push two times a day PRN Anxiety  melatonin 3 milliGRAM(s) Oral at bedtime PRN Insomnia    ALLERGIES:  Allergies    No Known Allergies  Intolerances    LABS:                        10.5  17.60 )-----------( 379      ( 08 Feb 2024 06:25 )             31.3     02-08    143  |  105  |  18.6  ----------------------------<  123<H>  3.5   |  28.0  |  0.75    Ca    8.3<L>      08 Feb 2024 06:25  Phos  3.0     02-07  Mg     1.7     02-07    TPro  5.6<L>  /  Alb  2.1<L>  /  TBili  0.3<L>  /  DBili  x   /  AST  20  /  ALT  9   /  AlkPhos  64  02-08    Urinalysis Basic - ( 08 Feb 2024 06:25 )    Color: x / Appearance: x / SG: x / pH: x  Gluc: 123 mg/dL / Ketone: x  / Bili: x / Urobili: x   Blood: x / Protein: x / Nitrite: x   Leuk Esterase: x / RBC: x / WBC x   Sq Epi: x / Non Sq Epi: x / Bacteria: x    Micro:    Culture - Blood (collected 01-30-24 @ 15:22)  Source: .Blood Blood  Final Report (02-04-24 @ 22:00):    No growth at 5 days    Culture - Blood (collected 01-30-24 @ 15:17)  Source: .Blood Blood  Final Report (02-04-24 @ 22:00):    No growth at 5 days    RADIOLOGY & ADDITIONAL TESTS: Reviewed.

## 2024-02-08 NOTE — PROGRESS NOTE ADULT - ASSESSMENT
79 y/o F who presented with R sided abdominal pain, found to have sepsis and CT finding of enterocolitis.    #Enterocolitis  #SBO  CT A/P w/ IV Cont (01.30.24)- Diffuse small bowel and colonic inflammation, most pronounced in the RLQ involving the distal ileum. Findings may be related to infectious or inflammatory enterocolitis (including Crohn's disease).  CT A/P w/ Oral Cont and w/ IV Cont (02.05.24)- partial small bowel obstruction, nonspecific mesenteric edema/interloop free fluid.  Xray KUB (02.08.24)- mild to moderate diffuse small bowel dilatation, contrast progresses only to the proximal jejunum by the last radiograph with suspicion of an intraluminal filling defect.    - Would NOT remove NGT or advance diet, maintain NPO   - Monitor for return of bowel function  - Serial abdominal exams  - Continue IV antibiotics    - No plans for colonoscopy at this time in setting of SBO and active colitis, will need to follow up as an outpatient for colonoscopy  _________________________________________________________________  Assessment and recommendations are final when note is signed by the attending physician.

## 2024-02-08 NOTE — PROGRESS NOTE ADULT - SUBJECTIVE AND OBJECTIVE BOX
HPI/OVERNIGHT EVENTS: Patient seen and examined at bedside this AM. No overnight events. No complaints. GGC with contrast in colon , denies any BF last 2 days   Vital Signs Last 24 Hrs  T(C): 36.6 (08 Feb 2024 04:35), Max: 36.6 (07 Feb 2024 08:47)  T(F): 97.9 (08 Feb 2024 04:35), Max: 97.9 (07 Feb 2024 08:47)  HR: 92 (08 Feb 2024 04:35) (78 - 93)  BP: 136/86 (08 Feb 2024 04:35) (132/83 - 167/96)  BP(mean): --  RR: 18 (08 Feb 2024 04:35) (18 - 18)  SpO2: 97% (08 Feb 2024 04:35) (93% - 97%)    Parameters below as of 08 Feb 2024 04:35  Patient On (Oxygen Delivery Method): nasal cannula  O2 Flow (L/min): 3      I&O's Detail    07 Feb 2024 07:01  -  08 Feb 2024 07:00  --------------------------------------------------------  IN:  Total IN: 0 mL    OUT:    Nasogastric/Oral tube (mL): 300 mL  Total OUT: 300 mL    Total NET: -300 mL          Constitutional: patient resting comfortably in bed, in no acute distress  HEENT: EOMI, PERRLA, MMM.  Respiratory: Non labored breathing on RA  Cardiovascular: RRR  Gastrointestinal: Abdomen soft, non-tender, distended, no rebound tenderness / guarding  Musculoskeletal: No joint pain, swelling or deformity; no limitation of movement  Vascular: Extremities warm and well perfused.     LABS:                        10.5   17.60 )-----------( 379      ( 08 Feb 2024 06:25 )             31.3     02-08    143  |  105  |  18.6  ----------------------------<  123<H>  3.5   |  28.0  |  0.75    Ca    8.3<L>      08 Feb 2024 06:25  Phos  3.0     02-07  Mg     1.7     02-07    TPro  5.6<L>  /  Alb  2.1<L>  /  TBili  0.3<L>  /  DBili  x   /  AST  20  /  ALT  9   /  AlkPhos  64  02-08      Urinalysis Basic - ( 08 Feb 2024 06:25 )    Color: x / Appearance: x / SG: x / pH: x  Gluc: 123 mg/dL / Ketone: x  / Bili: x / Urobili: x   Blood: x / Protein: x / Nitrite: x   Leuk Esterase: x / RBC: x / WBC x   Sq Epi: x / Non Sq Epi: x / Bacteria: x        MEDICATIONS  (STANDING):  albuterol/ipratropium for Nebulization 3 milliLiter(s) Nebulizer every 6 hours  aspirin enteric coated 81 milliGRAM(s) Oral daily  atorvastatin 40 milliGRAM(s) Oral at bedtime  budesonide  80 MICROgram(s)/formoterol 4.5 MICROgram(s) Inhaler 2 Puff(s) Inhalation two times a day  cilostazol 100 milliGRAM(s) Oral two times a day  dextrose 5% 1000 milliLiter(s) (75 mL/Hr) IV Continuous <Continuous>  enalapril 5 milliGRAM(s) Oral daily  heparin   Injectable 5000 Unit(s) SubCutaneous every 12 hours  iohexol 300 mG (iodine)/mL Oral Solution 60 milliLiter(s) Oral once  levothyroxine Injectable 37.5 MICROGram(s) IV Push at bedtime  magnesium sulfate  IVPB 1 Gram(s) IV Intermittent once  mirabegron ER 50 milliGRAM(s) Oral daily  nicotine -  14 mG/24Hr(s) Patch 1 Patch Transdermal daily  pantoprazole  Injectable 40 milliGRAM(s) IV Push with breakfast  piperacillin/tazobactam IVPB.. 3.375 Gram(s) IV Intermittent every 8 hours  sertraline 200 milliGRAM(s) Oral daily    MEDICATIONS  (PRN):  acetaminophen     Tablet .. 650 milliGRAM(s) Oral every 6 hours PRN Temp greater or equal to 38C (100.4F), Mild Pain (1 - 3), Moderate Pain (4 - 6)  LORazepam   Injectable 0.25 milliGRAM(s) IV Push two times a day PRN Anxiety  melatonin 3 milliGRAM(s) Oral at bedtime PRN Insomnia      MICRO:   Cultures     STUDIES:   EKG, CXR, U/S, CT, MRI    HPI/OVERNIGHT EVENTS: Patient seen and examined at bedside this AM. No overnight events. No complaints. GGC with contrast not in colon , denies any BF last 2 days   Vital Signs Last 24 Hrs  T(C): 36.6 (08 Feb 2024 04:35), Max: 36.6 (07 Feb 2024 08:47)  T(F): 97.9 (08 Feb 2024 04:35), Max: 97.9 (07 Feb 2024 08:47)  HR: 92 (08 Feb 2024 04:35) (78 - 93)  BP: 136/86 (08 Feb 2024 04:35) (132/83 - 167/96)  BP(mean): --  RR: 18 (08 Feb 2024 04:35) (18 - 18)  SpO2: 97% (08 Feb 2024 04:35) (93% - 97%)    Parameters below as of 08 Feb 2024 04:35  Patient On (Oxygen Delivery Method): nasal cannula  O2 Flow (L/min): 3      I&O's Detail    07 Feb 2024 07:01  -  08 Feb 2024 07:00  --------------------------------------------------------  IN:  Total IN: 0 mL    OUT:    Nasogastric/Oral tube (mL): 300 mL  Total OUT: 300 mL    Total NET: -300 mL          Constitutional: patient resting comfortably in bed, in no acute distress  HEENT: EOMI, PERRLA, MMM.  Respiratory: Non labored breathing on RA  Cardiovascular: RRR  Gastrointestinal: Abdomen soft, non-tender, distended, no rebound tenderness / guarding  Musculoskeletal: No joint pain, swelling or deformity; no limitation of movement  Vascular: Extremities warm and well perfused.     LABS:                        10.5   17.60 )-----------( 379      ( 08 Feb 2024 06:25 )             31.3     02-08    143  |  105  |  18.6  ----------------------------<  123<H>  3.5   |  28.0  |  0.75    Ca    8.3<L>      08 Feb 2024 06:25  Phos  3.0     02-07  Mg     1.7     02-07    TPro  5.6<L>  /  Alb  2.1<L>  /  TBili  0.3<L>  /  DBili  x   /  AST  20  /  ALT  9   /  AlkPhos  64  02-08      Urinalysis Basic - ( 08 Feb 2024 06:25 )    Color: x / Appearance: x / SG: x / pH: x  Gluc: 123 mg/dL / Ketone: x  / Bili: x / Urobili: x   Blood: x / Protein: x / Nitrite: x   Leuk Esterase: x / RBC: x / WBC x   Sq Epi: x / Non Sq Epi: x / Bacteria: x        MEDICATIONS  (STANDING):  albuterol/ipratropium for Nebulization 3 milliLiter(s) Nebulizer every 6 hours  aspirin enteric coated 81 milliGRAM(s) Oral daily  atorvastatin 40 milliGRAM(s) Oral at bedtime  budesonide  80 MICROgram(s)/formoterol 4.5 MICROgram(s) Inhaler 2 Puff(s) Inhalation two times a day  cilostazol 100 milliGRAM(s) Oral two times a day  dextrose 5% 1000 milliLiter(s) (75 mL/Hr) IV Continuous <Continuous>  enalapril 5 milliGRAM(s) Oral daily  heparin   Injectable 5000 Unit(s) SubCutaneous every 12 hours  iohexol 300 mG (iodine)/mL Oral Solution 60 milliLiter(s) Oral once  levothyroxine Injectable 37.5 MICROGram(s) IV Push at bedtime  magnesium sulfate  IVPB 1 Gram(s) IV Intermittent once  mirabegron ER 50 milliGRAM(s) Oral daily  nicotine -  14 mG/24Hr(s) Patch 1 Patch Transdermal daily  pantoprazole  Injectable 40 milliGRAM(s) IV Push with breakfast  piperacillin/tazobactam IVPB.. 3.375 Gram(s) IV Intermittent every 8 hours  sertraline 200 milliGRAM(s) Oral daily    MEDICATIONS  (PRN):  acetaminophen     Tablet .. 650 milliGRAM(s) Oral every 6 hours PRN Temp greater or equal to 38C (100.4F), Mild Pain (1 - 3), Moderate Pain (4 - 6)  LORazepam   Injectable 0.25 milliGRAM(s) IV Push two times a day PRN Anxiety  melatonin 3 milliGRAM(s) Oral at bedtime PRN Insomnia      MICRO:   Cultures     STUDIES:   EKG, CXR, U/S, CT, MRI

## 2024-02-08 NOTE — PROVIDER CONTACT NOTE (OTHER) - ASSESSMENT
MT alerted RN that pt had 9 beats of vtach on the monitor, VSS, leads checked for right placement. pt denies CP or SOB, pt went right into baseline rhythm. MD Avery Brown made aware. no new orders at this time. endorse to the oncoing night RN to keep monitoring. safety measures in place.
MT alerted RN that pt had a burst of PSVT , pt was asleep, RN assessed pt at bedside, pt woke up leads checked for right placement, pt denies any complaints of CP or SOB, NAD noted. VSS. safety measures in place. DUKE zhu made aware.

## 2024-02-08 NOTE — PROGRESS NOTE ADULT - PROBLEM SELECTOR PLAN 1
SBO   Small bowel obstruction–transition zone in the right lower quadrant  No history of Crohn's disease in the past    Clinically not much improvement.  No bowel movement, no flatus, abdomen is distended.  Does say abdominal discomfort is somewhat better    Abdominal exam–abdomen is still distended with hypoactive, high-pitched bowel sounds.  NG tube to suction    Assessment and plan  Enteritis/small bowel obstruction  Not much improvement with NG tube suction.  Patient has been here for about 1 week  Leukocytosis 16,000 continue to be elevated  CBC ordered for tomorrow  I reviewed the surgical note (has not been cosigned by the attending yet ).  They suggest D/Cing  NG tube and starting clear liquids.  However I spoke to surgical attending Dr. Perez.  I am concerned as patient still has abdominal distention 1 week after admission with exam still suggestive of of small bowel obstruction.  CAT scan x 2 showing transition zone in the right lower quadrant.  Dr. Peralta will reevaluate  continue NGT suction for now SBO   Small bowel obstruction–transition zone in the right lower quadrant X 2 Ct  and 2 xrays   No history of Crohn's disease in the past- may have small bowel lesion     Clinically no improvement.  No bowel movement, no flatus, abdomen is distended.  Does say abdominal discomfort is somewhat better    Abdominal exam–abdomen is still distended with hypoactive, high-pitched bowel sounds.  NG tube to suction    Assessment and plan  /small bowel obstruction  Not much improvement with NG tube suction.  Patient has been here for over  1 week  Leukocytosis 17,600 continue to be elevated  CBC ordered for tomorrow   I read the surgical note-  They suggest D/Cing  NG tube and starting clear liquids.  However I spoke to surgical attending Dr. Perez.  I am concerned as patient still has abdominal distention 1 week after admission with exam still suggestive of of small bowel obstruction.  CAT scan x 2 showing transition zone in the right lower quadrant.   My interpretation  XrAY  x 2 from this  am.  Xray at  2 am  and 6 am today show no passage of contrast beyond the small bowel . Pt to go to OR in the next 1-2 days   continue NGT suction for now

## 2024-02-08 NOTE — PROGRESS NOTE ADULT - SUBJECTIVE AND OBJECTIVE BOX
Chief Complaint:  Patient is a 78y old  Female who presents with a chief complaint of Sepsis due to enterocolitis (08 Feb 2024 08:03)      HPI/ 24 hr events: Patient seen and examined at bedside. She has crampy abdominal pain. She has not passed flatus or BM yet. Denies nausea or vomiting. NGT present. Vitals are overall stable. Abdominal XR shows "mild to moderate diffuse small bowel dilatation, contrast progresses only to the proximal jejunum by the last radiograph with suspicion of an intraluminal filling defect."       REVIEW OF SYSTEMS:   General: Negative  HEENT: Negative  CV: Negative  Respiratory: Negative  GI: See HPI  : Negative  MSK: Negative  Hematologic: Negative  Skin: Negative    MEDICATIONS:   MEDICATIONS  (STANDING):  albuterol/ipratropium for Nebulization 3 milliLiter(s) Nebulizer every 6 hours  aspirin enteric coated 81 milliGRAM(s) Oral daily  atorvastatin 40 milliGRAM(s) Oral at bedtime  budesonide  80 MICROgram(s)/formoterol 4.5 MICROgram(s) Inhaler 2 Puff(s) Inhalation two times a day  cilostazol 100 milliGRAM(s) Oral two times a day  dextrose 5% 1000 milliLiter(s) (75 mL/Hr) IV Continuous <Continuous>  enalapril 5 milliGRAM(s) Oral daily  heparin   Injectable 5000 Unit(s) SubCutaneous every 12 hours  iohexol 300 mG (iodine)/mL Oral Solution 60 milliLiter(s) Oral once  levothyroxine Injectable 37.5 MICROGram(s) IV Push at bedtime  magnesium sulfate  IVPB 1 Gram(s) IV Intermittent once  mirabegron ER 50 milliGRAM(s) Oral daily  nicotine -  14 mG/24Hr(s) Patch 1 Patch Transdermal daily  pantoprazole  Injectable 40 milliGRAM(s) IV Push with breakfast  piperacillin/tazobactam IVPB.. 3.375 Gram(s) IV Intermittent every 8 hours  sertraline 200 milliGRAM(s) Oral daily    MEDICATIONS  (PRN):  acetaminophen     Tablet .. 650 milliGRAM(s) Oral every 6 hours PRN Temp greater or equal to 38C (100.4F), Mild Pain (1 - 3), Moderate Pain (4 - 6)  LORazepam   Injectable 0.25 milliGRAM(s) IV Push two times a day PRN Anxiety  melatonin 3 milliGRAM(s) Oral at bedtime PRN Insomnia            DIET:  Diet, NPO (02-05-24 @ 12:02) [Active]          ALLERGIES:   Allergies    No Known Allergies    Intolerances        VITAL SIGNS:   Vital Signs Last 24 Hrs  T(C): 36.6 (08 Feb 2024 08:39), Max: 36.6 (07 Feb 2024 16:10)  T(F): 97.8 (08 Feb 2024 08:39), Max: 97.9 (07 Feb 2024 20:55)  HR: 99 (08 Feb 2024 08:39) (78 - 99)  BP: 133/81 (08 Feb 2024 08:39) (132/83 - 150/80)  BP(mean): --  RR: 18 (08 Feb 2024 08:39) (18 - 18)  SpO2: 93% (08 Feb 2024 08:39) (93% - 97%)    Parameters below as of 08 Feb 2024 08:39  Patient On (Oxygen Delivery Method): nasal cannula  O2 Flow (L/min): 3    I&O's Summary    07 Feb 2024 07:01  -  08 Feb 2024 07:00  --------------------------------------------------------  IN: 0 mL / OUT: 300 mL / NET: -300 mL        PHYSICAL EXAM:   GENERAL:  No acute distress  HEENT:  NC/AT, conjunctiva clear, sclera anicteric  CHEST:  No increased effort  HEART:  Regular rate  ABDOMEN:  Soft, non-tender, moderately distended, hypoactive bowel sounds, no rebound or guarding  SKIN:  Warm, dry  NEURO:  Calm, cooperative    LABS:                        10.5   17.60 )-----------( 379      ( 08 Feb 2024 06:25 )             31.3     Hemoglobin: 10.5 g/dL (02-08-24 @ 06:25)  Hemoglobin: 11.2 g/dL (02-07-24 @ 06:37)  Hemoglobin: 9.6 g/dL (02-06-24 @ 06:00)    02-08    143  |  105  |  18.6  ----------------------------<  123<H>  3.5   |  28.0  |  0.75    Ca    8.3<L>      08 Feb 2024 06:25  Phos  3.0     02-07  Mg     1.7     02-07    TPro  5.6<L>  /  Alb  2.1<L>  /  TBili  0.3<L>  /  DBili  x   /  AST  20  /  ALT  9   /  AlkPhos  64  02-08    LIVER FUNCTIONS - ( 08 Feb 2024 06:25 )  Alb: 2.1 g/dL / Pro: 5.6 g/dL / ALK PHOS: 64 U/L / ALT: 9 U/L / AST: 20 U/L / GGT: x                                                     RADIOLOGY & ADDITIONAL STUDIES:      < from: Xray Kidney Ureter Bladder (02.08.24 @ 07:10) >    ACC: [id number] EXAM:  XR ABDOMEN 1 VIEW   ORDERED BY: YENNY WHITE     ACC: [id number] EXAM:  XR KUB 1 VIEW   ORDERED BY: LEYDA RAJAN     ACC: [id number] EXAM:  XR KUB 1 VIEW   ORDERED BY: LEYDA RAJAN     PROCEDURE DATE:  02/08/2024          INTERPRETATION:  Clinical History: 78-year-old female, Gastrografin   challenge.    Serial radiographs at 3:35 PM 2/7/2024, 2:07 AM and 6:18 AM are   correlated with the abdominal CT of 2/5/2024.    FINDINGS: Mild to moderate diffuse small bowel dilatation throughout the   examination.    Contrast progresses only to the proximal jejunum with a possible   intraluminal filling defect.    NG tube with the tip in the fundus, unchanged    No pneumoperitoneum or acute osseous finding.    IMPRESSION:    Mild to moderate diffuse small bowel dilatation, contrast progresses only   to the proximal jejunum by the last radiograph with suspicion of an   intraluminal filling defect.    --- End of Report ---            CURLY COOPER DO; Attending Radiologist  This document has been electronically signed. Feb 8 2024  8:55AM    < end of copied text >          < from: Xray Kidney Ureter Bladder (02.08.24 @ 02:27) >    ACC: [id number] EXAM:  XR ABDOMEN 1 VIEW   ORDERED BY: YENNY WHITE     ACC: [id number] EXAM:  XR KUB 1 VIEW   ORDERED BY: LEYDA RAJAN     ACC: [id number] EXAM:  XR KUB 1 VIEW   ORDERED BY: LEYDA RAJAN     PROCEDURE DATE:  02/08/2024          INTERPRETATION:  Clinical History: 78-year-old female, Gastrografin   challenge.    Serial radiographs at 3:35 PM 2/7/2024, 2:07 AM and 6:18 AM are   correlated with the abdominal CT of 2/5/2024.    FINDINGS: Mild to moderate diffuse small bowel dilatation throughout the   examination.    Contrast progresses only to the proximal jejunum with a possible   intraluminal filling defect.    NG tube with the tip in the fundus, unchanged    No pneumoperitoneum or acute osseous finding.    IMPRESSION:    Mild to moderate diffuse small bowel dilatation, contrast progresses only   to the proximal jejunum by the last radiograph with suspicion of an   intraluminal filling defect.    --- End of Report ---            CURLY COOPER DO; Attending Radiologist  This document has been electronically signed. Feb 8 2024  8:55AM    < end of copied text >           Chief Complaint:  Patient is a 78y old  Female who presents with a chief complaint of Sepsis due to enterocolitis (08 Feb 2024 08:03)      HPI/ 24 hr events: Patient seen and examined at bedside. She has crampy abdominal pain. She has not passed flatus or BM yet in over 24 hours . Denies nausea or vomiting. NGT present. Vitals are overall stable. Abdominal XR shows "mild to moderate diffuse small bowel dilatation, contrast progresses only to the proximal jejunum by the last radiograph with suspicion of an intraluminal filling defect."       REVIEW OF SYSTEMS:   General: Negative  HEENT: Negative  CV: Negative  Respiratory: Negative  GI: See HPI  : Negative  MSK: Negative  Hematologic: Negative  Skin: Negative    MEDICATIONS:   MEDICATIONS  (STANDING):  albuterol/ipratropium for Nebulization 3 milliLiter(s) Nebulizer every 6 hours  aspirin enteric coated 81 milliGRAM(s) Oral daily  atorvastatin 40 milliGRAM(s) Oral at bedtime  budesonide  80 MICROgram(s)/formoterol 4.5 MICROgram(s) Inhaler 2 Puff(s) Inhalation two times a day  cilostazol 100 milliGRAM(s) Oral two times a day  dextrose 5% 1000 milliLiter(s) (75 mL/Hr) IV Continuous <Continuous>  enalapril 5 milliGRAM(s) Oral daily  heparin   Injectable 5000 Unit(s) SubCutaneous every 12 hours  iohexol 300 mG (iodine)/mL Oral Solution 60 milliLiter(s) Oral once  levothyroxine Injectable 37.5 MICROGram(s) IV Push at bedtime  magnesium sulfate  IVPB 1 Gram(s) IV Intermittent once  mirabegron ER 50 milliGRAM(s) Oral daily  nicotine -  14 mG/24Hr(s) Patch 1 Patch Transdermal daily  pantoprazole  Injectable 40 milliGRAM(s) IV Push with breakfast  piperacillin/tazobactam IVPB.. 3.375 Gram(s) IV Intermittent every 8 hours  sertraline 200 milliGRAM(s) Oral daily    MEDICATIONS  (PRN):  acetaminophen     Tablet .. 650 milliGRAM(s) Oral every 6 hours PRN Temp greater or equal to 38C (100.4F), Mild Pain (1 - 3), Moderate Pain (4 - 6)  LORazepam   Injectable 0.25 milliGRAM(s) IV Push two times a day PRN Anxiety  melatonin 3 milliGRAM(s) Oral at bedtime PRN Insomnia            DIET:  Diet, NPO (02-05-24 @ 12:02) [Active]          ALLERGIES:   Allergies    No Known Allergies    Intolerances        VITAL SIGNS:   Vital Signs Last 24 Hrs  T(C): 36.6 (08 Feb 2024 08:39), Max: 36.6 (07 Feb 2024 16:10)  T(F): 97.8 (08 Feb 2024 08:39), Max: 97.9 (07 Feb 2024 20:55)  HR: 99 (08 Feb 2024 08:39) (78 - 99)  BP: 133/81 (08 Feb 2024 08:39) (132/83 - 150/80)  BP(mean): --  RR: 18 (08 Feb 2024 08:39) (18 - 18)  SpO2: 93% (08 Feb 2024 08:39) (93% - 97%)    Parameters below as of 08 Feb 2024 08:39  Patient On (Oxygen Delivery Method): nasal cannula  O2 Flow (L/min): 3    I&O's Summary    07 Feb 2024 07:01  -  08 Feb 2024 07:00  --------------------------------------------------------  IN: 0 mL / OUT: 300 mL / NET: -300 mL        PHYSICAL EXAM:   GENERAL:  No acute distress  HEENT:  NC/AT, conjunctiva clear, sclera anicteric  CHEST:  No increased effort  HEART:  Regular rate  ABDOMEN:  Soft, non-tender, moderately distended, hypoactive bowel sounds, no rebound or guarding  SKIN:  Warm, dry  NEURO:  Calm, cooperative    LABS:                        10.5   17.60 )-----------( 379      ( 08 Feb 2024 06:25 )             31.3     Hemoglobin: 10.5 g/dL (02-08-24 @ 06:25)  Hemoglobin: 11.2 g/dL (02-07-24 @ 06:37)  Hemoglobin: 9.6 g/dL (02-06-24 @ 06:00)    02-08    143  |  105  |  18.6  ----------------------------<  123<H>  3.5   |  28.0  |  0.75    Ca    8.3<L>      08 Feb 2024 06:25  Phos  3.0     02-07  Mg     1.7     02-07    TPro  5.6<L>  /  Alb  2.1<L>  /  TBili  0.3<L>  /  DBili  x   /  AST  20  /  ALT  9   /  AlkPhos  64  02-08    LIVER FUNCTIONS - ( 08 Feb 2024 06:25 )  Alb: 2.1 g/dL / Pro: 5.6 g/dL / ALK PHOS: 64 U/L / ALT: 9 U/L / AST: 20 U/L / GGT: x                                                     RADIOLOGY & ADDITIONAL STUDIES:      < from: Xray Kidney Ureter Bladder (02.08.24 @ 07:10) >    ACC: [id number] EXAM:  XR ABDOMEN 1 VIEW   ORDERED BY: YENNY WHITE     ACC: [id number] EXAM:  XR KUB 1 VIEW   ORDERED BY: LEYDA RAJAN     ACC: [id number] EXAM:  XR KUB 1 VIEW   ORDERED BY: LEYDA RAJAN     PROCEDURE DATE:  02/08/2024          INTERPRETATION:  Clinical History: 78-year-old female, Gastrografin   challenge.    Serial radiographs at 3:35 PM 2/7/2024, 2:07 AM and 6:18 AM are   correlated with the abdominal CT of 2/5/2024.    FINDINGS: Mild to moderate diffuse small bowel dilatation throughout the   examination.    Contrast progresses only to the proximal jejunum with a possible   intraluminal filling defect.    NG tube with the tip in the fundus, unchanged    No pneumoperitoneum or acute osseous finding.    IMPRESSION:    Mild to moderate diffuse small bowel dilatation, contrast progresses only   to the proximal jejunum by the last radiograph with suspicion of an   intraluminal filling defect.    --- End of Report ---            CURLY COOPER DO; Attending Radiologist  This document has been electronically signed. Feb 8 2024  8:55AM    < end of copied text >          < from: Xray Kidney Ureter Bladder (02.08.24 @ 02:27) >    ACC: [id number] EXAM:  XR ABDOMEN 1 VIEW   ORDERED BY: YENNY WHITE     ACC: [id number] EXAM:  XR KUB 1 VIEW   ORDERED BY: LEYDA RAJAN     ACC: [id number] EXAM:  XR KUB 1 VIEW   ORDERED BY: LEYDA RAJAN     PROCEDURE DATE:  02/08/2024          INTERPRETATION:  Clinical History: 78-year-old female, Gastrografin   challenge.    Serial radiographs at 3:35 PM 2/7/2024, 2:07 AM and 6:18 AM are   correlated with the abdominal CT of 2/5/2024.    FINDINGS: Mild to moderate diffuse small bowel dilatation throughout the   examination.    Contrast progresses only to the proximal jejunum with a possible   intraluminal filling defect.    NG tube with the tip in the fundus, unchanged    No pneumoperitoneum or acute osseous finding.    IMPRESSION:    Mild to moderate diffuse small bowel dilatation, contrast progresses only   to the proximal jejunum by the last radiograph with suspicion of an   intraluminal filling defect.    --- End of Report ---            CURLY COOPER DO; Attending Radiologist  This document has been electronically signed. Feb 8 2024  8:55AM    < end of copied text >

## 2024-02-08 NOTE — PROGRESS NOTE ADULT - NS ATTEST RISK PROBLEM GEN_ALL_CORE FT
Infectious enteritis, colitis  Patient admitted with fever, diarrhea, abdominal pain     C diff pending. GI PCR negative  cbc , cmp ordered for tomorrow  advance diet to low fiber, lactose free. I discussed this with Dr foster today   Ct images reviewed-  My interpreation :  distal ileitis, colitis
Enteritis/small bowel obstruction  Not much improvement with NG tube suction.  Patient has been here for about 1 week  Leukocytosis 16,000 continue to be elevated  CBC ordered for tomorrow  I reviewed the surgical note (has not been cosigned by the attending yet ).  They suggest D/Cing  NG tube and starting clear liquids.  However I spoke to surgical attending Dr. Perez.  I am concerned as patient still has abdominal distention 1 week after admission with exam still suggestive of of small bowel obstruction.  CAT scan x 2 showing transition zone in the right lower quadrant.  Dr. Peralta will reevaluate  continue NGT suction for now
small bowel obstruction  Not much improvement with NG tube suction.  Patient has been here for over  1 week  Leukocytosis 17,600 continue to be elevated  CBC ordered for tomorrow   I read the surgical note-  They suggest D/C ing  NG tube and starting clear liquids.  However I spoke to surgical attending Dr. Perez.  I am concerned as patient still has abdominal distention 1 week after admission with exam still suggestive of of small bowel obstruction.  CAT scan x 2 showing transition zone in the right lower quadrant.   My interpretation  XrAY  x 2 from this  am.  Xray at  2 am  and 6 am today show no passage of contrast beyond the small bowel . Pt to go to OR in the next 1-2 days      I notified   Dr Watkins hospitalist about plan for surgery   continue NGT suction for now

## 2024-02-08 NOTE — PROGRESS NOTE ADULT - ASSESSMENT
78yoF hx active smoker, HTN, HLD, PAD, presenting with abdominal pain, non-bloody watery diarrhea Febrile (Tm105) and tachycardic. MERVAT, AGMA and Hyperkalemia present. CT A/P showing diffuse small bowel and colonic inflammation, most pronounced in the right lower quadrant involving the distal ileum concerning for Crohn’s disease.  Empiric antibiotics were initiated for enterocolitis and intravenous fluids for acute kidney injury.  Hospital stay then complicated by SBO and patient made NPO, NGT placed    #Partial SBO  CT A/P 2/4 showing partial SBO   - Now s/p NGT placement   - NPO, IVF (oral medications on hold for now)  - Serial abd exams   - Serial imaging  - GI and Surgery follow     #Enterocolitis  CT abdomen initially noted small bowel and colonic thickening with inflammation and stranding  GI PCR (-), Blood Culture (-)  Elevated Fecal Calprotectin 252  - On ceftriaxone and metronidazole, changed to Zosyn per GI recs  - GI follow up appreciated    #Hypernatremia  #Hypokalemia  #Hypomagnesemia  - IVF changed to D5W with potassium  - Magnesium Sulfate  - Monitor lytes    #SVT  TTE with normal function, moderate pulmonary hypertension  Currently monitor SR with PAC  - Potassium and magnesium supplementation  - Cardiology evaluation noted  - Keep K > 4 and Mag > 2   - Per Cardio, if after repletion of electrolytes if there is still PSVT would recommend Lopressor 25mg po q 12hrs     #Hypoxia, likely underlying COPD  #Smoker  - Supplemental O2  - Add ICS/LAMA  - Albuterol PRN  - Nicotine replacement     #Acute kidney injury  Resolved  - Renal function improved with intravenous fluids     #Hypertension / Peripheral artery disease  - Monitor blood pressure   - On aspirin and cilostazol, atorvastatin (medications to be resumed once on diet)    #Hypothyroidism  - On levothyroxine    #Anxiety / Depression  - On sertraline  - Alprazolam as needed    #Overactive bladder  - On mirabegron    VTE prophylaxis - Heparin SQ   GI ppx: Pantoprazole 40mg    78yoF hx active smoker, HTN, HLD, PAD, presenting with abdominal pain, non-bloody watery diarrhea Febrile (Tm105) and tachycardic. MERVAT, AGMA and Hyperkalemia present. CT A/P showing diffuse small bowel and colonic inflammation, most pronounced in the right lower quadrant involving the distal ileum concerning for Crohn’s disease.  Empiric antibiotics were initiated for enterocolitis and intravenous fluids for acute kidney injury.  Hospital stay then complicated by SBO and patient made NPO, NGT placed    #Partial SBO  CT A/P 2/4 showing partial SBO   - Now s/p NGT placement   - NPO, IVF (oral medications on hold for now)  - Serial abd exams   - Serial imaging  - GI and Surgery follow     #Enterocolitis  CT abdomen initially noted small bowel and colonic thickening with inflammation and stranding  GI PCR (-), Blood Culture (-)  Elevated Fecal Calprotectin 252  - Zosyn per GI recs  - GI follow up appreciated    #Hypernatremia  - Improved  - D5W with potassium  - Monitor lytes    #SVT  TTE with normal function, moderate pulmonary hypertension  Currently monitor SR with PAC  - Potassium and magnesium supplementation  - Cardiology evaluation noted  - Keep K > 4 and Mag > 2   - Per Cardio, if after repletion of electrolytes if there is still PSVT would recommend Lopressor 25mg po q 12hrs     #Hypoxia, likely underlying COPD  #Smoker  - Supplemental O2  - Add ICS/LAMA  - Albuterol PRN  - Nicotine replacement     #Acute kidney injury  Resolved  - Renal function improved with intravenous fluids     #Hypertension / Peripheral artery disease  - Monitor blood pressure   - Aspirin 81mg daily   - Cilostazol 100mg BID  - will continue to hold Atorvastatin until on regular diet    #Hypothyroidism  - levothyroxine 37.5 mcg IV at bedtime    #Anxiety / Depression  - Sertraline 200mg Daily    #Overactive bladder  - Mirabegron 50mg Daily    #Hypokalemia   - Resolved     VTE ppx: Heparin   GI ppx: Pantoprazole 40mg     DISPO: Active, Pending Surgery intervention

## 2024-02-09 ENCOUNTER — TRANSCRIPTION ENCOUNTER (OUTPATIENT)
Age: 79
End: 2024-02-09

## 2024-02-09 LAB
ANION GAP SERPL CALC-SCNC: 10 MMOL/L — SIGNIFICANT CHANGE UP (ref 5–17)
APTT BLD: 31.9 SEC — SIGNIFICANT CHANGE UP (ref 24.5–35.6)
BUN SERPL-MCNC: 14.6 MG/DL — SIGNIFICANT CHANGE UP (ref 8–20)
CALCIUM SERPL-MCNC: 8.2 MG/DL — LOW (ref 8.4–10.5)
CHLORIDE SERPL-SCNC: 102 MMOL/L — SIGNIFICANT CHANGE UP (ref 96–108)
CO2 SERPL-SCNC: 27 MMOL/L — SIGNIFICANT CHANGE UP (ref 22–29)
CREAT SERPL-MCNC: 0.76 MG/DL — SIGNIFICANT CHANGE UP (ref 0.5–1.3)
EGFR: 81 ML/MIN/1.73M2 — SIGNIFICANT CHANGE UP
GLUCOSE BLDC GLUCOMTR-MCNC: 117 MG/DL — HIGH (ref 70–99)
GLUCOSE BLDC GLUCOMTR-MCNC: 124 MG/DL — HIGH (ref 70–99)
GLUCOSE BLDC GLUCOMTR-MCNC: 134 MG/DL — HIGH (ref 70–99)
GLUCOSE SERPL-MCNC: 104 MG/DL — HIGH (ref 70–99)
HCT VFR BLD CALC: 31 % — LOW (ref 34.5–45)
HGB BLD-MCNC: 9.9 G/DL — LOW (ref 11.5–15.5)
INR BLD: 1.38 RATIO — HIGH (ref 0.85–1.18)
MAGNESIUM SERPL-MCNC: 1.6 MG/DL — SIGNIFICANT CHANGE UP (ref 1.6–2.6)
MCHC RBC-ENTMCNC: 29.4 PG — SIGNIFICANT CHANGE UP (ref 27–34)
MCHC RBC-ENTMCNC: 31.9 GM/DL — LOW (ref 32–36)
MCV RBC AUTO: 92 FL — SIGNIFICANT CHANGE UP (ref 80–100)
PHOSPHATE SERPL-MCNC: 2.5 MG/DL — SIGNIFICANT CHANGE UP (ref 2.4–4.7)
PLATELET # BLD AUTO: 370 K/UL — SIGNIFICANT CHANGE UP (ref 150–400)
POTASSIUM SERPL-MCNC: 3.3 MMOL/L — LOW (ref 3.5–5.3)
POTASSIUM SERPL-SCNC: 3.3 MMOL/L — LOW (ref 3.5–5.3)
PROTHROM AB SERPL-ACNC: 15.2 SEC — HIGH (ref 9.5–13)
RBC # BLD: 3.37 M/UL — LOW (ref 3.8–5.2)
RBC # FLD: 14.7 % — HIGH (ref 10.3–14.5)
SODIUM SERPL-SCNC: 139 MMOL/L — SIGNIFICANT CHANGE UP (ref 135–145)
WBC # BLD: 18.33 K/UL — HIGH (ref 3.8–10.5)
WBC # FLD AUTO: 18.33 K/UL — HIGH (ref 3.8–10.5)

## 2024-02-09 PROCEDURE — 74018 RADEX ABDOMEN 1 VIEW: CPT | Mod: 26

## 2024-02-09 PROCEDURE — 99233 SBSQ HOSP IP/OBS HIGH 50: CPT

## 2024-02-09 RX ORDER — POTASSIUM PHOSPHATE, MONOBASIC POTASSIUM PHOSPHATE, DIBASIC 236; 224 MG/ML; MG/ML
30 INJECTION, SOLUTION INTRAVENOUS ONCE
Refills: 0 | Status: COMPLETED | OUTPATIENT
Start: 2024-02-09 | End: 2024-02-09

## 2024-02-09 RX ORDER — ACETAMINOPHEN 500 MG
1000 TABLET ORAL ONCE
Refills: 0 | Status: COMPLETED | OUTPATIENT
Start: 2024-02-09 | End: 2024-02-09

## 2024-02-09 RX ADMIN — HEPARIN SODIUM 5000 UNIT(S): 5000 INJECTION INTRAVENOUS; SUBCUTANEOUS at 17:31

## 2024-02-09 RX ADMIN — PANTOPRAZOLE SODIUM 40 MILLIGRAM(S): 20 TABLET, DELAYED RELEASE ORAL at 05:16

## 2024-02-09 RX ADMIN — PIPERACILLIN AND TAZOBACTAM 25 GRAM(S): 4; .5 INJECTION, POWDER, LYOPHILIZED, FOR SOLUTION INTRAVENOUS at 05:16

## 2024-02-09 RX ADMIN — Medication 1000 MILLIGRAM(S): at 13:30

## 2024-02-09 RX ADMIN — BUDESONIDE AND FORMOTEROL FUMARATE DIHYDRATE 2 PUFF(S): 160; 4.5 AEROSOL RESPIRATORY (INHALATION) at 07:50

## 2024-02-09 RX ADMIN — Medication 3 MILLILITER(S): at 14:45

## 2024-02-09 RX ADMIN — POTASSIUM PHOSPHATE, MONOBASIC POTASSIUM PHOSPHATE, DIBASIC 83.33 MILLIMOLE(S): 236; 224 INJECTION, SOLUTION INTRAVENOUS at 08:48

## 2024-02-09 RX ADMIN — PIPERACILLIN AND TAZOBACTAM 25 GRAM(S): 4; .5 INJECTION, POWDER, LYOPHILIZED, FOR SOLUTION INTRAVENOUS at 21:35

## 2024-02-09 RX ADMIN — Medication 400 MILLIGRAM(S): at 12:58

## 2024-02-09 RX ADMIN — Medication 37.5 MICROGRAM(S): at 21:34

## 2024-02-09 RX ADMIN — SODIUM CHLORIDE 75 MILLILITER(S): 9 INJECTION, SOLUTION INTRAVENOUS at 08:48

## 2024-02-09 RX ADMIN — BUDESONIDE AND FORMOTEROL FUMARATE DIHYDRATE 2 PUFF(S): 160; 4.5 AEROSOL RESPIRATORY (INHALATION) at 21:17

## 2024-02-09 RX ADMIN — PIPERACILLIN AND TAZOBACTAM 25 GRAM(S): 4; .5 INJECTION, POWDER, LYOPHILIZED, FOR SOLUTION INTRAVENOUS at 14:12

## 2024-02-09 RX ADMIN — Medication 3 MILLILITER(S): at 07:50

## 2024-02-09 RX ADMIN — Medication 3 MILLILITER(S): at 21:17

## 2024-02-09 RX ADMIN — SODIUM CHLORIDE 75 MILLILITER(S): 9 INJECTION, SOLUTION INTRAVENOUS at 14:12

## 2024-02-09 RX ADMIN — Medication 0.25 MILLIGRAM(S): at 21:34

## 2024-02-09 RX ADMIN — HEPARIN SODIUM 5000 UNIT(S): 5000 INJECTION INTRAVENOUS; SUBCUTANEOUS at 05:16

## 2024-02-09 NOTE — PROGRESS NOTE ADULT - ASSESSMENT
77yo Female admitted for enteritis causing partial bowel obstruction.     Plan:   - Booked for OR today  - Will repeat KUB this morning   79yo Female admitted for enteritis causing partial bowel obstruction.     Plan:   - Booked for OR tomorrow  - keep NGT   -preop for dwayne 2/10

## 2024-02-09 NOTE — PROGRESS NOTE ADULT - SUBJECTIVE AND OBJECTIVE BOX
SURGERY PROGRESS NOTE    Subjective:   Patient examined at bedside this AM. Reports she is feeling fine this morning. No N/V, no abdominal pain. Reports BM this morning.       Objective:  Vital Signs  T(C): 36.9 (02-09 @ 05:30), Max: 37.1 (02-09 @ 00:22)  HR: 90 (02-09 @ 05:30) (84 - 99)  BP: 144/87 (02-09 @ 05:30) (133/81 - 147/88)  RR: 18 (02-09 @ 05:30) (17 - 18)  SpO2: 93% (02-09 @ 05:30) (91% - 96%)  02-07-24 @ 07:01  -  02-08-24 @ 07:00  --------------------------------------------------------  IN:  Total IN: 0 mL    OUT:    Nasogastric/Oral tube (mL): 300 mL  Total OUT: 300 mL    Total NET: -300 mL      Physical Exam:  General: alert and oriented, NAD  Resp: airway patent, respirations unlabored  Abdomen: soft, nontender, nondistended, NGT clamped  Extremities: no edema  Skin: warm, dry, appropriate color      Labs:                        9.9    18.33 )-----------( 370      ( 09 Feb 2024 05:50 )             31.0   02-09    139  |  102  |  14.6  ----------------------------<  104<H>  3.3<L>   |  27.0  |  0.76    Ca    8.2<L>      09 Feb 2024 05:50  Phos  2.5     02-09  Mg     1.6     02-09    TPro  5.6<L>  /  Alb  2.1<L>  /  TBili  0.3<L>  /  DBili  x   /  AST  20  /  ALT  9   /  AlkPhos  64  02-08    CAPILLARY BLOOD GLUCOSE  POCT Blood Glucose.: 124 mg/dL (09 Feb 2024 05:16)  POCT Blood Glucose.: 93 mg/dL (08 Feb 2024 21:13)  POCT Blood Glucose.: 99 mg/dL (08 Feb 2024 18:17)  POCT Blood Glucose.: 91 mg/dL (08 Feb 2024 12:04)

## 2024-02-09 NOTE — PROGRESS NOTE ADULT - SUBJECTIVE AND OBJECTIVE BOX
Patient is a 78y old  Female who presents with a chief complaint of Sepsis due to enterocolitis (09 Feb 2024 06:50)    INTERVAL HPI/OVERNIGHT EVENTS:  - No acute events     SUBJECTIVE:   - Patient seen and examined at bedside.     ROS: All negative except as listed above.    VITAL SIGNS:  ICU Vital Signs Last 24 Hrs  T(C): 36.9 (09 Feb 2024 05:30), Max: 37.1 (09 Feb 2024 00:22)  T(F): 98.4 (09 Feb 2024 05:30), Max: 98.8 (09 Feb 2024 00:22)  HR: 90 (09 Feb 2024 05:30) (84 - 99)  BP: 144/87 (09 Feb 2024 05:30) (133/81 - 147/88)  RR: 18 (09 Feb 2024 05:30) (17 - 18)  SpO2: 93% (09 Feb 2024 05:30) (91% - 96%)    O2 Parameters below as of 09 Feb 2024 05:30  Patient On (Oxygen Delivery Method): nasal cannula  O2 Flow (L/min): 3    Plateau pressure:   P/F ratio:     02-08 @ 07:01  -  02-09 @ 07:00  --------------------------------------------------------  IN: 825 mL / OUT: 0 mL / NET: 825 mL    CAPILLARY BLOOD GLUCOSE    POCT Blood Glucose.: 124 mg/dL (09 Feb 2024 05:16)    ECG: reviewed.    PHYSICAL EXAM:  GENERAL: Elderly ill appearing female sitting up in bed  HEENT: NGT. +NC  NECK: Supple  CVS: regular rate and rhythm S1 S2  RESP:  Fair air entry bilaterally  GI:  Soft with tenderness diffusely on palpation. BS+  : No suprapubic tenderness  MSK: Moves all extremities  CNS: Awake, aware of being in hospital, follows commands though generalized weakness  INTEG: Warm dry skin  PSYCH: Fatigued    MEDICATIONS:  MEDICATIONS  (STANDING):  albuterol/ipratropium for Nebulization 3 milliLiter(s) Nebulizer every 6 hours  aspirin enteric coated 81 milliGRAM(s) Oral daily  atorvastatin 40 milliGRAM(s) Oral at bedtime  budesonide  80 MICROgram(s)/formoterol 4.5 MICROgram(s) Inhaler 2 Puff(s) Inhalation two times a day  cilostazol 100 milliGRAM(s) Oral two times a day  dextrose 5% 1000 milliLiter(s) (75 mL/Hr) IV Continuous <Continuous>  enalapril 5 milliGRAM(s) Oral daily  heparin   Injectable 5000 Unit(s) SubCutaneous every 12 hours  iohexol 300 mG (iodine)/mL Oral Solution 60 milliLiter(s) Oral once  levothyroxine Injectable 37.5 MICROGram(s) IV Push at bedtime  magnesium sulfate  IVPB 1 Gram(s) IV Intermittent once  mirabegron ER 50 milliGRAM(s) Oral daily  nicotine -  14 mG/24Hr(s) Patch 1 Patch Transdermal daily  pantoprazole  Injectable 40 milliGRAM(s) IV Push with breakfast  piperacillin/tazobactam IVPB.. 3.375 Gram(s) IV Intermittent every 8 hours  potassium phosphate IVPB 30 milliMole(s) IV Intermittent once  sertraline 200 milliGRAM(s) Oral daily    MEDICATIONS  (PRN):  acetaminophen  Tablet .. 650 milliGRAM(s) Oral every 6 hours PRN Temp greater or equal to 38C (100.4F), Mild Pain (1 - 3), Moderate Pain (4 - 6)  HYDROmorphone  Injectable 0.5 milliGRAM(s) IV Push every 3 hours PRN Moderate Pain (4 - 6)  HYDROmorphone  Injectable 1 milliGRAM(s) IV Push every 4 hours PRN Severe Pain (7 - 10)  LORazepam   Injectable 0.25 milliGRAM(s) IV Push two times a day PRN Anxiety  melatonin 3 milliGRAM(s) Oral at bedtime PRN Insomnia    ALLERGIES:  Allergies    No Known Allergies  Intolerances     LABS:                        9.9    18.33 )-----------( 370      ( 09 Feb 2024 05:50 )             31.0     02-09    139  |  102  |  14.6  ----------------------------<  104<H>  3.3<L>   |  27.0  |  0.76    Ca    8.2<L>      09 Feb 2024 05:50  Phos  2.5     02-09  Mg     1.6     02-09    TPro  5.6<L>  /  Alb  2.1<L>  /  TBili  0.3<L>  /  DBili  x   /  AST  20  /  ALT  9   /  AlkPhos  64  02-08    PT/INR - ( 09 Feb 2024 05:50 )   PT: 15.2 sec;   INR: 1.38 ratio      PTT - ( 09 Feb 2024 05:50 )  PTT:31.9 sec  Urinalysis Basic - ( 09 Feb 2024 05:50 )    Color: x / Appearance: x / SG: x / pH: x  Gluc: 104 mg/dL / Ketone: x  / Bili: x / Urobili: x   Blood: x / Protein: x / Nitrite: x   Leuk Esterase: x / RBC: x / WBC x   Sq Epi: x / Non Sq Epi: x / Bacteria: x    Micro:    Culture - Blood (collected 01-30-24 @ 15:22)  Source: .Blood Blood  Final Report (02-04-24 @ 22:00):    No growth at 5 days    Culture - Blood (collected 01-30-24 @ 15:17)  Source: .Blood Blood  Final Report (02-04-24 @ 22:00):    No growth at 5 days    RADIOLOGY & ADDITIONAL TESTS: Reviewed. Patient is a 78y old  Female who presents with a chief complaint of Sepsis due to enterocolitis (09 Feb 2024 06:50)    INTERVAL HPI/OVERNIGHT EVENTS:  - No acute events     SUBJECTIVE:   - Patient seen and examined at bedside.   - Reports Restful sleep  - reports solid BM this morning  - Endorses cough  - Denies n/v, pain, SOB, CP    ROS: All negative except as listed above.    VITAL SIGNS:  ICU Vital Signs Last 24 Hrs  T(C): 36.9 (09 Feb 2024 05:30), Max: 37.1 (09 Feb 2024 00:22)  T(F): 98.4 (09 Feb 2024 05:30), Max: 98.8 (09 Feb 2024 00:22)  HR: 90 (09 Feb 2024 05:30) (84 - 99)  BP: 144/87 (09 Feb 2024 05:30) (133/81 - 147/88)  RR: 18 (09 Feb 2024 05:30) (17 - 18)  SpO2: 93% (09 Feb 2024 05:30) (91% - 96%)    O2 Parameters below as of 09 Feb 2024 05:30  Patient On (Oxygen Delivery Method): nasal cannula  O2 Flow (L/min): 3    Plateau pressure:   P/F ratio:     02-08 @ 07:01  -  02-09 @ 07:00  --------------------------------------------------------  IN: 825 mL / OUT: 0 mL / NET: 825 mL    CAPILLARY BLOOD GLUCOSE    POCT Blood Glucose.: 124 mg/dL (09 Feb 2024 05:16)    ECG: reviewed.    PHYSICAL EXAM:  GENERAL: Elderly ill appearing female sitting up in bed  HEENT: NGT. +NC  NECK: Supple  CVS: regular rate and rhythm S1 S2  RESP:  Fair air entry bilaterally  GI:  Soft with tenderness diffusely on palpation. BS+  : No suprapubic tenderness  MSK: Moves all extremities  CNS: Awake, aware of being in hospital, follows commands though generalized weakness  INTEG: Warm dry skin  PSYCH: Fatigued    MEDICATIONS:  MEDICATIONS  (STANDING):  albuterol/ipratropium for Nebulization 3 milliLiter(s) Nebulizer every 6 hours  aspirin enteric coated 81 milliGRAM(s) Oral daily  atorvastatin 40 milliGRAM(s) Oral at bedtime  budesonide  80 MICROgram(s)/formoterol 4.5 MICROgram(s) Inhaler 2 Puff(s) Inhalation two times a day  cilostazol 100 milliGRAM(s) Oral two times a day  dextrose 5% 1000 milliLiter(s) (75 mL/Hr) IV Continuous <Continuous>  enalapril 5 milliGRAM(s) Oral daily  heparin   Injectable 5000 Unit(s) SubCutaneous every 12 hours  iohexol 300 mG (iodine)/mL Oral Solution 60 milliLiter(s) Oral once  levothyroxine Injectable 37.5 MICROGram(s) IV Push at bedtime  magnesium sulfate  IVPB 1 Gram(s) IV Intermittent once  mirabegron ER 50 milliGRAM(s) Oral daily  nicotine -  14 mG/24Hr(s) Patch 1 Patch Transdermal daily  pantoprazole  Injectable 40 milliGRAM(s) IV Push with breakfast  piperacillin/tazobactam IVPB.. 3.375 Gram(s) IV Intermittent every 8 hours  potassium phosphate IVPB 30 milliMole(s) IV Intermittent once  sertraline 200 milliGRAM(s) Oral daily    MEDICATIONS  (PRN):  acetaminophen  Tablet .. 650 milliGRAM(s) Oral every 6 hours PRN Temp greater or equal to 38C (100.4F), Mild Pain (1 - 3), Moderate Pain (4 - 6)  HYDROmorphone  Injectable 0.5 milliGRAM(s) IV Push every 3 hours PRN Moderate Pain (4 - 6)  HYDROmorphone  Injectable 1 milliGRAM(s) IV Push every 4 hours PRN Severe Pain (7 - 10)  LORazepam   Injectable 0.25 milliGRAM(s) IV Push two times a day PRN Anxiety  melatonin 3 milliGRAM(s) Oral at bedtime PRN Insomnia    ALLERGIES:  Allergies    No Known Allergies  Intolerances     LABS:                        9.9    18.33 )-----------( 370      ( 09 Feb 2024 05:50 )             31.0     02-09    139  |  102  |  14.6  ----------------------------<  104<H>  3.3<L>   |  27.0  |  0.76    Ca    8.2<L>      09 Feb 2024 05:50  Phos  2.5     02-09  Mg     1.6     02-09    TPro  5.6<L>  /  Alb  2.1<L>  /  TBili  0.3<L>  /  DBili  x   /  AST  20  /  ALT  9   /  AlkPhos  64  02-08    PT/INR - ( 09 Feb 2024 05:50 )   PT: 15.2 sec;   INR: 1.38 ratio      PTT - ( 09 Feb 2024 05:50 )  PTT:31.9 sec  Urinalysis Basic - ( 09 Feb 2024 05:50 )    Color: x / Appearance: x / SG: x / pH: x  Gluc: 104 mg/dL / Ketone: x  / Bili: x / Urobili: x   Blood: x / Protein: x / Nitrite: x   Leuk Esterase: x / RBC: x / WBC x   Sq Epi: x / Non Sq Epi: x / Bacteria: x    Micro:    Culture - Blood (collected 01-30-24 @ 15:22)  Source: .Blood Blood  Final Report (02-04-24 @ 22:00):    No growth at 5 days    Culture - Blood (collected 01-30-24 @ 15:17)  Source: .Blood Blood  Final Report (02-04-24 @ 22:00):    No growth at 5 days    RADIOLOGY & ADDITIONAL TESTS: Reviewed.

## 2024-02-09 NOTE — PROGRESS NOTE ADULT - ATTENDING COMMENTS
8yoF hx active smoker, HTN, HLD, PAD, presenting with abdominal pain, non-bloody watery diarrhea  Febrile (Tm105) and tachycardic. MERVAT, AGMA and Hyperkalemia present  CT A/P showing diffuse small bowel and colonic inflammation, most pronounced in the right lower quadrant involving the distal ileum concerning for Crohn’s disease.   Empiric antibiotics were initiated for enterocolitis and intravenous fluids for acute kidney injury.   Hospital stay then complicated by SBO and patient made NPO, NGT placed.  NGT clamped; patient admits to flatus and bowel movement, though unclear about consistency.  Plan for exploratory Laparotomy       # Partial SBO  # Enterocolitis  # Hypernatremia  # Hypokalemia  # hypomagnesemia  # SVT  # Hypoxia, likely underlying COPD  # Smoker  # Acute kidney injury. Resolved  # Hypertension / Peripheral artery disease  # Hypothyroidism  # Anxiety / Depression  # Overactive bladder    - NGT clamped, small sips liquids  - For OR for exploratory laparotomy; no absolute medical contraindication  - IV antibiotics  - IVF changed to D5W , replenish lytes  - Supplemental O2  - ICS/LAMA  - Albuterol PRN  - Nicotine replacement   - Antihypertensives  - LT4  - VTE prophylaxis .      Discussed with sister at bedside - requesting daily updates

## 2024-02-09 NOTE — PROGRESS NOTE ADULT - ASSESSMENT
78yoF hx active smoker, HTN, HLD, PAD, presenting with abdominal pain, non-bloody watery diarrhea Febrile (Tm105) and tachycardic. MERVAT, AGMA and Hyperkalemia present. CT A/P showing diffuse small bowel and colonic inflammation, most pronounced in the right lower quadrant involving the distal ileum concerning for Crohn’s disease.  Empiric antibiotics were initiated for enterocolitis and intravenous fluids for acute kidney injury.  Hospital stay then complicated by SBO and patient made NPO, NGT placed    #Partial SBO  CT A/P 2/4 showing partial SBO   - Now s/p NGT placement   - NPO, IVF (oral medications on hold for now)  - Serial abd exams   - Serial imaging  - Plan for ex-lap with Surgery today (2/9)  - GI and Surgery follow     #Enterocolitis  CT abdomen initially noted small bowel and colonic thickening with inflammation and stranding  GI PCR (-), Blood Culture (-)  Elevated Fecal Calprotectin 252  - Zosyn per GI recs  - GI follow up appreciated    #Hypernatremia  - Improved  - D5W with potassium  - Monitor lytes    #SVT  TTE with normal function, moderate pulmonary hypertension  Currently monitor SR with PAC  - Potassium and magnesium supplementation  - Cardiology evaluation noted  - Keep K > 4 and Mag > 2   - Per Cardio, if after repletion of electrolytes if there is still PSVT would recommend Lopressor 25mg po q 12hrs     #Hypoxia, likely underlying COPD  #Smoker  - Supplemental O2  - Add ICS/LAMA  - Albuterol PRN  - Nicotine replacement     #Acute kidney injury  Resolved  - Renal function improved with intravenous fluids     #Hypertension / Peripheral artery disease  - Monitor blood pressure   - Aspirin 81mg daily   - Cilostazol 100mg BID  - will continue to hold Atorvastatin until on regular diet    #Hypothyroidism  - levothyroxine 37.5 mcg IV at bedtime    #Anxiety / Depression  - Sertraline 200mg Daily    #Overactive bladder  - Mirabegron 50mg Daily    #Hypokalemia   - Resolved     VTE ppx: Heparin   GI ppx: Pantoprazole 40mg  78yoF hx active smoker, HTN, HLD, PAD, presenting with abdominal pain, non-bloody watery diarrhea Febrile (Tm105) and tachycardic. MERVAT, AGMA and Hyperkalemia present. CT A/P showing diffuse small bowel and colonic inflammation, most pronounced in the right lower quadrant involving the distal ileum concerning for Crohn’s disease.  Empiric antibiotics were initiated for enterocolitis and intravenous fluids for acute kidney injury.  Hospital stay then complicated by SBO and patient made NPO, NGT placed    #Partial SBO  CT A/P 2/4 showing partial SBO   - Now s/p NGT placement   - NPO, IVF (oral medications on hold for now)  - Serial abd exams   - Serial imaging  - Plan for ex-lap with Surgery tomorrow (2/10)  - GI and Surgery follow     #Enterocolitis  CT abdomen initially noted small bowel and colonic thickening with inflammation and stranding  GI PCR (-), Blood Culture (-)  Elevated Fecal Calprotectin 252  - Zosyn per GI recs  - GI follow up appreciated    #Hypernatremia  - Improved  - D5W with potassium  - Monitor lytes    #SVT  TTE with normal function, moderate pulmonary hypertension  Currently monitor SR with PAC  - Potassium and magnesium supplementation  - Cardiology evaluation noted  - Keep K > 4 and Mag > 2   - Per Cardio, if after repletion of electrolytes if there is still PSVT would recommend Lopressor 25mg po q 12hrs     #Hypoxia, likely underlying COPD  #Smoker  - Supplemental O2  - Add ICS/LAMA  - Albuterol PRN  - Nicotine replacement     #Acute kidney injury  Resolved  - Renal function improved with intravenous fluids     #Hypertension / Peripheral artery disease  - Monitor blood pressure   - Aspirin 81mg daily   - Cilostazol 100mg BID  - will continue to hold Atorvastatin until on regular diet    #Hypothyroidism  - levothyroxine 37.5 mcg IV at bedtime    #Anxiety / Depression  - Sertraline 200mg Daily    #Overactive bladder  - Mirabegron 50mg Daily    #Hypokalemia   - Resolved     VTE ppx: Heparin   GI ppx: Pantoprazole 40mg

## 2024-02-10 ENCOUNTER — TRANSCRIPTION ENCOUNTER (OUTPATIENT)
Age: 79
End: 2024-02-10

## 2024-02-10 LAB
ALBUMIN SERPL ELPH-MCNC: 1.9 G/DL — LOW (ref 3.3–5.2)
ALBUMIN SERPL ELPH-MCNC: 2.1 G/DL — LOW (ref 3.3–5.2)
ALBUMIN SERPL ELPH-MCNC: 2.2 G/DL — LOW (ref 3.3–5.2)
ALP SERPL-CCNC: 63 U/L — SIGNIFICANT CHANGE UP (ref 40–120)
ALP SERPL-CCNC: 64 U/L — SIGNIFICANT CHANGE UP (ref 40–120)
ALP SERPL-CCNC: 65 U/L — SIGNIFICANT CHANGE UP (ref 40–120)
ALT FLD-CCNC: 13 U/L — SIGNIFICANT CHANGE UP
ANION GAP SERPL CALC-SCNC: 10 MMOL/L — SIGNIFICANT CHANGE UP (ref 5–17)
ANION GAP SERPL CALC-SCNC: 11 MMOL/L — SIGNIFICANT CHANGE UP (ref 5–17)
ANION GAP SERPL CALC-SCNC: 12 MMOL/L — SIGNIFICANT CHANGE UP (ref 5–17)
AST SERPL-CCNC: 30 U/L — SIGNIFICANT CHANGE UP
AST SERPL-CCNC: 30 U/L — SIGNIFICANT CHANGE UP
AST SERPL-CCNC: 32 U/L — HIGH
BASOPHILS # BLD AUTO: 0.03 K/UL — SIGNIFICANT CHANGE UP (ref 0–0.2)
BASOPHILS # BLD AUTO: 0.07 K/UL — SIGNIFICANT CHANGE UP (ref 0–0.2)
BASOPHILS NFR BLD AUTO: 0.2 % — SIGNIFICANT CHANGE UP (ref 0–2)
BASOPHILS NFR BLD AUTO: 0.3 % — SIGNIFICANT CHANGE UP (ref 0–2)
BILIRUB DIRECT SERPL-MCNC: 0.1 MG/DL — SIGNIFICANT CHANGE UP (ref 0–0.3)
BILIRUB INDIRECT FLD-MCNC: 0.2 MG/DL — SIGNIFICANT CHANGE UP (ref 0.2–1)
BILIRUB SERPL-MCNC: 0.3 MG/DL — LOW (ref 0.4–2)
BLD GP AB SCN SERPL QL: SIGNIFICANT CHANGE UP
BUN SERPL-MCNC: 10.7 MG/DL — SIGNIFICANT CHANGE UP (ref 8–20)
BUN SERPL-MCNC: 11.5 MG/DL — SIGNIFICANT CHANGE UP (ref 8–20)
BUN SERPL-MCNC: 9.9 MG/DL — SIGNIFICANT CHANGE UP (ref 8–20)
CALCIUM SERPL-MCNC: 7.4 MG/DL — LOW (ref 8.4–10.5)
CALCIUM SERPL-MCNC: 7.9 MG/DL — LOW (ref 8.4–10.5)
CALCIUM SERPL-MCNC: 7.9 MG/DL — LOW (ref 8.4–10.5)
CHLORIDE SERPL-SCNC: 97 MMOL/L — SIGNIFICANT CHANGE UP (ref 96–108)
CHLORIDE SERPL-SCNC: 98 MMOL/L — SIGNIFICANT CHANGE UP (ref 96–108)
CHLORIDE SERPL-SCNC: 99 MMOL/L — SIGNIFICANT CHANGE UP (ref 96–108)
CO2 SERPL-SCNC: 24 MMOL/L — SIGNIFICANT CHANGE UP (ref 22–29)
CO2 SERPL-SCNC: 25 MMOL/L — SIGNIFICANT CHANGE UP (ref 22–29)
CO2 SERPL-SCNC: 26 MMOL/L — SIGNIFICANT CHANGE UP (ref 22–29)
CREAT SERPL-MCNC: 0.77 MG/DL — SIGNIFICANT CHANGE UP (ref 0.5–1.3)
CREAT SERPL-MCNC: 0.78 MG/DL — SIGNIFICANT CHANGE UP (ref 0.5–1.3)
CREAT SERPL-MCNC: 0.97 MG/DL — SIGNIFICANT CHANGE UP (ref 0.5–1.3)
EGFR: 61 ML/MIN/1.73M2 — SIGNIFICANT CHANGE UP
EGFR: 79 ML/MIN/1.73M2 — SIGNIFICANT CHANGE UP
EGFR: 80 ML/MIN/1.73M2 — SIGNIFICANT CHANGE UP
EOSINOPHIL # BLD AUTO: 0 K/UL — SIGNIFICANT CHANGE UP (ref 0–0.5)
EOSINOPHIL # BLD AUTO: 0.03 K/UL — SIGNIFICANT CHANGE UP (ref 0–0.5)
EOSINOPHIL NFR BLD AUTO: 0 % — SIGNIFICANT CHANGE UP (ref 0–6)
EOSINOPHIL NFR BLD AUTO: 0.2 % — SIGNIFICANT CHANGE UP (ref 0–6)
GLUCOSE BLDC GLUCOMTR-MCNC: 122 MG/DL — HIGH (ref 70–99)
GLUCOSE BLDC GLUCOMTR-MCNC: 123 MG/DL — HIGH (ref 70–99)
GLUCOSE BLDC GLUCOMTR-MCNC: 126 MG/DL — HIGH (ref 70–99)
GLUCOSE BLDC GLUCOMTR-MCNC: 147 MG/DL — HIGH (ref 70–99)
GLUCOSE SERPL-MCNC: 127 MG/DL — HIGH (ref 70–99)
GLUCOSE SERPL-MCNC: 196 MG/DL — HIGH (ref 70–99)
GLUCOSE SERPL-MCNC: 93 MG/DL — SIGNIFICANT CHANGE UP (ref 70–99)
HCT VFR BLD CALC: 28.7 % — LOW (ref 34.5–45)
HCT VFR BLD CALC: 28.8 % — LOW (ref 34.5–45)
HCT VFR BLD CALC: 32.2 % — LOW (ref 34.5–45)
HGB BLD-MCNC: 10.7 G/DL — LOW (ref 11.5–15.5)
HGB BLD-MCNC: 9.3 G/DL — LOW (ref 11.5–15.5)
HGB BLD-MCNC: 9.7 G/DL — LOW (ref 11.5–15.5)
IMM GRANULOCYTES NFR BLD AUTO: 0.7 % — SIGNIFICANT CHANGE UP (ref 0–0.9)
IMM GRANULOCYTES NFR BLD AUTO: 0.8 % — SIGNIFICANT CHANGE UP (ref 0–0.9)
INR BLD: 1.41 RATIO — HIGH (ref 0.85–1.18)
LACTATE SERPL-SCNC: 2 MMOL/L — SIGNIFICANT CHANGE UP (ref 0.5–2)
LYMPHOCYTES # BLD AUTO: 0.56 K/UL — LOW (ref 1–3.3)
LYMPHOCYTES # BLD AUTO: 0.56 K/UL — LOW (ref 1–3.3)
LYMPHOCYTES # BLD AUTO: 2.7 % — LOW (ref 13–44)
LYMPHOCYTES # BLD AUTO: 3 % — LOW (ref 13–44)
MAGNESIUM SERPL-MCNC: 1.4 MG/DL — LOW (ref 1.6–2.6)
MAGNESIUM SERPL-MCNC: 2.2 MG/DL — SIGNIFICANT CHANGE UP (ref 1.6–2.6)
MAGNESIUM SERPL-MCNC: 3.2 MG/DL — HIGH (ref 1.6–2.6)
MCHC RBC-ENTMCNC: 29.3 PG — SIGNIFICANT CHANGE UP (ref 27–34)
MCHC RBC-ENTMCNC: 30.7 PG — SIGNIFICANT CHANGE UP (ref 27–34)
MCHC RBC-ENTMCNC: 30.9 PG — SIGNIFICANT CHANGE UP (ref 27–34)
MCHC RBC-ENTMCNC: 32.4 GM/DL — SIGNIFICANT CHANGE UP (ref 32–36)
MCHC RBC-ENTMCNC: 33.2 GM/DL — SIGNIFICANT CHANGE UP (ref 32–36)
MCHC RBC-ENTMCNC: 33.7 GM/DL — SIGNIFICANT CHANGE UP (ref 32–36)
MCV RBC AUTO: 90.5 FL — SIGNIFICANT CHANGE UP (ref 80–100)
MCV RBC AUTO: 91.7 FL — SIGNIFICANT CHANGE UP (ref 80–100)
MCV RBC AUTO: 92.5 FL — SIGNIFICANT CHANGE UP (ref 80–100)
MONOCYTES # BLD AUTO: 0.58 K/UL — SIGNIFICANT CHANGE UP (ref 0–0.9)
MONOCYTES # BLD AUTO: 0.83 K/UL — SIGNIFICANT CHANGE UP (ref 0–0.9)
MONOCYTES NFR BLD AUTO: 2.8 % — SIGNIFICANT CHANGE UP (ref 2–14)
MONOCYTES NFR BLD AUTO: 4.5 % — SIGNIFICANT CHANGE UP (ref 2–14)
NEUTROPHILS # BLD AUTO: 17 K/UL — HIGH (ref 1.8–7.4)
NEUTROPHILS # BLD AUTO: 19.49 K/UL — HIGH (ref 1.8–7.4)
NEUTROPHILS NFR BLD AUTO: 91.4 % — HIGH (ref 43–77)
NEUTROPHILS NFR BLD AUTO: 93.4 % — HIGH (ref 43–77)
PHOSPHATE SERPL-MCNC: 2.9 MG/DL — SIGNIFICANT CHANGE UP (ref 2.4–4.7)
PHOSPHATE SERPL-MCNC: 4.6 MG/DL — SIGNIFICANT CHANGE UP (ref 2.4–4.7)
PLATELET # BLD AUTO: 214 K/UL — SIGNIFICANT CHANGE UP (ref 150–400)
PLATELET # BLD AUTO: 343 K/UL — SIGNIFICANT CHANGE UP (ref 150–400)
PLATELET # BLD AUTO: 363 K/UL — SIGNIFICANT CHANGE UP (ref 150–400)
POTASSIUM SERPL-MCNC: 3.7 MMOL/L — SIGNIFICANT CHANGE UP (ref 3.5–5.3)
POTASSIUM SERPL-MCNC: 4.1 MMOL/L — SIGNIFICANT CHANGE UP (ref 3.5–5.3)
POTASSIUM SERPL-MCNC: 4.8 MMOL/L — SIGNIFICANT CHANGE UP (ref 3.5–5.3)
POTASSIUM SERPL-SCNC: 3.7 MMOL/L — SIGNIFICANT CHANGE UP (ref 3.5–5.3)
POTASSIUM SERPL-SCNC: 4.1 MMOL/L — SIGNIFICANT CHANGE UP (ref 3.5–5.3)
POTASSIUM SERPL-SCNC: 4.8 MMOL/L — SIGNIFICANT CHANGE UP (ref 3.5–5.3)
PROT SERPL-MCNC: 5.1 G/DL — LOW (ref 6.6–8.7)
PROT SERPL-MCNC: 5.7 G/DL — LOW (ref 6.6–8.7)
PROT SERPL-MCNC: 5.9 G/DL — LOW (ref 6.6–8.7)
PROTHROM AB SERPL-ACNC: 15.5 SEC — HIGH (ref 9.5–13)
RBC # BLD: 3.14 M/UL — LOW (ref 3.8–5.2)
RBC # BLD: 3.17 M/UL — LOW (ref 3.8–5.2)
RBC # BLD: 3.48 M/UL — LOW (ref 3.8–5.2)
RBC # FLD: 14.6 % — HIGH (ref 10.3–14.5)
RBC # FLD: 14.6 % — HIGH (ref 10.3–14.5)
RBC # FLD: 14.7 % — HIGH (ref 10.3–14.5)
SODIUM SERPL-SCNC: 132 MMOL/L — LOW (ref 135–145)
SODIUM SERPL-SCNC: 134 MMOL/L — LOW (ref 135–145)
SODIUM SERPL-SCNC: 135 MMOL/L — SIGNIFICANT CHANGE UP (ref 135–145)
WBC # BLD: 16.84 K/UL — HIGH (ref 3.8–10.5)
WBC # BLD: 18.58 K/UL — HIGH (ref 3.8–10.5)
WBC # BLD: 20.87 K/UL — HIGH (ref 3.8–10.5)
WBC # FLD AUTO: 16.84 K/UL — HIGH (ref 3.8–10.5)
WBC # FLD AUTO: 18.58 K/UL — HIGH (ref 3.8–10.5)
WBC # FLD AUTO: 20.87 K/UL — HIGH (ref 3.8–10.5)

## 2024-02-10 PROCEDURE — 88307 TISSUE EXAM BY PATHOLOGIST: CPT | Mod: 26

## 2024-02-10 DEVICE — STAPLER COVIDIEN ENDO GIA 60-3.8MM BLUE: Type: IMPLANTABLE DEVICE | Status: FUNCTIONAL

## 2024-02-10 DEVICE — STAPLER COVIDIEN TA 60 BLUE: Type: IMPLANTABLE DEVICE | Status: FUNCTIONAL

## 2024-02-10 RX ORDER — ACETAMINOPHEN 500 MG
1000 TABLET ORAL ONCE
Refills: 0 | Status: COMPLETED | OUTPATIENT
Start: 2024-02-10 | End: 2024-02-10

## 2024-02-10 RX ORDER — FENTANYL CITRATE 50 UG/ML
50 INJECTION INTRAVENOUS
Refills: 0 | Status: DISCONTINUED | OUTPATIENT
Start: 2024-02-10 | End: 2024-02-10

## 2024-02-10 RX ORDER — ALBUMIN HUMAN 25 %
250 VIAL (ML) INTRAVENOUS
Refills: 0 | Status: DISCONTINUED | OUTPATIENT
Start: 2024-02-10 | End: 2024-02-13

## 2024-02-10 RX ORDER — NICOTINE POLACRILEX 2 MG
1 GUM BUCCAL DAILY
Refills: 0 | Status: DISCONTINUED | OUTPATIENT
Start: 2024-02-10 | End: 2024-02-24

## 2024-02-10 RX ORDER — SODIUM CHLORIDE 9 MG/ML
1000 INJECTION, SOLUTION INTRAVENOUS
Refills: 0 | Status: DISCONTINUED | OUTPATIENT
Start: 2024-02-10 | End: 2024-02-10

## 2024-02-10 RX ORDER — PIPERACILLIN AND TAZOBACTAM 4; .5 G/20ML; G/20ML
3.38 INJECTION, POWDER, LYOPHILIZED, FOR SOLUTION INTRAVENOUS EVERY 8 HOURS
Refills: 0 | Status: COMPLETED | OUTPATIENT
Start: 2024-02-11 | End: 2024-02-17

## 2024-02-10 RX ORDER — ALBUMIN HUMAN 25 %
250 VIAL (ML) INTRAVENOUS ONCE
Refills: 0 | Status: COMPLETED | OUTPATIENT
Start: 2024-02-10 | End: 2024-02-10

## 2024-02-10 RX ORDER — PANTOPRAZOLE SODIUM 20 MG/1
40 TABLET, DELAYED RELEASE ORAL
Refills: 0 | Status: DISCONTINUED | OUTPATIENT
Start: 2024-02-10 | End: 2024-02-24

## 2024-02-10 RX ORDER — HYDROMORPHONE HYDROCHLORIDE 2 MG/ML
1 INJECTION INTRAMUSCULAR; INTRAVENOUS; SUBCUTANEOUS EVERY 4 HOURS
Refills: 0 | Status: DISCONTINUED | OUTPATIENT
Start: 2024-02-10 | End: 2024-02-13

## 2024-02-10 RX ORDER — SODIUM CHLORIDE 9 MG/ML
1000 INJECTION, SOLUTION INTRAVENOUS
Refills: 0 | Status: DISCONTINUED | OUTPATIENT
Start: 2024-02-10 | End: 2024-02-12

## 2024-02-10 RX ORDER — BUDESONIDE AND FORMOTEROL FUMARATE DIHYDRATE 160; 4.5 UG/1; UG/1
2 AEROSOL RESPIRATORY (INHALATION)
Refills: 0 | Status: DISCONTINUED | OUTPATIENT
Start: 2024-02-10 | End: 2024-02-24

## 2024-02-10 RX ORDER — ONDANSETRON 8 MG/1
4 TABLET, FILM COATED ORAL EVERY 6 HOURS
Refills: 0 | Status: DISCONTINUED | OUTPATIENT
Start: 2024-02-10 | End: 2024-02-24

## 2024-02-10 RX ORDER — IPRATROPIUM/ALBUTEROL SULFATE 18-103MCG
3 AEROSOL WITH ADAPTER (GRAM) INHALATION EVERY 6 HOURS
Refills: 0 | Status: DISCONTINUED | OUTPATIENT
Start: 2024-02-10 | End: 2024-02-13

## 2024-02-10 RX ORDER — SODIUM CHLORIDE 9 MG/ML
500 INJECTION, SOLUTION INTRAVENOUS ONCE
Refills: 0 | Status: COMPLETED | OUTPATIENT
Start: 2024-02-10 | End: 2024-02-10

## 2024-02-10 RX ORDER — ONDANSETRON 8 MG/1
4 TABLET, FILM COATED ORAL ONCE
Refills: 0 | Status: DISCONTINUED | OUTPATIENT
Start: 2024-02-10 | End: 2024-02-10

## 2024-02-10 RX ORDER — MAGNESIUM SULFATE 500 MG/ML
2 VIAL (ML) INJECTION
Refills: 0 | Status: COMPLETED | OUTPATIENT
Start: 2024-02-10 | End: 2024-02-10

## 2024-02-10 RX ORDER — LANOLIN ALCOHOL/MO/W.PET/CERES
3 CREAM (GRAM) TOPICAL AT BEDTIME
Refills: 0 | Status: DISCONTINUED | OUTPATIENT
Start: 2024-02-10 | End: 2024-02-24

## 2024-02-10 RX ORDER — ACETAMINOPHEN 500 MG
1000 TABLET ORAL EVERY 6 HOURS
Refills: 0 | Status: COMPLETED | OUTPATIENT
Start: 2024-02-10 | End: 2024-02-11

## 2024-02-10 RX ORDER — KETOROLAC TROMETHAMINE 30 MG/ML
15 SYRINGE (ML) INJECTION EVERY 8 HOURS
Refills: 0 | Status: DISCONTINUED | OUTPATIENT
Start: 2024-02-10 | End: 2024-02-11

## 2024-02-10 RX ORDER — SERTRALINE 25 MG/1
200 TABLET, FILM COATED ORAL DAILY
Refills: 0 | Status: DISCONTINUED | OUTPATIENT
Start: 2024-02-10 | End: 2024-02-24

## 2024-02-10 RX ORDER — LEVOTHYROXINE SODIUM 125 MCG
37.5 TABLET ORAL AT BEDTIME
Refills: 0 | Status: DISCONTINUED | OUTPATIENT
Start: 2024-02-10 | End: 2024-02-24

## 2024-02-10 RX ORDER — HEPARIN SODIUM 5000 [USP'U]/ML
5000 INJECTION INTRAVENOUS; SUBCUTANEOUS EVERY 8 HOURS
Refills: 0 | Status: DISCONTINUED | OUTPATIENT
Start: 2024-02-10 | End: 2024-02-24

## 2024-02-10 RX ORDER — HYDROMORPHONE HYDROCHLORIDE 2 MG/ML
0.5 INJECTION INTRAMUSCULAR; INTRAVENOUS; SUBCUTANEOUS
Refills: 0 | Status: DISCONTINUED | OUTPATIENT
Start: 2024-02-10 | End: 2024-02-13

## 2024-02-10 RX ORDER — ATORVASTATIN CALCIUM 80 MG/1
40 TABLET, FILM COATED ORAL AT BEDTIME
Refills: 0 | Status: DISCONTINUED | OUTPATIENT
Start: 2024-02-10 | End: 2024-02-24

## 2024-02-10 RX ORDER — PHENYLEPHRINE HYDROCHLORIDE 10 MG/ML
0.5 INJECTION INTRAVENOUS
Qty: 40 | Refills: 0 | Status: DISCONTINUED | OUTPATIENT
Start: 2024-02-10 | End: 2024-02-10

## 2024-02-10 RX ADMIN — FENTANYL CITRATE 50 MICROGRAM(S): 50 INJECTION INTRAVENOUS at 14:45

## 2024-02-10 RX ADMIN — Medication 400 MILLIGRAM(S): at 14:15

## 2024-02-10 RX ADMIN — ATORVASTATIN CALCIUM 40 MILLIGRAM(S): 80 TABLET, FILM COATED ORAL at 22:20

## 2024-02-10 RX ADMIN — Medication 15 MILLIGRAM(S): at 23:00

## 2024-02-10 RX ADMIN — FENTANYL CITRATE 50 MICROGRAM(S): 50 INJECTION INTRAVENOUS at 14:50

## 2024-02-10 RX ADMIN — PHENYLEPHRINE HYDROCHLORIDE 10.5 MICROGRAM(S)/KG/MIN: 10 INJECTION INTRAVENOUS at 14:47

## 2024-02-10 RX ADMIN — Medication 25 GRAM(S): at 05:20

## 2024-02-10 RX ADMIN — SODIUM CHLORIDE 100 MILLILITER(S): 9 INJECTION, SOLUTION INTRAVENOUS at 14:32

## 2024-02-10 RX ADMIN — Medication 1000 MILLIGRAM(S): at 23:00

## 2024-02-10 RX ADMIN — HYDROMORPHONE HYDROCHLORIDE 1 MILLIGRAM(S): 2 INJECTION INTRAMUSCULAR; INTRAVENOUS; SUBCUTANEOUS at 20:26

## 2024-02-10 RX ADMIN — Medication 25 GRAM(S): at 07:01

## 2024-02-10 RX ADMIN — PANTOPRAZOLE SODIUM 40 MILLIGRAM(S): 20 TABLET, DELAYED RELEASE ORAL at 05:20

## 2024-02-10 RX ADMIN — Medication 125 MILLILITER(S): at 14:30

## 2024-02-10 RX ADMIN — HYDROMORPHONE HYDROCHLORIDE 1 MILLIGRAM(S): 2 INJECTION INTRAMUSCULAR; INTRAVENOUS; SUBCUTANEOUS at 21:00

## 2024-02-10 RX ADMIN — Medication 400 MILLIGRAM(S): at 22:20

## 2024-02-10 RX ADMIN — SODIUM CHLORIDE 500 MILLILITER(S): 9 INJECTION, SOLUTION INTRAVENOUS at 16:30

## 2024-02-10 RX ADMIN — BUDESONIDE AND FORMOTEROL FUMARATE DIHYDRATE 2 PUFF(S): 160; 4.5 AEROSOL RESPIRATORY (INHALATION) at 07:58

## 2024-02-10 RX ADMIN — BUDESONIDE AND FORMOTEROL FUMARATE DIHYDRATE 2 PUFF(S): 160; 4.5 AEROSOL RESPIRATORY (INHALATION) at 20:04

## 2024-02-10 RX ADMIN — Medication 3 MILLILITER(S): at 07:58

## 2024-02-10 RX ADMIN — FENTANYL CITRATE 50 MICROGRAM(S): 50 INJECTION INTRAVENOUS at 15:00

## 2024-02-10 RX ADMIN — Medication 3 MILLILITER(S): at 20:04

## 2024-02-10 RX ADMIN — Medication 37.5 MICROGRAM(S): at 22:31

## 2024-02-10 RX ADMIN — HEPARIN SODIUM 5000 UNIT(S): 5000 INJECTION INTRAVENOUS; SUBCUTANEOUS at 22:36

## 2024-02-10 RX ADMIN — Medication 125 MILLILITER(S): at 14:00

## 2024-02-10 RX ADMIN — Medication 1000 MILLIGRAM(S): at 14:45

## 2024-02-10 RX ADMIN — PIPERACILLIN AND TAZOBACTAM 25 GRAM(S): 4; .5 INJECTION, POWDER, LYOPHILIZED, FOR SOLUTION INTRAVENOUS at 05:21

## 2024-02-10 RX ADMIN — HEPARIN SODIUM 5000 UNIT(S): 5000 INJECTION INTRAVENOUS; SUBCUTANEOUS at 05:20

## 2024-02-10 RX ADMIN — Medication 15 MILLIGRAM(S): at 22:22

## 2024-02-10 NOTE — CONSULT NOTE ADULT - NS ATTEND AMEND GEN_ALL_CORE FT
I have seen and examined this patient with the PA.  Patient hypotensive post-operatively.  Recovered with IVF bolus.  No need for SICU at this time.
Patient was seen and examined at bedside with no complaints of chest pain or shortness of breath   PSVT   K 3.3     78y old  Female PMH active smoker, HTN, HLD, PAD, presenting with abdominal pain and diarrhea. Being treated for enterocolitis; with pSVT     1) Paroxsymal SVT   - patient has had episodes of pSVT   - EKG sinus tachycardia @ 118 bpm   - recommend to replete K and keep K ~ 4 and Mag ~ 2   - obtain TTE to assess LV function and valvulopathy,   - if after repletion of electrolytes and fluid hydration andif there is still pSVT would recommend Lopressor 25mg po q 12hrs     no further cardiac work up will sign off     Adrianna Johnson D.O. [facility]  Cardiology/Vascular Cardiology -[facility]   Telephone # [phone]

## 2024-02-10 NOTE — PROGRESS NOTE ADULT - SUBJECTIVE AND OBJECTIVE BOX
HPI/OVERNIGHT EVENTS: Patient seen and examined at bedside.  No overnight events.  Denies fever, chills, nausea, vomiting, chest pain, SOB, dizziness. States she continues to have abdominal pain, has been able to have 2 BM but no flatus.    Vital Signs Last 24 Hrs  T(C): 37.5 (10 Feb 2024 00:08), Max: 37.5 (10 Feb 2024 00:08)  T(F): 99.5 (10 Feb 2024 00:08), Max: 99.5 (10 Feb 2024 00:08)  HR: 104 (10 Feb 2024 00:08) (80 - 104)  BP: 144/83 (10 Feb 2024 00:08) (99/66 - 160/86)  BP(mean): --  RR: 17 (10 Feb 2024 00:08) (16 - 18)  SpO2: 91% (10 Feb 2024 00:08) (91% - 98%)    Parameters below as of 10 Feb 2024 00:08  Patient On (Oxygen Delivery Method): nasal cannula        I&O's Detail    08 Feb 2024 07:01  -  09 Feb 2024 07:00  --------------------------------------------------------  IN:    dextrose 5% w/ Additives: 825 mL  Total IN: 825 mL    OUT:  Total OUT: 0 mL    Total NET: 825 mL          Constitutional: patient resting comfortably in bed, in no acute distress  HEENT: EOMI, PERRLA, MMM.  Respiratory: Non labored breathing on RA  Cardiovascular: RRR  Gastrointestinal: Abdomen soft, non-tender, non-distended, no rebound tenderness / guarding  Musculoskeletal: No joint pain, swelling or deformity; no limitation of movement  Vascular: Extremities warm and well perfused.     LABS:                        9.7    18.58 )-----------( 343      ( 10 Feb 2024 01:00 )             28.8     02-10    134<L>  |  97  |  11.5  ----------------------------<  93  3.7   |  26.0  |  0.78    Ca    7.9<L>      10 Feb 2024 01:00  Phos  2.5     02-09  Mg     1.4     02-10    TPro  5.7<L>  /  Alb  2.2<L>  /  TBili  0.3<L>  /  DBili  x   /  AST  30  /  ALT  13  /  AlkPhos  64  02-10    PT/INR - ( 09 Feb 2024 05:50 )   PT: 15.2 sec;   INR: 1.38 ratio         PTT - ( 09 Feb 2024 05:50 )  PTT:31.9 sec  Urinalysis Basic - ( 10 Feb 2024 01:00 )    Color: x / Appearance: x / SG: x / pH: x  Gluc: 93 mg/dL / Ketone: x  / Bili: x / Urobili: x   Blood: x / Protein: x / Nitrite: x   Leuk Esterase: x / RBC: x / WBC x   Sq Epi: x / Non Sq Epi: x / Bacteria: x        MEDICATIONS  (STANDING):  albuterol/ipratropium for Nebulization 3 milliLiter(s) Nebulizer every 6 hours  aspirin enteric coated 81 milliGRAM(s) Oral daily  atorvastatin 40 milliGRAM(s) Oral at bedtime  budesonide  80 MICROgram(s)/formoterol 4.5 MICROgram(s) Inhaler 2 Puff(s) Inhalation two times a day  cilostazol 100 milliGRAM(s) Oral two times a day  dextrose 5% 1000 milliLiter(s) (75 mL/Hr) IV Continuous <Continuous>  enalapril 5 milliGRAM(s) Oral daily  heparin   Injectable 5000 Unit(s) SubCutaneous every 12 hours  iohexol 300 mG (iodine)/mL Oral Solution 60 milliLiter(s) Oral once  levothyroxine Injectable 37.5 MICROGram(s) IV Push at bedtime  magnesium sulfate  IVPB 1 Gram(s) IV Intermittent once  mirabegron ER 50 milliGRAM(s) Oral daily  nicotine -  14 mG/24Hr(s) Patch 1 Patch Transdermal daily  pantoprazole  Injectable 40 milliGRAM(s) IV Push with breakfast  piperacillin/tazobactam IVPB.. 3.375 Gram(s) IV Intermittent every 8 hours  sertraline 200 milliGRAM(s) Oral daily    MEDICATIONS  (PRN):  acetaminophen     Tablet .. 650 milliGRAM(s) Oral every 6 hours PRN Temp greater or equal to 38C (100.4F), Mild Pain (1 - 3), Moderate Pain (4 - 6)  HYDROmorphone  Injectable 1 milliGRAM(s) IV Push every 4 hours PRN Severe Pain (7 - 10)  HYDROmorphone  Injectable 0.5 milliGRAM(s) IV Push every 3 hours PRN Moderate Pain (4 - 6)  LORazepam   Injectable 0.25 milliGRAM(s) IV Push two times a day PRN Anxiety  melatonin 3 milliGRAM(s) Oral at bedtime PRN Insomnia      MICRO:   Cultures     STUDIES:   EKG, CXR, U/S, CT, MRI

## 2024-02-10 NOTE — PROGRESS NOTE ADULT - ASSESSMENT
79yo Female admitted for enteritis causing partial bowel obstruction. NGT in place, explained to patient on change of date of surgery to 2/10/24.  Family member and patient anxious due to change in date of intervention as she states she is hungry and continues to have abdominal pain.  Advised patient to inform nurse if pain increases.    Plan:   - npo/ivf  - Booked for OR 2/10  - Continue NGT   - pre oped by primary team, missing labs were added

## 2024-02-10 NOTE — BRIEF OPERATIVE NOTE - NSICDXBRIEFPROCEDURE_GEN_ALL_CORE_FT
PROCEDURES:  Enterotomy repair 10-Feb-2024 13:32:19  Oscar DIMAS  Open partial sigmoidectomy 10-Feb-2024 13:32:26  Oscar DIMAS  Creation, end colostomy 10-Feb-2024 13:32:31  Oscar DIMAS

## 2024-02-10 NOTE — CHART NOTE - NSCHARTNOTEFT_GEN_A_CORE
Notified by RN that pt was having episodes of sinus tachycardia. On monitor, pt was as high as 140s, but soon dropped to 100s-110s. Pt asymptomatic, denies chest pain, SOB, and palpitations.    Vital Signs Last 24 Hrs  T(C): 37.5 (10 Feb 2024 00:08), Max: 37.5 (10 Feb 2024 00:08)  T(F): 99.5 (10 Feb 2024 00:08), Max: 99.5 (10 Feb 2024 00:08)  HR: 104 (10 Feb 2024 00:08) (80 - 104)  BP: 144/83 (10 Feb 2024 00:08) (99/66 - 160/86)  BP(mean): --  RR: 17 (10 Feb 2024 00:08) (16 - 18)  SpO2: 91% (10 Feb 2024 00:08) (91% - 98%)    Parameters below as of 10 Feb 2024 00:08  Patient On (Oxygen Delivery Method): nasal cannula    Assessment/plan:  -Per note from daytime, ordered repeat labs STAT.   -Repeat labs revealed hypomagnesemia, so will replete with 2g IV Mg x2 doses.  -If pt becomes tachycardic again, will consider use of metoprolol per daytime note.  -Will sign out to day team    Discussed w/ Dr. Robbins

## 2024-02-10 NOTE — CONSULT NOTE ADULT - ASSESSMENT
Assessment and Plan:    79yo F admitted for septic enterocolitis having continued abdominal pain and distension. Pt admitted to medicine service 1/30 presenting with abdominal pain, watery diarrhea, Tmax 105, tachycardia with diffuse small bowel and colonic inflammation most pronounced in the lower quadrant. CT A/P revealed a  distended stomach with dilated air-fluid filled small bowel loops with at least one transition in the right lower quadrant with colonic diverticulosis. Pt failed conservative managment and was taken to OR for Ex. Lap. In the OR patient was found with severe soft adhesions, collection entered and cultured and appears secondary to perforated diverticulitis with collection causing obstruction. 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 Pt was on Mono gtt during case and post op prompting consult.    Neuro:   - Multimodal pain control   - delirium precautions  - Optimize sleep / wake cycle    CV:  Hypovolemia, resolved  - Off Mono gtt s/p Albumin and IVF bolus  - Continue hemodynamic monitoring  - Maintain MAP > 65    Pulm:   - Incentive spirometry  - Pulmonary toilet  - OOB to chair    GI/Nutrition: Intra-abdominal abscess: Likely secondary to perforated diverticulitis  - S/p Gregg and abdominal wash out  - Monitor ostomy output  - Monitor bowel function and continue serial abdominal exams  - continue bowel reg    /Renal:  Hyponatremia, resolved  - ruiz for strict I&O  - monitor kidney fxn  - Replete lytes as needed    ID: Intra-abdominal abscess  - Zosyn x 4 days for abdominal sepsis  - F/up Culture results  - Monitor fever curve  - Leukocytosis    Endo:  - monitor blood glucose    Skin:   - repositioning for DTI prevention while in bed    Heme/DVT Prophylaxis:  - SCDs  - Chemical prophylaxis with lovenox    Dispo:  - Pt off vasopressors  - Hyponatremia resolved  - No indication for SICU admission at this time  - Transfer to monitored surgical floor bed

## 2024-02-10 NOTE — BRIEF OPERATIVE NOTE - OPERATION/FINDINGS
severe soft adehesions, collection entered cultured, seems perforated diverticulitis with collection causing obstruction. garrett procedure perfored, serosal tears repaired CATA in pelvis

## 2024-02-10 NOTE — CONSULT NOTE ADULT - SUBJECTIVE AND OBJECTIVE BOX
INTERVAL HPI/OVERNIGHT EVENTS:  79yo F admitted for septic enterocolitis having continued abdominal pain and distension. Pt admitted to medicine service 1/30 presenting with abdominal pain, watery diarrhea, Tmax 105, tachycardia with diffuse small bowel and colonic inflammation most pronounced in the lower quadrant. Also found to have MREVAT with electrolyte abnormalities. She was started on antibiotics and resuscitated appropriately. GI was consulted and deemed this likely infectious enteritis and she was continued on antibiotics. She was tolerating low fiber diet. Today, surgery consulted given increasing abdominal distension and pain. Pt states that she has been eating and having bowel movements but is feeling more bloated and pain has worsened. WBC has uptrended to 20. CT A/P revealed a  distended stomach with dilated air-fluid filled small bowel loops. There appears to be at least one transition in the right lower quadrant. There appears to be small amount of enteric contrast transited distal to this point and there is left-sided colonic diverticulosis. Pt was admitted under hospitalist service and placed on antibiotics with surgery consultation. Pt continued with worsening leukocytosis and abdominal exam. Pt failed conservative managment and was taken to OR for Ex. Lap. In the OR patient was found with severe soft adhesions, collection entered and cultured and appears secondary to perforated diverticulitis with collection causing obstruction. A Kim procedure perforated serosal tears repaired CATA in pelvis. Pt was on Mono gtt during case and post op prompting consult. While in PACU pt received IV resuscitation by surgical team and is currently off Mono gtt. Pt with c/o of abdominal pain but controlled with medication Pt denies chest pain, SOB, N/V, fever/chills, motor/sensation deficit.    MEDICATIONS  (STANDING):  acetaminophen   IVPB .. 1000 milliGRAM(s) IV Intermittent every 6 hours  albumin human  5% IVPB 250 milliLiter(s) IV Intermittent every 30 minutes  albuterol/ipratropium for Nebulization 3 milliLiter(s) Nebulizer every 6 hours  atorvastatin 40 milliGRAM(s) Oral at bedtime  budesonide  80 MICROgram(s)/formoterol 4.5 MICROgram(s) Inhaler 2 Puff(s) Inhalation two times a day  heparin   Injectable 5000 Unit(s) SubCutaneous every 8 hours  lactated ringers. 1000 milliLiter(s) (75 mL/Hr) IV Continuous <Continuous>  lactated ringers. 1000 milliLiter(s) (100 mL/Hr) IV Continuous <Continuous>  levothyroxine Injectable 37.5 MICROGram(s) IV Push at bedtime  nicotine -  14 mG/24Hr(s) Patch 1 Patch Transdermal daily  pantoprazole  Injectable 40 milliGRAM(s) IV Push with breakfast  phenylephrine    Infusion 0.5 MICROgram(s)/kG/Min (10.5 mL/Hr) IV Continuous <Continuous>  sertraline 200 milliGRAM(s) Oral daily    MEDICATIONS  (PRN):  fentaNYL    Injectable 50 MICROGram(s) IV Push every 5 minutes PRN Severe Pain (7 - 10)  HYDROmorphone  Injectable 1 milliGRAM(s) IV Push every 4 hours PRN Severe Pain (7 - 10)  HYDROmorphone  Injectable 0.5 milliGRAM(s) IV Push every 3 hours PRN Moderate Pain (4 - 6)  LORazepam   Injectable 0.25 milliGRAM(s) IV Push two times a day PRN Anxiety  melatonin 3 milliGRAM(s) Oral at bedtime PRN Insomnia  ondansetron Injectable 4 milliGRAM(s) IV Push every 6 hours PRN Nausea and/or Vomiting  ondansetron Injectable 4 milliGRAM(s) IV Push once PRN Nausea and/or Vomiting      Drug Dosing Weight  Height (cm): 162.6 (30 Jan 2024 14:11)  Weight (kg): 55.8 (30 Jan 2024 14:11)  BMI (kg/m2): 21.1 (30 Jan 2024 14:11)  BSA (m2): 1.59 (30 Jan 2024 14:11)      PAST MEDICAL & SURGICAL HISTORY:  Hypothyroid      Hypertension      Peripheral arterial disease      Cigarette smoker      Major depression      Osteoarthritis      Hyperlipidemia      S/P ORIF (open reduction internal fixation) fracture          ICU Vital Signs Last 24 Hrs  T(C): 36.8 (10 Feb 2024 13:20), Max: 37.5 (10 Feb 2024 00:08)  T(F): 98.2 (10 Feb 2024 13:20), Max: 99.5 (10 Feb 2024 00:08)  HR: 98 (10 Feb 2024 17:45) (86 - 110)  BP: 142/90 (10 Feb 2024 17:45) (73/49 - 149/88)  BP(mean): 105 (10 Feb 2024 17:45) (57 - 105)  ABP: --  ABP(mean): --  RR: 20 (10 Feb 2024 17:45) (14 - 28)  SpO2: 97% (10 Feb 2024 17:45) (91% - 100%)    O2 Parameters below as of 10 Feb 2024 17:45  Patient On (Oxygen Delivery Method): nasal cannula  O2 Flow (L/min): 2              I&O's Detail    10 Feb 2024 07:01  -  10 Feb 2024 18:16  --------------------------------------------------------  IN:    Albumin 5%  - 250 mL: 500 mL    IV PiggyBack: 100 mL    Lactated Ringers: 500 mL    Lactated Ringers Bolus: 1500 mL    Phenylephrine: 10 mL  Total IN: 2610 mL    OUT:    Bulb (mL): 70 mL    Colostomy (mL): 0 mL    Indwelling Catheter - Urethral (mL): 210 mL  Total OUT: 280 mL    Total NET: 2330 mL        Physical Exam:    Neurological:  Non-focal.  Moving all extremities.  No appreciable motor deficits    HEENT: NGT in place. PERRLA, no drainage or redness.     Neck: Neck supple, No JVD    Respiratory:  Trachea midline, equal chest rise.  Breath Sounds equal bilateral    Cardiovascular: Regular rate & rhythm, normal S1, S2    Gastrointestinal: Midline with prevena in place. Stoma is pink and viable. No output. CATA with serosanguinous fluid. Soft, no rigidity, no guarding. Non-distended    Extremities: No peripheral edema, No cyanosis, clubbing     Vascular: Equal and normal pulses: 2+ peripheral pulses throughout    Skin: No rashes      LABS:  CBC Full  -  ( 10 Feb 2024 14:50 )  WBC Count : 20.87 K/uL  RBC Count : 3.48 M/uL  Hemoglobin : 10.7 g/dL  Hematocrit : 32.2 %  Platelet Count - Automated : 214 K/uL  Mean Cell Volume : 92.5 fl  Mean Cell Hemoglobin : 30.7 pg  Mean Cell Hemoglobin Concentration : 33.2 gm/dL  Auto Neutrophil # : 19.49 K/uL  Auto Lymphocyte # : 0.56 K/uL  Auto Monocyte # : 0.58 K/uL  Auto Eosinophil # : 0.00 K/uL  Auto Basophil # : 0.07 K/uL  Auto Neutrophil % : 93.4 %  Auto Lymphocyte % : 2.7 %  Auto Monocyte % : 2.8 %  Auto Eosinophil % : 0.0 %  Auto Basophil % : 0.3 %    02-10    135  |  99  |  10.7  ----------------------------<  127<H>  4.1   |  24.0  |  0.97    Ca    7.4<L>      10 Feb 2024 14:15  Phos  4.6     02-10  Mg     2.2     02-10    TPro  5.1<L>  /  Alb  1.9<L>  /  TBili  0.3<L>  /  DBili  0.1  /  AST  30  /  ALT  13  /  AlkPhos  63  02-10    PT/INR - ( 10 Feb 2024 06:45 )   PT: 15.5 sec;   INR: 1.41 ratio         PTT - ( 09 Feb 2024 05:50 )  PTT:31.9 sec  Urinalysis Basic - ( 10 Feb 2024 14:15 )    Color: x / Appearance: x / SG: x / pH: x  Gluc: 127 mg/dL / Ketone: x  / Bili: x / Urobili: x   Blood: x / Protein: x / Nitrite: x   Leuk Esterase: x / RBC: x / WBC x   Sq Epi: x / Non Sq Epi: x / Bacteria: x

## 2024-02-10 NOTE — CHART NOTE - NSCHARTNOTEFT_GEN_A_CORE
patient taken to OR by surgery team before seen by medical team.   now called by surgery team and was informed that patient will be transferred to surgical  team service   patient care transferred to surgery team dr isaacs

## 2024-02-10 NOTE — CHART NOTE - NSCHARTNOTEFT_GEN_A_CORE
POST-OPERATIVE NOTE    Subjective: patient complains of some pain but improved, discomfort by NGT, but otherwise feels better  Patient is s/p ex lap for SBO found to have collection and diverticulitis now s/p Hartmanns and ANGELA, initially hypotensive in PACU but improved with fluids, UOP picked up .     Vital Signs Last 24 Hrs  T(C): 36.4 (11 Feb 2024 04:39), Max: 36.8 (10 Feb 2024 13:20)  T(F): 97.6 (11 Feb 2024 04:39), Max: 98.2 (10 Feb 2024 13:20)  HR: 86 (11 Feb 2024 04:39) (86 - 108)  BP: 105/70 (11 Feb 2024 04:39) (73/49 - 149/88)  BP(mean): 100 (10 Feb 2024 18:15) (57 - 105)  RR: 18 (11 Feb 2024 04:39) (14 - 28)  SpO2: 99% (11 Feb 2024 04:39) (95% - 100%)    Parameters below as of 11 Feb 2024 04:39  Patient On (Oxygen Delivery Method): nasal cannula      I&O's Detail    10 Feb 2024 07:01  -  11 Feb 2024 04:56  --------------------------------------------------------  IN:    Albumin 5%  - 250 mL: 500 mL    IV PiggyBack: 100 mL    Lactated Ringers: 600 mL    Lactated Ringers Bolus: 1500 mL    Phenylephrine: 10 mL  Total IN: 2710 mL    OUT:    Bulb (mL): 100 mL    Colostomy (mL): 0 mL    Indwelling Catheter - Urethral (mL): 535 mL    Nasogastric/Oral tube (mL): 75 mL  Total OUT: 710 mL    Total NET: 2000 mL        piperacillin/tazobactam IVPB.. 3.375  heparin   Injectable 5000  piperacillin/tazobactam IVPB.. 3.375    PAST MEDICAL & SURGICAL HISTORY:  Hypothyroid      Hypertension      Peripheral arterial disease      Cigarette smoker      Major depression      Osteoarthritis      Hyperlipidemia      S/P ORIF (open reduction internal fixation) fracture            Physical Exam:  General: NAD, resting comfortably in bed  Pulmonary: Nonlabored breathing, on 2 L NC  Cardiovascular: well perfused  Abdominal: soft, ND, prevene with good seal, NGT bilious thich output, ostomy quiet  Extremities: WW    LABS:                        10.7   20.87 )-----------( 214      ( 10 Feb 2024 14:50 )             32.2     02-10    135  |  99  |  10.7  ----------------------------<  127<H>  4.1   |  24.0  |  0.97    Ca    7.4<L>      10 Feb 2024 14:15  Phos  4.6     02-10  Mg     2.2     02-10    TPro  5.1<L>  /  Alb  1.9<L>  /  TBili  0.3<L>  /  DBili  0.1  /  AST  30  /  ALT  13  /  AlkPhos  63  02-10    PT/INR - ( 10 Feb 2024 06:45 )   PT: 15.5 sec;   INR: 1.41 ratio         PTT - ( 09 Feb 2024 05:50 )  PTT:31.9 sec  CAPILLARY BLOOD GLUCOSE      POCT Blood Glucose.: 122 mg/dL (10 Feb 2024 20:01)  POCT Blood Glucose.: 126 mg/dL (10 Feb 2024 14:06)  POCT Blood Glucose.: 123 mg/dL (10 Feb 2024 08:46)      Radiology and Additional Studies:    Assessment:  The patient is a 78y Female who is now several hours post-op from a Hartmanns and ANGELA for SBO found to have diverticulitis. pain beter controlled now better resuscitated. will need close monitoring, pending ostomy activity    Plan:  - Pain control as needed  - DVT ppx  - OOB and ambulating as tolerated  - NGT to LCS  - trend UOP  - Zosyn  - f.u cltures intraop  - F/u AM labs

## 2024-02-10 NOTE — BRIEF OPERATIVE NOTE - NSICDXBRIEFPOSTOP_GEN_ALL_CORE_FT
POST-OP DIAGNOSIS:  Small bowel obstruction 10-Feb-2024 13:32:45  Oscar DIMAS  Pericolonic abscess due to diverticulitis 10-Feb-2024 13:32:56  Oscar DIMAS

## 2024-02-10 NOTE — PROGRESS NOTE ADULT - SUBJECTIVE AND OBJECTIVE BOX
pt with unresolving SBO  Pt to  og for ex lap today   Pt explaine dthe procedure including a colostomy if needed  Pt understands and agress

## 2024-02-11 LAB
ANION GAP SERPL CALC-SCNC: 16 MMOL/L — SIGNIFICANT CHANGE UP (ref 5–17)
ANION GAP SERPL CALC-SCNC: 17 MMOL/L — SIGNIFICANT CHANGE UP (ref 5–17)
BUN SERPL-MCNC: 10.4 MG/DL — SIGNIFICANT CHANGE UP (ref 8–20)
BUN SERPL-MCNC: 13.2 MG/DL — SIGNIFICANT CHANGE UP (ref 8–20)
CALCIUM SERPL-MCNC: 6.9 MG/DL — LOW (ref 8.4–10.5)
CALCIUM SERPL-MCNC: 7 MG/DL — LOW (ref 8.4–10.5)
CHLORIDE SERPL-SCNC: 102 MMOL/L — SIGNIFICANT CHANGE UP (ref 96–108)
CHLORIDE SERPL-SCNC: 91 MMOL/L — LOW (ref 96–108)
CO2 SERPL-SCNC: 18 MMOL/L — LOW (ref 22–29)
CO2 SERPL-SCNC: 21 MMOL/L — LOW (ref 22–29)
CREAT SERPL-MCNC: 0.54 MG/DL — SIGNIFICANT CHANGE UP (ref 0.5–1.3)
CREAT SERPL-MCNC: 1.01 MG/DL — SIGNIFICANT CHANGE UP (ref 0.5–1.3)
EGFR: 58 ML/MIN/1.73M2 — LOW
EGFR: 95 ML/MIN/1.73M2 — SIGNIFICANT CHANGE UP
GLUCOSE BLDC GLUCOMTR-MCNC: 101 MG/DL — HIGH (ref 70–99)
GLUCOSE BLDC GLUCOMTR-MCNC: 65 MG/DL — LOW (ref 70–99)
GLUCOSE BLDC GLUCOMTR-MCNC: 79 MG/DL — SIGNIFICANT CHANGE UP (ref 70–99)
GLUCOSE SERPL-MCNC: 443 MG/DL — HIGH (ref 70–99)
GLUCOSE SERPL-MCNC: 45 MG/DL — CRITICAL LOW (ref 70–99)
GRAM STN FLD: ABNORMAL
HCT VFR BLD CALC: 19.6 % — CRITICAL LOW (ref 34.5–45)
HCT VFR BLD CALC: 21.9 % — LOW (ref 34.5–45)
HGB BLD-MCNC: 6.5 G/DL — CRITICAL LOW (ref 11.5–15.5)
HGB BLD-MCNC: 7.1 G/DL — LOW (ref 11.5–15.5)
MAGNESIUM SERPL-MCNC: 1.2 MG/DL — LOW (ref 1.6–2.6)
MAGNESIUM SERPL-MCNC: 1.7 MG/DL — SIGNIFICANT CHANGE UP (ref 1.6–2.6)
MCHC RBC-ENTMCNC: 30.2 PG — SIGNIFICANT CHANGE UP (ref 27–34)
MCHC RBC-ENTMCNC: 31.1 PG — SIGNIFICANT CHANGE UP (ref 27–34)
MCHC RBC-ENTMCNC: 32.4 GM/DL — SIGNIFICANT CHANGE UP (ref 32–36)
MCHC RBC-ENTMCNC: 33.2 GM/DL — SIGNIFICANT CHANGE UP (ref 32–36)
MCV RBC AUTO: 93.2 FL — SIGNIFICANT CHANGE UP (ref 80–100)
MCV RBC AUTO: 93.8 FL — SIGNIFICANT CHANGE UP (ref 80–100)
PHOSPHATE SERPL-MCNC: 2.3 MG/DL — LOW (ref 2.4–4.7)
PHOSPHATE SERPL-MCNC: 3.3 MG/DL — SIGNIFICANT CHANGE UP (ref 2.4–4.7)
PLATELET # BLD AUTO: 268 K/UL — SIGNIFICANT CHANGE UP (ref 150–400)
PLATELET # BLD AUTO: 289 K/UL — SIGNIFICANT CHANGE UP (ref 150–400)
POTASSIUM SERPL-MCNC: 3.7 MMOL/L — SIGNIFICANT CHANGE UP (ref 3.5–5.3)
POTASSIUM SERPL-MCNC: 4.2 MMOL/L — SIGNIFICANT CHANGE UP (ref 3.5–5.3)
POTASSIUM SERPL-SCNC: 3.7 MMOL/L — SIGNIFICANT CHANGE UP (ref 3.5–5.3)
POTASSIUM SERPL-SCNC: 4.2 MMOL/L — SIGNIFICANT CHANGE UP (ref 3.5–5.3)
RBC # BLD: 2.09 M/UL — LOW (ref 3.8–5.2)
RBC # BLD: 2.35 M/UL — LOW (ref 3.8–5.2)
RBC # FLD: 14.7 % — HIGH (ref 10.3–14.5)
RBC # FLD: 14.9 % — HIGH (ref 10.3–14.5)
SODIUM SERPL-SCNC: 129 MMOL/L — LOW (ref 135–145)
SODIUM SERPL-SCNC: 136 MMOL/L — SIGNIFICANT CHANGE UP (ref 135–145)
SPECIMEN SOURCE: SIGNIFICANT CHANGE UP
WBC # BLD: 14.44 K/UL — HIGH (ref 3.8–10.5)
WBC # BLD: 14.91 K/UL — HIGH (ref 3.8–10.5)
WBC # FLD AUTO: 14.44 K/UL — HIGH (ref 3.8–10.5)
WBC # FLD AUTO: 14.91 K/UL — HIGH (ref 3.8–10.5)

## 2024-02-11 PROCEDURE — 93010 ELECTROCARDIOGRAM REPORT: CPT

## 2024-02-11 RX ORDER — METOCLOPRAMIDE HCL 10 MG
10 TABLET ORAL DAILY
Refills: 0 | Status: DISCONTINUED | OUTPATIENT
Start: 2024-02-11 | End: 2024-02-11

## 2024-02-11 RX ORDER — METOCLOPRAMIDE HCL 10 MG
5 TABLET ORAL DAILY
Refills: 0 | Status: DISCONTINUED | OUTPATIENT
Start: 2024-02-11 | End: 2024-02-11

## 2024-02-11 RX ORDER — SODIUM CHLORIDE 9 MG/ML
500 INJECTION, SOLUTION INTRAVENOUS
Refills: 0 | Status: DISCONTINUED | OUTPATIENT
Start: 2024-02-11 | End: 2024-02-13

## 2024-02-11 RX ORDER — FLUCONAZOLE 150 MG/1
TABLET ORAL
Refills: 0 | Status: DISCONTINUED | OUTPATIENT
Start: 2024-02-11 | End: 2024-02-24

## 2024-02-11 RX ORDER — FLUCONAZOLE 150 MG/1
400 TABLET ORAL ONCE
Refills: 0 | Status: COMPLETED | OUTPATIENT
Start: 2024-02-11 | End: 2024-02-11

## 2024-02-11 RX ORDER — ACETAMINOPHEN 500 MG
1000 TABLET ORAL ONCE
Refills: 0 | Status: COMPLETED | OUTPATIENT
Start: 2024-02-11 | End: 2024-02-11

## 2024-02-11 RX ORDER — FLUCONAZOLE 150 MG/1
400 TABLET ORAL EVERY 24 HOURS
Refills: 0 | Status: DISCONTINUED | OUTPATIENT
Start: 2024-02-12 | End: 2024-02-24

## 2024-02-11 RX ORDER — METOCLOPRAMIDE HCL 10 MG
5 TABLET ORAL DAILY
Refills: 0 | Status: DISCONTINUED | OUTPATIENT
Start: 2024-02-11 | End: 2024-02-22

## 2024-02-11 RX ORDER — SODIUM CHLORIDE 9 MG/ML
1000 INJECTION, SOLUTION INTRAVENOUS ONCE
Refills: 0 | Status: COMPLETED | OUTPATIENT
Start: 2024-02-11 | End: 2024-02-11

## 2024-02-11 RX ADMIN — Medication 3 MILLILITER(S): at 08:43

## 2024-02-11 RX ADMIN — Medication 400 MILLIGRAM(S): at 09:08

## 2024-02-11 RX ADMIN — SODIUM CHLORIDE 333.33 MILLILITER(S): 9 INJECTION, SOLUTION INTRAVENOUS at 16:31

## 2024-02-11 RX ADMIN — BUDESONIDE AND FORMOTEROL FUMARATE DIHYDRATE 2 PUFF(S): 160; 4.5 AEROSOL RESPIRATORY (INHALATION) at 08:43

## 2024-02-11 RX ADMIN — FLUCONAZOLE 400 MILLIGRAM(S): 150 TABLET ORAL at 08:19

## 2024-02-11 RX ADMIN — Medication 1 PATCH: at 00:00

## 2024-02-11 RX ADMIN — SODIUM CHLORIDE 500 MILLILITER(S): 9 INJECTION, SOLUTION INTRAVENOUS at 23:18

## 2024-02-11 RX ADMIN — HYDROMORPHONE HYDROCHLORIDE 0.5 MILLIGRAM(S): 2 INJECTION INTRAMUSCULAR; INTRAVENOUS; SUBCUTANEOUS at 20:16

## 2024-02-11 RX ADMIN — PIPERACILLIN AND TAZOBACTAM 25 GRAM(S): 4; .5 INJECTION, POWDER, LYOPHILIZED, FOR SOLUTION INTRAVENOUS at 06:00

## 2024-02-11 RX ADMIN — PIPERACILLIN AND TAZOBACTAM 25 GRAM(S): 4; .5 INJECTION, POWDER, LYOPHILIZED, FOR SOLUTION INTRAVENOUS at 13:22

## 2024-02-11 RX ADMIN — HEPARIN SODIUM 5000 UNIT(S): 5000 INJECTION INTRAVENOUS; SUBCUTANEOUS at 16:31

## 2024-02-11 RX ADMIN — Medication 37.5 MICROGRAM(S): at 22:07

## 2024-02-11 RX ADMIN — PIPERACILLIN AND TAZOBACTAM 25 GRAM(S): 4; .5 INJECTION, POWDER, LYOPHILIZED, FOR SOLUTION INTRAVENOUS at 22:21

## 2024-02-11 RX ADMIN — Medication 1000 MILLIGRAM(S): at 17:31

## 2024-02-11 RX ADMIN — Medication 15 MILLIGRAM(S): at 13:19

## 2024-02-11 RX ADMIN — PANTOPRAZOLE SODIUM 40 MILLIGRAM(S): 20 TABLET, DELAYED RELEASE ORAL at 06:08

## 2024-02-11 RX ADMIN — HYDROMORPHONE HYDROCHLORIDE 0.5 MILLIGRAM(S): 2 INJECTION INTRAMUSCULAR; INTRAVENOUS; SUBCUTANEOUS at 21:00

## 2024-02-11 RX ADMIN — Medication 1000 MILLIGRAM(S): at 01:10

## 2024-02-11 RX ADMIN — ATORVASTATIN CALCIUM 40 MILLIGRAM(S): 80 TABLET, FILM COATED ORAL at 22:07

## 2024-02-11 RX ADMIN — Medication 1 PATCH: at 14:07

## 2024-02-11 RX ADMIN — Medication 400 MILLIGRAM(S): at 16:31

## 2024-02-11 RX ADMIN — Medication 3 MILLILITER(S): at 21:10

## 2024-02-11 RX ADMIN — Medication 3 MILLILITER(S): at 15:28

## 2024-02-11 RX ADMIN — HEPARIN SODIUM 5000 UNIT(S): 5000 INJECTION INTRAVENOUS; SUBCUTANEOUS at 06:08

## 2024-02-11 RX ADMIN — Medication 15 MILLIGRAM(S): at 07:00

## 2024-02-11 RX ADMIN — Medication 15 MILLIGRAM(S): at 14:19

## 2024-02-11 RX ADMIN — Medication 15 MILLIGRAM(S): at 06:04

## 2024-02-11 RX ADMIN — Medication 400 MILLIGRAM(S): at 22:04

## 2024-02-11 NOTE — PROGRESS NOTE ADULT - ASSESSMENT
79yo Female admitted for enteritis causing partial bowel obstruction. Now POD 1 for ex lap, ANGELA Hartmanns, due to diverticulitis causing SBO due to collection and adhesions. just obtained source control. on ABx, will expect prolonged recovery    Plan:   - DVT PPx  - NPO  - IVF  - f/u labs  - pain control  - f/u ostomy output  - prevena for 5 days  - trend UOP through ruiz  - Zosyn for 4 days pending cultures  - PT  - Dispo medically acute

## 2024-02-11 NOTE — PROGRESS NOTE ADULT - SUBJECTIVE AND OBJECTIVE BOX
HPI/OVERNIGHT EVENTS: Patient seen and examined at bedside this AM. pain controlled, ruiz with urine, NGT working, prevena in place, quiet, patient denies current nausea, denies fevers    Vital Signs Last 24 Hrs  T(C): 36.4 (11 Feb 2024 04:39), Max: 36.8 (10 Feb 2024 13:20)  T(F): 97.6 (11 Feb 2024 04:39), Max: 98.2 (10 Feb 2024 13:20)  HR: 86 (11 Feb 2024 04:39) (86 - 108)  BP: 105/70 (11 Feb 2024 04:39) (73/49 - 149/88)  BP(mean): 100 (10 Feb 2024 18:15) (57 - 105)  RR: 18 (11 Feb 2024 04:39) (14 - 28)  SpO2: 99% (11 Feb 2024 04:39) (95% - 100%)    Parameters below as of 11 Feb 2024 04:39  Patient On (Oxygen Delivery Method): nasal cannula        I&O's Detail    10 Feb 2024 07:01  -  11 Feb 2024 05:00  --------------------------------------------------------  IN:    Albumin 5%  - 250 mL: 500 mL    IV PiggyBack: 100 mL    Lactated Ringers: 600 mL    Lactated Ringers Bolus: 1500 mL    Phenylephrine: 10 mL  Total IN: 2710 mL    OUT:    Bulb (mL): 100 mL    Colostomy (mL): 0 mL    Indwelling Catheter - Urethral (mL): 535 mL    Nasogastric/Oral tube (mL): 75 mL  Total OUT: 710 mL    Total NET: 2000 mL          Constitutional: patient resting comfortably in bed, in no acute distress  HEENT: EOMI, PERRLA, MMM.  Respiratory: Non labored breathing on NC  Cardiovascular: Well perfused  Gastrointestinal: Abdomen soft, non-tender, prevena good seal, colostomy quiet      LABS:                        10.7   20.87 )-----------( 214      ( 10 Feb 2024 14:50 )             32.2     02-10    135  |  99  |  10.7  ----------------------------<  127<H>  4.1   |  24.0  |  0.97    Ca    7.4<L>      10 Feb 2024 14:15  Phos  4.6     02-10  Mg     2.2     02-10    TPro  5.1<L>  /  Alb  1.9<L>  /  TBili  0.3<L>  /  DBili  0.1  /  AST  30  /  ALT  13  /  AlkPhos  63  02-10    PT/INR - ( 10 Feb 2024 06:45 )   PT: 15.5 sec;   INR: 1.41 ratio         PTT - ( 09 Feb 2024 05:50 )  PTT:31.9 sec  Urinalysis Basic - ( 10 Feb 2024 14:15 )    Color: x / Appearance: x / SG: x / pH: x  Gluc: 127 mg/dL / Ketone: x  / Bili: x / Urobili: x   Blood: x / Protein: x / Nitrite: x   Leuk Esterase: x / RBC: x / WBC x   Sq Epi: x / Non Sq Epi: x / Bacteria: x        MEDICATIONS  (STANDING):  acetaminophen   IVPB .. 1000 milliGRAM(s) IV Intermittent every 6 hours  albumin human  5% IVPB 250 milliLiter(s) IV Intermittent every 30 minutes  albuterol/ipratropium for Nebulization 3 milliLiter(s) Nebulizer every 6 hours  atorvastatin 40 milliGRAM(s) Oral at bedtime  budesonide  80 MICROgram(s)/formoterol 4.5 MICROgram(s) Inhaler 2 Puff(s) Inhalation two times a day  heparin   Injectable 5000 Unit(s) SubCutaneous every 8 hours  ketorolac   Injectable 15 milliGRAM(s) IV Push every 8 hours  lactated ringers. 1000 milliLiter(s) (100 mL/Hr) IV Continuous <Continuous>  levothyroxine Injectable 37.5 MICROGram(s) IV Push at bedtime  nicotine -  14 mG/24Hr(s) Patch 1 Patch Transdermal daily  pantoprazole  Injectable 40 milliGRAM(s) IV Push with breakfast  piperacillin/tazobactam IVPB.. 3.375 Gram(s) IV Intermittent every 8 hours  sertraline 200 milliGRAM(s) Oral daily    MEDICATIONS  (PRN):  HYDROmorphone  Injectable 0.5 milliGRAM(s) IV Push every 3 hours PRN Moderate Pain (4 - 6)  HYDROmorphone  Injectable 1 milliGRAM(s) IV Push every 4 hours PRN Severe Pain (7 - 10)  LORazepam   Injectable 0.25 milliGRAM(s) IV Push two times a day PRN Anxiety  melatonin 3 milliGRAM(s) Oral at bedtime PRN Insomnia  ondansetron Injectable 4 milliGRAM(s) IV Push every 6 hours PRN Nausea and/or Vomiting

## 2024-02-12 LAB
-  AMPICILLIN: SIGNIFICANT CHANGE UP
-  VANCOMYCIN: SIGNIFICANT CHANGE UP
ANION GAP SERPL CALC-SCNC: 10 MMOL/L — SIGNIFICANT CHANGE UP (ref 5–17)
BUN SERPL-MCNC: 13.3 MG/DL — SIGNIFICANT CHANGE UP (ref 8–20)
CALCIUM SERPL-MCNC: 7.6 MG/DL — LOW (ref 8.4–10.5)
CHLORIDE SERPL-SCNC: 103 MMOL/L — SIGNIFICANT CHANGE UP (ref 96–108)
CO2 SERPL-SCNC: 25 MMOL/L — SIGNIFICANT CHANGE UP (ref 22–29)
CREAT SERPL-MCNC: 0.79 MG/DL — SIGNIFICANT CHANGE UP (ref 0.5–1.3)
EGFR: 77 ML/MIN/1.73M2 — SIGNIFICANT CHANGE UP
GLUCOSE BLDC GLUCOMTR-MCNC: 123 MG/DL — HIGH (ref 70–99)
GLUCOSE BLDC GLUCOMTR-MCNC: 150 MG/DL — HIGH (ref 70–99)
GLUCOSE BLDC GLUCOMTR-MCNC: 158 MG/DL — HIGH (ref 70–99)
GLUCOSE BLDC GLUCOMTR-MCNC: 61 MG/DL — LOW (ref 70–99)
GLUCOSE BLDC GLUCOMTR-MCNC: 67 MG/DL — LOW (ref 70–99)
GLUCOSE BLDC GLUCOMTR-MCNC: 80 MG/DL — SIGNIFICANT CHANGE UP (ref 70–99)
GLUCOSE BLDC GLUCOMTR-MCNC: 81 MG/DL — SIGNIFICANT CHANGE UP (ref 70–99)
GLUCOSE SERPL-MCNC: 85 MG/DL — SIGNIFICANT CHANGE UP (ref 70–99)
HCT VFR BLD CALC: 30.5 % — LOW (ref 34.5–45)
HGB BLD-MCNC: 10.4 G/DL — LOW (ref 11.5–15.5)
MAGNESIUM SERPL-MCNC: 1.8 MG/DL — SIGNIFICANT CHANGE UP (ref 1.8–2.6)
MCHC RBC-ENTMCNC: 30.7 PG — SIGNIFICANT CHANGE UP (ref 27–34)
MCHC RBC-ENTMCNC: 34.1 GM/DL — SIGNIFICANT CHANGE UP (ref 32–36)
MCV RBC AUTO: 90 FL — SIGNIFICANT CHANGE UP (ref 80–100)
METHOD TYPE: SIGNIFICANT CHANGE UP
PHOSPHATE SERPL-MCNC: 3 MG/DL — SIGNIFICANT CHANGE UP (ref 2.4–4.7)
PLATELET # BLD AUTO: 328 K/UL — SIGNIFICANT CHANGE UP (ref 150–400)
POTASSIUM SERPL-MCNC: 3.9 MMOL/L — SIGNIFICANT CHANGE UP (ref 3.5–5.3)
POTASSIUM SERPL-SCNC: 3.9 MMOL/L — SIGNIFICANT CHANGE UP (ref 3.5–5.3)
RBC # BLD: 3.39 M/UL — LOW (ref 3.8–5.2)
RBC # FLD: 14.5 % — SIGNIFICANT CHANGE UP (ref 10.3–14.5)
SODIUM SERPL-SCNC: 138 MMOL/L — SIGNIFICANT CHANGE UP (ref 135–145)
WBC # BLD: 22.67 K/UL — HIGH (ref 3.8–10.5)
WBC # FLD AUTO: 22.67 K/UL — HIGH (ref 3.8–10.5)

## 2024-02-12 PROCEDURE — 93010 ELECTROCARDIOGRAM REPORT: CPT

## 2024-02-12 PROCEDURE — 71045 X-RAY EXAM CHEST 1 VIEW: CPT | Mod: 26

## 2024-02-12 PROCEDURE — 99223 1ST HOSP IP/OBS HIGH 75: CPT

## 2024-02-12 RX ORDER — SODIUM CHLORIDE 9 MG/ML
1000 INJECTION, SOLUTION INTRAVENOUS
Refills: 0 | Status: DISCONTINUED | OUTPATIENT
Start: 2024-02-12 | End: 2024-02-16

## 2024-02-12 RX ORDER — ACETAMINOPHEN 500 MG
1000 TABLET ORAL EVERY 6 HOURS
Refills: 0 | Status: COMPLETED | OUTPATIENT
Start: 2024-02-12 | End: 2024-02-13

## 2024-02-12 RX ORDER — DEXTROSE 50 % IN WATER 50 %
50 SYRINGE (ML) INTRAVENOUS ONCE
Refills: 0 | Status: COMPLETED | OUTPATIENT
Start: 2024-02-12 | End: 2024-02-12

## 2024-02-12 RX ORDER — FUROSEMIDE 40 MG
20 TABLET ORAL ONCE
Refills: 0 | Status: COMPLETED | OUTPATIENT
Start: 2024-02-12 | End: 2024-02-12

## 2024-02-12 RX ORDER — METHOCARBAMOL 500 MG/1
750 TABLET, FILM COATED ORAL EVERY 8 HOURS
Refills: 0 | Status: DISCONTINUED | OUTPATIENT
Start: 2024-02-12 | End: 2024-02-13

## 2024-02-12 RX ADMIN — HEPARIN SODIUM 5000 UNIT(S): 5000 INJECTION INTRAVENOUS; SUBCUTANEOUS at 01:59

## 2024-02-12 RX ADMIN — HYDROMORPHONE HYDROCHLORIDE 1 MILLIGRAM(S): 2 INJECTION INTRAMUSCULAR; INTRAVENOUS; SUBCUTANEOUS at 05:38

## 2024-02-12 RX ADMIN — PIPERACILLIN AND TAZOBACTAM 25 GRAM(S): 4; .5 INJECTION, POWDER, LYOPHILIZED, FOR SOLUTION INTRAVENOUS at 22:50

## 2024-02-12 RX ADMIN — HEPARIN SODIUM 5000 UNIT(S): 5000 INJECTION INTRAVENOUS; SUBCUTANEOUS at 15:24

## 2024-02-12 RX ADMIN — Medication 20 MILLIGRAM(S): at 15:24

## 2024-02-12 RX ADMIN — BUDESONIDE AND FORMOTEROL FUMARATE DIHYDRATE 2 PUFF(S): 160; 4.5 AEROSOL RESPIRATORY (INHALATION) at 21:29

## 2024-02-12 RX ADMIN — Medication 400 MILLIGRAM(S): at 18:07

## 2024-02-12 RX ADMIN — METHOCARBAMOL 215 MILLIGRAM(S): 500 TABLET, FILM COATED ORAL at 12:52

## 2024-02-12 RX ADMIN — Medication 1 PATCH: at 12:23

## 2024-02-12 RX ADMIN — PIPERACILLIN AND TAZOBACTAM 25 GRAM(S): 4; .5 INJECTION, POWDER, LYOPHILIZED, FOR SOLUTION INTRAVENOUS at 05:54

## 2024-02-12 RX ADMIN — Medication 1000 MILLIGRAM(S): at 22:34

## 2024-02-12 RX ADMIN — Medication 3 MILLILITER(S): at 21:29

## 2024-02-12 RX ADMIN — HEPARIN SODIUM 5000 UNIT(S): 5000 INJECTION INTRAVENOUS; SUBCUTANEOUS at 22:47

## 2024-02-12 RX ADMIN — ATORVASTATIN CALCIUM 40 MILLIGRAM(S): 80 TABLET, FILM COATED ORAL at 22:45

## 2024-02-12 RX ADMIN — FLUCONAZOLE 100 MILLIGRAM(S): 150 TABLET ORAL at 06:43

## 2024-02-12 RX ADMIN — PIPERACILLIN AND TAZOBACTAM 25 GRAM(S): 4; .5 INJECTION, POWDER, LYOPHILIZED, FOR SOLUTION INTRAVENOUS at 14:06

## 2024-02-12 RX ADMIN — PANTOPRAZOLE SODIUM 40 MILLIGRAM(S): 20 TABLET, DELAYED RELEASE ORAL at 06:00

## 2024-02-12 RX ADMIN — HEPARIN SODIUM 5000 UNIT(S): 5000 INJECTION INTRAVENOUS; SUBCUTANEOUS at 08:00

## 2024-02-12 RX ADMIN — Medication 5 MILLIGRAM(S): at 12:23

## 2024-02-12 RX ADMIN — BUDESONIDE AND FORMOTEROL FUMARATE DIHYDRATE 2 PUFF(S): 160; 4.5 AEROSOL RESPIRATORY (INHALATION) at 09:05

## 2024-02-12 RX ADMIN — Medication 50 MILLILITER(S): at 19:48

## 2024-02-12 RX ADMIN — SODIUM CHLORIDE 84 MILLILITER(S): 9 INJECTION, SOLUTION INTRAVENOUS at 19:51

## 2024-02-12 RX ADMIN — Medication 3 MILLILITER(S): at 02:23

## 2024-02-12 RX ADMIN — Medication 3 MILLILITER(S): at 09:04

## 2024-02-12 RX ADMIN — Medication 1 PATCH: at 08:35

## 2024-02-12 RX ADMIN — Medication 400 MILLIGRAM(S): at 12:23

## 2024-02-12 RX ADMIN — HYDROMORPHONE HYDROCHLORIDE 1 MILLIGRAM(S): 2 INJECTION INTRAMUSCULAR; INTRAVENOUS; SUBCUTANEOUS at 06:15

## 2024-02-12 RX ADMIN — Medication 1000 MILLIGRAM(S): at 13:23

## 2024-02-12 RX ADMIN — Medication 1000 MILLIGRAM(S): at 19:07

## 2024-02-12 NOTE — PHYSICAL THERAPY INITIAL EVALUATION ADULT - CRITERIA FOR SKILLED THERAPEUTIC INTERVENTIONS
impairments found/functional limitations in following categories
impairments found/functional limitations in following categories/risk reduction/prevention/rehab potential/therapy frequency/predicted duration of therapy intervention/anticipated equipment needs at discharge/anticipated discharge recommendation

## 2024-02-12 NOTE — CHART NOTE - NSCHARTNOTEFT_GEN_A_CORE
Patient was having decreased urine output as the day progressed. Was given a 1L LR bolus to further increase urine output which helped improve a little. A later recheck of CBC confirming decreased Hgb below 7 from 7.1 -> 6.5 from original 10.2 the day prior. Patient was tachycardic to 107 on exam but remaining vitals within normal limits. She remained asymptomatic. Blood transfusions were discussed with the patient, she was amenable. She signed the consent, was given a 500cc D5 LR bolus for asymptomatic hypoglycemia of 65. Responded to 110s glucose level. She was started with a blood transfusion at 12:30am, the first of 2Us of PRBCs she will be getting today.     Patient feels well with minimal complaints, asymptomatic. Patient was having decreased urine output as the day progressed. Was given a 1L LR bolus to further increase urine output which helped improve a little. A later recheck of CBC confirming decreased Hgb below 7 from 7.1 -> 6.5 from original 10.7 the day prior. Patient was tachycardic to 107 on exam but remaining vitals within normal limits. She remained asymptomatic. Blood transfusions were discussed with the patient, she was amenable. She signed the consent, was given a 500cc D5 LR bolus for asymptomatic hypoglycemia of 65. Responded to 110s glucose level. She was started with a blood transfusion at 12:30am, the first of 2Us of PRBCs she will be getting today.     Patient feels well with minimal complaints, asymptomatic.

## 2024-02-12 NOTE — CONSULT NOTE ADULT - SUBJECTIVE AND OBJECTIVE BOX
[facility]                                                INFECTIOUS DISEASES  =======================================================                     Leonid Bauman#   Seamus Holman MD#   Connie Street MD*                           Mai Carrizales MD*   Nadeen Phillips MD*            Diplomates American Board of Internal Medicine & Infectious Diseases                  # [place] Office - Appt - Tel  [phone] Fax [phone]                * [place] Office - Appt - Tel [phone] Fax [phone]                                  Hospital Consult line:  [phone]  =======================================================      [id number]  YAMILETH DAVIS   HPI:  78yoF hx active smoker, HTN, HLD, PAD, presenting with abdominal pain and diarrhea.  She reports few days of lower abdominal pain followed by onset of watery, non-bloody diarrhea and nausea and vomiting for the past day.  Pt also reports subjective fevers.  On admission, pt febrile with Tmax of 105, tachycardic with HR in 120s.  Labs show MERVAT, AMGA, hypokalemia. CT A/P remarkable for diffuse small bowel and colonic inflammation, most pronounced in the right lower quadrant involving the distal ileum concerning for Crohn’s disease. Sister at bedside reports FHx of colitis in mother but not sure if she had IBD diagnosis. (30 Jan 2024 21:57)          I have personally reviewed the labs and data; pertinent labs and data are listed in this note; please see below.   =======================================================  Past Medical & Surgical Hx:  =====================  PAST MEDICAL & SURGICAL HISTORY:  Hypothyroid      Hypertension      Peripheral arterial disease      Cigarette smoker      Major depression      Osteoarthritis      Hyperlipidemia      S/P ORIF (open reduction internal fixation) fracture        Problem List:  ==========  HEALTH ISSUES - PROBLEM Dx:  Enteritis, infectious, presumed    Paroxysmal SVT (supraventricular tachycardia)    Enterocolitis    Abdominal distention    Small bowel obstruction          Social Hx:  =======  no toxic habits currently    FAMILY HISTORY:  Family history of colitis in mother    no significant family history of immunosuppressive disorders in mother or father   =======================================================    REVIEW OF SYSTEMS:  CONSTITUTIONAL:  No Fever or chills  HEENT:  No diplopia or blurred vision.  No earache, sore throat or runny nose.  CARDIOVASCULAR:  No pressure, squeezing, strangling, tightness, heaviness or aching about the chest, neck, axilla or epigastrium.  RESPIRATORY:  No cough, shortness of breath  GASTROINTESTINAL:  No nausea, vomiting or diarrhea.  GENITOURINARY:  No dysuria, frequency or urgency. No Blood in urine  MUSCULOSKELETAL:  no joint aches, no muscle pain  SKIN:  No change in skin, hair or nails.  NEUROLOGIC:  No Headaches, seizures or weakness.  PSYCHIATRIC:  No disorder of thought or mood.  ENDOCRINE:  No heat or cold intolerance  HEMATOLOGICAL:  No easy bruising or bleeding.    =======================================================  Allergies    No Known Allergies    Intolerances    Antibiotics:  fluconAZOLE IVPB      fluconAZOLE IVPB 400 milliGRAM(s) IV Intermittent every 24 hours  piperacillin/tazobactam IVPB.. 3.375 Gram(s) IV Intermittent every 8 hours    Other medications:  acetaminophen   IVPB .. 1000 milliGRAM(s) IV Intermittent every 6 hours  albumin human  5% IVPB 250 milliLiter(s) IV Intermittent every 30 minutes  albuterol/ipratropium for Nebulization 3 milliLiter(s) Nebulizer every 6 hours  atorvastatin 40 milliGRAM(s) Oral at bedtime  budesonide  80 MICROgram(s)/formoterol 4.5 MICROgram(s) Inhaler 2 Puff(s) Inhalation two times a day  dextrose 5% + lactated ringers. 500 milliLiter(s) IV Continuous <Continuous>  furosemide   Injectable 20 milliGRAM(s) IV Push once  heparin   Injectable 5000 Unit(s) SubCutaneous every 8 hours  lactated ringers. 1000 milliLiter(s) IV Continuous <Continuous>  levothyroxine Injectable 37.5 MICROGram(s) IV Push at bedtime  metoclopramide Injectable 5 milliGRAM(s) IV Push daily  nicotine -  14 mG/24Hr(s) Patch 1 Patch Transdermal daily  pantoprazole  Injectable 40 milliGRAM(s) IV Push with breakfast  sertraline 200 milliGRAM(s) Oral daily   cefepime  Injectable.   2000 milliGRAM(s) IV Push (01-30-24 @ 15:39)    cefTRIAXone Injectable.   1000 milliGRAM(s) IV Push (01-31-24 @ 01:21)   1000 milliGRAM(s) IV Push (02-01-24 @ 01:07)   1000 milliGRAM(s) IV Push (02-02-24 @ 01:03)   1000 milliGRAM(s) IV Push (02-03-24 @ 01:31)   1000 milliGRAM(s) IV Push (02-04-24 @ 01:10)    fluconAZOLE IVPB   100 mL/Hr IV Intermittent (02-12-24 @ 06:43)    fluconAZOLE IVPB   400 milliGRAM(s) IV Intermittent (02-11-24 @ 08:19)    metroNIDAZOLE  IVPB   100 mL/Hr IV Intermittent (01-30-24 @ 15:31)    metroNIDAZOLE  IVPB   100 mL/Hr IV Intermittent (01-31-24 @ 01:20)   100 mL/Hr IV Intermittent (01-31-24 @ 05:42)   100 mL/Hr IV Intermittent (01-31-24 @ 13:25)   100 mL/Hr IV Intermittent (01-31-24 @ 21:47)   100 mL/Hr IV Intermittent (02-01-24 @ 05:47)   100 mL/Hr IV Intermittent (02-01-24 @ 13:50)   100 mL/Hr IV Intermittent (02-01-24 @ 23:08)   100 mL/Hr IV Intermittent (02-02-24 @ 05:43)   100 mL/Hr IV Intermittent (02-02-24 @ 13:29)   100 mL/Hr IV Intermittent (02-02-24 @ 22:30)   100 mL/Hr IV Intermittent (02-03-24 @ 05:48)   100 mL/Hr IV Intermittent (02-03-24 @ 13:45)   100 mL/Hr IV Intermittent (02-03-24 @ 21:15)   100 mL/Hr IV Intermittent (02-04-24 @ 05:45)   100 mL/Hr IV Intermittent (02-04-24 @ 14:51)    piperacillin/tazobactam IVPB.-   25 mL/Hr IV Intermittent (02-05-24 @ 03:28)    piperacillin/tazobactam IVPB..   25 mL/Hr IV Intermittent (02-05-24 @ 13:29)   25 mL/Hr IV Intermittent (02-05-24 @ 21:39)   25 mL/Hr IV Intermittent (02-06-24 @ 05:26)   25 mL/Hr IV Intermittent (02-06-24 @ 14:07)   25 mL/Hr IV Intermittent (02-06-24 @ 21:14)   25 mL/Hr IV Intermittent (02-07-24 @ 05:10)   25 mL/Hr IV Intermittent (02-07-24 @ 13:42)   25 mL/Hr IV Intermittent (02-07-24 @ 22:39)   25 mL/Hr IV Intermittent (02-08-24 @ 05:09)   25 mL/Hr IV Intermittent (02-08-24 @ 14:56)   25 mL/Hr IV Intermittent (02-08-24 @ 22:00)   25 mL/Hr IV Intermittent (02-09-24 @ 05:16)   25 mL/Hr IV Intermittent (02-09-24 @ 14:12)   25 mL/Hr IV Intermittent (02-09-24 @ 21:35)   25 mL/Hr IV Intermittent (02-10-24 @ 05:21)    piperacillin/tazobactam IVPB..   25 mL/Hr IV Intermittent (02-11-24 @ 06:00)   25 mL/Hr IV Intermittent (02-11-24 @ 13:22)   25 mL/Hr IV Intermittent (02-11-24 @ 22:21)   25 mL/Hr IV Intermittent (02-12-24 @ 05:54)   25 mL/Hr IV Intermittent (02-12-24 @ 14:06)    vancomycin  IVPB.   250 mL/Hr IV Intermittent (01-30-24 @ 16:37)      ======================================================  Physical Exam:  ============  T(F): 99.2 (12 Feb 2024 13:07), Max: 99.2 (12 Feb 2024 13:07)  HR: 104 (12 Feb 2024 13:07)  BP: 144/88 (12 Feb 2024 13:07)  RR: 18 (12 Feb 2024 13:07)  SpO2: 92% (12 Feb 2024 13:07) (90% - 98%)  temp max in last 48H T(F): , Max: 99.2 (02-12-24 @ 13:07)    General:  No acute distress.  Eye: Pupils are equal, round and reactive to light, Normal conjunctiva.  HENT: Normocephalic, Oral mucosa is moist, No pharyngeal erythema, No sinus tenderness.  Neck: Supple, No lymphadenopathy.  Respiratory: Lungs are clear to auscultation, Respirations are non-labored.  Cardiovascular: Normal rate, Regular rhythm, s1 + s2  Gastrointestinal: Soft, Non-tender, Non-distended, Normal bowel sounds.  Genitourinary: No costovertebral angle tenderness.  Lymphatics: No lymphadenopathy neck,   Musculoskeletal: Normal range of motion, Normal strength.  Integumentary: No rash.  Neurologic: Alert, Oriented, No focal deficits  Psychiatric: Appropriate mood & affect.    =======================================================  Labs:                        10.4   22.67 )-----------( 328      ( 12 Feb 2024 09:02 )             30.5     02-12    138  |  103  |  13.3  ----------------------------<  85  3.9   |  25.0  |  0.79    Ca    7.6<L>      12 Feb 2024 09:02  Phos  3.0     02-12  Mg     1.8     02-12        Culture - Abscess with Gram Stain (collected 02-10-24 @ 11:30)  Source: .Abscess Abdominal Abscess  Gram Stain (02-11-24 @ 01:06):    Few polymorphonuclear leukocytes seen per low power field    Few Gram positive cocci in pairs seen per oil power field    Rare Yeast like cells seen per oil power field  Preliminary Report (02-11-24 @ 18:43):    Moderate Enterococcus faecalis    Culture - Urine (collected 01-30-24 @ 15:40)  Source: Clean Catch Clean Catch (Midstream)  Final Report (02-01-24 @ 00:37):    <10,000 CFU/mL Normal Urogenital Nimco    Culture - Blood (collected 01-30-24 @ 15:22)  Source: .Blood Blood  Final Report (02-04-24 @ 22:00):    No growth at 5 days    Culture - Blood (collected 01-30-24 @ 15:17)  Source: .Blood Blood  Final Report (02-04-24 @ 22:00):    No growth at 5 days    Culture - Urine (collected 07-30-22 @ 03:45)  Source: Clean Catch Clean Catch (Midstream)  Final Report (08-01-22 @ 21:53):    >100,000 CFU/ml Escherichia coli  Organism: Escherichia coli (08-01-22 @ 21:53)  Organism: Escherichia coli (08-01-22 @ 21:53)    Sensitivities:      Method Type: MARIEL      -  Amikacin: S <=16      -  Amoxicillin/Clavulanic Acid: S <=8/4 Consider reserving for cystitis when ampicillin/sulbactam is resistant      -  Ampicillin: R >16 These ampicillin results predict results for amoxicillin      -  Ampicillin/Sulbactam: R >16/8 Enterobacter, Klebsiella aerogenes, Citrobacter, and Serratia may develop resistance during prolonged therapy (3-4 days)      -  Aztreonam: S <=4      -  Cefazolin: S 8 (MIC_CL_COM_ENTERIC_CEFAZU) For uncomplicated UTI with K. pneumoniae, E. coli, or P. mirablis: MARIEL <=16 is sensitive and MARIEL >=32 is resistant. This also predicts results for oral agents cefaclor, cefdinir, cefpodoxime, cefprozil, cefuroxime axetil, cephalexin and locarbef for uncomplicated UTI. Note that some isolates may be susceptible to these agents while testing resistant to cefazolin.      -  Cefepime: S <=2      -  Cefoxitin: S <=8      -  Ceftriaxone: S <=1 Enterobacter, Klebsiella aerogenes, Citrobacter, and Serratia may develop resistance during prolonged therapy      -  Ciprofloxacin: R 1      -  Ertapenem: S <=0.5      -  Gentamicin: R >8      -  Imipenem: S <=1      -  Levofloxacin: I 1      -  Meropenem: S <=1      -  Nitrofurantoin: S <=32 Should not be used to treat pyelonephritis      -  Piperacillin/Tazobactam: S <=8      -  Tigecycline: S <=2      -  Tobramycin: I 8      -  Trimethoprim/Sulfamethoxazole: R >2/38       SARS-CoV-2 Result: Lilian (01-30-24 @ 14:56)                                                   [facility]                                                INFECTIOUS DISEASES  =======================================================                     Leonid Bauman#   Seamus Holman MD#   Connie Street MD*                           Mai Carrizales MD*   Nadeen Phillips MD*            Diplomates American Board of Internal Medicine & Infectious Diseases                  # [place] Office - Appt - Tel  [phone] Fax [phone]                * [place] Office - Appt - Tel [phone] Fax [phone]                                  Hospital Consult line:  [phone]  =======================================================      [id number]  YAMILETH DAVIS   HPI:  78yoF hx active smoker, HTN, HLD, PAD, presenting with abdominal pain and diarrhea.  She reports few days of lower abdominal pain followed by onset of watery, non-bloody diarrhea and nausea and vomiting for the past day.  Pt also reports subjective fevers.  On admission, pt febrile with Tmax of 105, tachycardic with HR in 120s.  Labs show MERVAT, AMGA, hypokalemia. CT A/P remarkable for diffuse small bowel and colonic inflammation, most pronounced in the right lower quadrant involving the distal ileum concerning for Crohn’s disease. Sister at bedside reports FHx of colitis in mother but not sure if she had IBD diagnosis. (30 Jan 2024 21:57)          I have personally reviewed the labs and data; pertinent labs and data are listed in this note; please see below.   =======================================================  Past Medical & Surgical Hx:  =====================  PAST MEDICAL & SURGICAL HISTORY:  Hypothyroid      Hypertension      Peripheral arterial disease      Cigarette smoker      Major depression      Osteoarthritis      Hyperlipidemia      S/P ORIF (open reduction internal fixation) fracture        Problem List:  ==========  HEALTH ISSUES - PROBLEM Dx:  Enteritis, infectious, presumed    Paroxysmal SVT (supraventricular tachycardia)    Enterocolitis    Abdominal distention    Small bowel obstruction          Social Hx:  =======  no toxic habits currently    FAMILY HISTORY:  Family history of colitis in mother    no significant family history of immunosuppressive disorders in mother or father   =======================================================    REVIEW OF SYSTEMS:  CONSTITUTIONAL:  No Fever or chills  HEENT:  No diplopia or blurred vision.  No earache, sore throat or runny nose.  CARDIOVASCULAR:  No pressure, squeezing, strangling, tightness, heaviness or aching about the chest, neck, axilla or epigastrium.  RESPIRATORY:  No cough, shortness of breath  GASTROINTESTINAL:  No nausea, vomiting or diarrhea.  GENITOURINARY:  No dysuria, frequency or urgency. No Blood in urine  MUSCULOSKELETAL:  no joint aches, no muscle pain  SKIN:  No change in skin, hair or nails.  NEUROLOGIC:  No Headaches, seizures or weakness.  PSYCHIATRIC:  No disorder of thought or mood.  ENDOCRINE:  No heat or cold intolerance  HEMATOLOGICAL:  No easy bruising or bleeding.    =======================================================  Allergies    No Known Allergies    Intolerances    Antibiotics:  fluconAZOLE IVPB      fluconAZOLE IVPB 400 milliGRAM(s) IV Intermittent every 24 hours  piperacillin/tazobactam IVPB.. 3.375 Gram(s) IV Intermittent every 8 hours    Other medications:  acetaminophen   IVPB .. 1000 milliGRAM(s) IV Intermittent every 6 hours  albumin human  5% IVPB 250 milliLiter(s) IV Intermittent every 30 minutes  albuterol/ipratropium for Nebulization 3 milliLiter(s) Nebulizer every 6 hours  atorvastatin 40 milliGRAM(s) Oral at bedtime  budesonide  80 MICROgram(s)/formoterol 4.5 MICROgram(s) Inhaler 2 Puff(s) Inhalation two times a day  dextrose 5% + lactated ringers. 500 milliLiter(s) IV Continuous <Continuous>  furosemide   Injectable 20 milliGRAM(s) IV Push once  heparin   Injectable 5000 Unit(s) SubCutaneous every 8 hours  lactated ringers. 1000 milliLiter(s) IV Continuous <Continuous>  levothyroxine Injectable 37.5 MICROGram(s) IV Push at bedtime  metoclopramide Injectable 5 milliGRAM(s) IV Push daily  nicotine -  14 mG/24Hr(s) Patch 1 Patch Transdermal daily  pantoprazole  Injectable 40 milliGRAM(s) IV Push with breakfast  sertraline 200 milliGRAM(s) Oral daily   cefepime  Injectable.   2000 milliGRAM(s) IV Push (01-30-24 @ 15:39)    cefTRIAXone Injectable.   1000 milliGRAM(s) IV Push (01-31-24 @ 01:21)   1000 milliGRAM(s) IV Push (02-01-24 @ 01:07)   1000 milliGRAM(s) IV Push (02-02-24 @ 01:03)   1000 milliGRAM(s) IV Push (02-03-24 @ 01:31)   1000 milliGRAM(s) IV Push (02-04-24 @ 01:10)    fluconAZOLE IVPB   100 mL/Hr IV Intermittent (02-12-24 @ 06:43)    fluconAZOLE IVPB   400 milliGRAM(s) IV Intermittent (02-11-24 @ 08:19)    metroNIDAZOLE  IVPB   100 mL/Hr IV Intermittent (01-30-24 @ 15:31)    metroNIDAZOLE  IVPB   100 mL/Hr IV Intermittent (01-31-24 @ 01:20)   100 mL/Hr IV Intermittent (01-31-24 @ 05:42)   100 mL/Hr IV Intermittent (01-31-24 @ 13:25)   100 mL/Hr IV Intermittent (01-31-24 @ 21:47)   100 mL/Hr IV Intermittent (02-01-24 @ 05:47)   100 mL/Hr IV Intermittent (02-01-24 @ 13:50)   100 mL/Hr IV Intermittent (02-01-24 @ 23:08)   100 mL/Hr IV Intermittent (02-02-24 @ 05:43)   100 mL/Hr IV Intermittent (02-02-24 @ 13:29)   100 mL/Hr IV Intermittent (02-02-24 @ 22:30)   100 mL/Hr IV Intermittent (02-03-24 @ 05:48)   100 mL/Hr IV Intermittent (02-03-24 @ 13:45)   100 mL/Hr IV Intermittent (02-03-24 @ 21:15)   100 mL/Hr IV Intermittent (02-04-24 @ 05:45)   100 mL/Hr IV Intermittent (02-04-24 @ 14:51)    piperacillin/tazobactam IVPB.-   25 mL/Hr IV Intermittent (02-05-24 @ 03:28)    piperacillin/tazobactam IVPB..   25 mL/Hr IV Intermittent (02-05-24 @ 13:29)   25 mL/Hr IV Intermittent (02-05-24 @ 21:39)   25 mL/Hr IV Intermittent (02-06-24 @ 05:26)   25 mL/Hr IV Intermittent (02-06-24 @ 14:07)   25 mL/Hr IV Intermittent (02-06-24 @ 21:14)   25 mL/Hr IV Intermittent (02-07-24 @ 05:10)   25 mL/Hr IV Intermittent (02-07-24 @ 13:42)   25 mL/Hr IV Intermittent (02-07-24 @ 22:39)   25 mL/Hr IV Intermittent (02-08-24 @ 05:09)   25 mL/Hr IV Intermittent (02-08-24 @ 14:56)   25 mL/Hr IV Intermittent (02-08-24 @ 22:00)   25 mL/Hr IV Intermittent (02-09-24 @ 05:16)   25 mL/Hr IV Intermittent (02-09-24 @ 14:12)   25 mL/Hr IV Intermittent (02-09-24 @ 21:35)   25 mL/Hr IV Intermittent (02-10-24 @ 05:21)    piperacillin/tazobactam IVPB..   25 mL/Hr IV Intermittent (02-11-24 @ 06:00)   25 mL/Hr IV Intermittent (02-11-24 @ 13:22)   25 mL/Hr IV Intermittent (02-11-24 @ 22:21)   25 mL/Hr IV Intermittent (02-12-24 @ 05:54)   25 mL/Hr IV Intermittent (02-12-24 @ 14:06)    vancomycin  IVPB.   250 mL/Hr IV Intermittent (01-30-24 @ 16:37)      ======================================================  Physical Exam:  ============  T(F): 99.2 (12 Feb 2024 13:07), Max: 99.2 (12 Feb 2024 13:07)  HR: 104 (12 Feb 2024 13:07)  BP: 144/88 (12 Feb 2024 13:07)  RR: 18 (12 Feb 2024 13:07)  SpO2: 92% (12 Feb 2024 13:07) (90% - 98%)  temp max in last 48H T(F): , Max: 99.2 (02-12-24 @ 13:07)    General:  No acute distress , thin, frail, NGT in place  Eye:  Normal conjunctiva.  Neck: Supple, No lymphadenopathy.  Respiratory: Lungs are clear to auscultation, Respirations are non-labored.  Cardiovascular: Normal rate, Regular rhythm, s1 + s2  Gastrointestinal: Soft, Non-tender, Non-distended, Normal bowel sounds. midline prevena in place. CATA serosanguinous output, llq ostomy  Integumentary: No rash.  Neurologic: Alert, Oriented, No focal deficits  Psychiatric: Appropriate mood & affect.    =======================================================  Labs:                        10.4   22.67 )-----------( 328      ( 12 Feb 2024 09:02 )             30.5     02-12    138  |  103  |  13.3  ----------------------------<  85  3.9   |  25.0  |  0.79    Ca    7.6<L>      12 Feb 2024 09:02  Phos  3.0     02-12  Mg     1.8     02-12        Culture - Abscess with Gram Stain (collected 02-10-24 @ 11:30)  Source: .Abscess Abdominal Abscess  Gram Stain (02-11-24 @ 01:06):    Few polymorphonuclear leukocytes seen per low power field    Few Gram positive cocci in pairs seen per oil power field    Rare Yeast like cells seen per oil power field  Preliminary Report (02-11-24 @ 18:43):    Moderate Enterococcus faecalis    Culture - Urine (collected 01-30-24 @ 15:40)  Source: Clean Catch Clean Catch (Midstream)  Final Report (02-01-24 @ 00:37):    <10,000 CFU/mL Normal Urogenital Nimco    Culture - Blood (collected 01-30-24 @ 15:22)  Source: .Blood Blood  Final Report (02-04-24 @ 22:00):    No growth at 5 days    Culture - Blood (collected 01-30-24 @ 15:17)  Source: .Blood Blood  Final Report (02-04-24 @ 22:00):    No growth at 5 days    Culture - Urine (collected 07-30-22 @ 03:45)  Source: Clean Catch Clean Catch (Midstream)  Final Report (08-01-22 @ 21:53):    >100,000 CFU/ml Escherichia coli  Organism: Escherichia coli (08-01-22 @ 21:53)  Organism: Escherichia coli (08-01-22 @ 21:53)    Sensitivities:      Method Type: MARIEL      -  Amikacin: S <=16      -  Amoxicillin/Clavulanic Acid: S <=8/4 Consider reserving for cystitis when ampicillin/sulbactam is resistant      -  Ampicillin: R >16 These ampicillin results predict results for amoxicillin      -  Ampicillin/Sulbactam: R >16/8 Enterobacter, Klebsiella aerogenes, Citrobacter, and Serratia may develop resistance during prolonged therapy (3-4 days)      -  Aztreonam: S <=4      -  Cefazolin: S 8 (MIC_CL_COM_ENTERIC_CEFAZU) For uncomplicated UTI with K. pneumoniae, E. coli, or P. mirablis: MARIEL <=16 is sensitive and MARIEL >=32 is resistant. This also predicts results for oral agents cefaclor, cefdinir, cefpodoxime, cefprozil, cefuroxime axetil, cephalexin and locarbef for uncomplicated UTI. Note that some isolates may be susceptible to these agents while testing resistant to cefazolin.      -  Cefepime: S <=2      -  Cefoxitin: S <=8      -  Ceftriaxone: S <=1 Enterobacter, Klebsiella aerogenes, Citrobacter, and Serratia may develop resistance during prolonged therapy      -  Ciprofloxacin: R 1      -  Ertapenem: S <=0.5      -  Gentamicin: R >8      -  Imipenem: S <=1      -  Levofloxacin: I 1      -  Meropenem: S <=1      -  Nitrofurantoin: S <=32 Should not be used to treat pyelonephritis      -  Piperacillin/Tazobactam: S <=8      -  Tigecycline: S <=2      -  Tobramycin: I 8      -  Trimethoprim/Sulfamethoxazole: R >2/38       SARS-CoV-2 Result: NotDetec (01-30-24 @ 14:56)     < from: Xray Chest 1 View- PORTABLE-Urgent (Xray Chest 1 View- PORTABLE-Urgent .) (02.12.24 @ 10:12) >    INTERPRETATION:  AP chest on February 12, 2024 at 9:40 AM. Concern is   fluid overload.    NG tube remains. Heart magnified by technique.    There is a mild to moderate congestive picture increased from February 5.    IMPRESSION: Increasing CHF.    --- End of Report ---    < end of copied text >

## 2024-02-12 NOTE — PHYSICAL THERAPY INITIAL EVALUATION ADULT - ADDITIONAL COMMENTS
Pt AxOx4 states she lives at home alone with 12 steps upstairs, was independent PTA and does not use DME, owns RW.
Pt reports living alone in a house [address] 2 [address]. Pt amb without DME and is independent with functional mobility, ADLs, and IADLs. Pt drives and is retired. Pt has support of family.

## 2024-02-12 NOTE — CONSULT NOTE ADULT - ASSESSMENT
77yo F admitted for septic enterocolitis having continued abdominal pain and distension. Pt admitted to medicine service 1/30 presenting with abdominal pain, watery diarrhea, Tmax 105, tachycardia with diffuse small bowel and colonic inflammation most pronounced in the lower quadrant. CT A/P revealed a  distended stomach with dilated air-fluid filled small bowel loops with at least one transition in the right lower quadrant with colonic diverticulosis. Pt failed conservative managment and was taken to OR on 2/10/24 for Ex. Lap. In the OR patient was found with severe soft adhesions, collection entered and cultured and appears secondary to perforated diverticulitis with collection causing obstruction. 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 OR cultures + e.faecalis and yeast. Patient with post op leukocytosis and drop in h/H    - bcx ngtd  - OR cx e.faecalis and yeast, f/u final cx  - Continue zosyn and fluconazole  - trend H/H- s/p PRBC  - Trend Fever  - Trend Leukocytosis- may be reactive. may need repeat Ct if worsening     d/w RN  Will Follow

## 2024-02-12 NOTE — CONSULT NOTE ADULT - SUBJECTIVE AND OBJECTIVE BOX
Patient is a 78y old  Female who presents with a chief complaint of Sepsis due to enterocolitis (12 Feb 2024 08:11)      HPI:  78yoF hx active smoker, HTN, HLD, PAD, presenting with abdominal pain and diarrhea.  She reports few days of lower abdominal pain followed by onset of watery, non-bloody diarrhea and nausea and vomiting for the past day.  Pt also reports subjective fevers.  On admission, pt febrile with Tmax of 105, tachycardic with HR in 120s.  Labs show MERVAT, AMGA, hypokalemia. CT A/P remarkable for diffuse small bowel and colonic inflammation, most pronounced in the right lower quadrant involving the distal ileum concerning for Crohn’s disease. Sister at bedside reports FHx of colitis in mother but not sure if she had IBD diagnosis. (30 Jan 2024 21:57)            Analgesic Dosing for past 24 hours reviewed as below:    acetaminophen   IVPB ..   400 mL/Hr IV Intermittent (02-11-24 @ 22:04)    acetaminophen   IVPB ..   400 mL/Hr IV Intermittent (02-11-24 @ 16:31)    HYDROmorphone  Injectable   1 milliGRAM(s) IV Push (02-12-24 @ 05:38)    HYDROmorphone  Injectable   0.5 milliGRAM(s) IV Push (02-11-24 @ 20:16)    ketorolac   Injectable   15 milliGRAM(s) IV Push (02-11-24 @ 13:19)          T(C): 36.6 (02-12-24 @ 07:50), Max: 36.6 (02-11-24 @ 14:55)  HR: 92 (02-12-24 @ 09:32) (72 - 108)  BP: 138/78 (02-12-24 @ 07:50) (111/68 - 138/78)  RR: 18 (02-12-24 @ 07:50) (18 - 18)  SpO2: 96% (02-12-24 @ 09:32) (90% - 98%)      02-11-24 @ 07:01  -  02-12-24 @ 07:00  --------------------------------------------------------  IN: 1999 mL / OUT: 1160 mL / NET: 839 mL        albumin human  5% IVPB 250 milliLiter(s) IV Intermittent every 30 minutes  albuterol/ipratropium for Nebulization 3 milliLiter(s) Nebulizer every 6 hours  atorvastatin 40 milliGRAM(s) Oral at bedtime  budesonide  80 MICROgram(s)/formoterol 4.5 MICROgram(s) Inhaler 2 Puff(s) Inhalation two times a day  dextrose 5% + lactated ringers. 500 milliLiter(s) IV Continuous <Continuous>  fluconAZOLE IVPB      fluconAZOLE IVPB 400 milliGRAM(s) IV Intermittent every 24 hours  heparin   Injectable 5000 Unit(s) SubCutaneous every 8 hours  HYDROmorphone  Injectable 0.5 milliGRAM(s) IV Push every 3 hours PRN  HYDROmorphone  Injectable 1 milliGRAM(s) IV Push every 4 hours PRN  lactated ringers. 1000 milliLiter(s) IV Continuous <Continuous>  levothyroxine Injectable 37.5 MICROGram(s) IV Push at bedtime  LORazepam   Injectable 0.25 milliGRAM(s) IV Push two times a day PRN  melatonin 3 milliGRAM(s) Oral at bedtime PRN  metoclopramide Injectable 5 milliGRAM(s) IV Push daily  nicotine -  14 mG/24Hr(s) Patch 1 Patch Transdermal daily  ondansetron Injectable 4 milliGRAM(s) IV Push every 6 hours PRN  pantoprazole  Injectable 40 milliGRAM(s) IV Push with breakfast  piperacillin/tazobactam IVPB.. 3.375 Gram(s) IV Intermittent every 8 hours  sertraline 200 milliGRAM(s) Oral daily                          10.4   22.67 )-----------( 328      ( 12 Feb 2024 09:02 )             30.5     02-12    138  |  103  |  13.3  ----------------------------<  85  3.9   |  25.0  |  0.79    Ca    7.6<L>      12 Feb 2024 09:02  Phos  3.0     02-12  Mg     1.8     02-12    TPro  5.1<L>  /  Alb  1.9<L>  /  TBili  0.3<L>  /  DBili  0.1  /  AST  30  /  ALT  13  /  AlkPhos  63  02-10      Urinalysis Basic - ( 12 Feb 2024 09:02 )    Color: x / Appearance: x / SG: x / pH: x  Gluc: 85 mg/dL / Ketone: x  / Bili: x / Urobili: x   Blood: x / Protein: x / Nitrite: x   Leuk Esterase: x / RBC: x / WBC x   Sq Epi: x / Non Sq Epi: x / Bacteria: x        Pain Service   [phone]

## 2024-02-12 NOTE — CONSULT NOTE ADULT - ASSESSMENT
78F  s/p Enterotomy repair 10-Feb-2024 for Pericolonic abscess due to diverticulitis     Med Recs:  cont iv acetaminophen  cont iv dilaudid prn  pt mentions lower abdominal crampy pain today    - Recommend starting robaxin IVPB 750mg TID prn abdominal pain

## 2024-02-12 NOTE — PHYSICAL THERAPY INITIAL EVALUATION ADULT - IMPAIRMENTS FOUND, PT EVAL
aerobic capacity/endurance/gait, locomotion, and balance/muscle strength
aerobic capacity/endurance/gait, locomotion, and balance/gross motor/muscle strength/poor safety awareness

## 2024-02-12 NOTE — PHYSICAL THERAPY INITIAL EVALUATION ADULT - PLANNED THERAPY INTERVENTIONS, PT EVAL
balance training/bed mobility training/gait training/strengthening/transfer training
stairs/bed mobility training/gait training/transfer training

## 2024-02-12 NOTE — CONSULT NOTE ADULT - TIME BILLING
Chart/notes review, interpretation of laboratory and imaging results,  patient evaluation, MDM, documentation, case discussion with interdisciplinary team   Interpretation  and review of microbiologic data

## 2024-02-12 NOTE — PHYSICAL THERAPY INITIAL EVALUATION ADULT - GENERAL OBSERVATIONS, REHAB EVAL
Pt received in bed +IV +NGT +monitor +ostomy +CATA drain +O2 via NC at 3L/min +vac, pleasant and cooperative
Pt received in semifowler position with NG tube, family present, monitor, IV, agreeable to PT c/o pain

## 2024-02-12 NOTE — PROVIDER CONTACT NOTE (HYPOGLYCEMIA EVENT) - NS PROVIDER CONTACT BACKGROUND-HYPO
Age: 78y    Gender: Female    POCT Blood Glucose:  67 mg/dL (02-12-24 @ 19:35)  61 mg/dL (02-12-24 @ 19:32)  81 mg/dL (02-12-24 @ 13:02)  80 mg/dL (02-12-24 @ 07:25)  123 mg/dL (02-12-24 @ 00:36)  65 mg/dL (02-11-24 @ 23:04)      eMAR:atorvastatin   40 milliGRAM(s) Oral (02-11-24 @ 22:07)    dextrose 50% Injectable   50 milliLiter(s) IV Push (02-12-24 @ 19:48)    levothyroxine Injectable   37.5 MICROGram(s) IV Push (02-11-24 @ 22:07)

## 2024-02-12 NOTE — PROGRESS NOTE ADULT - ASSESSMENT
77yo Female admitted for enteritis causing partial bowel obstruction. Now POD 2 for ex lap, ANGELA Hartmanns, due to diverticulitis causing SBO due to collection and adhesions. Post-operative course complicated by pain and anemia     Plan:   - NPO w/IVF   - TrendHgB   - 2U PRBC on today, repeeat CBC @1400   - Hemodynamic monitoring   - Pain management consult on today   - Monitor ostomy output. productive of bowel sweat   - Prevena for until POD 5  - Monitor ruiz o/p   - ID consult, zosyn and flucanazole

## 2024-02-12 NOTE — PROGRESS NOTE ADULT - SUBJECTIVE AND OBJECTIVE BOX
HPI/OVERNIGHT EVENTS:    Overnight , patient complains of pain (minimal improvement with IV regimen). Hemoglobin 6.5, ordered 2U.  Sinus tachycardia, otherwise HDS. Denies any fever/chills, CP and/or SOB.     MEDICATIONS  (STANDING):  albumin human  5% IVPB 250 milliLiter(s) IV Intermittent every 30 minutes  albuterol/ipratropium for Nebulization 3 milliLiter(s) Nebulizer every 6 hours  atorvastatin 40 milliGRAM(s) Oral at bedtime  budesonide  80 MICROgram(s)/formoterol 4.5 MICROgram(s) Inhaler 2 Puff(s) Inhalation two times a day  dextrose 5% + lactated ringers. 500 milliLiter(s) (500 mL/Hr) IV Continuous <Continuous>  fluconAZOLE IVPB 400 milliGRAM(s) IV Intermittent every 24 hours  fluconAZOLE IVPB      heparin   Injectable 5000 Unit(s) SubCutaneous every 8 hours  lactated ringers. 1000 milliLiter(s) (100 mL/Hr) IV Continuous <Continuous>  levothyroxine Injectable 37.5 MICROGram(s) IV Push at bedtime  metoclopramide Injectable 5 milliGRAM(s) IV Push daily  nicotine -  14 mG/24Hr(s) Patch 1 Patch Transdermal daily  pantoprazole  Injectable 40 milliGRAM(s) IV Push with breakfast  piperacillin/tazobactam IVPB.. 3.375 Gram(s) IV Intermittent every 8 hours  sertraline 200 milliGRAM(s) Oral daily    MEDICATIONS  (PRN):  HYDROmorphone  Injectable 1 milliGRAM(s) IV Push every 4 hours PRN Severe Pain (7 - 10)  HYDROmorphone  Injectable 0.5 milliGRAM(s) IV Push every 3 hours PRN Moderate Pain (4 - 6)  LORazepam   Injectable 0.25 milliGRAM(s) IV Push two times a day PRN Anxiety  melatonin 3 milliGRAM(s) Oral at bedtime PRN Insomnia  ondansetron Injectable 4 milliGRAM(s) IV Push every 6 hours PRN Nausea and/or Vomiting      Vital Signs Last 24 Hrs  T(C): 36.4 (12 Feb 2024 04:43), Max: 36.6 (11 Feb 2024 14:55)  T(F): 97.5 (12 Feb 2024 04:43), Max: 97.9 (11 Feb 2024 19:17)  HR: 101 (12 Feb 2024 04:43) (72 - 108)  BP: 137/84 (12 Feb 2024 04:43) (111/68 - 137/84)  BP(mean): --  RR: 18 (12 Feb 2024 04:43) (18 - 18)  SpO2: 95% (12 Feb 2024 04:43) (90% - 98%)    Parameters below as of 12 Feb 2024 04:43  Patient On (Oxygen Delivery Method): room air        Constitutional: patient resting comfortably in bed, in no acute distress  Respiratory: respirations are unlabored, no accessory muscle use, no conversational dyspnea  Gastrointestinal: Abdomen soft, tender non-localized, non-distended, no rebound tenderness / guarding, prevena in place with good seal, NGT with bilious o/p . ostomy with bowel sweat   Neurological: A&O x 3      I&O's Detail    11 Feb 2024 07:01  -  12 Feb 2024 07:00  --------------------------------------------------------  IN:    Lactated Ringers: 1000 mL    Lactated Ringers Bolus: 999 mL  Total IN: 1999 mL    OUT:    Bulb (mL): 55 mL    Colostomy (mL): 15 mL    Indwelling Catheter - Urethral (mL): 940 mL    Nasogastric/Oral tube (mL): 150 mL  Total OUT: 1160 mL    Total NET: 839 mL          LABS:                        6.5    14.44 )-----------( 268      ( 11 Feb 2024 21:20 )             19.6     02-11    136  |  102  |  10.4  ----------------------------<  45<LL>  4.2   |  18.0<L>  |  0.54    Ca    7.0<L>      11 Feb 2024 21:20  Phos  2.3     02-11  Mg     1.2     02-11    TPro  5.1<L>  /  Alb  1.9<L>  /  TBili  0.3<L>  /  DBili  0.1  /  AST  30  /  ALT  13  /  AlkPhos  63  02-10      Urinalysis Basic - ( 11 Feb 2024 21:20 )    Color: x / Appearance: x / SG: x / pH: x  Gluc: 45 mg/dL / Ketone: x  / Bili: x / Urobili: x   Blood: x / Protein: x / Nitrite: x   Leuk Esterase: x / RBC: x / WBC x   Sq Epi: x / Non Sq Epi: x / Bacteria: x

## 2024-02-12 NOTE — PHYSICAL THERAPY INITIAL EVALUATION ADULT - PERTINENT HX OF CURRENT PROBLEM, REHAB EVAL
77yo Female admitted for enteritis causing partial bowel obstruction. Now POD 2 for ex lap, ANGELA Hartmanns, due to diverticulitis causing SBO due to collection and adhesions. Post-operative course complicated by pain and anemia
79yo Female admitted with enterocolitis with increasing abdominal distension and pain CT c/f dilated bowel with TP in RLQ s/o partial SBO.

## 2024-02-12 NOTE — PROGRESS NOTE ADULT - SUBJECTIVE AND OBJECTIVE BOX
Feels a litle better   afebrile NGT bile   abd soft nontender Colostomy pink slight edema   Inc c/d Pervina intact  CATA serous   OOB   Supportive care

## 2024-02-13 LAB
ALBUMIN SERPL ELPH-MCNC: 2 G/DL — LOW (ref 3.3–5.2)
ALP SERPL-CCNC: 85 U/L — SIGNIFICANT CHANGE UP (ref 40–120)
ALT FLD-CCNC: 6 U/L — SIGNIFICANT CHANGE UP
ANION GAP SERPL CALC-SCNC: 10 MMOL/L — SIGNIFICANT CHANGE UP (ref 5–17)
ANION GAP SERPL CALC-SCNC: 14 MMOL/L — SIGNIFICANT CHANGE UP (ref 5–17)
ANION GAP SERPL CALC-SCNC: 9 MMOL/L — SIGNIFICANT CHANGE UP (ref 5–17)
APTT BLD: 37.3 SEC — HIGH (ref 24.5–35.6)
AST SERPL-CCNC: 17 U/L — SIGNIFICANT CHANGE UP
BASOPHILS # BLD AUTO: 0.03 K/UL — SIGNIFICANT CHANGE UP (ref 0–0.2)
BASOPHILS # BLD AUTO: 0.04 K/UL — SIGNIFICANT CHANGE UP (ref 0–0.2)
BASOPHILS NFR BLD AUTO: 0.2 % — SIGNIFICANT CHANGE UP (ref 0–2)
BASOPHILS NFR BLD AUTO: 0.2 % — SIGNIFICANT CHANGE UP (ref 0–2)
BILIRUB SERPL-MCNC: 0.3 MG/DL — LOW (ref 0.4–2)
BUN SERPL-MCNC: 11.4 MG/DL — SIGNIFICANT CHANGE UP (ref 8–20)
BUN SERPL-MCNC: 11.6 MG/DL — SIGNIFICANT CHANGE UP (ref 8–20)
BUN SERPL-MCNC: 12.1 MG/DL — SIGNIFICANT CHANGE UP (ref 8–20)
CALCIUM SERPL-MCNC: 7.3 MG/DL — LOW (ref 8.4–10.5)
CALCIUM SERPL-MCNC: 7.9 MG/DL — LOW (ref 8.4–10.5)
CALCIUM SERPL-MCNC: 8.2 MG/DL — LOW (ref 8.4–10.5)
CHLORIDE SERPL-SCNC: 101 MMOL/L — SIGNIFICANT CHANGE UP (ref 96–108)
CHLORIDE SERPL-SCNC: 102 MMOL/L — SIGNIFICANT CHANGE UP (ref 96–108)
CHLORIDE SERPL-SCNC: 99 MMOL/L — SIGNIFICANT CHANGE UP (ref 96–108)
CK SERPL-CCNC: 27 U/L — SIGNIFICANT CHANGE UP (ref 25–170)
CO2 SERPL-SCNC: 24 MMOL/L — SIGNIFICANT CHANGE UP (ref 22–29)
CO2 SERPL-SCNC: 26 MMOL/L — SIGNIFICANT CHANGE UP (ref 22–29)
CO2 SERPL-SCNC: 26 MMOL/L — SIGNIFICANT CHANGE UP (ref 22–29)
CREAT SERPL-MCNC: 0.8 MG/DL — SIGNIFICANT CHANGE UP (ref 0.5–1.3)
CREAT SERPL-MCNC: 0.83 MG/DL — SIGNIFICANT CHANGE UP (ref 0.5–1.3)
CREAT SERPL-MCNC: 0.89 MG/DL — SIGNIFICANT CHANGE UP (ref 0.5–1.3)
EGFR: 67 ML/MIN/1.73M2 — SIGNIFICANT CHANGE UP
EGFR: 73 ML/MIN/1.73M2 — SIGNIFICANT CHANGE UP
EGFR: 76 ML/MIN/1.73M2 — SIGNIFICANT CHANGE UP
EOSINOPHIL # BLD AUTO: 0.03 K/UL — SIGNIFICANT CHANGE UP (ref 0–0.5)
EOSINOPHIL # BLD AUTO: 0.05 K/UL — SIGNIFICANT CHANGE UP (ref 0–0.5)
EOSINOPHIL NFR BLD AUTO: 0.2 % — SIGNIFICANT CHANGE UP (ref 0–6)
EOSINOPHIL NFR BLD AUTO: 0.3 % — SIGNIFICANT CHANGE UP (ref 0–6)
GLUCOSE BLDC GLUCOMTR-MCNC: 79 MG/DL — SIGNIFICANT CHANGE UP (ref 70–99)
GLUCOSE BLDC GLUCOMTR-MCNC: 83 MG/DL — SIGNIFICANT CHANGE UP (ref 70–99)
GLUCOSE BLDC GLUCOMTR-MCNC: 94 MG/DL — SIGNIFICANT CHANGE UP (ref 70–99)
GLUCOSE BLDC GLUCOMTR-MCNC: 96 MG/DL — SIGNIFICANT CHANGE UP (ref 70–99)
GLUCOSE BLDC GLUCOMTR-MCNC: 99 MG/DL — SIGNIFICANT CHANGE UP (ref 70–99)
GLUCOSE SERPL-MCNC: 108 MG/DL — HIGH (ref 70–99)
GLUCOSE SERPL-MCNC: 109 MG/DL — HIGH (ref 70–99)
GLUCOSE SERPL-MCNC: 405 MG/DL — HIGH (ref 70–99)
HCT VFR BLD CALC: 29.5 % — LOW (ref 34.5–45)
HCT VFR BLD CALC: 31.1 % — LOW (ref 34.5–45)
HGB BLD-MCNC: 10 G/DL — LOW (ref 11.5–15.5)
HGB BLD-MCNC: 10.3 G/DL — LOW (ref 11.5–15.5)
IMM GRANULOCYTES NFR BLD AUTO: 0.7 % — SIGNIFICANT CHANGE UP (ref 0–0.9)
IMM GRANULOCYTES NFR BLD AUTO: 0.7 % — SIGNIFICANT CHANGE UP (ref 0–0.9)
INR BLD: 1.51 RATIO — HIGH (ref 0.85–1.18)
LACTATE SERPL-SCNC: 0.9 MMOL/L — SIGNIFICANT CHANGE UP (ref 0.5–2)
LYMPHOCYTES # BLD AUTO: 0.33 K/UL — LOW (ref 1–3.3)
LYMPHOCYTES # BLD AUTO: 0.53 K/UL — LOW (ref 1–3.3)
LYMPHOCYTES # BLD AUTO: 2.1 % — LOW (ref 13–44)
LYMPHOCYTES # BLD AUTO: 2.9 % — LOW (ref 13–44)
MAGNESIUM SERPL-MCNC: 1.5 MG/DL — LOW (ref 1.8–2.6)
MCHC RBC-ENTMCNC: 29.3 PG — SIGNIFICANT CHANGE UP (ref 27–34)
MCHC RBC-ENTMCNC: 30.1 PG — SIGNIFICANT CHANGE UP (ref 27–34)
MCHC RBC-ENTMCNC: 33.1 GM/DL — SIGNIFICANT CHANGE UP (ref 32–36)
MCHC RBC-ENTMCNC: 33.9 GM/DL — SIGNIFICANT CHANGE UP (ref 32–36)
MCV RBC AUTO: 88.6 FL — SIGNIFICANT CHANGE UP (ref 80–100)
MCV RBC AUTO: 88.9 FL — SIGNIFICANT CHANGE UP (ref 80–100)
MONOCYTES # BLD AUTO: 0.77 K/UL — SIGNIFICANT CHANGE UP (ref 0–0.9)
MONOCYTES # BLD AUTO: 1.1 K/UL — HIGH (ref 0–0.9)
MONOCYTES NFR BLD AUTO: 4.8 % — SIGNIFICANT CHANGE UP (ref 2–14)
MONOCYTES NFR BLD AUTO: 6 % — SIGNIFICANT CHANGE UP (ref 2–14)
NEUTROPHILS # BLD AUTO: 14.82 K/UL — HIGH (ref 1.8–7.4)
NEUTROPHILS # BLD AUTO: 16.37 K/UL — HIGH (ref 1.8–7.4)
NEUTROPHILS NFR BLD AUTO: 89.9 % — HIGH (ref 43–77)
NEUTROPHILS NFR BLD AUTO: 92 % — HIGH (ref 43–77)
PHOSPHATE SERPL-MCNC: 3 MG/DL — SIGNIFICANT CHANGE UP (ref 2.4–4.7)
PLATELET # BLD AUTO: 377 K/UL — SIGNIFICANT CHANGE UP (ref 150–400)
PLATELET # BLD AUTO: 377 K/UL — SIGNIFICANT CHANGE UP (ref 150–400)
POTASSIUM SERPL-MCNC: 2.9 MMOL/L — CRITICAL LOW (ref 3.5–5.3)
POTASSIUM SERPL-MCNC: 3.2 MMOL/L — LOW (ref 3.5–5.3)
POTASSIUM SERPL-MCNC: 3.3 MMOL/L — LOW (ref 3.5–5.3)
POTASSIUM SERPL-MCNC: 3.5 MMOL/L — SIGNIFICANT CHANGE UP (ref 3.5–5.3)
POTASSIUM SERPL-SCNC: 2.9 MMOL/L — CRITICAL LOW (ref 3.5–5.3)
POTASSIUM SERPL-SCNC: 3.2 MMOL/L — LOW (ref 3.5–5.3)
POTASSIUM SERPL-SCNC: 3.3 MMOL/L — LOW (ref 3.5–5.3)
POTASSIUM SERPL-SCNC: 3.5 MMOL/L — SIGNIFICANT CHANGE UP (ref 3.5–5.3)
PROT SERPL-MCNC: 5.3 G/DL — LOW (ref 6.6–8.7)
PROTHROM AB SERPL-ACNC: 16.6 SEC — HIGH (ref 9.5–13)
RBC # BLD: 3.32 M/UL — LOW (ref 3.8–5.2)
RBC # BLD: 3.51 M/UL — LOW (ref 3.8–5.2)
RBC # FLD: 14.7 % — HIGH (ref 10.3–14.5)
RBC # FLD: 14.8 % — HIGH (ref 10.3–14.5)
SODIUM SERPL-SCNC: 134 MMOL/L — LOW (ref 135–145)
SODIUM SERPL-SCNC: 138 MMOL/L — SIGNIFICANT CHANGE UP (ref 135–145)
SODIUM SERPL-SCNC: 139 MMOL/L — SIGNIFICANT CHANGE UP (ref 135–145)
TROPONIN T, HIGH SENSITIVITY RESULT: 115 NG/L — HIGH (ref 0–51)
TROPONIN T, HIGH SENSITIVITY RESULT: 129 NG/L — HIGH (ref 0–51)
TROPONIN T, HIGH SENSITIVITY RESULT: 137 NG/L — HIGH (ref 0–51)
WBC # BLD: 16.09 K/UL — HIGH (ref 3.8–10.5)
WBC # BLD: 18.21 K/UL — HIGH (ref 3.8–10.5)
WBC # FLD AUTO: 16.09 K/UL — HIGH (ref 3.8–10.5)
WBC # FLD AUTO: 18.21 K/UL — HIGH (ref 3.8–10.5)

## 2024-02-13 PROCEDURE — 99232 SBSQ HOSP IP/OBS MODERATE 35: CPT

## 2024-02-13 PROCEDURE — 70498 CT ANGIOGRAPHY NECK: CPT | Mod: 26

## 2024-02-13 PROCEDURE — 93010 ELECTROCARDIOGRAM REPORT: CPT

## 2024-02-13 PROCEDURE — 0042T: CPT

## 2024-02-13 PROCEDURE — 71045 X-RAY EXAM CHEST 1 VIEW: CPT | Mod: 26

## 2024-02-13 PROCEDURE — 99223 1ST HOSP IP/OBS HIGH 75: CPT

## 2024-02-13 PROCEDURE — 70496 CT ANGIOGRAPHY HEAD: CPT | Mod: 26

## 2024-02-13 PROCEDURE — 70450 CT HEAD/BRAIN W/O DYE: CPT | Mod: 26,59

## 2024-02-13 RX ORDER — POTASSIUM CHLORIDE 20 MEQ
20 PACKET (EA) ORAL ONCE
Refills: 0 | Status: DISCONTINUED | OUTPATIENT
Start: 2024-02-13 | End: 2024-02-13

## 2024-02-13 RX ORDER — POTASSIUM CHLORIDE 20 MEQ
20 PACKET (EA) ORAL
Refills: 0 | Status: DISCONTINUED | OUTPATIENT
Start: 2024-02-13 | End: 2024-02-13

## 2024-02-13 RX ORDER — IPRATROPIUM/ALBUTEROL SULFATE 18-103MCG
3 AEROSOL WITH ADAPTER (GRAM) INHALATION ONCE
Refills: 0 | Status: DISCONTINUED | OUTPATIENT
Start: 2024-02-13 | End: 2024-02-24

## 2024-02-13 RX ORDER — ACETAMINOPHEN 500 MG
1000 TABLET ORAL EVERY 6 HOURS
Refills: 0 | Status: COMPLETED | OUTPATIENT
Start: 2024-02-13 | End: 2024-02-17

## 2024-02-13 RX ADMIN — Medication 1000 MILLIGRAM(S): at 23:30

## 2024-02-13 RX ADMIN — HEPARIN SODIUM 5000 UNIT(S): 5000 INJECTION INTRAVENOUS; SUBCUTANEOUS at 23:07

## 2024-02-13 RX ADMIN — Medication 1000 MILLIGRAM(S): at 12:02

## 2024-02-13 RX ADMIN — Medication 1 PATCH: at 12:02

## 2024-02-13 RX ADMIN — Medication 400 MILLIGRAM(S): at 00:20

## 2024-02-13 RX ADMIN — PIPERACILLIN AND TAZOBACTAM 25 GRAM(S): 4; .5 INJECTION, POWDER, LYOPHILIZED, FOR SOLUTION INTRAVENOUS at 23:07

## 2024-02-13 RX ADMIN — BUDESONIDE AND FORMOTEROL FUMARATE DIHYDRATE 2 PUFF(S): 160; 4.5 AEROSOL RESPIRATORY (INHALATION) at 21:23

## 2024-02-13 RX ADMIN — Medication 37.5 MICROGRAM(S): at 00:03

## 2024-02-13 RX ADMIN — PANTOPRAZOLE SODIUM 40 MILLIGRAM(S): 20 TABLET, DELAYED RELEASE ORAL at 05:54

## 2024-02-13 RX ADMIN — PIPERACILLIN AND TAZOBACTAM 25 GRAM(S): 4; .5 INJECTION, POWDER, LYOPHILIZED, FOR SOLUTION INTRAVENOUS at 09:06

## 2024-02-13 RX ADMIN — Medication 50 MILLIEQUIVALENT(S): at 09:20

## 2024-02-13 RX ADMIN — SODIUM CHLORIDE 84 MILLILITER(S): 9 INJECTION, SOLUTION INTRAVENOUS at 09:25

## 2024-02-13 RX ADMIN — BUDESONIDE AND FORMOTEROL FUMARATE DIHYDRATE 2 PUFF(S): 160; 4.5 AEROSOL RESPIRATORY (INHALATION) at 09:08

## 2024-02-13 RX ADMIN — Medication 3 MILLILITER(S): at 09:07

## 2024-02-13 RX ADMIN — Medication 1000 MILLIGRAM(S): at 00:50

## 2024-02-13 RX ADMIN — Medication 3 MILLILITER(S): at 02:25

## 2024-02-13 RX ADMIN — Medication 1 PATCH: at 07:45

## 2024-02-13 RX ADMIN — FLUCONAZOLE 100 MILLIGRAM(S): 150 TABLET ORAL at 05:42

## 2024-02-13 RX ADMIN — Medication 5 MILLIGRAM(S): at 12:03

## 2024-02-13 RX ADMIN — PIPERACILLIN AND TAZOBACTAM 25 GRAM(S): 4; .5 INJECTION, POWDER, LYOPHILIZED, FOR SOLUTION INTRAVENOUS at 16:18

## 2024-02-13 RX ADMIN — SODIUM CHLORIDE 84 MILLILITER(S): 9 INJECTION, SOLUTION INTRAVENOUS at 21:13

## 2024-02-13 RX ADMIN — HEPARIN SODIUM 5000 UNIT(S): 5000 INJECTION INTRAVENOUS; SUBCUTANEOUS at 05:55

## 2024-02-13 RX ADMIN — HEPARIN SODIUM 5000 UNIT(S): 5000 INJECTION INTRAVENOUS; SUBCUTANEOUS at 16:17

## 2024-02-13 RX ADMIN — Medication 400 MILLIGRAM(S): at 22:38

## 2024-02-13 RX ADMIN — Medication 1 PATCH: at 12:04

## 2024-02-13 RX ADMIN — Medication 3 MILLIGRAM(S): at 21:14

## 2024-02-13 RX ADMIN — Medication 400 MILLIGRAM(S): at 10:43

## 2024-02-13 RX ADMIN — ATORVASTATIN CALCIUM 40 MILLIGRAM(S): 80 TABLET, FILM COATED ORAL at 21:14

## 2024-02-13 RX ADMIN — Medication 37.5 MICROGRAM(S): at 21:13

## 2024-02-13 RX ADMIN — Medication 1 PATCH: at 19:32

## 2024-02-13 NOTE — CONSULT NOTE ADULT - SUBJECTIVE AND OBJECTIVE BOX
[facility]                                     [facility] [place]                                 [facility]                                  [address]                                        [place], [place], [address]                                             [phone]    CC: AMS  HPI:  The patient is a 78y Female who presented with sepsis, abscess and SBO, s/p OR on 2/120/24.  Overnight, at about 0330 she was unresponsive and code stroke was called.  She awoke with painful stimuli per chart.  She had CT scan of head that did not show stroke, had question of Left ICA occlusion on CTA and no perfusion deficits.  She is currently at baseline without neuro deficit.  Neurology is asked to evalaute.    PAST MEDICAL & SURGICAL HISTORY:  Hypothyroid      Hypertension      Peripheral arterial disease      Cigarette smoker      Major depression      Osteoarthritis      Hyperlipidemia      S/P ORIF (open reduction internal fixation) fracture      MEDICATIONS  (STANDING):  atorvastatin 40 milliGRAM(s) Oral at bedtime  budesonide  80 MICROgram(s)/formoterol 4.5 MICROgram(s) Inhaler 2 Puff(s) Inhalation two times a day  dextrose 5% + sodium chloride 0.45%. 1000 milliLiter(s) (84 mL/Hr) IV Continuous <Continuous>  fluconAZOLE IVPB      fluconAZOLE IVPB 400 milliGRAM(s) IV Intermittent every 24 hours  heparin   Injectable 5000 Unit(s) SubCutaneous every 8 hours  levothyroxine Injectable 37.5 MICROGram(s) IV Push at bedtime  metoclopramide Injectable 5 milliGRAM(s) IV Push daily  nicotine -  14 mG/24Hr(s) Patch 1 Patch Transdermal daily  pantoprazole  Injectable 40 milliGRAM(s) IV Push with breakfast  piperacillin/tazobactam IVPB.. 3.375 Gram(s) IV Intermittent every 8 hours  sertraline 200 milliGRAM(s) Oral daily    MEDICATIONS  (PRN):  albuterol/ipratropium for Nebulization. 3 milliLiter(s) Nebulizer once PRN Shortness of Breath and/or Wheezing  melatonin 3 milliGRAM(s) Oral at bedtime PRN Insomnia  ondansetron Injectable 4 milliGRAM(s) IV Push every 6 hours PRN Nausea and/or Vomiting      Allergies    No Known Allergies    Intolerances      SOCIAL HISTORY:  (+) tob,   rare alcohol   no drugs    FAMILY HISTORY:  Family history of colitis in mother      ROS: 14 point ROS negative other than what is present in HPI or below    Vital Signs Last 24 Hrs  T(C): 36.7 (13 Feb 2024 12:21), Max: 37.3 (13 Feb 2024 00:14)  T(F): 98 (13 Feb 2024 12:21), Max: 99.1 (13 Feb 2024 00:14)  HR: 98 (13 Feb 2024 12:21) (84 - 105)  BP: 129/84 (13 Feb 2024 12:21) (129/84 - 164/83)  BP(mean): --  RR: 18 (13 Feb 2024 12:21) (18 - 18)  SpO2: 94% (13 Feb 2024 12:21) (91% - 99%)    Parameters below as of 13 Feb 2024 12:21  Patient On (Oxygen Delivery Method): room air      General: NAD    Detailed Neurologic Exam:    Mental status: The patient is awake and alert and has normal attention span.  The patient is fully oriented in 3 spheres.  The patient is able to name objects, follow commands, repeat sentences.    Cranial nerves: Pupils equal and react symmetrically to light. There is no visual field deficit to confrontation. Extraocular motion is full with no nystagmus. There is no ptosis. Facial sensation is intact. Facial musculature is symmetric. Palate elevates symmetrically. Tongue is midline.    Motor: There is normal bulk and tone.  There is no tremor.  Strength is 5/5 in the right arm and leg.   Strength is 5/5 in the left arm and leg.    Sensation: Intact to light touch and pin in 4 extremities    Reflexes: 2+ patellar, bicep and BR, plantars are flexor. B/L    Cerebellar: There is no dysmetria on finger to nose testing.    Gait : deferred    [facility] SS:  DATE: 2/13/24  TIME:1220  1A: Level of consciousness (0-3):   1B: Questions (0-2):   1C: Commands (0-2):   2: Gaze (0-2):   3: Visual fields (0-3):   4: Facial palsy (0-3):   MOTOR:  5A: Left arm motor drift (0-4):   5B: Right arm motor drift (0-4):   6A: Left leg motor drift (0-4):   6B: Right leg motor drift (0-4):   7: Limb ataxia (0-2):   SENSORY:  8: Sensation (0-2):   SPEECH:  9: Language (0-3):   10: Dysarthria (0-2):   EXTINCTION:  11: Extinction/inattention (0-2):     TOTAL SCORE: 0      LABS:                         10.0   16.09 )-----------( 377      ( 13 Feb 2024 06:35 )             29.5       02-13    x   |  x   |  x   ----------------------------<  x   3.2<L>   |  x   |  x     Ca    8.2<L>      13 Feb 2024 08:30  Phos  3.0     02-13  Mg     1.5     02-13    TPro  5.3<L>  /  Alb  2.0<L>  /  TBili  0.3<L>  /  DBili  x   /  AST  17  /  ALT  6   /  AlkPhos  85  02-13      PT/INR - ( 13 Feb 2024 03:33 )   PT: 16.6 sec;   INR: 1.51 ratio         PTT - ( 13 Feb 2024 03:33 )  PTT:37.3 sec    RADIOLOGY & ADDITIONAL STUDIES (independently reviewed unless otherwise noted):  CT Brain Stroke Protocol (02.13.24 @ 03:55)   IMPRESSION:  No evidence of acute intracranial hemorrhage, midline shift or CT   evidence of acute territorial infarct.  Mild mucosal thickening of the paranasal sinuses with fluid levels.   Correlate clinically for acute sinusitis.  If the patient's symptoms persist, consider short interval follow-up   brain MRI if there are no MRI contraindications.    CT Angio Brain/Neck and perfusion Stroke Protocol  w/ IV Cont (02.13.24 @ 04:06)   IMPRESSION:  CT PERFUSION: No core infarct or penumbra.  If symptoms persist consider follow up head CT or MRI, MRA  if no   contraindication.    CTA COW:  Patent intracranial circulation without flow limiting stenosis    CTA NECK:  Bilateral vertebral arteries are patent without flow limiting stenosis.  Ageindeterminate occlusion of the left internal carotid artery with   recanalization of the left anterior communicating and middle cerebral   arteries. While this may be chronic in nature, MRI/MRA of the brain and   MRA of the neck is suggested for further evaluation.    Partially visualized bilateral pleural effusions.

## 2024-02-13 NOTE — CONSULT NOTE ADULT - CONSULT REQUESTED DATE/TIME
12-Feb-2024 15:12
31-Jan-2024 10:52
10-Feb-2024 15:00
12-Feb-2024 10:37
04-Feb-2024 23:38
02-Feb-2024 09:32
13-Feb-2024 15:11

## 2024-02-13 NOTE — PROGRESS NOTE ADULT - SUBJECTIVE AND OBJECTIVE BOX
Stroke attending on call:  3:30AM  Called by resident that patient was noted to have an episode of unreponsivness. FS 91.  Did have hypoglycemia earlier in the day.  At the time of discussing with patient, it seemed patient was waking up, verbalizing and moving all extremities.  Therefore only CT head was recommended and stroke code was de-escalated.  Neuro will see patient in AM

## 2024-02-13 NOTE — CHART NOTE - NSCHARTNOTEFT_GEN_A_CORE
A rapid response was called.  The patient was completely unresponsive, not opening eyes, no responding to commands with pinpoint pupils.  There was concerns for narcotic induced AMS, we were about to give Narcan however the patient woke up when we illicit pain.  She was alert, orientated to person, place and time after being aroused.  We checked her MAR and spoke with nursing staff who noted that the last time the patient received dilaudid was at 5am however she has been receiving ativan and robaxin. Those meds were held. A code stroke was still called due to change in mental status, her NIHSS 1.  The neurologist was contacted.  Attending surgeon was contacted.  We ordered a full set of labs and she will get imaging.  We also send cultures due to a documented fever of 103 at midnight.  The team was not notified of this temperature spike. A rapid response was called.  The patient was completely unresponsive, not opening eyes, no responding to commands with pinpoint pupils.  There was concerns for narcotic induced AMS, we were about to give Narcan however the patient woke up when we illicit pain.  She was alert, orientated to person, place and time after being aroused.  We checked her MAR and spoke with nursing staff who noted that the last time the patient received dilaudid was at 5am however she has been receiving ativan and robaxin. Those meds were held. A code stroke was still called due to change in mental status, her NIHSS 42.  The neurologist was contacted.  Attending surgeon was contacted.  We ordered a full set of labs and she will get imaging.  There was a documented Temp of 103, The team was not notified of this temperature spike. Upon speaking with the nursing manager, she states she went back into the history and the Temp was documented wrong in the chart and the patient was never febrile.

## 2024-02-13 NOTE — PROGRESS NOTE ADULT - ASSESSMENT
79yo Female admitted for enteritis causing partial bowel obstruction. Now POD 3 for ex lap, ANGELA Hartmanns, due to diverticulitis causing SBO due to collection and adhesions. Post-operative course complicated by pain and anemia, now both have improved. Over night, patient had episode of AMS, concerning for possible CVA, however w/u negative for CVA. Trops elevated with EKG demonstrating NSR at time of RRT, Cardiology consulted and repeat trops ordered this morning.     Plan:   - F/U Cards consult  - trend trops  - NPO w/IVF   - Monitor ostomy output. productive of bowel sweat on morning rounds  - Prevena for until POD 5  - Kelley removed 2/12, voiding spontaneously  - ID consult, zosyn and flucanazole

## 2024-02-13 NOTE — CONSULT NOTE ADULT - ASSESSMENT
ASSESSMENT: 78 year old woman with episode of unresponsiveness overnight, now with NIHSS=0 but age-insterminate Left ICA occlusion with recanalization and flow in distal ICA and MCA/STELLA circulation    NEURO:   -Neurologically improved from last night, myron mental status  - No stroke on CT head and no symptoms currently to suggest stroke  -Continue close monitoring for neurologic deterioration    - neuro checks q 4H   - SBP goal per primary team avoiding rapid fluctuations and hypotension     - LEFT ICA OCCLUSION    -Carotid duplex      - if no flow would use antiplatelet agent when cleared by surgery      - if any flow within LEFT ICA  would suggest vascular surgery eval     -ANTITHROMBOTIC THERAPY: ASA 81 daily when OK from surgery  -titrate statin to LDL goal less than 70  -Physical therapy/OT as tolerated   -DVT ppx: Heparin s.c [X LMWH [] SCD[]    -maintain adequate hydration  per surgery     OTHER:  condition and plan of care d/w patient, questions and concerns addressed.     will follow with you    Radhames Danielson MD PhD   [phone]

## 2024-02-13 NOTE — CONSULT NOTE ADULT - CONSULT REASON
CERTIFICATE OF RETURN TO SCHOOL      This is to certify that Ran Davila has been under my care from 12/30/2019 and can return to school on 1/6/2020.          RESTRICTIONS:  none        REMARKS:  none                      Signature:__________________________________________12/30/2019                                     Mamie Thomas MD         AMS

## 2024-02-13 NOTE — PHARMACOTHERAPY INTERVENTION NOTE - COMMENTS
Referenced pharmacy fill records to obtain medication list. Outpatient Medication Review updated.    HOME MEDICATIONS:  atorvastatin 40 mg oral tablet: 1 tab(s) orally once a day (30 Jan 2024 20:09)  cilostazol 100 mg oral tablet: 1 tab(s) orally 2 times a day (30 Jan 2024 20:09)  enalapril 5 mg oral tablet: 1 tab(s) orally once a day (30 Jan 2024 20:09)  famotidine 20 mg oral tablet: 1 tab(s) orally 2 times a day (30 Jan 2024 20:09)  levothyroxine 75 mcg (0.075 mg) oral tablet: 1 tab(s) orally once a day (30 Jan 2024 20:09)  meloxicam 15 mg oral tablet: 1 tab(s) orally once a day (30 Jan 2024 20:09)  Myrbetriq 50 mg oral tablet, extended release: 1 tab(s) orally once a day (30 Jan 2024 20:09)  sertraline 100 mg oral tablet: 2 tab(s) orally once a day (30 Jan 2024 20:09)  
Code Rapid Response/Stroke Attended

## 2024-02-13 NOTE — PROGRESS NOTE ADULT - SUBJECTIVE AND OBJECTIVE BOX
SURGERY PROGRESS NOTE    Subjective:   Patient examined at bedside this AM. Reports she is feeling fine this morning. Does not recall events preceding RRT. Overnight, patient was found unable to be aroused, had pinpoint pupils on exam. Rapid response was called and ultimately became a code stroke. CVA work up negative for any evidence of acute intracranial problems.       Objective:  Vital Signs  T(C): 36.3 (02-13 @ 04:15), Max: 37.3 (02-12 @ 13:07)  HR: 96 (02-13 @ 04:15) (84 - 105)  BP: 153/83 (02-13 @ 04:15) (139/86 - 153/94)  RR: 18 (02-13 @ 04:15) (18 - 18)  SpO2: 91% (02-13 @ 04:15) (91% - 99%)  02-12-24 @ 07:01  -  02-13-24 @ 07:00  --------------------------------------------------------  IN:  Total IN: 0 mL    OUT:    Bulb (mL): 45 mL    Colostomy (mL): 10 mL    Indwelling Catheter - Urethral (mL): 225 mL    Nasogastric/Oral tube (mL): 50 mL    Voided (mL): 1600 mL  Total OUT: 1930 mL    Total NET: -1930 mL      Physical Exam:  General: alert and oriented, NAD  Resp: airway patent, respirations unlabored  Abdomen: soft, nondistended, appropriately tender to palpation, ostomy pink and viable, bowel sweat in ostomy bag  Extremities: no edema  Skin: warm, dry, appropriate color    Labs:                        10.0   16.09 )-----------( 377      ( 13 Feb 2024 06:35 )             29.5   02-13    134<L>  |  99  |  11.4  ----------------------------<  405<H>  2.9<LL>   |  26.0  |  0.80    Ca    7.3<L>      13 Feb 2024 06:35  Phos  3.0     02-13  Mg     1.5     02-13    TPro  5.3<L>  /  Alb  2.0<L>  /  TBili  0.3<L>  /  DBili  x   /  AST  17  /  ALT  6   /  AlkPhos  85  02-13    CAPILLARY BLOOD GLUCOSE  91 (13 Feb 2024 03:36)  POCT Blood Glucose.: 94 mg/dL (13 Feb 2024 03:23)  POCT Blood Glucose.: 158 mg/dL (12 Feb 2024 20:17)  POCT Blood Glucose.: 150 mg/dL (12 Feb 2024 20:06)  POCT Blood Glucose.: 67 mg/dL (12 Feb 2024 19:35)  POCT Blood Glucose.: 61 mg/dL (12 Feb 2024 19:32)  POCT Blood Glucose.: 81 mg/dL (12 Feb 2024 13:02)

## 2024-02-13 NOTE — PROGRESS NOTE ADULT - ASSESSMENT
79yo F admitted for septic enterocolitis having continued abdominal pain and distension. Pt admitted to medicine service 1/30 presenting with abdominal pain, watery diarrhea, Tmax 105, tachycardia with diffuse small bowel and colonic inflammation most pronounced in the lower quadrant. CT A/P revealed a  distended stomach with dilated air-fluid filled small bowel loops with at least one transition in the right lower quadrant with colonic diverticulosis. Pt failed conservative managment and was taken to OR on 2/10/24 for Ex. Lap. In the OR patient was found with severe soft adhesions, collection entered and cultured and appears secondary to perforated diverticulitis with collection causing obstruction. 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 OR cultures + e.faecalis and yeast. Patient with post op leukocytosis and drop in h/H    - bcx ngtd  - OR cx e.faecalis and yeast, f/u final cx  - Continue zosyn and fluconazole  - trend H/H- s/p PRBC, now stable  - Trend Fever  - Trend Leukocytosis- improving, may be reactive. may need repeat Ct if worsening       Will Follow

## 2024-02-13 NOTE — CONSULT NOTE ADULT - REASON FOR ADMISSION
Sepsis due to enterocolitis

## 2024-02-13 NOTE — PROGRESS NOTE ADULT - SUBJECTIVE AND OBJECTIVE BOX
[facility]                                                INFECTIOUS DISEASES  =======================================================                   Leonid Bauman#   Seamus Holman MD#    Connie Street MD*                           Mai Carrizales MD*   Nadeen Phillips MD*           Diplomates American Board of Internal Medicine & Infectious Diseases                  # [place] Office - Appt - Tel  [phone] Fax [phone]                * [place] Office - Appt - Tel [phone] Fax [phone]                                  Hospital Consult line:  [phone]  =======================================================      [facility]-[id number]  YAMILETH DAVIS   follow up for: pt had RRt converted to code stroke overnight, patient was unarousable and had pinpoint pupils, thought to be due to medications  CT head neg for acute CVA  pt lethargic but axox3, denies complaints  patient seen and examined.       I have personally reviewed the labs and data; pertinent labs and data are listed in this note; please see below.   ===================================================  REVIEW OF SYSTEMS:  CONSTITUTIONAL:  No Fever or chills  HEENT:  No diplopia or blurred vision.  No earache, sore throat or runny nose.  CARDIOVASCULAR:  No pressure, squeezing, strangling, tightness, heaviness or aching about the chest, neck, axilla or epigastrium.  RESPIRATORY:  No cough, shortness of breath  GASTROINTESTINAL:  No nausea, vomiting or diarrhea.  GENITOURINARY:  No dysuria, frequency or urgency. No Blood in urine  MUSCULOSKELETAL:  no joint aches, no muscle pain  SKIN:  No change in skin, hair or nails.  NEUROLOGIC:  No Headaches, seizures or weakness.  PSYCHIATRIC:  No disorder of thought or mood.  ENDOCRINE:  No heat or cold intolerance  HEMATOLOGICAL:  No easy bruising or bleeding.    =======================================================  Allergies    No Known Allergies    Intolerances    Antibiotics:  fluconAZOLE IVPB 400 milliGRAM(s) IV Intermittent every 24 hours  fluconAZOLE IVPB      piperacillin/tazobactam IVPB.. 3.375 Gram(s) IV Intermittent every 8 hours    Other medications:  atorvastatin 40 milliGRAM(s) Oral at bedtime  budesonide  80 MICROgram(s)/formoterol 4.5 MICROgram(s) Inhaler 2 Puff(s) Inhalation two times a day  dextrose 5% + sodium chloride 0.45%. 1000 milliLiter(s) IV Continuous <Continuous>  heparin   Injectable 5000 Unit(s) SubCutaneous every 8 hours  levothyroxine Injectable 37.5 MICROGram(s) IV Push at bedtime  metoclopramide Injectable 5 milliGRAM(s) IV Push daily  nicotine -  14 mG/24Hr(s) Patch 1 Patch Transdermal daily  pantoprazole  Injectable 40 milliGRAM(s) IV Push with breakfast  sertraline 200 milliGRAM(s) Oral daily    ======================================================  Physical Exam:  ============  T(F): 98 (13 Feb 2024 12:21), Max: 99.1 (13 Feb 2024 00:14)  HR: 98 (13 Feb 2024 12:21)  BP: 129/84 (13 Feb 2024 12:21)  RR: 18 (13 Feb 2024 12:21)  SpO2: 94% (13 Feb 2024 12:21) (91% - 99%)  temp max in last 48H T(F): , Max: 99.2 (02-12-24 @ 13:07)    General:  No acute distress, frail elderly lady, lethargic arousable, sump in place  Eye: no conjunctival pallor, no scleral icterus  Neck: Supple, No lymphadenopathy.  Respiratory: Lungs are clear to auscultation, Respirations are non-labored.  Cardiovascular: Normal rate, Regular rhythm,  s1+s2 +lubna  Gastrointestinal: Soft, Non-tender, Non-distended, Normal bowel sounds. midline prevena, rlq dima serosanguinous, llq ostomy  Integumentary: No rash.  Neurologic: Alert, Oriented, No focal deficits  Psychiatric: Appropriate mood & affect.  =======================================================  Labs:                        10.0   16.09 )-----------( 377      ( 13 Feb 2024 06:35 )             29.5     02-13    x   |  x   |  x   ----------------------------<  x   3.2<L>   |  x   |  x     Ca    8.2<L>      13 Feb 2024 08:30  Phos  3.0     02-13  Mg     1.5     02-13    TPro  5.3<L>  /  Alb  2.0<L>  /  TBili  0.3<L>  /  DBili  x   /  AST  17  /  ALT  6   /  AlkPhos  85  02-13      Culture - Abscess with Gram Stain (collected 02-10-24 @ 11:30)  Source: .Abscess Abdominal Abscess  Gram Stain (02-11-24 @ 01:06):    Few polymorphonuclear leukocytes seen per low power field    Few Gram positive cocci in pairs seen per oil power field    Rare Yeast like cells seen per oil power field  Preliminary Report (02-12-24 @ 20:32):    Moderate Enterococcus faecalis    Few Yeast Identification to follow.  Organism: Enterococcus faecalis (02-12-24 @ 19:34)  Organism: Enterococcus faecalis (02-12-24 @ 19:34)    Sensitivities:      Method Type: MARIEL      -  Ampicillin: S <=2 Predicts results to ampicillin/sulbactam, amoxacillin-clavulanate and  piperacillin-tazobactam.      -  Vancomycin: S 2    Culture - Urine (collected 01-30-24 @ 15:40)  Source: Clean Catch Clean Catch (Midstream)  Final Report (02-01-24 @ 00:37):    <10,000 CFU/mL Normal Urogenital Nimco    Culture - Blood (collected 01-30-24 @ 15:22)  Source: .Blood Blood  Final Report (02-04-24 @ 22:00):    No growth at 5 days    Culture - Blood (collected 01-30-24 @ 15:17)  Source: .Blood Blood  Final Report (02-04-24 @ 22:00):    No growth at 5 days    Culture - Urine (collected 07-30-22 @ 03:45)  Source: Clean Catch Clean Catch (Midstream)  Final Report (08-01-22 @ 21:53):    >100,000 CFU/ml Escherichia coli  Organism: Escherichia coli (08-01-22 @ 21:53)  Organism: Escherichia coli (08-01-22 @ 21:53)    Sensitivities:      Method Type: MARIEL      -  Amikacin: S <=16      -  Amoxicillin/Clavulanic Acid: S <=8/4 Consider reserving for cystitis when ampicillin/sulbactam is resistant      -  Ampicillin: R >16 These ampicillin results predict results for amoxicillin      -  Ampicillin/Sulbactam: R >16/8 Enterobacter, Klebsiella aerogenes, Citrobacter, and Serratia may develop resistance during prolonged therapy (3-4 days)      -  Aztreonam: S <=4      -  Cefazolin: S 8 (MIC_CL_COM_ENTERIC_CEFAZU) For uncomplicated UTI with K. pneumoniae, E. coli, or P. mirablis: MARIEL <=16 is sensitive and MARIEL >=32 is resistant. This also predicts results for oral agents cefaclor, cefdinir, cefpodoxime, cefprozil, cefuroxime axetil, cephalexin and locarbef for uncomplicated UTI. Note that some isolates may be susceptible to these agents while testing resistant to cefazolin.      -  Cefepime: S <=2      -  Cefoxitin: S <=8      -  Ceftriaxone: S <=1 Enterobacter, Klebsiella aerogenes, Citrobacter, and Serratia may develop resistance during prolonged therapy      -  Ciprofloxacin: R 1      -  Ertapenem: S <=0.5      -  Gentamicin: R >8      -  Imipenem: S <=1      -  Levofloxacin: I 1      -  Meropenem: S <=1      -  Nitrofurantoin: S <=32 Should not be used to treat pyelonephritis      -  Piperacillin/Tazobactam: S <=8      -  Tigecycline: S <=2      -  Tobramycin: I 8      -  Trimethoprim/Sulfamethoxazole: R >2/38

## 2024-02-14 ENCOUNTER — RESULT REVIEW (OUTPATIENT)
Age: 79
End: 2024-02-14

## 2024-02-14 DIAGNOSIS — I10 ESSENTIAL (PRIMARY) HYPERTENSION: ICD-10-CM

## 2024-02-14 DIAGNOSIS — I65.22 OCCLUSION AND STENOSIS OF LEFT CAROTID ARTERY: ICD-10-CM

## 2024-02-14 DIAGNOSIS — R79.89 OTHER SPECIFIED ABNORMAL FINDINGS OF BLOOD CHEMISTRY: ICD-10-CM

## 2024-02-14 LAB
-  AZTREONAM: SIGNIFICANT CHANGE UP
-  CEFEPIME: SIGNIFICANT CHANGE UP
-  CEFTAZIDIME: SIGNIFICANT CHANGE UP
-  CIPROFLOXACIN: SIGNIFICANT CHANGE UP
-  IMIPENEM: SIGNIFICANT CHANGE UP
-  LEVOFLOXACIN: SIGNIFICANT CHANGE UP
-  MEROPENEM: SIGNIFICANT CHANGE UP
-  PIPERACILLIN/TAZOBACTAM: SIGNIFICANT CHANGE UP
ANION GAP SERPL CALC-SCNC: 11 MMOL/L — SIGNIFICANT CHANGE UP (ref 5–17)
ANION GAP SERPL CALC-SCNC: 13 MMOL/L — SIGNIFICANT CHANGE UP (ref 5–17)
BASOPHILS # BLD AUTO: 0.06 K/UL — SIGNIFICANT CHANGE UP (ref 0–0.2)
BASOPHILS NFR BLD AUTO: 0.4 % — SIGNIFICANT CHANGE UP (ref 0–2)
BUN SERPL-MCNC: 9.1 MG/DL — SIGNIFICANT CHANGE UP (ref 8–20)
BUN SERPL-MCNC: 9.4 MG/DL — SIGNIFICANT CHANGE UP (ref 8–20)
CALCIUM SERPL-MCNC: 7.9 MG/DL — LOW (ref 8.4–10.5)
CALCIUM SERPL-MCNC: 8 MG/DL — LOW (ref 8.4–10.5)
CHLORIDE SERPL-SCNC: 102 MMOL/L — SIGNIFICANT CHANGE UP (ref 96–108)
CHLORIDE SERPL-SCNC: 103 MMOL/L — SIGNIFICANT CHANGE UP (ref 96–108)
CO2 SERPL-SCNC: 24 MMOL/L — SIGNIFICANT CHANGE UP (ref 22–29)
CO2 SERPL-SCNC: 25 MMOL/L — SIGNIFICANT CHANGE UP (ref 22–29)
CREAT SERPL-MCNC: 0.82 MG/DL — SIGNIFICANT CHANGE UP (ref 0.5–1.3)
CREAT SERPL-MCNC: 0.86 MG/DL — SIGNIFICANT CHANGE UP (ref 0.5–1.3)
EGFR: 70 ML/MIN/1.73M2 — SIGNIFICANT CHANGE UP
EGFR: 74 ML/MIN/1.73M2 — SIGNIFICANT CHANGE UP
EOSINOPHIL # BLD AUTO: 0.16 K/UL — SIGNIFICANT CHANGE UP (ref 0–0.5)
EOSINOPHIL NFR BLD AUTO: 1.2 % — SIGNIFICANT CHANGE UP (ref 0–6)
GLUCOSE BLDC GLUCOMTR-MCNC: 110 MG/DL — HIGH (ref 70–99)
GLUCOSE BLDC GLUCOMTR-MCNC: 111 MG/DL — HIGH (ref 70–99)
GLUCOSE BLDC GLUCOMTR-MCNC: 73 MG/DL — SIGNIFICANT CHANGE UP (ref 70–99)
GLUCOSE BLDC GLUCOMTR-MCNC: 92 MG/DL — SIGNIFICANT CHANGE UP (ref 70–99)
GLUCOSE SERPL-MCNC: 110 MG/DL — HIGH (ref 70–99)
GLUCOSE SERPL-MCNC: 94 MG/DL — SIGNIFICANT CHANGE UP (ref 70–99)
HCT VFR BLD CALC: 31.6 % — LOW (ref 34.5–45)
HGB BLD-MCNC: 10.2 G/DL — LOW (ref 11.5–15.5)
IMM GRANULOCYTES NFR BLD AUTO: 0.6 % — SIGNIFICANT CHANGE UP (ref 0–0.9)
LYMPHOCYTES # BLD AUTO: 0.54 K/UL — LOW (ref 1–3.3)
LYMPHOCYTES # BLD AUTO: 4 % — LOW (ref 13–44)
MAGNESIUM SERPL-MCNC: 1.6 MG/DL — SIGNIFICANT CHANGE UP (ref 1.6–2.6)
MCHC RBC-ENTMCNC: 29.3 PG — SIGNIFICANT CHANGE UP (ref 27–34)
MCHC RBC-ENTMCNC: 32.3 GM/DL — SIGNIFICANT CHANGE UP (ref 32–36)
MCV RBC AUTO: 90.8 FL — SIGNIFICANT CHANGE UP (ref 80–100)
METHOD TYPE: SIGNIFICANT CHANGE UP
MONOCYTES # BLD AUTO: 1.08 K/UL — HIGH (ref 0–0.9)
MONOCYTES NFR BLD AUTO: 8 % — SIGNIFICANT CHANGE UP (ref 2–14)
NEUTROPHILS # BLD AUTO: 11.53 K/UL — HIGH (ref 1.8–7.4)
NEUTROPHILS NFR BLD AUTO: 85.8 % — HIGH (ref 43–77)
PHOSPHATE SERPL-MCNC: 2.6 MG/DL — SIGNIFICANT CHANGE UP (ref 2.4–4.7)
PLATELET # BLD AUTO: 429 K/UL — HIGH (ref 150–400)
POTASSIUM SERPL-MCNC: 2.8 MMOL/L — CRITICAL LOW (ref 3.5–5.3)
POTASSIUM SERPL-MCNC: 3.1 MMOL/L — LOW (ref 3.5–5.3)
POTASSIUM SERPL-SCNC: 2.8 MMOL/L — CRITICAL LOW (ref 3.5–5.3)
POTASSIUM SERPL-SCNC: 3.1 MMOL/L — LOW (ref 3.5–5.3)
RBC # BLD: 3.48 M/UL — LOW (ref 3.8–5.2)
RBC # FLD: 14.5 % — SIGNIFICANT CHANGE UP (ref 10.3–14.5)
SODIUM SERPL-SCNC: 139 MMOL/L — SIGNIFICANT CHANGE UP (ref 135–145)
SODIUM SERPL-SCNC: 139 MMOL/L — SIGNIFICANT CHANGE UP (ref 135–145)
TROPONIN T, HIGH SENSITIVITY RESULT: 131 NG/L — HIGH (ref 0–51)
TROPONIN T, HIGH SENSITIVITY RESULT: 133 NG/L — HIGH (ref 0–51)
TROPONIN T, HIGH SENSITIVITY RESULT: 134 NG/L — HIGH (ref 0–51)
WBC # BLD: 13.45 K/UL — HIGH (ref 3.8–10.5)
WBC # FLD AUTO: 13.45 K/UL — HIGH (ref 3.8–10.5)

## 2024-02-14 PROCEDURE — 99232 SBSQ HOSP IP/OBS MODERATE 35: CPT

## 2024-02-14 PROCEDURE — 93321 DOPPLER ECHO F-UP/LMTD STD: CPT | Mod: 26

## 2024-02-14 PROCEDURE — 99223 1ST HOSP IP/OBS HIGH 75: CPT

## 2024-02-14 PROCEDURE — 93880 EXTRACRANIAL BILAT STUDY: CPT | Mod: 26

## 2024-02-14 PROCEDURE — 93308 TTE F-UP OR LMTD: CPT | Mod: 26

## 2024-02-14 RX ORDER — ALPRAZOLAM 0.25 MG
0.5 TABLET ORAL ONCE
Refills: 0 | Status: DISCONTINUED | OUTPATIENT
Start: 2024-02-14 | End: 2024-02-14

## 2024-02-14 RX ORDER — HYDRALAZINE HCL 50 MG
10 TABLET ORAL ONCE
Refills: 0 | Status: COMPLETED | OUTPATIENT
Start: 2024-02-14 | End: 2024-02-14

## 2024-02-14 RX ORDER — MAGNESIUM SULFATE 500 MG/ML
2 VIAL (ML) INJECTION ONCE
Refills: 0 | Status: COMPLETED | OUTPATIENT
Start: 2024-02-14 | End: 2024-02-14

## 2024-02-14 RX ORDER — POTASSIUM CHLORIDE 20 MEQ
40 PACKET (EA) ORAL EVERY 4 HOURS
Refills: 0 | Status: COMPLETED | OUTPATIENT
Start: 2024-02-14 | End: 2024-02-14

## 2024-02-14 RX ORDER — POTASSIUM PHOSPHATE, MONOBASIC POTASSIUM PHOSPHATE, DIBASIC 236; 224 MG/ML; MG/ML
15 INJECTION, SOLUTION INTRAVENOUS ONCE
Refills: 0 | Status: COMPLETED | OUTPATIENT
Start: 2024-02-14 | End: 2024-02-14

## 2024-02-14 RX ORDER — METOPROLOL TARTRATE 50 MG
25 TABLET ORAL
Refills: 0 | Status: DISCONTINUED | OUTPATIENT
Start: 2024-02-14 | End: 2024-02-14

## 2024-02-14 RX ORDER — METOPROLOL TARTRATE 50 MG
25 TABLET ORAL DAILY
Refills: 0 | Status: DISCONTINUED | OUTPATIENT
Start: 2024-02-15 | End: 2024-02-24

## 2024-02-14 RX ORDER — POTASSIUM CHLORIDE 20 MEQ
20 PACKET (EA) ORAL
Refills: 0 | Status: DISCONTINUED | OUTPATIENT
Start: 2024-02-14 | End: 2024-02-17

## 2024-02-14 RX ADMIN — Medication 400 MILLIGRAM(S): at 14:57

## 2024-02-14 RX ADMIN — Medication 5 MILLIGRAM(S): at 11:36

## 2024-02-14 RX ADMIN — Medication 0.5 MILLIGRAM(S): at 20:24

## 2024-02-14 RX ADMIN — ATORVASTATIN CALCIUM 40 MILLIGRAM(S): 80 TABLET, FILM COATED ORAL at 21:39

## 2024-02-14 RX ADMIN — Medication 37.5 MICROGRAM(S): at 21:49

## 2024-02-14 RX ADMIN — Medication 10 MILLIGRAM(S): at 20:24

## 2024-02-14 RX ADMIN — PIPERACILLIN AND TAZOBACTAM 25 GRAM(S): 4; .5 INJECTION, POWDER, LYOPHILIZED, FOR SOLUTION INTRAVENOUS at 06:57

## 2024-02-14 RX ADMIN — Medication 1000 MILLIGRAM(S): at 15:50

## 2024-02-14 RX ADMIN — Medication 40 MILLIEQUIVALENT(S): at 16:18

## 2024-02-14 RX ADMIN — Medication 25 GRAM(S): at 14:36

## 2024-02-14 RX ADMIN — PANTOPRAZOLE SODIUM 40 MILLIGRAM(S): 20 TABLET, DELAYED RELEASE ORAL at 07:46

## 2024-02-14 RX ADMIN — SODIUM CHLORIDE 84 MILLILITER(S): 9 INJECTION, SOLUTION INTRAVENOUS at 20:23

## 2024-02-14 RX ADMIN — Medication 25 MILLIGRAM(S): at 16:24

## 2024-02-14 RX ADMIN — Medication 20 MILLIEQUIVALENT(S): at 14:36

## 2024-02-14 RX ADMIN — PIPERACILLIN AND TAZOBACTAM 25 GRAM(S): 4; .5 INJECTION, POWDER, LYOPHILIZED, FOR SOLUTION INTRAVENOUS at 14:37

## 2024-02-14 RX ADMIN — BUDESONIDE AND FORMOTEROL FUMARATE DIHYDRATE 2 PUFF(S): 160; 4.5 AEROSOL RESPIRATORY (INHALATION) at 21:39

## 2024-02-14 RX ADMIN — Medication 400 MILLIGRAM(S): at 08:32

## 2024-02-14 RX ADMIN — FLUCONAZOLE 100 MILLIGRAM(S): 150 TABLET ORAL at 05:43

## 2024-02-14 RX ADMIN — Medication 1000 MILLIGRAM(S): at 09:00

## 2024-02-14 RX ADMIN — HEPARIN SODIUM 5000 UNIT(S): 5000 INJECTION INTRAVENOUS; SUBCUTANEOUS at 14:53

## 2024-02-14 RX ADMIN — Medication 1 PATCH: at 10:21

## 2024-02-14 RX ADMIN — BUDESONIDE AND FORMOTEROL FUMARATE DIHYDRATE 2 PUFF(S): 160; 4.5 AEROSOL RESPIRATORY (INHALATION) at 08:32

## 2024-02-14 RX ADMIN — Medication 1 PATCH: at 12:41

## 2024-02-14 RX ADMIN — POTASSIUM PHOSPHATE, MONOBASIC POTASSIUM PHOSPHATE, DIBASIC 62.5 MILLIMOLE(S): 236; 224 INJECTION, SOLUTION INTRAVENOUS at 14:36

## 2024-02-14 RX ADMIN — Medication 40 MILLIEQUIVALENT(S): at 21:39

## 2024-02-14 RX ADMIN — SERTRALINE 200 MILLIGRAM(S): 25 TABLET, FILM COATED ORAL at 11:36

## 2024-02-14 RX ADMIN — HEPARIN SODIUM 5000 UNIT(S): 5000 INJECTION INTRAVENOUS; SUBCUTANEOUS at 06:34

## 2024-02-14 RX ADMIN — HEPARIN SODIUM 5000 UNIT(S): 5000 INJECTION INTRAVENOUS; SUBCUTANEOUS at 23:13

## 2024-02-14 RX ADMIN — Medication 0.5 MILLIGRAM(S): at 21:39

## 2024-02-14 RX ADMIN — PIPERACILLIN AND TAZOBACTAM 25 GRAM(S): 4; .5 INJECTION, POWDER, LYOPHILIZED, FOR SOLUTION INTRAVENOUS at 23:13

## 2024-02-14 NOTE — CHART NOTE - NSCHARTNOTEFT_GEN_A_CORE
Source: Patient [ ]  Family [ ]   other [x ]    Current Diet: Diet, NPO with Tube Feed:   Tube Feeding Modality: Nasogastric  Jevity 1.5 Mason (JEVITY1.5)  Total Volume for 24 Hours (mL): 240  Continuous  Starting Tube Feed Rate {mL per Hour}: 10  Increase Tube Feed Rate by (mL): 0  Until Goal Tube Feed Rate (mL per Hour): 10  Tube Feed Duration (in Hours): 24  Tube Feed Start Time: 11:30 (02-13-24 @ 11:14)    Enteral /Parenteral Nutrition: Trickle feeds of Jevity 1.5 infusing at 10 ml/hr     Current Weight:   (2/2) 136.4 lbs  (1/30) 123 lbs  No new weight  Noted with 1+ b/l foot edema, continue to trend    Pertinent Medications: MEDICATIONS  (STANDING):  atorvastatin 40 milliGRAM(s) Oral at bedtime  dextrose 5% + sodium chloride 0.45%. 1000 milliLiter(s) (84 mL/Hr) IV Continuous <Continuous>  fluconAZOLE IVPB 400 milliGRAM(s) IV Intermittent every 24 hours  levothyroxine Injectable 37.5 MICROGram(s) IV Push at bedtime  metoclopramide Injectable 5 milliGRAM(s) IV Push daily  pantoprazole  Injectable 40 milliGRAM(s) IV Push with breakfast  piperacillin/tazobactam IVPB.. 3.375 Gram(s) IV Intermittent every 8 hours  sertraline 200 milliGRAM(s) Oral daily    MEDICATIONS  (PRN):  ondansetron Injectable 4 milliGRAM(s) IV Push every 6 hours PRN Nausea and/or Vomiting    Pertinent Labs: CBC Full  -  ( 14 Feb 2024 06:15 )  WBC Count : 13.45 K/uL  RBC Count : 3.48 M/uL  Hemoglobin : 10.2 g/dL  Hematocrit : 31.6 %    02-14 Na139 mmol/L Glu 110 mg/dL<H> K+ 3.1 mmol/L<L> Cr  0.86 mg/dL BUN 9.4 mg/dL Phos 2.6 mg/dL Alb n/a   PAB n/a       Skin: Surgical incision to abdomen and IAD per documentation  [place]: 17    Nutrition focused physical exam conducted previously - with signs of malnutrition present    Subcutaneous fat loss: [x ] Orbital fat pads region, [x ]Buccal fat region, [ ]Triceps region,  [ ]Ribs region    Muscle wasting: [ x]Temples region, [ x]Clavicle region, [x ]Shoulder region, [ ]Scapula region, [ ]Interosseous region,  [ ]thigh region, [ ]Calf region    Estimated Needs:   [x ] no change since previous assessment  [ ] recalculated:     Current Nutrition Diagnosis: Pt remains at high nutrition risk secondary to malnutrition (moderate, chronic) related to inability to meet sufficient protein-energy in setting of enteritis causing partial bowel obstruction. Now s/p ex lap, ANGELA, Hartmanns as evidenced by 20% weight loss, meets<75%EER>1 mo, mod fat/muscle depletion. Pt receiving trickle feeds via NGT of Jevity 1.5. RD to follow up as feasible.     Recommendations:   1) Suggest change formula to Vital 1.5; as feasible, increase 10 ml/hr q8 hrs until goal rate of 60 ml/hr x 20 hrs to provide 1200 ml, 1800 kcal, 81g protein, 917 ml free water, and >100% of RDIs for vitamins/minerals. Additional free water per MD discretion.   2) Monitor electrolytes and replete PRN.  3) Rx: liquid MVI daily.  4) Monitor tube feed tolerance.  5) Obtain daily weights to monitor trends.     Monitoring and Evaluation:   [ ] PO intake [ x] Tolerance to diet prescription [X] Weights  [X] Follow up per protocol [X] Labs: chem 8, mg, phos, H/H.

## 2024-02-14 NOTE — PROGRESS NOTE ADULT - SUBJECTIVE AND OBJECTIVE BOX
HPI/OVERNIGHT EVENTS: Patient seen and examined at bedside this AM. No overnight events. No complaints. Denies fever, chills, nausea, vomiting, chest pain, SOB, dizziness, abd pain or any other concerning symptoms.    Vital Signs Last 24 Hrs  T(C): 36.5 (14 Feb 2024 08:39), Max: 36.8 (14 Feb 2024 01:07)  T(F): 97.7 (14 Feb 2024 08:39), Max: 98.2 (14 Feb 2024 01:07)  HR: 90 (14 Feb 2024 08:39) (89 - 98)  BP: 150/86 (14 Feb 2024 09:57) (129/84 - 168/100)  BP(mean): --  RR: 18 (14 Feb 2024 08:39) (18 - 18)  SpO2: 96% (14 Feb 2024 08:39) (93% - 96%)    Parameters below as of 14 Feb 2024 08:39  Patient On (Oxygen Delivery Method): room air        I&O's Detail    13 Feb 2024 07:01  -  14 Feb 2024 07:00  --------------------------------------------------------  IN:    dextrose 5% + sodium chloride 0.45%: 1008 mL    IV PiggyBack: 300 mL  Total IN: 1308 mL    OUT:    Bulb (mL): 10 mL    Nasogastric/Oral tube (mL): 120 mL    Voided (mL): 2150 mL  Total OUT: 2280 mL    Total NET: -972 mL          Constitutional: patient resting comfortably in bed, in no acute distress  HEENT: EOMI, PERRLA, MMM.  Respiratory: Non labored breathing on RA  Cardiovascular: RRR  Gastrointestinal: Abdomen soft, non-tender, non-distended, no rebound tenderness / guarding  Musculoskeletal: No joint pain, swelling or deformity; no limitation of movement  Vascular: Extremities warm and well perfused.     LABS:                        10.2   13.45 )-----------( 429      ( 14 Feb 2024 06:15 )             31.6     02-14    139  |  103  |  9.4  ----------------------------<  110<H>  3.1<L>   |  25.0  |  0.86    Ca    7.9<L>      14 Feb 2024 06:15  Phos  2.6     02-14  Mg     1.6     02-14    TPro  5.3<L>  /  Alb  2.0<L>  /  TBili  0.3<L>  /  DBili  x   /  AST  17  /  ALT  6   /  AlkPhos  85  02-13    PT/INR - ( 13 Feb 2024 03:33 )   PT: 16.6 sec;   INR: 1.51 ratio         PTT - ( 13 Feb 2024 03:33 )  PTT:37.3 sec  Urinalysis Basic - ( 14 Feb 2024 06:15 )    Color: x / Appearance: x / SG: x / pH: x  Gluc: 110 mg/dL / Ketone: x  / Bili: x / Urobili: x   Blood: x / Protein: x / Nitrite: x   Leuk Esterase: x / RBC: x / WBC x   Sq Epi: x / Non Sq Epi: x / Bacteria: x        MEDICATIONS  (STANDING):  atorvastatin 40 milliGRAM(s) Oral at bedtime  budesonide  80 MICROgram(s)/formoterol 4.5 MICROgram(s) Inhaler 2 Puff(s) Inhalation two times a day  dextrose 5% + sodium chloride 0.45%. 1000 milliLiter(s) (84 mL/Hr) IV Continuous <Continuous>  enalapril 5 milliGRAM(s) Oral daily  fluconAZOLE IVPB 400 milliGRAM(s) IV Intermittent every 24 hours  fluconAZOLE IVPB      heparin   Injectable 5000 Unit(s) SubCutaneous every 8 hours  levothyroxine Injectable 37.5 MICROGram(s) IV Push at bedtime  metoclopramide Injectable 5 milliGRAM(s) IV Push daily  nicotine -  14 mG/24Hr(s) Patch 1 Patch Transdermal daily  pantoprazole  Injectable 40 milliGRAM(s) IV Push with breakfast  piperacillin/tazobactam IVPB.. 3.375 Gram(s) IV Intermittent every 8 hours  sertraline 200 milliGRAM(s) Oral daily    MEDICATIONS  (PRN):  acetaminophen   IVPB .. 1000 milliGRAM(s) IV Intermittent every 6 hours PRN Mild Pain (1 - 3), Moderate Pain (4 - 6), Severe Pain (7 - 10)  albuterol/ipratropium for Nebulization. 3 milliLiter(s) Nebulizer once PRN Shortness of Breath and/or Wheezing  melatonin 3 milliGRAM(s) Oral at bedtime PRN Insomnia  ondansetron Injectable 4 milliGRAM(s) IV Push every 6 hours PRN Nausea and/or Vomiting      MICRO:   Cultures     STUDIES:   EKG, CXR, U/S, CT, MRI

## 2024-02-14 NOTE — PROGRESS NOTE ADULT - SUBJECTIVE AND OBJECTIVE BOX
Feels ok   afebrile   abd soft  slight distenetion nontender  inc c/d   pt on trickle feeds   dima serous   supportive care

## 2024-02-14 NOTE — PROGRESS NOTE ADULT - SUBJECTIVE AND OBJECTIVE BOX
[facility]                                                INFECTIOUS DISEASES  =======================================================                   Leonid Bauman#   Seamus Holman MD#    Connie Street MD*                           Mai Carrizales MD*   Nadeen Phillips MD*           Diplomates American Board of Internal Medicine & Infectious Diseases                  # [place] Office - Appt - Tel  [phone] Fax [phone]                * [place] Office - Appt - Tel [phone] Fax [phone]                                  Hospital Consult line:  [phone]  =======================================================      [facility]-[id number]  YAMILETH DAVIS   follow up for: perforated diverticulitis  c/o abdominal cramping  no fever  patient seen and examined.       I have personally reviewed the labs and data; pertinent labs and data are listed in this note; please see below.   ===================================================  REVIEW OF SYSTEMS:  CONSTITUTIONAL:  No Fever or chills  HEENT:  No diplopia or blurred vision.  No earache, sore throat or runny nose.  CARDIOVASCULAR:  No pressure, squeezing, strangling, tightness, heaviness or aching about the chest, neck, axilla or epigastrium.  RESPIRATORY:  No cough, shortness of breath  GASTROINTESTINAL:  No nausea, vomiting or diarrhea. + abdominal cramping  GENITOURINARY:  No dysuria, frequency or urgency. No Blood in urine  MUSCULOSKELETAL:  no joint aches, no muscle pain  SKIN:  No change in skin, hair or nails.  NEUROLOGIC:  No Headaches, seizures or weakness.  PSYCHIATRIC:  No disorder of thought or mood.  ENDOCRINE:  No heat or cold intolerance  HEMATOLOGICAL:  No easy bruising or bleeding.    =======================================================  Allergies    No Known Allergies    Intolerances    Antibiotics:  fluconAZOLE IVPB 400 milliGRAM(s) IV Intermittent every 24 hours  fluconAZOLE IVPB      piperacillin/tazobactam IVPB.. 3.375 Gram(s) IV Intermittent every 8 hours    Other medications:  atorvastatin 40 milliGRAM(s) Oral at bedtime  budesonide  80 MICROgram(s)/formoterol 4.5 MICROgram(s) Inhaler 2 Puff(s) Inhalation two times a day  dextrose 5% + sodium chloride 0.45%. 1000 milliLiter(s) IV Continuous <Continuous>  heparin   Injectable 5000 Unit(s) SubCutaneous every 8 hours  levothyroxine Injectable 37.5 MICROGram(s) IV Push at bedtime  metoclopramide Injectable 5 milliGRAM(s) IV Push daily  nicotine -  14 mG/24Hr(s) Patch 1 Patch Transdermal daily  pantoprazole  Injectable 40 milliGRAM(s) IV Push with breakfast  sertraline 200 milliGRAM(s) Oral daily    ======================================================  Physical Exam:  ============  Vital Signs Last 24 Hrs  T(C): 36.5 (14 Feb 2024 08:39), Max: 36.8 (14 Feb 2024 01:07)  T(F): 97.7 (14 Feb 2024 08:39), Max: 98.2 (14 Feb 2024 01:07)  HR: 90 (14 Feb 2024 08:39) (89 - 98)  BP: 150/86 (14 Feb 2024 09:57) (129/84 - 168/100)  BP(mean): --  RR: 18 (14 Feb 2024 08:39) (18 - 18)  SpO2: 96% (14 Feb 2024 08:39) (93% - 96%)    Parameters below as of 14 Feb 2024 08:39  Patient On (Oxygen Delivery Method): room air        General:  No acute distress, frail elderly lady, lethargic arousable, sump in place  Eye: no conjunctival pallor, no scleral icterus  Neck: Supple, No lymphadenopathy.  Respiratory: Lungs are clear to auscultation, Respirations are non-labored.  Cardiovascular: Normal rate, Regular rhythm,  s1+s2 +lubna  Gastrointestinal: Soft, Non-tender, Non-distended, Normal bowel sounds. midline prevena, RLQ CATA serous, LLQ ostomy  Integumentary: No rash.  Neurologic: Alert, Oriented, No focal deficits  Psychiatric: Appropriate mood & affect.  =======================================================  Labs:                                        10.2   13.45 )-----------( 429      ( 14 Feb 2024 06:15 )             31.6       02-14    139  |  103  |  9.4  ----------------------------<  110<H>  3.1<L>   |  25.0  |  0.86    Ca    7.9<L>      14 Feb 2024 06:15  Phos  2.6     02-14  Mg     1.6     02-14    TPro  5.3<L>  /  Alb  2.0<L>  /  TBili  0.3<L>  /  DBili  x   /  AST  17  /  ALT  6   /  AlkPhos  85  02-13              Urinalysis Basic - ( 14 Feb 2024 06:15 )    Color: x / Appearance: x / SG: x / pH: x  Gluc: 110 mg/dL / Ketone: x  / Bili: x / Urobili: x   Blood: x / Protein: x / Nitrite: x   Leuk Esterase: x / RBC: x / WBC x   Sq Epi: x / Non Sq Epi: x / Bacteria: x        PT/INR - ( 13 Feb 2024 03:33 )   PT: 16.6 sec;   INR: 1.51 ratio         PTT - ( 13 Feb 2024 03:33 )  PTT:37.3 sec    CARDIAC MARKERS ( 13 Feb 2024 03:33 )  x     / x     / 27 U/L / x     / x            CAPILLARY BLOOD GLUCOSE  91 (13 Feb 2024 03:36)      POCT Blood Glucose.: 111 mg/dL (14 Feb 2024 06:32)            Culture - Abscess with Gram Stain (collected 02-10-24 @ 11:30)  Source: .Abscess Abdominal Abscess  Gram Stain (02-11-24 @ 01:06):    Few polymorphonuclear leukocytes seen per low power field    Few Gram positive cocci in pairs seen per oil power field    Rare Yeast like cells seen per oil power field  Preliminary Report (02-12-24 @ 20:32):    Moderate Enterococcus faecalis    Few Yeast Identification to follow.  Organism: Enterococcus faecalis (02-12-24 @ 19:34)  Organism: Enterococcus faecalis (02-12-24 @ 19:34)    Sensitivities:      Method Type: MARIEL      -  Ampicillin: S <=2 Predicts results to ampicillin/sulbactam, amoxacillin-clavulanate and  piperacillin-tazobactam.      -  Vancomycin: S 2    Culture - Urine (collected 01-30-24 @ 15:40)  Source: Clean Catch Clean Catch (Midstream)  Final Report (02-01-24 @ 00:37):    <10,000 CFU/mL Normal Urogenital Nimco    Culture - Blood (collected 01-30-24 @ 15:22)  Source: .Blood Blood  Final Report (02-04-24 @ 22:00):    No growth at 5 days    Culture - Blood (collected 01-30-24 @ 15:17)  Source: .Blood Blood  Final Report (02-04-24 @ 22:00):    No growth at 5 days    Culture - Urine (collected 07-30-22 @ 03:45)  Source: Clean Catch Clean Catch (Midstream)  Final Report (08-01-22 @ 21:53):    >100,000 CFU/ml Escherichia coli  Organism: Escherichia coli (08-01-22 @ 21:53)  Organism: Escherichia coli (08-01-22 @ 21:53)    Sensitivities:      Method Type: MARIEL      -  Amikacin: S <=16      -  Amoxicillin/Clavulanic Acid: S <=8/4 Consider reserving for cystitis when ampicillin/sulbactam is resistant      -  Ampicillin: R >16 These ampicillin results predict results for amoxicillin      -  Ampicillin/Sulbactam: R >16/8 Enterobacter, Klebsiella aerogenes, Citrobacter, and Serratia may develop resistance during prolonged therapy (3-4 days)      -  Aztreonam: S <=4      -  Cefazolin: S 8 (MIC_CL_[id number]) For uncomplicated UTI with K. pneumoniae, E. coli, or P. mirablis: MARIEL <=16 is sensitive and MARIEL >=32 is resistant. This also predicts results for oral agents cefaclor, cefdinir, cefpodoxime, cefprozil, cefuroxime axetil, cephalexin and locarbef for uncomplicated UTI. Note that some isolates may be susceptible to these agents while testing resistant to cefazolin.      -  Cefepime: S <=2      -  Cefoxitin: S <=8      -  Ceftriaxone: S <=1 Enterobacter, Klebsiella aerogenes, Citrobacter, and Serratia may develop resistance during prolonged therapy      -  Ciprofloxacin: R 1      -  Ertapenem: S <=0.5      -  Gentamicin: R >8      -  Imipenem: S <=1      -  Levofloxacin: I 1      -  Meropenem: S <=1      -  Nitrofurantoin: S <=32 Should not be used to treat pyelonephritis      -  Piperacillin/Tazobactam: S <=8      -  Tigecycline: S <=2      -  Tobramycin: I 8      -  Trimethoprim/Sulfamethoxazole: R >2/38

## 2024-02-14 NOTE — PROGRESS NOTE ADULT - SUBJECTIVE AND OBJECTIVE BOX
[facility]                                     Ne[facility] at [place]                                 [facility]                                  [address]                                        [place], [place], [address]                                             [phone]    CC: AMS  HPI:  The patient is a 78y Female who presented with sepsis, abscess and SBO, s/p OR on 2/120/24.  Overnight, at about 0330 she was unresponsive and code stroke was called.  She awoke with painful stimuli per chart.  She had CT scan of head that did not show stroke, had question of Left ICA occlusion on CTA and no perfusion deficits.  She is currently at baseline without neuro deficit.  Neurology is asked to evaluate.    Interval history: no new neuro events    Review of systems (neurology): Denies headache or dizziness. Denies weakness/numbness.  Denies speech/language deficits. Denies diplopia/blurred vision.  Denies confusion    MEDICATIONS  (STANDING):  atorvastatin 40 milliGRAM(s) Oral at bedtime  budesonide  80 MICROgram(s)/formoterol 4.5 MICROgram(s) Inhaler 2 Puff(s) Inhalation two times a day  dextrose 5% + sodium chloride 0.45%. 1000 milliLiter(s) (84 mL/Hr) IV Continuous <Continuous>  enalapril 5 milliGRAM(s) Oral daily  fluconAZOLE IVPB 400 milliGRAM(s) IV Intermittent every 24 hours  fluconAZOLE IVPB      heparin   Injectable 5000 Unit(s) SubCutaneous every 8 hours  levothyroxine Injectable 37.5 MICROGram(s) IV Push at bedtime  metoclopramide Injectable 5 milliGRAM(s) IV Push daily  nicotine -  14 mG/24Hr(s) Patch 1 Patch Transdermal daily  pantoprazole  Injectable 40 milliGRAM(s) IV Push with breakfast  piperacillin/tazobactam IVPB.. 3.375 Gram(s) IV Intermittent every 8 hours  sertraline 200 milliGRAM(s) Oral daily    MEDICATIONS  (PRN):  acetaminophen   IVPB .. 1000 milliGRAM(s) IV Intermittent every 6 hours PRN Mild Pain (1 - 3), Moderate Pain (4 - 6), Severe Pain (7 - 10)  albuterol/ipratropium for Nebulization. 3 milliLiter(s) Nebulizer once PRN Shortness of Breath and/or Wheezing  melatonin 3 milliGRAM(s) Oral at bedtime PRN Insomnia  ondansetron Injectable 4 milliGRAM(s) IV Push every 6 hours PRN Nausea and/or Vomiting      Vital Signs Last 24 Hrs  T(C): 36.5 (14 Feb 2024 08:39), Max: 36.8 (14 Feb 2024 01:07)  T(F): 97.7 (14 Feb 2024 08:39), Max: 98.2 (14 Feb 2024 01:07)  HR: 90 (14 Feb 2024 08:39) (89 - 98)  BP: 150/86 (14 Feb 2024 09:57) (129/84 - 168/100)  BP(mean): --  RR: 18 (14 Feb 2024 08:39) (18 - 18)  SpO2: 96% (14 Feb 2024 08:39) (93% - 96%)    Parameters below as of 14 Feb 2024 08:39  Patient On (Oxygen Delivery Method): room air        Detailed Neurologic Exam:    Mental status: The patient is awake and alert and has normal attention span.  The patient is fully oriented in 3 spheres.  The patient is able to name objects, follow commands, repeat sentences.    Cranial nerves: Pupils equal and react symmetrically to light. There is no visual field deficit to confrontation. Extraocular motion is full with no nystagmus. There is no ptosis. Facial sensation is intact. Facial musculature is symmetric. Palate elevates symmetrically. Tongue is midline.    Motor: There is normal bulk and tone.  There is no tremor.  Strength is 5/5 in the right arm and leg.   Strength is 5/5 in the left arm and leg.    Sensation: Intact to light touch and pin in 4 extremities    Reflexes: 2+ patellar, bicep and BR, plantars are flexor. B/L    Cerebellar: There is no dysmetria on finger to nose testing.    Gait : deferred      LABS:                                    10.2   13.45 )-----------( 429      ( 14 Feb 2024 06:15 )             31.6     02-14    139  |  103  |  9.4  ----------------------------<  110<H>  3.1<L>   |  25.0  |  0.86    Ca    7.9<L>      14 Feb 2024 06:15  Phos  2.6     02-14  Mg     1.6     02-14    TPro  5.3<L>  /  Alb  2.0<L>  /  TBili  0.3<L>  /  DBili  x   /  AST  17  /  ALT  6   /  AlkPhos  85  02-13    LIVER FUNCTIONS - ( 13 Feb 2024 03:33 )  Alb: 2.0 g/dL / Pro: 5.3 g/dL / ALK PHOS: 85 U/L / ALT: 6 U/L / AST: 17 U/L / GGT: x           PT/INR - ( 13 Feb 2024 03:33 )   PT: 16.6 sec;   INR: 1.51 ratio         PTT - ( 13 Feb 2024 03:33 )  PTT:37.3 sec    RADIOLOGY & ADDITIONAL STUDIES (independently reviewed unless otherwise noted):  CT Brain Stroke Protocol (02.13.24 @ 03:55)   IMPRESSION:  No evidence of acute intracranial hemorrhage, midline shift or CT   evidence of acute territorial infarct.  Mild mucosal thickening of the paranasal sinuses with fluid levels.   Correlate clinically for acute sinusitis.  If the patient's symptoms persist, consider short interval follow-up   brain MRI if there are no MRI contraindications.    CT Angio Brain/Neck and perfusion Stroke Protocol  w/ IV Cont (02.13.24 @ 04:06)   IMPRESSION:  CT PERFUSION: No core infarct or penumbra.  If symptoms persist consider follow up head CT or MRI, MRA  if no   contraindication.    CTA COW:  Patent intracranial circulation without flow limiting stenosis    CTA NECK:  Bilateral vertebral arteries are patent without flow limiting stenosis.  Ageindeterminate occlusion of the left internal carotid artery with   recanalization of the left anterior communicating and middle cerebral   arteries. While this may be chronic in nature, MRI/MRA of the brain and   MRA of the neck is suggested for further evaluation.    Partially visualized bilateral pleural effusions.

## 2024-02-14 NOTE — PROGRESS NOTE ADULT - ASSESSMENT
77yo Female admitted for enteritis causing partial bowel obstruction. Now POD 4 for ex lap, ANGELA Hartmanns, due to diverticulitis causing SBO due to collection and adhesions. Post-operative course complicated by pain and anemia, now both have improved. On 2/13, patient had episode of AMS, concerning for possible CVA, however w/u negative for CVA. Trops elevated with EKG demonstrating NSR at time of RRT.  Cards consulted on the night of the event, recs not in yet, will re consult    Plan:   - F/U Cards consult  - trend trops  - NPO w/IVF   - Monitor ostomy output. productive of bowel sweat on morning rounds  - Prevena for until POD 5  - Kelley removed 2/12, voiding spontaneously  - ID consult, zosyn and flucanazole   - f/u PT  77yo Female admitted for enteritis causing partial bowel obstruction. Now POD 4 for ex lap, ANGELA Hartmanns, due to diverticulitis causing SBO due to collection and adhesions. Post-operative course complicated by pain and anemia, now both have improved. On 2/13, patient had episode of AMS, concerning for possible CVA, however w/u negative for CVA. Trops elevated with EKG demonstrating NSR at time of RRT.  Cards reconsulted     Plan:   - F/U Cards consult  - trend trops  - NPO w/IVF   - Monitor ostomy output. productive of bowel sweat on morning rounds  - Prevena for until POD 5  - Kelley removed 2/12, voiding spontaneously  - ID consult, zosyn and flucanazole   - f/u PT

## 2024-02-14 NOTE — PROGRESS NOTE ADULT - ASSESSMENT
77yo F admitted for septic enterocolitis having continued abdominal pain and distension. Pt admitted to medicine service 1/30 presenting with abdominal pain, watery diarrhea, Tmax 105, tachycardia with diffuse small bowel and colonic inflammation most pronounced in the lower quadrant. CT A/P revealed a  distended stomach with dilated air-fluid filled small bowel loops with at least one transition in the right lower quadrant with colonic diverticulosis. Pt failed conservative management and was taken to OR on 2/10/24 for Ex. Lap. In the OR patient was found with severe soft adhesions, collection entered and cultured and appears secondary to perforated diverticulitis with collection causing obstruction. 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 OR cultures + e.faecalis and yeast. Patient with post op leukocytosis and drop in h/H    - bcx ngtd  - OR cx e.faecalis candida dubliniensis  - Continue zosyn and fluconazole  - trend H/H- s/p PRBC, now stable  - on trickle feeds  - Trend Fever  - Trend Leukocytosis- improving, may be reactive. may need repeat Ct if worsening       Will Follow

## 2024-02-14 NOTE — PROGRESS NOTE ADULT - PROBLEM SELECTOR PLAN 1
- Troponins elevated during and after RRT  - serial troponin and serial EKG.   - EKG NSR with no ischemia   - Patient denies chest pain, chest pressure and anginal equivalent symptoms.   - Per patient and sister at the bedside, no ischemic evaluation was done. Hx of rheumatic fever as a child.   - Will repeat limited Echo to evaluate for wall motion abnormalities  - monitor and replete electrolytes as needed. Keep K>4 and Mg >2.  - may need outpatient ischemic work  if echo is abnormal.   - will need surgical clearance in case need for DAPT.

## 2024-02-14 NOTE — PROGRESS NOTE ADULT - ASSESSMENT
ASSESSMENT: 78 year old woman with episode of unresponsiveness overnight, now with NIHSS=0 but age-indeterminate Left ICA occlusion with recanalization and flow in distal ICA and MCA/STELLA circulation    NEURO:   -Neurologically improved from last night, normal mental status  - No stroke on CT head and no symptoms currently to suggest stroke  -Continue close monitoring for neurologic deterioration    - neuro checks q 4H   - SBP goal per primary team avoiding rapid fluctuations and hypotension     - LEFT ICA OCCLUSION    -Discussed with surgical team, will need carotid duplex       - if no flow would use antiplatelet agent when cleared by surgery      - if any flow within LEFT ICA  would suggest vascular surgery eval     -ANTITHROMBOTIC THERAPY: ASA 81 daily when OK from surgery  -titrate statin to LDL goal less than 70  -Physical therapy/OT as tolerated   -DVT ppx: Heparin s.c [X LMWH [] SCD[]    -maintain adequate hydration  per surgery     OTHER:  condition and plan of care d/w patient, questions and concerns addressed.     will follow with you    Radhames Danielson MD PhD   [id number]

## 2024-02-14 NOTE — PROGRESS NOTE ADULT - ASSESSMENT
79 y/o F, active smoker has hx of HTN, HLD, PAD, and rheumatic fever as a child presenting with abdominal pain and diarrhea. Patient was admitted for enteritis causing a partial bowel obstruction. S/p ex lap, ANGELA Kim due to diverticulis which caused SBO due to collection and adhesion. Post op course complicated by pain and anemia which improved. However, on 2/13/2024, pt had an episode of AMS with concerns for CVA. Troponins were elevated. Cardiology reconsulted for elevated troponin.

## 2024-02-14 NOTE — PROGRESS NOTE ADULT - PROBLEM SELECTOR PLAN 2
- on telemetry has periods of NSVT. Last documented NSVT was last night, with 4 beats NSVT   - monitor and replete electrolytes as needed. Keep K>4 and Mg >2.   - monitor on telemetry.

## 2024-02-14 NOTE — PROGRESS NOTE ADULT - SUBJECTIVE AND OBJECTIVE BOX
[facility]                                                         CARDIOLOGY AT [facility]                                                                  [address], [place] [address]                                                         Telephone: [phone]. Fax:[phone]                                                                             PROGRESS NOTE    Reason for follow up: Elevated troponin   Initial reason for consult: SVT   Update: s/p RRT with an episode of AMS overnight which converted to a code stroke. Patient was seen at the bedside this morning, patient is awake and alert, moving all extremities.     Review of symptoms:   Cardiac:  No chest pain. No dyspnea. No palpitations.  Respiratory: no cough. No dyspnea  Gastrointestinal: No diarrhea. No abdominal pain. No bleeding.   Neuro: No focal neuro complaints.    Vitals:  T(C): 36.5 (02-14-24 @ 08:39), Max: 36.8 (02-14-24 @ 01:07)  HR: 90 (02-14-24 @ 08:39) (89 - 98)  BP: 150/86 (02-14-24 @ 09:57) (129/84 - 168/100)  RR: 18 (02-14-24 @ 08:39) (18 - 18)  SpO2: 96% (02-14-24 @ 08:39) (93% - 96%)  Wt(kg): --  I&O's Summary    13 Feb 2024 07:01  -  14 Feb 2024 07:00  --------------------------------------------------------  IN: 1308 mL / OUT: 2280 mL / NET: -972 mL          PHYSICAL EXAM:  Appearance: Comfortable. No acute distress  HEENT:  Atraumatic. Normocephalic.  Normal oral mucosa  Neurologic: A & O x 3, no gross focal deficits.  Cardiovascular: RRR S1 S2, Tachycardic with HR up to 130s. No murmur, no rubs/gallops. No JVD  Respiratory: Lungs clear to auscultation, unlabored   Gastrointestinal:  Soft, Non-tender, + BS  Lower Extremities: 2+ Peripheral Pulses, No clubbing, cyanosis, or edema  Psychiatry: Patient is calm. No agitation.   Skin: warm and dry.    CURRENT CARDIAC MEDICATIONS:  enalapril 5 milliGRAM(s) Oral daily      CURRENT OTHER MEDICATIONS:  albuterol/ipratropium for Nebulization. 3 milliLiter(s) Nebulizer once PRN Shortness of Breath and/or Wheezing  budesonide  80 MICROgram(s)/formoterol 4.5 MICROgram(s) Inhaler 2 Puff(s) Inhalation two times a day  fluconAZOLE IVPB 400 milliGRAM(s) IV Intermittent every 24 hours  fluconAZOLE IVPB      piperacillin/tazobactam IVPB.. 3.375 Gram(s) IV Intermittent every 8 hours  acetaminophen   IVPB .. 1000 milliGRAM(s) IV Intermittent every 6 hours PRN Mild Pain (1 - 3), Moderate Pain (4 - 6), Severe Pain (7 - 10)  melatonin 3 milliGRAM(s) Oral at bedtime PRN Insomnia  metoclopramide Injectable 5 milliGRAM(s) IV Push daily  ondansetron Injectable 4 milliGRAM(s) IV Push every 6 hours PRN Nausea and/or Vomiting  sertraline 200 milliGRAM(s) Oral daily  pantoprazole  Injectable 40 milliGRAM(s) IV Push with breakfast  atorvastatin 40 milliGRAM(s) Oral at bedtime  levothyroxine Injectable 37.5 MICROGram(s) IV Push at bedtime  dextrose 5% + sodium chloride 0.45%. 1000 milliLiter(s) (84 mL/Hr) IV Continuous <Continuous>  heparin   Injectable 5000 Unit(s) SubCutaneous every 8 hours      LABS:	 	  ( 13 Feb 2024 03:33 )  Troponin T  X    ,  CPK  27   , CKMB  X    , BNP X                                  10.2   13.45 )-----------( 429      ( 14 Feb 2024 06:15 )             31.6     02-14    139  |  103  |  9.4  ----------------------------<  110<H>  3.1<L>   |  25.0  |  0.86    Ca    7.9<L>      14 Feb 2024 06:15  Phos  2.6     02-14  Mg     1.6     02-14    TPro  5.3<L>  /  Alb  2.0<L>  /  TBili  0.3<L>  /  DBili  x   /  AST  17  /  ALT  6   /  AlkPhos  85  02-13    PT/INR/PTT ( 13 Feb 2024 03:33 )                       :                       :      16.6         :       37.3                  .        .                   .              .           .       1.51        .                                       Lipid Profile:   HgA1c:   TSH:     TELEMETRY: NSR/ST with HR up to 130s. 4 beats NSVT overnight.   ECG: NSR     DIAGNOSTIC TESTING:  [x] Echocardiogram: < from: TTE W or WO Ultrasound Enhancing Agent (02.04.24 @ 12:12) >   1. Left ventricular wall thickness is normal. Left ventricular systolic function is normal with an ejection fraction visually estimated at 60 to 65 %.   2. There is mild (grade 1) left ventricular diastolic dysfunction, with normal filling pressure.   3. Normal right ventricular cavity size and normal systolic function.   4. The left atrium is normal.   5. There is mild calcification of the mitral valve annulus.   6. Mild mitral valve leaflet calcification.   7. No mitral regurgitation.   8. Mild aortic stenosis.   9. Trace aortic regurgitation.  10. Mild tricuspid regurgitation.  11. No pericardial effusion seen.  12. Estimated pulmonary artery systolic pressure is 52 mmHg, consistent with moderate pulmonary hypertension.    < end of copied text >    [ ]  Catheterization:  [ ] Stress Test:    OTHER:

## 2024-02-15 LAB
ANION GAP SERPL CALC-SCNC: 11 MMOL/L — SIGNIFICANT CHANGE UP (ref 5–17)
ANION GAP SERPL CALC-SCNC: 12 MMOL/L — SIGNIFICANT CHANGE UP (ref 5–17)
BASOPHILS # BLD AUTO: 0.06 K/UL — SIGNIFICANT CHANGE UP (ref 0–0.2)
BASOPHILS NFR BLD AUTO: 0.6 % — SIGNIFICANT CHANGE UP (ref 0–2)
BUN SERPL-MCNC: 8.7 MG/DL — SIGNIFICANT CHANGE UP (ref 8–20)
BUN SERPL-MCNC: 8.7 MG/DL — SIGNIFICANT CHANGE UP (ref 8–20)
CALCIUM SERPL-MCNC: 7.7 MG/DL — LOW (ref 8.4–10.5)
CALCIUM SERPL-MCNC: 7.9 MG/DL — LOW (ref 8.4–10.5)
CHLORIDE SERPL-SCNC: 102 MMOL/L — SIGNIFICANT CHANGE UP (ref 96–108)
CHLORIDE SERPL-SCNC: 104 MMOL/L — SIGNIFICANT CHANGE UP (ref 96–108)
CO2 SERPL-SCNC: 23 MMOL/L — SIGNIFICANT CHANGE UP (ref 22–29)
CO2 SERPL-SCNC: 23 MMOL/L — SIGNIFICANT CHANGE UP (ref 22–29)
CREAT SERPL-MCNC: 0.7 MG/DL — SIGNIFICANT CHANGE UP (ref 0.5–1.3)
CREAT SERPL-MCNC: 0.77 MG/DL — SIGNIFICANT CHANGE UP (ref 0.5–1.3)
CULTURE RESULTS: ABNORMAL
EGFR: 80 ML/MIN/1.73M2 — SIGNIFICANT CHANGE UP
EGFR: 90 ML/MIN/1.73M2 — SIGNIFICANT CHANGE UP
EOSINOPHIL # BLD AUTO: 0.17 K/UL — SIGNIFICANT CHANGE UP (ref 0–0.5)
EOSINOPHIL NFR BLD AUTO: 1.7 % — SIGNIFICANT CHANGE UP (ref 0–6)
GLUCOSE BLDC GLUCOMTR-MCNC: 119 MG/DL — HIGH (ref 70–99)
GLUCOSE SERPL-MCNC: 123 MG/DL — HIGH (ref 70–99)
GLUCOSE SERPL-MCNC: 98 MG/DL — SIGNIFICANT CHANGE UP (ref 70–99)
HCT VFR BLD CALC: 31.2 % — LOW (ref 34.5–45)
HGB BLD-MCNC: 10.1 G/DL — LOW (ref 11.5–15.5)
IMM GRANULOCYTES NFR BLD AUTO: 1.6 % — HIGH (ref 0–0.9)
LYMPHOCYTES # BLD AUTO: 0.63 K/UL — LOW (ref 1–3.3)
LYMPHOCYTES # BLD AUTO: 6.2 % — LOW (ref 13–44)
MAGNESIUM SERPL-MCNC: 1.8 MG/DL — SIGNIFICANT CHANGE UP (ref 1.6–2.6)
MCHC RBC-ENTMCNC: 29.6 PG — SIGNIFICANT CHANGE UP (ref 27–34)
MCHC RBC-ENTMCNC: 32.4 GM/DL — SIGNIFICANT CHANGE UP (ref 32–36)
MCV RBC AUTO: 91.5 FL — SIGNIFICANT CHANGE UP (ref 80–100)
MONOCYTES # BLD AUTO: 0.94 K/UL — HIGH (ref 0–0.9)
MONOCYTES NFR BLD AUTO: 9.2 % — SIGNIFICANT CHANGE UP (ref 2–14)
NEUTROPHILS # BLD AUTO: 8.24 K/UL — HIGH (ref 1.8–7.4)
NEUTROPHILS NFR BLD AUTO: 80.7 % — HIGH (ref 43–77)
ORGANISM # SPEC MICROSCOPIC CNT: ABNORMAL
ORGANISM # SPEC MICROSCOPIC CNT: ABNORMAL
ORGANISM # SPEC MICROSCOPIC CNT: SIGNIFICANT CHANGE UP
PHOSPHATE SERPL-MCNC: 2.5 MG/DL — SIGNIFICANT CHANGE UP (ref 2.4–4.7)
PLATELET # BLD AUTO: 436 K/UL — HIGH (ref 150–400)
POTASSIUM SERPL-MCNC: 3.9 MMOL/L — SIGNIFICANT CHANGE UP (ref 3.5–5.3)
POTASSIUM SERPL-MCNC: 4.1 MMOL/L — SIGNIFICANT CHANGE UP (ref 3.5–5.3)
POTASSIUM SERPL-SCNC: 3.9 MMOL/L — SIGNIFICANT CHANGE UP (ref 3.5–5.3)
POTASSIUM SERPL-SCNC: 4.1 MMOL/L — SIGNIFICANT CHANGE UP (ref 3.5–5.3)
RBC # BLD: 3.41 M/UL — LOW (ref 3.8–5.2)
RBC # FLD: 14.6 % — HIGH (ref 10.3–14.5)
SODIUM SERPL-SCNC: 137 MMOL/L — SIGNIFICANT CHANGE UP (ref 135–145)
SODIUM SERPL-SCNC: 138 MMOL/L — SIGNIFICANT CHANGE UP (ref 135–145)
SPECIMEN SOURCE: SIGNIFICANT CHANGE UP
TROPONIN T, HIGH SENSITIVITY RESULT: 136 NG/L — HIGH (ref 0–51)
TROPONIN T, HIGH SENSITIVITY RESULT: 148 NG/L — HIGH (ref 0–51)
TROPONIN T, HIGH SENSITIVITY RESULT: 152 NG/L — HIGH (ref 0–51)
TROPONIN T, HIGH SENSITIVITY RESULT: 163 NG/L — HIGH (ref 0–51)
WBC # BLD: 10.2 K/UL — SIGNIFICANT CHANGE UP (ref 3.8–10.5)
WBC # FLD AUTO: 10.2 K/UL — SIGNIFICANT CHANGE UP (ref 3.8–10.5)

## 2024-02-15 PROCEDURE — 99232 SBSQ HOSP IP/OBS MODERATE 35: CPT

## 2024-02-15 PROCEDURE — 99233 SBSQ HOSP IP/OBS HIGH 50: CPT

## 2024-02-15 RX ORDER — ALPRAZOLAM 0.25 MG
0.5 TABLET ORAL THREE TIMES A DAY
Refills: 0 | Status: DISCONTINUED | OUTPATIENT
Start: 2024-02-15 | End: 2024-02-17

## 2024-02-15 RX ORDER — LOSARTAN POTASSIUM 100 MG/1
50 TABLET, FILM COATED ORAL DAILY
Refills: 0 | Status: DISCONTINUED | OUTPATIENT
Start: 2024-02-15 | End: 2024-02-16

## 2024-02-15 RX ORDER — LOSARTAN POTASSIUM 100 MG/1
50 TABLET, FILM COATED ORAL DAILY
Refills: 0 | Status: DISCONTINUED | OUTPATIENT
Start: 2024-02-15 | End: 2024-02-15

## 2024-02-15 RX ORDER — SODIUM CHLORIDE 9 MG/ML
1000 INJECTION, SOLUTION INTRAVENOUS ONCE
Refills: 0 | Status: COMPLETED | OUTPATIENT
Start: 2024-02-15 | End: 2024-02-15

## 2024-02-15 RX ADMIN — HEPARIN SODIUM 5000 UNIT(S): 5000 INJECTION INTRAVENOUS; SUBCUTANEOUS at 22:50

## 2024-02-15 RX ADMIN — BUDESONIDE AND FORMOTEROL FUMARATE DIHYDRATE 2 PUFF(S): 160; 4.5 AEROSOL RESPIRATORY (INHALATION) at 19:38

## 2024-02-15 RX ADMIN — SODIUM CHLORIDE 1000 MILLILITER(S): 9 INJECTION, SOLUTION INTRAVENOUS at 08:45

## 2024-02-15 RX ADMIN — SERTRALINE 200 MILLIGRAM(S): 25 TABLET, FILM COATED ORAL at 08:19

## 2024-02-15 RX ADMIN — FLUCONAZOLE 100 MILLIGRAM(S): 150 TABLET ORAL at 05:54

## 2024-02-15 RX ADMIN — Medication 0.5 MILLIGRAM(S): at 15:51

## 2024-02-15 RX ADMIN — PIPERACILLIN AND TAZOBACTAM 25 GRAM(S): 4; .5 INJECTION, POWDER, LYOPHILIZED, FOR SOLUTION INTRAVENOUS at 13:04

## 2024-02-15 RX ADMIN — HEPARIN SODIUM 5000 UNIT(S): 5000 INJECTION INTRAVENOUS; SUBCUTANEOUS at 14:49

## 2024-02-15 RX ADMIN — BUDESONIDE AND FORMOTEROL FUMARATE DIHYDRATE 2 PUFF(S): 160; 4.5 AEROSOL RESPIRATORY (INHALATION) at 08:20

## 2024-02-15 RX ADMIN — Medication 0.5 MILLIGRAM(S): at 22:51

## 2024-02-15 RX ADMIN — LOSARTAN POTASSIUM 50 MILLIGRAM(S): 100 TABLET, FILM COATED ORAL at 08:20

## 2024-02-15 RX ADMIN — PIPERACILLIN AND TAZOBACTAM 25 GRAM(S): 4; .5 INJECTION, POWDER, LYOPHILIZED, FOR SOLUTION INTRAVENOUS at 22:53

## 2024-02-15 RX ADMIN — SODIUM CHLORIDE 84 MILLILITER(S): 9 INJECTION, SOLUTION INTRAVENOUS at 05:29

## 2024-02-15 RX ADMIN — PIPERACILLIN AND TAZOBACTAM 25 GRAM(S): 4; .5 INJECTION, POWDER, LYOPHILIZED, FOR SOLUTION INTRAVENOUS at 07:31

## 2024-02-15 RX ADMIN — HEPARIN SODIUM 5000 UNIT(S): 5000 INJECTION INTRAVENOUS; SUBCUTANEOUS at 06:46

## 2024-02-15 RX ADMIN — Medication 37.5 MICROGRAM(S): at 22:46

## 2024-02-15 RX ADMIN — ATORVASTATIN CALCIUM 40 MILLIGRAM(S): 80 TABLET, FILM COATED ORAL at 22:50

## 2024-02-15 RX ADMIN — Medication 25 MILLIGRAM(S): at 05:54

## 2024-02-15 RX ADMIN — PANTOPRAZOLE SODIUM 40 MILLIGRAM(S): 20 TABLET, DELAYED RELEASE ORAL at 08:19

## 2024-02-15 RX ADMIN — Medication 5 MILLIGRAM(S): at 08:20

## 2024-02-15 RX ADMIN — Medication 5 MILLIGRAM(S): at 05:54

## 2024-02-15 NOTE — PROGRESS NOTE ADULT - PROBLEM SELECTOR PROBLEM 2
Paroxysmal SVT (supraventricular tachycardia)
Small bowel obstruction
Abdominal distention
Paroxysmal SVT (supraventricular tachycardia)
Small bowel obstruction

## 2024-02-15 NOTE — PROGRESS NOTE ADULT - PROBLEM SELECTOR PLAN 4
- found to have L ICA occlusion on CTA neck   - appreciate neuro recs.    - B/L carotid dopplers: occlusion of L internal carotid artery in he neck.   -  per neurology, no role for revascularization, to start antiplatelets when safe from surgical standpoint.
- found to have L ICA occlusion on CTA neck   - appreciate neuro recs.   - B/L carotid dopplers pending

## 2024-02-15 NOTE — PROGRESS NOTE ADULT - SUBJECTIVE AND OBJECTIVE BOX
Subjective: Patient seen at bedside, no current complaints, no overnight events, denies n/v/cp/sob. Colostomy with good output this AM      MEDICATIONS  (STANDING):  atorvastatin 40 milliGRAM(s) Oral at bedtime  budesonide  80 MICROgram(s)/formoterol 4.5 MICROgram(s) Inhaler 2 Puff(s) Inhalation two times a day  dextrose 5% + sodium chloride 0.45%. 1000 milliLiter(s) (84 mL/Hr) IV Continuous <Continuous>  fluconAZOLE IVPB      fluconAZOLE IVPB 400 milliGRAM(s) IV Intermittent every 24 hours  heparin   Injectable 5000 Unit(s) SubCutaneous every 8 hours  levothyroxine Injectable 37.5 MICROGram(s) IV Push at bedtime  losartan 50 milliGRAM(s) Oral daily  metoclopramide Injectable 5 milliGRAM(s) IV Push daily  metoprolol succinate ER 25 milliGRAM(s) Oral daily  nicotine -  14 mG/24Hr(s) Patch 1 Patch Transdermal daily  pantoprazole  Injectable 40 milliGRAM(s) IV Push with breakfast  piperacillin/tazobactam IVPB.. 3.375 Gram(s) IV Intermittent every 8 hours  potassium chloride    Tablet ER 20 milliEquivalent(s) Oral every 2 hours  sertraline 200 milliGRAM(s) Oral daily    MEDICATIONS  (PRN):  acetaminophen   IVPB .. 1000 milliGRAM(s) IV Intermittent every 6 hours PRN Mild Pain (1 - 3), Moderate Pain (4 - 6), Severe Pain (7 - 10)  albuterol/ipratropium for Nebulization. 3 milliLiter(s) Nebulizer once PRN Shortness of Breath and/or Wheezing  melatonin 3 milliGRAM(s) Oral at bedtime PRN Insomnia  ondansetron Injectable 4 milliGRAM(s) IV Push every 6 hours PRN Nausea and/or Vomiting      Vital Signs Last 24 Hrs  T(C): 36.7 (15 Feb 2024 05:00), Max: 36.7 (15 Feb 2024 05:00)  T(F): 98.1 (15 Feb 2024 05:00), Max: 98.1 (15 Feb 2024 05:00)  HR: 91 (15 Feb 2024 05:00) (60 - 94)  BP: 168/95 (15 Feb 2024 05:00) (150/86 - 180/100)  BP(mean): --  RR: 19 (15 Feb 2024 05:00) (17 - 19)  SpO2: 97% (15 Feb 2024 05:00) (89% - 97%)    Parameters below as of 15 Feb 2024 05:00  Patient On (Oxygen Delivery Method): nasal cannula, 2Ltrs        Physical Exam:    Constitutional: NAD  HEENT: PERRL, EOMI  Respiratory: Respirations non-labored, no accessory muscle use  Gastrointestinal: Soft, appropriately tender, non-distended, preveena in place, ostomy with good output, drain with serous output  Neurological: A&O x 3      LABS:                        10.1   10.20 )-----------( 436      ( 15 Feb 2024 05:30 )             31.2     02-15    138  |  104  |  8.7  ----------------------------<  123<H>  3.9   |  23.0  |  0.77    Ca    7.7<L>      15 Feb 2024 05:30  Phos  2.5     02-15  Mg     1.8     02-15        Urinalysis Basic - ( 15 Feb 2024 05:30 )    Color: x / Appearance: x / SG: x / pH: x  Gluc: 123 mg/dL / Ketone: x  / Bili: x / Urobili: x   Blood: x / Protein: x / Nitrite: x   Leuk Esterase: x / RBC: x / WBC x   Sq Epi: x / Non Sq Epi: x / Bacteria: x        A: 77yo s/p ex lap, ANGELA Hartmanns for SBO. Post-operative course complicated by pain and anemia, now both have improved. On 2/13, patient had episode of AMS, concerning for possible CVA, however w/u negative for CVA. Trops elevated with EKG demonstrating NSR at time of RRT.  Ostomy now productive    Plan:   - Remove NGT  - AM labs and repletions  - CLD  - Monitor ostomy output  - Remove preveena  -  zosyn and flucanazole   - DVT PPx: SCD, JURGEN

## 2024-02-15 NOTE — PROGRESS NOTE ADULT - SUBJECTIVE AND OBJECTIVE BOX
[facility]                                                INFECTIOUS DISEASES  =======================================================                   Leonid Mitul#   Seamus Holman MD#    Connie Street MD*                           Mai Carrizales MD*   Nadeen Phillips MD*           Diplomates American Board of Internal Medicine & Infectious Diseases                  # [place] Office - Appt - Tel  [phone] Fax [phone]                * [place] Office - Appt - Tel [phone] Fax [phone]                                  Hospital Consult line:  [phone]  =======================================================      [facility]-[id number]  YAMILETH DAVIS   follow up for: perforated diverticulitis  denies abdominal pain  no fever  wbc wnl  seen in AM  patient seen and examined.       I have personally reviewed the labs and data; pertinent labs and data are listed in this note; please see below.   ===================================================  REVIEW OF SYSTEMS:  CONSTITUTIONAL:  No Fever or chills  HEENT:  No diplopia or blurred vision.  No earache, sore throat or runny nose.  CARDIOVASCULAR:  No pressure, squeezing, strangling, tightness, heaviness or aching about the chest, neck, axilla or epigastrium.  RESPIRATORY:  No cough, shortness of breath  GASTROINTESTINAL:  No nausea, vomiting or diarrhea. + abdominal cramping  GENITOURINARY:  No dysuria, frequency or urgency. No Blood in urine  MUSCULOSKELETAL:  no joint aches, no muscle pain  SKIN:  No change in skin, hair or nails.  NEUROLOGIC:  No Headaches, seizures or weakness.  PSYCHIATRIC:  No disorder of thought or mood.  ENDOCRINE:  No heat or cold intolerance  HEMATOLOGICAL:  No easy bruising or bleeding.    =======================================================  Allergies    No Known Allergies    Intolerances    Antibiotics:  fluconAZOLE IVPB 400 milliGRAM(s) IV Intermittent every 24 hours  fluconAZOLE IVPB      piperacillin/tazobactam IVPB.. 3.375 Gram(s) IV Intermittent every 8 hours    Other medications:  atorvastatin 40 milliGRAM(s) Oral at bedtime  budesonide  80 MICROgram(s)/formoterol 4.5 MICROgram(s) Inhaler 2 Puff(s) Inhalation two times a day  dextrose 5% + sodium chloride 0.45%. 1000 milliLiter(s) IV Continuous <Continuous>  heparin   Injectable 5000 Unit(s) SubCutaneous every 8 hours  levothyroxine Injectable 37.5 MICROGram(s) IV Push at bedtime  metoclopramide Injectable 5 milliGRAM(s) IV Push daily  nicotine -  14 mG/24Hr(s) Patch 1 Patch Transdermal daily  pantoprazole  Injectable 40 milliGRAM(s) IV Push with breakfast  sertraline 200 milliGRAM(s) Oral daily    ======================================================  Physical Exam:  ============  Vital Signs Last 24 Hrs  T(C): 36.2 (15 Feb 2024 07:42), Max: 36.7 (15 Feb 2024 05:00)  T(F): 97.2 (15 Feb 2024 07:42), Max: 98.1 (15 Feb 2024 05:00)  HR: 97 (15 Feb 2024 07:42) (60 - 97)  BP: 168/94 (15 Feb 2024 07:42) (155/99 - 180/100)  BP(mean): --  RR: 18 (15 Feb 2024 07:42) (17 - 19)  SpO2: 97% (15 Feb 2024 05:00) (89% - 97%)    Parameters below as of 15 Feb 2024 05:00  Patient On (Oxygen Delivery Method): nasal cannula, 2Ltrs          General:  No acute distress, frail elderly lady, sump in place  Eye: no conjunctival pallor, no scleral icterus  Neck: Supple, No lymphadenopathy.  Respiratory: Lungs are clear to auscultation, Respirations are non-labored.  Cardiovascular: Normal rate, Regular rhythm,  s1+s2 +lubna  Gastrointestinal: Soft, Non-tender, Non-distended, Normal bowel sounds. midline prevena, RLQ CATA serosanguinous, LLQ ostomy  Integumentary: No rash.  Neurologic: Alert, Oriented, No focal deficits  Psychiatric: Appropriate mood & affect.  =======================================================  Labs:                                                       10.1   10.20 )-----------( 436      ( 15 Feb 2024 05:30 )             31.2       02-15    138  |  104  |  8.7  ----------------------------<  123<H>  3.9   |  23.0  |  0.77    Ca    7.7<L>      15 Feb 2024 05:30  Phos  2.5     02-15  Mg     1.8     02-15                Urinalysis Basic - ( 15 Feb 2024 05:30 )    Color: x / Appearance: x / SG: x / pH: x  Gluc: 123 mg/dL / Ketone: x  / Bili: x / Urobili: x   Blood: x / Protein: x / Nitrite: x   Leuk Esterase: x / RBC: x / WBC x   Sq Epi: x / Non Sq Epi: x / Bacteria: x                  CAPILLARY BLOOD GLUCOSE      POCT Blood Glucose.: 119 mg/dL (15 Feb 2024 06:15)              Urinalysis Basic - ( 14 Feb 2024 06:15 )    Color: x / Appearance: x / SG: x / pH: x  Gluc: 110 mg/dL / Ketone: x  / Bili: x / Urobili: x   Blood: x / Protein: x / Nitrite: x   Leuk Esterase: x / RBC: x / WBC x   Sq Epi: x / Non Sq Epi: x / Bacteria: x        PT/INR - ( 13 Feb 2024 03:33 )   PT: 16.6 sec;   INR: 1.51 ratio         PTT - ( 13 Feb 2024 03:33 )  PTT:37.3 sec    CARDIAC MARKERS ( 13 Feb 2024 03:33 )  x     / x     / 27 U/L / x     / x            CAPILLARY BLOOD GLUCOSE  91 (13 Feb 2024 03:36)      POCT Blood Glucose.: 111 mg/dL (14 Feb 2024 06:32)            Culture - Abscess with Gram Stain (collected 02-10-24 @ 11:30)  Source: .Abscess Abdominal Abscess  Gram Stain (02-11-24 @ 01:06):    Few polymorphonuclear leukocytes seen per low power field    Few Gram positive cocci in pairs seen per oil power field    Rare Yeast like cells seen per oil power field  Preliminary Report (02-12-24 @ 20:32):    Moderate Enterococcus faecalis    Few Yeast Identification to follow.  Organism: Enterococcus faecalis (02-12-24 @ 19:34)  Organism: Enterococcus faecalis (02-12-24 @ 19:34)    Sensitivities:      Method Type: MARIEL      -  Ampicillin: S <=2 Predicts results to ampicillin/sulbactam, amoxacillin-clavulanate and  piperacillin-tazobactam.      -  Vancomycin: S 2    Culture - Urine (collected 01-30-24 @ 15:40)  Source: Clean Catch Clean Catch (Midstream)  Final Report (02-01-24 @ 00:37):    <10,000 CFU/mL Normal Urogenital Nimco    Culture - Blood (collected 01-30-24 @ 15:22)  Source: .Blood Blood  Final Report (02-04-24 @ 22:00):    No growth at 5 days    Culture - Blood (collected 01-30-24 @ 15:17)  Source: .Blood Blood  Final Report (02-04-24 @ 22:00):    No growth at 5 days    Culture - Urine (collected 07-30-22 @ 03:45)  Source: Clean Catch Clean Catch (Midstream)  Final Report (08-01-22 @ 21:53):    >100,000 CFU/ml Escherichia coli  Organism: Escherichia coli (08-01-22 @ 21:53)  Organism: Escherichia coli (08-01-22 @ 21:53)    Sensitivities:      Method Type: MARIEL      -  Amikacin: S <=16      -  Amoxicillin/Clavulanic Acid: S <=8/4 Consider reserving for cystitis when ampicillin/sulbactam is resistant      -  Ampicillin: R >16 These ampicillin results predict results for amoxicillin      -  Ampicillin/Sulbactam: R >16/8 Enterobacter, Klebsiella aerogenes, Citrobacter, and Serratia may develop resistance during prolonged therapy (3-4 days)      -  Aztreonam: S <=4      -  Cefazolin: S 8 (MIC_CL_COM_ENTERIC_[id number]) For uncomplicated UTI with K. pneumoniae, E. coli, or P. mirablis: MARIEL <=16 is sensitive and MARIEL >=32 is resistant. This also predicts results for oral agents cefaclor, cefdinir, cefpodoxime, cefprozil, cefuroxime axetil, cephalexin and locarbef for uncomplicated UTI. Note that some isolates may be susceptible to these agents while testing resistant to cefazolin.      -  Cefepime: S <=2      -  Cefoxitin: S <=8      -  Ceftriaxone: S <=1 Enterobacter, Klebsiella aerogenes, Citrobacter, and Serratia may develop resistance during prolonged therapy      -  Ciprofloxacin: R 1      -  Ertapenem: S <=0.5      -  Gentamicin: R >8      -  Imipenem: S <=1      -  Levofloxacin: I 1      -  Meropenem: S <=1      -  Nitrofurantoin: S <=32 Should not be used to treat pyelonephritis      -  Piperacillin/Tazobactam: S <=8      -  Tigecycline: S <=2      -  Tobramycin: I 8      -  Trimethoprim/Sulfamethoxazole: R >2/38

## 2024-02-15 NOTE — PROGRESS NOTE ADULT - PROBLEM SELECTOR PLAN 1
- Troponins elevated during and after RRT  - serial troponin and serial EKG.   - EKG NSR with no ischemia   - Patient denies chest pain, chest pressure and anginal equivalent symptoms.   - Per patient and sister at the bedside, no ischemic evaluation was done. Hx of rheumatic fever as a child.   - Limited TTE: EF 56%. All segments are normal in the LV wall.   - monitor and replete electrolytes as needed. Keep K>4 and Mg >2.  - may need outpatient ischemic work  if echo is abnormal.   - will need surgical clearance in case need for DAPT. - Troponins elevated during and after RRT  - serial troponin and serial EKG.   - EKG NSR with no ischemia   - Patient denies chest pain, chest pressure and anginal equivalent symptoms.   - Per patient and sister at the bedside, no ischemic evaluation was done. Hx of rheumatic fever as a child.   - Limited TTE: EF 56%. All segments are normal in the LV wall.   - monitor and replete electrolytes as needed. Keep K>4 and Mg >2.  - will need surgical clearance in case need for DAPT.   - outpatient ischemic eval.

## 2024-02-15 NOTE — PROGRESS NOTE ADULT - SUBJECTIVE AND OBJECTIVE BOX
[facility]                                                         CARDIOLOGY AT [facility] ([facility]                                                                  [address] [facility], [place] [place] [address]                                                         Telephone: [phone]. Fax:[phone]                                                                             PROGRESS NOTE    Reason for follow up: Elevated troponin ; HTN   Initial reason for consult: SVT   Update: Pt is in no acute distress. B/L carotid dopplers: occlusion of the left internal carotid artery in the neck. SBP is still in the 160s.     Review of symptoms:   Cardiac:  No chest pain. No dyspnea. No palpitations.  Respiratory: no cough. No dyspnea  Gastrointestinal: No diarrhea. No abdominal pain. No bleeding.   Neuro: No focal neuro complaints.    Vitals:  T(C): 36.2 (02-15-24 @ 07:42), Max: 36.7 (02-15-24 @ 05:00)  HR: 97 (02-15-24 @ 07:42) (60 - 97)  BP: 168/94 (02-15-24 @ 07:42) (150/86 - 180/100)  RR: 18 (02-15-24 @ 07:42) (17 - 19)  SpO2: 97% (02-15-24 @ 05:00) (89% - 97%)  Wt(kg): --  I&O's Summary    14 Feb 2024 07:01  -  15 Feb 2024 07:00  --------------------------------------------------------  IN: 1224 mL / OUT: 835 mL / NET: 389 mL          PHYSICAL EXAM:  Appearance: Comfortable. No acute distress. Frail.   HEENT:  Atraumatic. Normocephalic.  Normal oral mucosa  Neurologic: A & O x 3, no gross focal deficits.  Cardiovascular: RRR S1 S2, No murmur, no rubs/gallops. No JVD  Respiratory: Lungs clear to auscultation, unlabored   Gastrointestinal:  Soft, Non-tender, + BS. ostomy bag. NG tube.   Lower Extremities: 2+ Peripheral Pulses, No clubbing, cyanosis, or edema  Psychiatry: Patient is calm. No agitation.   Skin: warm and dry.    CURRENT CARDIAC MEDICATIONS:  losartan 50 milliGRAM(s) Oral daily  metoprolol succinate ER 25 milliGRAM(s) Oral daily      CURRENT OTHER MEDICATIONS:  albuterol/ipratropium for Nebulization. 3 milliLiter(s) Nebulizer once PRN Shortness of Breath and/or Wheezing  budesonide  80 MICROgram(s)/formoterol 4.5 MICROgram(s) Inhaler 2 Puff(s) Inhalation two times a day  fluconAZOLE IVPB 400 milliGRAM(s) IV Intermittent every 24 hours  fluconAZOLE IVPB      piperacillin/tazobactam IVPB.. 3.375 Gram(s) IV Intermittent every 8 hours  acetaminophen   IVPB .. 1000 milliGRAM(s) IV Intermittent every 6 hours PRN Mild Pain (1 - 3), Moderate Pain (4 - 6), Severe Pain (7 - 10)  melatonin 3 milliGRAM(s) Oral at bedtime PRN Insomnia  metoclopramide Injectable 5 milliGRAM(s) IV Push daily  ondansetron Injectable 4 milliGRAM(s) IV Push every 6 hours PRN Nausea and/or Vomiting  sertraline 200 milliGRAM(s) Oral daily  pantoprazole  Injectable 40 milliGRAM(s) IV Push with breakfast  atorvastatin 40 milliGRAM(s) Oral at bedtime  levothyroxine Injectable 37.5 MICROGram(s) IV Push at bedtime  dextrose 5% + sodium chloride 0.45%. 1000 milliLiter(s) (84 mL/Hr) IV Continuous <Continuous>  heparin   Injectable 5000 Unit(s) SubCutaneous every 8 hours  potassium chloride    Tablet ER 20 milliEquivalent(s) Oral every 2 hours, Stop order after: 3 Doses      LABS:	 	  ( 13 Feb 2024 03:33 )  Troponin T  X    ,  CPK  27   , CKMB  X    , BNP X                                  10.1   10.20 )-----------( 436      ( 15 Feb 2024 05:30 )             31.2     02-15    138  |  104  |  8.7  ----------------------------<  123<H>  3.9   |  23.0  |  0.77    Ca    7.7<L>      15 Feb 2024 05:30  Phos  2.5     02-15  Mg     1.8     02-15      PT/INR/PTT ( 13 Feb 2024 03:33 )                       :                       :      16.6         :       37.3                  .        .                   .              .           .       1.51        .                                       Lipid Profile:   HgA1c:   TSH:     TELEMETRY: NSR  ECG: NSR     DIAGNOSTIC TESTING:  [x  ] Echocardiogram:   < from: TTE Limited W or WO Ultrasound Enhancing Agent (02.14.24 @ 13:22) >      1. Left ventricular systolic function is normal with an ejection fraction of 56 % by Ragland's method of disks.    < end of copied text >  < from: TTE W or WO Ultrasound Enhancing Agent (02.04.24 @ 12:12) >      1. Left ventricular wall thickness is normal. Left ventricular systolic function is normal with an ejection fraction visually estimated at 60 to 65 %.   2. There is mild (grade 1) left ventricular diastolic dysfunction, with normal filling pressure.   3. Normal right ventricular cavity size and normal systolic function.   4. The left atrium is normal.   5. There is mild calcification of the mitral valve annulus.   6. Mild mitral valve leaflet calcification.   7. No mitral regurgitation.   8. Mild aortic stenosis.   9. Trace aortic regurgitation.  10. Mild tricuspid regurgitation.  11. No pericardial effusion seen.  12. Estimated pulmonary artery systolic pressure is 52 mmHg, consistent with moderate pulmonary hypertension.    < end of copied text >  [ ]  Catheterization:  [ ] Stress Test:    OTHER:

## 2024-02-15 NOTE — PROGRESS NOTE ADULT - ASSESSMENT
77 y/o F, active smoker has hx of HTN, HLD, PAD, and rheumatic fever as a child presenting with abdominal pain and diarrhea. Patient was admitted for enteritis causing a partial bowel obstruction. S/p ex lap, ANGELA Kim due to diverticulitis which caused SBO due to collection and adhesion. Post op course complicated by pain and anemia which improved. However, on 2/13/2024, pt had an episode of AMS with concerns for CVA. Troponins were elevated. Cardiology reconsulted for elevated troponin.

## 2024-02-15 NOTE — PROGRESS NOTE ADULT - ASSESSMENT
79yo F admitted for septic enterocolitis having continued abdominal pain and distension. Pt admitted to medicine service 1/30 presenting with abdominal pain, watery diarrhea, Tmax 105, tachycardia with diffuse small bowel and colonic inflammation most pronounced in the lower quadrant. CT A/P revealed a  distended stomach with dilated air-fluid filled small bowel loops with at least one transition in the right lower quadrant with colonic diverticulosis. Pt failed conservative management and was taken to OR on 2/10/24 for Ex. Lap. In the OR patient was found with severe soft adhesions, collection entered and cultured and appears secondary to perforated diverticulitis with collection causing obstruction. 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 OR cultures + e.faecalis and yeast. Patient with post op leukocytosis and drop in h/H    - bcx ngtd  - OR cx e.faecalis candida dubliniensis, + Pseudomonas  - Continue zosyn and fluconazole  - trend H/H- s/p PRBC, now stable  - on trickle feeds  - Trend Fever  - Trend Leukocytosis- improving. may need repeat Ct if worsening       Will Follow

## 2024-02-15 NOTE — PROGRESS NOTE ADULT - PROBLEM SELECTOR PROBLEM 1
Enteritis, infectious, presumed
Enterocolitis
Elevated troponin
Elevated troponin
Enteritis, infectious, presumed
Enteritis, infectious, presumed

## 2024-02-15 NOTE — CHART NOTE - NSCHARTNOTEFT_GEN_A_CORE
Carotid duplex as follows:    US Duplex Carotid Arteries Complete, Bilateral (02.14.24 @ 13:20)  IMPRESSION:  This examination confirms an occlusion of the left internal carotid   artery in the neck.  There are nodules in both lobes of the thyroid. If clinically warranted a   dedicated thyroid scan can be performed.  Measurement of carotid stenosis is based on velocity parameters that   correlate the residual internal carotid diameter with that of the more   distal vessel in accordance with a method such as the [place] American   Symptomatic Carotid Endarterectomy Trial (NASCET).    Complete occlusion of Left ICA  No role for revascularization  Antiplatelets when safe from surgery  continue lipitor    Thank you for allowing me to participate in the care of your patient    Radhames Danielson MD, PhD   [id number]

## 2024-02-15 NOTE — PROGRESS NOTE ADULT - PROBLEM SELECTOR PLAN 3
- hx of HTN   - d/c enalpril, start losartan 50 mg po daily. c/w Toprol 25 mg XL PO daily.   - monitor BP  - avoid rapid fluctuations of BP
- hx of HTN   - c/w enalpril 5 mg po daily.  - monitor BP

## 2024-02-15 NOTE — CHART NOTE - NSCHARTNOTEFT_GEN_A_CORE
PROCEDURE NOTE:  NG Tube removal    Site:   L nostril NG tube    Requested to remove the NG tube. Explained the procedure. NG Tube disconnected from feed, removed from pt, and intact. Patient tolerated procedure well. RN aware.    YVROSE Rondon-JANNA MARTIN

## 2024-02-15 NOTE — PROGRESS NOTE ADULT - NS ATTEND AMEND GEN_ALL_CORE FT
Patient admitted with fever, diarrhea, abdominal pain   continue antibiotics  C diff pending. GI PCR negative  diarrhea and cramping improving. No fevers  advance diet to low fiber, lactose free. I discussed this with Dr foster today
small bowel obstruction  Not much improvement with NG tube suction.  Patient has been here for over  1 week  Leukocytosis 17,600 continue to be elevated  CBC ordered for tomorrow   I read the surgical note-  They suggest D/C ing  NG tube and starting clear liquids.  However I spoke to surgical attending Dr. Perez.  I am concerned as patient still has abdominal distention 1 week after admission with exam still suggestive of of small bowel obstruction.  CAT scan x 2 showing transition zone in the right lower quadrant.   My interpretation  XrAY  x 2 from this  am.  Xray at  2 am  and 6 am today show no passage of contrast beyond the small bowel . Pt to go to OR in the next 1-2 days      I notified   Dr Watkins hospitalist about plan for surgery   continue NGT suction for now
Enteritis/small bowel obstruction  Not much improvement with NG tube suction.  Patient has been here for about 1 week  Leukocytosis 16,000 continue to be elevated  CBC ordered for tomorrow  I reviewed the surgical note (has not been cosigned by the attending yet ).  They suggest D/Cing  NG tube and starting clear liquids.  However I spoke to surgical attending Dr. Perez.  I am concerned as patient still has abdominal distention 1 week after admission with exam still suggestive of of small bowel obstruction.  CAT scan x 2 showing transition zone in the right lower quadrant.  Dr. Peralta will reevaluate  continue NGT suction for now
79 y/o F admitted with enteritis/partial bowel obstruction s/p OR. Cardiology initially consulted due to pSVT, treated with fluids and beta blockers. Hospital course c/b an episode of AMS/code stroke. Cardiology follow up requested due to elevated hsTNT (115, 129, 134, 131, 133). Likely demand ischemia. Patient denies signs/symptoms of ACS. EKG with nonspecific changes. Repeat TTE negative for WMA. Would recommend outpatient ischemic workup. BP uncontrolled; per neurology, avoid hypotension and rapid fluctuations in BP. Would gradually correct BP; start beta blocker Toprol XL 25mg today in view of HTN and NSVT, switch enalapril to losartan tomorrow for further BP correction. Start ASA once safe from surgical perspective.
79 y/o F admitted with enteritis/partial bowel obstruction s/p OR. Cardiology initially consulted due to pSVT, treated with fluids and beta blockers. Hospital course c/b an episode of AMS/code stroke. She had CTA head/neck with no acute CVA; but +age indeterminate occlusion of the left internal carotid artery with recanalization of the left anterior communicating and middle cerebral arteries. 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 Cardiology follow up requested due to elevated hsTNT (115, 129, 134, 131, 133).     Patient denies signs/symptoms of ACS. EKG with nonspecific changes. Repeat TTE negative for WMA. Abnormal troponins likely secondary to demand ischemia; however patient would benefit from ischemic workup (can have nuclear stress test as outpatient). BP uncontrolled; per neurology, avoid hypotension and rapid fluctuations in BP. Would gradually correct BP; patient was started on Toprol XL 25mg in view of HTN and NSVT; enalapril was transitioned to losartan 50mg today. Would increase to 100mg if BP remains above goal. Start ASA once safe from surgical perspective. Continue statin. Check lipid panel to confirm LDL at goal.

## 2024-02-15 NOTE — PROGRESS NOTE ADULT - PROBLEM SELECTOR PLAN 2
- on telemetry has periods of NSVT. Last documented NSVT, with 4 beats NSVT   - Last documented elevated HR is last night, 2/14/2024 at 10 PM as high as 135.   - monitor and replete electrolytes as needed. Keep K>4 and Mg >2.   - monitor on telemetry. - on telemetry has periods of NSVT. Last documented NSVT, with 4 beats NSVT   - Last documented elevated HR is last night, 2/14/2024 at 10 PM as high as 135.   - monitor and replete electrolytes as needed. Keep K>4 and Mg >2.   - monitor on telemetry.  - c/w toprol XL 25 mg po daily for rate control.

## 2024-02-16 LAB
ANION GAP SERPL CALC-SCNC: 8 MMOL/L — SIGNIFICANT CHANGE UP (ref 5–17)
BUN SERPL-MCNC: 7.5 MG/DL — LOW (ref 8–20)
CALCIUM SERPL-MCNC: 8 MG/DL — LOW (ref 8.4–10.5)
CHLORIDE SERPL-SCNC: 102 MMOL/L — SIGNIFICANT CHANGE UP (ref 96–108)
CHOLEST SERPL-MCNC: 62 MG/DL — SIGNIFICANT CHANGE UP
CO2 SERPL-SCNC: 27 MMOL/L — SIGNIFICANT CHANGE UP (ref 22–29)
CREAT SERPL-MCNC: 0.89 MG/DL — SIGNIFICANT CHANGE UP (ref 0.5–1.3)
EGFR: 67 ML/MIN/1.73M2 — SIGNIFICANT CHANGE UP
GLUCOSE SERPL-MCNC: 114 MG/DL — HIGH (ref 70–99)
HCT VFR BLD CALC: 29.8 % — LOW (ref 34.5–45)
HDLC SERPL-MCNC: 20 MG/DL — LOW
HGB BLD-MCNC: 9.8 G/DL — LOW (ref 11.5–15.5)
LIPID PNL WITH DIRECT LDL SERPL: 21 MG/DL — SIGNIFICANT CHANGE UP
MAGNESIUM SERPL-MCNC: 1.7 MG/DL — SIGNIFICANT CHANGE UP (ref 1.6–2.6)
MCHC RBC-ENTMCNC: 30 PG — SIGNIFICANT CHANGE UP (ref 27–34)
MCHC RBC-ENTMCNC: 32.9 GM/DL — SIGNIFICANT CHANGE UP (ref 32–36)
MCV RBC AUTO: 91.1 FL — SIGNIFICANT CHANGE UP (ref 80–100)
NON HDL CHOLESTEROL: 42 MG/DL — SIGNIFICANT CHANGE UP
PHOSPHATE SERPL-MCNC: 2.8 MG/DL — SIGNIFICANT CHANGE UP (ref 2.4–4.7)
PLATELET # BLD AUTO: 442 K/UL — HIGH (ref 150–400)
POTASSIUM SERPL-MCNC: 3.5 MMOL/L — SIGNIFICANT CHANGE UP (ref 3.5–5.3)
POTASSIUM SERPL-SCNC: 3.5 MMOL/L — SIGNIFICANT CHANGE UP (ref 3.5–5.3)
RBC # BLD: 3.27 M/UL — LOW (ref 3.8–5.2)
RBC # FLD: 14.4 % — SIGNIFICANT CHANGE UP (ref 10.3–14.5)
SODIUM SERPL-SCNC: 137 MMOL/L — SIGNIFICANT CHANGE UP (ref 135–145)
TRIGL SERPL-MCNC: 104 MG/DL — SIGNIFICANT CHANGE UP
TROPONIN T, HIGH SENSITIVITY RESULT: 183 NG/L — HIGH (ref 0–51)
WBC # BLD: 8.96 K/UL — SIGNIFICANT CHANGE UP (ref 3.8–10.5)
WBC # FLD AUTO: 8.96 K/UL — SIGNIFICANT CHANGE UP (ref 3.8–10.5)

## 2024-02-16 PROCEDURE — 99232 SBSQ HOSP IP/OBS MODERATE 35: CPT

## 2024-02-16 RX ORDER — POTASSIUM CHLORIDE 20 MEQ
20 PACKET (EA) ORAL
Refills: 0 | Status: COMPLETED | OUTPATIENT
Start: 2024-02-16 | End: 2024-02-16

## 2024-02-16 RX ORDER — MAGNESIUM SULFATE 500 MG/ML
2 VIAL (ML) INJECTION ONCE
Refills: 0 | Status: COMPLETED | OUTPATIENT
Start: 2024-02-16 | End: 2024-02-16

## 2024-02-16 RX ORDER — KETOROLAC TROMETHAMINE 30 MG/ML
15 SYRINGE (ML) INJECTION ONCE
Refills: 0 | Status: DISCONTINUED | OUTPATIENT
Start: 2024-02-16 | End: 2024-02-16

## 2024-02-16 RX ORDER — KETOROLAC TROMETHAMINE 30 MG/ML
15 SYRINGE (ML) INJECTION EVERY 6 HOURS
Refills: 0 | Status: DISCONTINUED | OUTPATIENT
Start: 2024-02-16 | End: 2024-02-21

## 2024-02-16 RX ORDER — LOSARTAN POTASSIUM 100 MG/1
50 TABLET, FILM COATED ORAL ONCE
Refills: 0 | Status: COMPLETED | OUTPATIENT
Start: 2024-02-16 | End: 2024-02-16

## 2024-02-16 RX ORDER — LOSARTAN POTASSIUM 100 MG/1
100 TABLET, FILM COATED ORAL DAILY
Refills: 0 | Status: DISCONTINUED | OUTPATIENT
Start: 2024-02-17 | End: 2024-02-24

## 2024-02-16 RX ADMIN — Medication 25 GRAM(S): at 08:24

## 2024-02-16 RX ADMIN — Medication 15 MILLIGRAM(S): at 10:14

## 2024-02-16 RX ADMIN — PIPERACILLIN AND TAZOBACTAM 25 GRAM(S): 4; .5 INJECTION, POWDER, LYOPHILIZED, FOR SOLUTION INTRAVENOUS at 21:47

## 2024-02-16 RX ADMIN — Medication 15 MILLIGRAM(S): at 11:14

## 2024-02-16 RX ADMIN — Medication 5 MILLIGRAM(S): at 11:43

## 2024-02-16 RX ADMIN — LOSARTAN POTASSIUM 50 MILLIGRAM(S): 100 TABLET, FILM COATED ORAL at 11:43

## 2024-02-16 RX ADMIN — PIPERACILLIN AND TAZOBACTAM 25 GRAM(S): 4; .5 INJECTION, POWDER, LYOPHILIZED, FOR SOLUTION INTRAVENOUS at 14:21

## 2024-02-16 RX ADMIN — LOSARTAN POTASSIUM 50 MILLIGRAM(S): 100 TABLET, FILM COATED ORAL at 05:59

## 2024-02-16 RX ADMIN — SERTRALINE 200 MILLIGRAM(S): 25 TABLET, FILM COATED ORAL at 11:42

## 2024-02-16 RX ADMIN — BUDESONIDE AND FORMOTEROL FUMARATE DIHYDRATE 2 PUFF(S): 160; 4.5 AEROSOL RESPIRATORY (INHALATION) at 10:13

## 2024-02-16 RX ADMIN — Medication 15 MILLIGRAM(S): at 17:33

## 2024-02-16 RX ADMIN — Medication 1 PATCH: at 19:56

## 2024-02-16 RX ADMIN — Medication 0.5 MILLIGRAM(S): at 20:28

## 2024-02-16 RX ADMIN — Medication 1 PATCH: at 11:42

## 2024-02-16 RX ADMIN — Medication 25 MILLIGRAM(S): at 05:59

## 2024-02-16 RX ADMIN — Medication 37.5 MICROGRAM(S): at 21:47

## 2024-02-16 RX ADMIN — PIPERACILLIN AND TAZOBACTAM 25 GRAM(S): 4; .5 INJECTION, POWDER, LYOPHILIZED, FOR SOLUTION INTRAVENOUS at 05:59

## 2024-02-16 RX ADMIN — HEPARIN SODIUM 5000 UNIT(S): 5000 INJECTION INTRAVENOUS; SUBCUTANEOUS at 23:33

## 2024-02-16 RX ADMIN — FLUCONAZOLE 100 MILLIGRAM(S): 150 TABLET ORAL at 06:15

## 2024-02-16 RX ADMIN — Medication 15 MILLIGRAM(S): at 18:33

## 2024-02-16 RX ADMIN — Medication 3 MILLIGRAM(S): at 21:47

## 2024-02-16 RX ADMIN — SODIUM CHLORIDE 84 MILLILITER(S): 9 INJECTION, SOLUTION INTRAVENOUS at 05:55

## 2024-02-16 RX ADMIN — ATORVASTATIN CALCIUM 40 MILLIGRAM(S): 80 TABLET, FILM COATED ORAL at 21:47

## 2024-02-16 RX ADMIN — PANTOPRAZOLE SODIUM 40 MILLIGRAM(S): 20 TABLET, DELAYED RELEASE ORAL at 08:25

## 2024-02-16 RX ADMIN — Medication 15 MILLIGRAM(S): at 23:33

## 2024-02-16 RX ADMIN — HEPARIN SODIUM 5000 UNIT(S): 5000 INJECTION INTRAVENOUS; SUBCUTANEOUS at 16:18

## 2024-02-16 RX ADMIN — Medication 20 MILLIEQUIVALENT(S): at 08:25

## 2024-02-16 RX ADMIN — Medication 20 MILLIEQUIVALENT(S): at 11:40

## 2024-02-16 RX ADMIN — HEPARIN SODIUM 5000 UNIT(S): 5000 INJECTION INTRAVENOUS; SUBCUTANEOUS at 06:03

## 2024-02-16 NOTE — PROGRESS NOTE ADULT - SUBJECTIVE AND OBJECTIVE BOX
[facility]                                                INFECTIOUS DISEASES  =======================================================                   Leonidstephen Bauman#   Seamus Holman MD#    Connie Street MD*                           Mai Carrizales MD*   Nadeen Phillips MD*           Diplomates American Board of Internal Medicine & Infectious Diseases                  # [place] Office - Appt - Tel  [phone] Fax [phone]                * [place] Office - Appt - Tel [phone] Fax [phone]                                  Hospital Consult line:  [phone]  =======================================================      [facility]-[id number]  YAMILETH DAVIS   follow up for: perforated diverticulitis  denies abdominal pain  no fever  wbc wnl  seen in AM  on clears  patient seen and examined.       I have personally reviewed the labs and data; pertinent labs and data are listed in this note; please see below.   ===================================================  REVIEW OF SYSTEMS:  CONSTITUTIONAL:  No Fever or chills  HEENT:  No diplopia or blurred vision.  No earache, sore throat or runny nose.  CARDIOVASCULAR:  No pressure, squeezing, strangling, tightness, heaviness or aching about the chest, neck, axilla or epigastrium.  RESPIRATORY:  No cough, shortness of breath  GASTROINTESTINAL:  No nausea, vomiting or diarrhea. + abdominal cramping  GENITOURINARY:  No dysuria, frequency or urgency. No Blood in urine  MUSCULOSKELETAL:  no joint aches, no muscle pain  SKIN:  No change in skin, hair or nails.  NEUROLOGIC:  No Headaches, seizures or weakness.  PSYCHIATRIC:  No disorder of thought or mood.  ENDOCRINE:  No heat or cold intolerance  HEMATOLOGICAL:  No easy bruising or bleeding.    =======================================================  Allergies    No Known Allergies    Intolerances    Antibiotics:  fluconAZOLE IVPB 400 milliGRAM(s) IV Intermittent every 24 hours  fluconAZOLE IVPB      piperacillin/tazobactam IVPB.. 3.375 Gram(s) IV Intermittent every 8 hours    Other medications:  atorvastatin 40 milliGRAM(s) Oral at bedtime  budesonide  80 MICROgram(s)/formoterol 4.5 MICROgram(s) Inhaler 2 Puff(s) Inhalation two times a day  dextrose 5% + sodium chloride 0.45%. 1000 milliLiter(s) IV Continuous <Continuous>  heparin   Injectable 5000 Unit(s) SubCutaneous every 8 hours  levothyroxine Injectable 37.5 MICROGram(s) IV Push at bedtime  metoclopramide Injectable 5 milliGRAM(s) IV Push daily  nicotine -  14 mG/24Hr(s) Patch 1 Patch Transdermal daily  pantoprazole  Injectable 40 milliGRAM(s) IV Push with breakfast  sertraline 200 milliGRAM(s) Oral daily    ======================================================  Physical Exam:  ============  Vital Signs Last 24 Hrs  T(C): 36.3 (16 Feb 2024 20:00), Max: 36.8 (15 Feb 2024 23:18)  T(F): 97.3 (16 Feb 2024 20:00), Max: 98.2 (15 Feb 2024 23:18)  HR: 85 (16 Feb 2024 20:00) (75 - 94)  BP: 134/91 (16 Feb 2024 20:00) (122/83 - 174/101)  BP(mean): --  RR: 18 (16 Feb 2024 20:00) (16 - 19)  SpO2: 95% (16 Feb 2024 20:00) (95% - 98%)    Parameters below as of 16 Feb 2024 20:00  Patient On (Oxygen Delivery Method): room air        General:  No acute distress, frail elderly lady  Eye: no conjunctival pallor, no scleral icterus  Neck: Supple, No lymphadenopathy.  Respiratory: Lungs are clear to auscultation, Respirations are non-labored.  Cardiovascular: Normal rate, Regular rhythm,  s1+s2 +lubna  Gastrointestinal: Soft, Non-tender, Non-distended, Normal bowel sounds. midline prevena, RLQ CATA serous, LLQ ostomy  Integumentary: No rash.  Neurologic: Alert, Oriented, No focal deficits  Psychiatric: Appropriate mood & affect.  =======================================================  Labs:                                                             9.8    8.96  )-----------( 442      ( 16 Feb 2024 01:45 )             29.8       02-16    137  |  102  |  7.5<L>  ----------------------------<  114<H>  3.5   |  27.0  |  0.89    Ca    8.0<L>      16 Feb 2024 01:45  Phos  2.8     02-16  Mg     1.7     02-16                Urinalysis Basic - ( 16 Feb 2024 01:45 )    Color: x / Appearance: x / SG: x / pH: x  Gluc: 114 mg/dL / Ketone: x  / Bili: x / Urobili: x   Blood: x / Protein: x / Nitrite: x   Leuk Esterase: x / RBC: x / WBC x   Sq Epi: x / Non Sq Epi: x / Bacteria: x                  CAPILLARY BLOOD GLUCOSE      POCT Blood Glucose.: 119 mg/dL (15 Feb 2024 06:15)                  Urinalysis Basic - ( 15 Feb 2024 05:30 )    Color: x / Appearance: x / SG: x / pH: x  Gluc: 123 mg/dL / Ketone: x  / Bili: x / Urobili: x   Blood: x / Protein: x / Nitrite: x   Leuk Esterase: x / RBC: x / WBC x   Sq Epi: x / Non Sq Epi: x / Bacteria: x                  CAPILLARY BLOOD GLUCOSE      POCT Blood Glucose.: 119 mg/dL (15 Feb 2024 06:15)              Urinalysis Basic - ( 14 Feb 2024 06:15 )    Color: x / Appearance: x / SG: x / pH: x  Gluc: 110 mg/dL / Ketone: x  / Bili: x / Urobili: x   Blood: x / Protein: x / Nitrite: x   Leuk Esterase: x / RBC: x / WBC x   Sq Epi: x / Non Sq Epi: x / Bacteria: x        PT/INR - ( 13 Feb 2024 03:33 )   PT: 16.6 sec;   INR: 1.51 ratio         PTT - ( 13 Feb 2024 03:33 )  PTT:37.3 sec    CARDIAC MARKERS ( 13 Feb 2024 03:33 )  x     / x     / 27 U/L / x     / x            CAPILLARY BLOOD GLUCOSE  91 (13 Feb 2024 03:36)      POCT Blood Glucose.: 111 mg/dL (14 Feb 2024 06:32)            Culture - Abscess with Gram Stain (collected 02-10-24 @ 11:30)  Source: .Abscess Abdominal Abscess  Gram Stain (02-11-24 @ 01:06):    Few polymorphonuclear leukocytes seen per low power field    Few Gram positive cocci in pairs seen per oil power field    Rare Yeast like cells seen per oil power field  Preliminary Report (02-12-24 @ 20:32):    Moderate Enterococcus faecalis    Few Yeast Identification to follow.  Organism: Enterococcus faecalis (02-12-24 @ 19:34)  Organism: Enterococcus faecalis (02-12-24 @ 19:34)    Sensitivities:      Method Type: MARIEL      -  Ampicillin: S <=2 Predicts results to ampicillin/sulbactam, amoxacillin-clavulanate and  piperacillin-tazobactam.      -  Vancomycin: S 2    Culture - Urine (collected 01-30-24 @ 15:40)  Source: Clean Catch Clean Catch (Midstream)  Final Report (02-01-24 @ 00:37):    <10,000 CFU/mL Normal Urogenital Nimco    Culture - Blood (collected 01-30-24 @ 15:22)  Source: .Blood Blood  Final Report (02-04-24 @ 22:00):    No growth at 5 days    Culture - Blood (collected 01-30-24 @ 15:17)  Source: .Blood Blood  Final Report (02-04-24 @ 22:00):    No growth at 5 days    Culture - Urine (collected 07-30-22 @ 03:45)  Source: Clean Catch Clean Catch (Midstream)  Final Report (08-01-22 @ 21:53):    >100,000 CFU/ml Escherichia coli  Organism: Escherichia coli (08-01-22 @ 21:53)  Organism: Escherichia coli (08-01-22 @ 21:53)    Sensitivities:      Method Type: MARIEL      -  Amikacin: S <=16      -  Amoxicillin/Clavulanic Acid: S <=8/4 Consider reserving for cystitis when ampicillin/sulbactam is resistant      -  Ampicillin: R >16 These ampicillin results predict results for amoxicillin      -  Ampicillin/Sulbactam: R >16/8 Enterobacter, Klebsiella aerogenes, Citrobacter, and Serratia may develop resistance during prolonged therapy (3-4 days)      -  Aztreonam: S <=4      -  Cefazolin: S 8 (MIC_CL_COM_ENTERIC_CEFAZU) For uncomplicated UTI with K. pneumoniae, E. coli, or P. mirablis: MARIEL <=16 is sensitive and MARIEL >=32 is resistant. This also predicts results for oral agents cefaclor, cefdinir, cefpodoxime, cefprozil, cefuroxime axetil, cephalexin and locarbef for uncomplicated UTI. Note that some isolates may be susceptible to these agents while testing resistant to cefazolin.      -  Cefepime: S <=2      -  Cefoxitin: S <=8      -  Ceftriaxone: S <=1 Enterobacter, Klebsiella aerogenes, Citrobacter, and Serratia may develop resistance during prolonged therapy      -  Ciprofloxacin: R 1      -  Ertapenem: S <=0.5      -  Gentamicin: R >8      -  Imipenem: S <=1      -  Levofloxacin: I 1      -  Meropenem: S <=1      -  Nitrofurantoin: S <=32 Should not be used to treat pyelonephritis      -  Piperacillin/Tazobactam: S <=8      -  Tigecycline: S <=2      -  Tobramycin: I 8      -  Trimethoprim/Sulfamethoxazole: R >2/38

## 2024-02-16 NOTE — PROGRESS NOTE ADULT - SUBJECTIVE AND OBJECTIVE BOX
feels better  afebrile   abd soft  Inc c/d   dima sdcant   colostomy functioing well   Doing well  supportive care   PT and Subacte care

## 2024-02-16 NOTE — PROGRESS NOTE ADULT - SUBJECTIVE AND OBJECTIVE BOX
Subjective: Patient seen at bedside, no current complaints, no overnight events, denies n/v/cp/sob. Tolerating clear diet, ambulated with PT yesterday, ostomy productive       MEDICATIONS  (STANDING):  atorvastatin 40 milliGRAM(s) Oral at bedtime  budesonide  80 MICROgram(s)/formoterol 4.5 MICROgram(s) Inhaler 2 Puff(s) Inhalation two times a day  dextrose 5% + sodium chloride 0.45%. 1000 milliLiter(s) (84 mL/Hr) IV Continuous <Continuous>  fluconAZOLE IVPB 400 milliGRAM(s) IV Intermittent every 24 hours  fluconAZOLE IVPB      heparin   Injectable 5000 Unit(s) SubCutaneous every 8 hours  levothyroxine Injectable 37.5 MICROGram(s) IV Push at bedtime  losartan 50 milliGRAM(s) Oral daily  metoclopramide Injectable 5 milliGRAM(s) IV Push daily  metoprolol succinate ER 25 milliGRAM(s) Oral daily  nicotine -  14 mG/24Hr(s) Patch 1 Patch Transdermal daily  pantoprazole  Injectable 40 milliGRAM(s) IV Push with breakfast  piperacillin/tazobactam IVPB.. 3.375 Gram(s) IV Intermittent every 8 hours  potassium chloride    Tablet ER 20 milliEquivalent(s) Oral every 2 hours  sertraline 200 milliGRAM(s) Oral daily    MEDICATIONS  (PRN):  acetaminophen   IVPB .. 1000 milliGRAM(s) IV Intermittent every 6 hours PRN Mild Pain (1 - 3), Moderate Pain (4 - 6), Severe Pain (7 - 10)  albuterol/ipratropium for Nebulization. 3 milliLiter(s) Nebulizer once PRN Shortness of Breath and/or Wheezing  ALPRAZolam 0.5 milliGRAM(s) Oral three times a day PRN anxiety  melatonin 3 milliGRAM(s) Oral at bedtime PRN Insomnia  ondansetron Injectable 4 milliGRAM(s) IV Push every 6 hours PRN Nausea and/or Vomiting      Vital Signs Last 24 Hrs  T(C): 36.7 (16 Feb 2024 04:49), Max: 36.8 (15 Feb 2024 23:18)  T(F): 98.1 (16 Feb 2024 04:49), Max: 98.2 (15 Feb 2024 23:18)  HR: 83 (16 Feb 2024 04:49) (71 - 97)  BP: 166/77 (16 Feb 2024 04:49) (160/96 - 174/101)  BP(mean): --  RR: 18 (16 Feb 2024 04:49) (18 - 19)  SpO2: 96% (16 Feb 2024 04:49) (95% - 97%)    Parameters below as of 16 Feb 2024 04:49  Patient On (Oxygen Delivery Method): room air        Physical Exam:    Constitutional: NAD  HEENT: PERRL, EOMI  Respiratory: Respirations non-labored, no accessory muscle use  Gastrointestinal: Soft, appropriately tender, non-distended, preveena in place, ostomy with good output, drain with serous output  Neurological: A&O x 3      LABS:                        9.8    8.96  )-----------( 442      ( 16 Feb 2024 01:45 )             29.8     02-16    137  |  102  |  7.5<L>  ----------------------------<  114<H>  3.5   |  27.0  |  0.89    Ca    8.0<L>      16 Feb 2024 01:45  Phos  2.8     02-16  Mg     1.7     02-16        Urinalysis Basic - ( 16 Feb 2024 01:45 )    Color: x / Appearance: x / SG: x / pH: x  Gluc: 114 mg/dL / Ketone: x  / Bili: x / Urobili: x   Blood: x / Protein: x / Nitrite: x   Leuk Esterase: x / RBC: x / WBC x   Sq Epi: x / Non Sq Epi: x / Bacteria: x        A: 77yo s/p ex lap, ANGELA Hartmanns for SBO. Post-operative course complicated by pain and anemia, now both have improved. On 2/13, patient had episode of AMS, concerning for possible CVA, however w/u negative for CVA. Trops elevated with EKG demonstrating NSR at time of RRT, cardiology following. Ostomy now productive, nGT removed 2/15    Plan:   - AM labs and repletions/monitor trops  - CLD, monitor tolerance and advance if able  - Monitor ostomy output  -  zosyn and flucanazole   - DVT PPx: SCD, JURGEN  - Carotid occlusion: will start antiplatelet therapy when able  - cardiology recs appreciated

## 2024-02-16 NOTE — PROGRESS NOTE ADULT - ASSESSMENT
77yo F admitted for septic enterocolitis having continued abdominal pain and distension. Pt admitted to medicine service 1/30 presenting with abdominal pain, watery diarrhea, Tmax 105, tachycardia with diffuse small bowel and colonic inflammation most pronounced in the lower quadrant. CT A/P revealed a  distended stomach with dilated air-fluid filled small bowel loops with at least one transition in the right lower quadrant with colonic diverticulosis. Pt failed conservative management and was taken to OR on 2/10/24 for Ex. Lap. In the OR patient was found with severe soft adhesions, collection entered and cultured and appears secondary to perforated diverticulitis with collection causing obstruction. 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 OR cultures + e.faecalis and yeast. Patient with post op leukocytosis and drop in h/H    - bcx ngtd  - OR cx e.faecalis amp sensitive, candida dubliniensis, + Pseudomonas (S) to zosyn  - Continue zosyn and fluconazole  - trend H/H- s/p PRBC, now stable  - now on cleats  - Trend Fever  - Trend Leukocytosis- improving. may need repeat Ct if worsening       Will Follow

## 2024-02-17 LAB
GLUCOSE BLDC GLUCOMTR-MCNC: 101 MG/DL — HIGH (ref 70–99)
GLUCOSE BLDC GLUCOMTR-MCNC: 101 MG/DL — HIGH (ref 70–99)
GLUCOSE BLDC GLUCOMTR-MCNC: 118 MG/DL — HIGH (ref 70–99)
GLUCOSE BLDC GLUCOMTR-MCNC: 99 MG/DL — SIGNIFICANT CHANGE UP (ref 70–99)

## 2024-02-17 PROCEDURE — 99231 SBSQ HOSP IP/OBS SF/LOW 25: CPT

## 2024-02-17 RX ORDER — ASPIRIN/CALCIUM CARB/MAGNESIUM 324 MG
81 TABLET ORAL DAILY
Refills: 0 | Status: DISCONTINUED | OUTPATIENT
Start: 2024-02-17 | End: 2024-02-24

## 2024-02-17 RX ORDER — ALPRAZOLAM 0.25 MG
0.25 TABLET ORAL EVERY 12 HOURS
Refills: 0 | Status: DISCONTINUED | OUTPATIENT
Start: 2024-02-17 | End: 2024-02-24

## 2024-02-17 RX ADMIN — HEPARIN SODIUM 5000 UNIT(S): 5000 INJECTION INTRAVENOUS; SUBCUTANEOUS at 14:36

## 2024-02-17 RX ADMIN — PIPERACILLIN AND TAZOBACTAM 25 GRAM(S): 4; .5 INJECTION, POWDER, LYOPHILIZED, FOR SOLUTION INTRAVENOUS at 14:35

## 2024-02-17 RX ADMIN — PIPERACILLIN AND TAZOBACTAM 25 GRAM(S): 4; .5 INJECTION, POWDER, LYOPHILIZED, FOR SOLUTION INTRAVENOUS at 06:58

## 2024-02-17 RX ADMIN — Medication 1 PATCH: at 09:51

## 2024-02-17 RX ADMIN — Medication 0.25 MILLIGRAM(S): at 21:11

## 2024-02-17 RX ADMIN — Medication 15 MILLIGRAM(S): at 12:36

## 2024-02-17 RX ADMIN — Medication 15 MILLIGRAM(S): at 05:08

## 2024-02-17 RX ADMIN — Medication 81 MILLIGRAM(S): at 11:36

## 2024-02-17 RX ADMIN — Medication 15 MILLIGRAM(S): at 19:37

## 2024-02-17 RX ADMIN — Medication 25 MILLIGRAM(S): at 05:07

## 2024-02-17 RX ADMIN — Medication 1000 MILLIGRAM(S): at 22:11

## 2024-02-17 RX ADMIN — BUDESONIDE AND FORMOTEROL FUMARATE DIHYDRATE 2 PUFF(S): 160; 4.5 AEROSOL RESPIRATORY (INHALATION) at 08:21

## 2024-02-17 RX ADMIN — Medication 15 MILLIGRAM(S): at 18:37

## 2024-02-17 RX ADMIN — PIPERACILLIN AND TAZOBACTAM 25 GRAM(S): 4; .5 INJECTION, POWDER, LYOPHILIZED, FOR SOLUTION INTRAVENOUS at 21:15

## 2024-02-17 RX ADMIN — Medication 37.5 MICROGRAM(S): at 21:11

## 2024-02-17 RX ADMIN — ATORVASTATIN CALCIUM 40 MILLIGRAM(S): 80 TABLET, FILM COATED ORAL at 21:10

## 2024-02-17 RX ADMIN — Medication 1 PATCH: at 20:00

## 2024-02-17 RX ADMIN — Medication 1 PATCH: at 10:47

## 2024-02-17 RX ADMIN — Medication 400 MILLIGRAM(S): at 21:11

## 2024-02-17 RX ADMIN — FLUCONAZOLE 100 MILLIGRAM(S): 150 TABLET ORAL at 05:49

## 2024-02-17 RX ADMIN — Medication 15 MILLIGRAM(S): at 06:00

## 2024-02-17 RX ADMIN — HEPARIN SODIUM 5000 UNIT(S): 5000 INJECTION INTRAVENOUS; SUBCUTANEOUS at 06:58

## 2024-02-17 RX ADMIN — PANTOPRAZOLE SODIUM 40 MILLIGRAM(S): 20 TABLET, DELAYED RELEASE ORAL at 08:21

## 2024-02-17 RX ADMIN — Medication 5 MILLIGRAM(S): at 11:36

## 2024-02-17 RX ADMIN — Medication 15 MILLIGRAM(S): at 11:36

## 2024-02-17 RX ADMIN — BUDESONIDE AND FORMOTEROL FUMARATE DIHYDRATE 2 PUFF(S): 160; 4.5 AEROSOL RESPIRATORY (INHALATION) at 21:17

## 2024-02-17 RX ADMIN — Medication 1 PATCH: at 12:53

## 2024-02-17 RX ADMIN — SERTRALINE 200 MILLIGRAM(S): 25 TABLET, FILM COATED ORAL at 11:36

## 2024-02-17 RX ADMIN — Medication 15 MILLIGRAM(S): at 00:30

## 2024-02-17 NOTE — PROGRESS NOTE ADULT - SUBJECTIVE AND OBJECTIVE BOX
[facility]                                                INFECTIOUS DISEASES  =======================================================                   Leonid Bauman#   Seamus Holman MD#    Connie Street MD*                           Mai Carrizales MD*   Nadeen Phillips MD*           Diplomates American Board of Internal Medicine & Infectious Diseases                  # [place] Office - Appt - Tel  [phone] Fax [phone]                * [place] Office - Appt - Tel [phone] Fax [phone]                                  Hospital Consult line:  [phone]  =======================================================      [facility]-[id number]  YAMILETH DAVIS   follow up for: perforated diverticulitis  feels weak, worked with PT today  no fever  wbc wnl  tolerating liquids  patient seen and examined.       I have personally reviewed the labs and data; pertinent labs and data are listed in this note; please see below.   ===================================================  REVIEW OF SYSTEMS:  CONSTITUTIONAL:  No Fever or chills  HEENT:  No diplopia or blurred vision.  No earache, sore throat or runny nose.  CARDIOVASCULAR:  No pressure, squeezing, strangling, tightness, heaviness or aching about the chest, neck, axilla or epigastrium.  RESPIRATORY:  No cough, shortness of breath  GASTROINTESTINAL:  No nausea, vomiting or diarrhea. + abdominal cramping  GENITOURINARY:  No dysuria, frequency or urgency. No Blood in urine  MUSCULOSKELETAL:  no joint aches, no muscle pain  SKIN:  No change in skin, hair or nails.  NEUROLOGIC:  No Headaches, seizures or weakness.  PSYCHIATRIC:  No disorder of thought or mood.  ENDOCRINE:  No heat or cold intolerance  HEMATOLOGICAL:  No easy bruising or bleeding.    =======================================================  Allergies    No Known Allergies    Intolerances    Antibiotics:  fluconAZOLE IVPB 400 milliGRAM(s) IV Intermittent every 24 hours  fluconAZOLE IVPB      piperacillin/tazobactam IVPB.. 3.375 Gram(s) IV Intermittent every 8 hours    Other medications:  atorvastatin 40 milliGRAM(s) Oral at bedtime  budesonide  80 MICROgram(s)/formoterol 4.5 MICROgram(s) Inhaler 2 Puff(s) Inhalation two times a day  dextrose 5% + sodium chloride 0.45%. 1000 milliLiter(s) IV Continuous <Continuous>  heparin   Injectable 5000 Unit(s) SubCutaneous every 8 hours  levothyroxine Injectable 37.5 MICROGram(s) IV Push at bedtime  metoclopramide Injectable 5 milliGRAM(s) IV Push daily  nicotine -  14 mG/24Hr(s) Patch 1 Patch Transdermal daily  pantoprazole  Injectable 40 milliGRAM(s) IV Push with breakfast  sertraline 200 milliGRAM(s) Oral daily    ======================================================  Physical Exam:  ============  Vital Signs Last 24 Hrs  T(C): 36.6 (17 Feb 2024 09:41), Max: 36.7 (16 Feb 2024 23:53)  T(F): 97.8 (17 Feb 2024 09:41), Max: 98.1 (16 Feb 2024 23:53)  HR: 88 (17 Feb 2024 09:41) (79 - 88)  BP: 135/85 (17 Feb 2024 09:41) (122/83 - 135/85)  BP(mean): --  RR: 18 (17 Feb 2024 09:41) (16 - 18)  SpO2: 98% (17 Feb 2024 09:41) (95% - 98%)    Parameters below as of 17 Feb 2024 09:41  Patient On (Oxygen Delivery Method): room air        General:  No acute distress, frail elderly lady sitting up in chair  Eye: no conjunctival pallor, no scleral icterus  Neck: Supple, No lymphadenopathy.  Respiratory: Lungs are clear to auscultation, Respirations are non-labored.  Cardiovascular: Normal rate, Regular rhythm,  s1+s2 +lubna  Gastrointestinal: Soft, Non-tender, Non-distended, Normal bowel sounds. midline prevena, RLQ CATA serous, LLQ ostomy  Integumentary: No rash.  Neurologic: Alert, Oriented, No focal deficits  Psychiatric: Appropriate mood & affect.  =======================================================  Labs:                                                          9.8    8.96  )-----------( 442      ( 16 Feb 2024 01:45 )             29.8       02-16    137  |  102  |  7.5<L>  ----------------------------<  114<H>  3.5   |  27.0  |  0.89    Ca    8.0<L>      16 Feb 2024 01:45  Phos  2.8     02-16  Mg     1.7     02-16                Urinalysis Basic - ( 16 Feb 2024 01:45 )    Color: x / Appearance: x / SG: x / pH: x  Gluc: 114 mg/dL / Ketone: x  / Bili: x / Urobili: x   Blood: x / Protein: x / Nitrite: x   Leuk Esterase: x / RBC: x / WBC x   Sq Epi: x / Non Sq Epi: x / Bacteria: x                  CAPILLARY BLOOD GLUCOSE      POCT Blood Glucose.: 101 mg/dL (17 Feb 2024 12:04)              Urinalysis Basic - ( 16 Feb 2024 01:45 )    Color: x / Appearance: x / SG: x / pH: x  Gluc: 114 mg/dL / Ketone: x  / Bili: x / Urobili: x   Blood: x / Protein: x / Nitrite: x   Leuk Esterase: x / RBC: x / WBC x   Sq Epi: x / Non Sq Epi: x / Bacteria: x                  CAPILLARY BLOOD GLUCOSE      POCT Blood Glucose.: 119 mg/dL (15 Feb 2024 06:15)                  Urinalysis Basic - ( 15 Feb 2024 05:30 )    Color: x / Appearance: x / SG: x / pH: x  Gluc: 123 mg/dL / Ketone: x  / Bili: x / Urobili: x   Blood: x / Protein: x / Nitrite: x   Leuk Esterase: x / RBC: x / WBC x   Sq Epi: x / Non Sq Epi: x / Bacteria: x                  CAPILLARY BLOOD GLUCOSE      POCT Blood Glucose.: 119 mg/dL (15 Feb 2024 06:15)              Urinalysis Basic - ( 14 Feb 2024 06:15 )    Color: x / Appearance: x / SG: x / pH: x  Gluc: 110 mg/dL / Ketone: x  / Bili: x / Urobili: x   Blood: x / Protein: x / Nitrite: x   Leuk Esterase: x / RBC: x / WBC x   Sq Epi: x / Non Sq Epi: x / Bacteria: x        PT/INR - ( 13 Feb 2024 03:33 )   PT: 16.6 sec;   INR: 1.51 ratio         PTT - ( 13 Feb 2024 03:33 )  PTT:37.3 sec    CARDIAC MARKERS ( 13 Feb 2024 03:33 )  x     / x     / 27 U/L / x     / x            CAPILLARY BLOOD GLUCOSE  91 (13 Feb 2024 03:36)      POCT Blood Glucose.: 111 mg/dL (14 Feb 2024 06:32)            Culture - Abscess with Gram Stain (collected 02-10-24 @ 11:30)  Source: .Abscess Abdominal Abscess  Gram Stain (02-11-24 @ 01:06):    Few polymorphonuclear leukocytes seen per low power field    Few Gram positive cocci in pairs seen per oil power field    Rare Yeast like cells seen per oil power field  Preliminary Report (02-12-24 @ 20:32):    Moderate Enterococcus faecalis    Few Yeast Identification to follow.  Organism: Enterococcus faecalis (02-12-24 @ 19:34)  Organism: Enterococcus faecalis (02-12-24 @ 19:34)    Sensitivities:      Method Type: MARIEL      -  Ampicillin: S <=2 Predicts results to ampicillin/sulbactam, amoxacillin-clavulanate and  piperacillin-tazobactam.      -  Vancomycin: S 2    Culture - Urine (collected 01-30-24 @ 15:40)  Source: Clean Catch Clean Catch (Midstream)  Final Report (02-01-24 @ 00:37):    <10,000 CFU/mL Normal Urogenital Nimco    Culture - Blood (collected 01-30-24 @ 15:22)  Source: .Blood Blood  Final Report (02-04-24 @ 22:00):    No growth at 5 days    Culture - Blood (collected 01-30-24 @ 15:17)  Source: .Blood Blood  Final Report (02-04-24 @ 22:00):    No growth at 5 days    Culture - Urine (collected 07-30-22 @ 03:45)  Source: Clean Catch Clean Catch (Midstream)  Final Report (08-01-22 @ 21:53):    >100,000 CFU/ml Escherichia coli  Organism: Escherichia coli (08-01-22 @ 21:53)  Organism: Escherichia coli (08-01-22 @ 21:53)    Sensitivities:      Method Type: MARIEL      -  Amikacin: S <=16      -  Amoxicillin/Clavulanic Acid: S <=8/4 Consider reserving for cystitis when ampicillin/sulbactam is resistant      -  Ampicillin: R >16 These ampicillin results predict results for amoxicillin      -  Ampicillin/Sulbactam: R >16/8 Enterobacter, Klebsiella aerogenes, Citrobacter, and Serratia may develop resistance during prolonged therapy (3-4 days)      -  Aztreonam: S <=4      -  Cefazolin: S 8 (MIC_CL_COM_ENTERIC_[id number]) For uncomplicated UTI with K. pneumoniae, E. coli, or P. mirablis: MARIEL <=16 is sensitive and MARIEL >=32 is resistant. This also predicts results for oral agents cefaclor, cefdinir, cefpodoxime, cefprozil, cefuroxime axetil, cephalexin and locarbef for uncomplicated UTI. Note that some isolates may be susceptible to these agents while testing resistant to cefazolin.      -  Cefepime: S <=2      -  Cefoxitin: S <=8      -  Ceftriaxone: S <=1 Enterobacter, Klebsiella aerogenes, Citrobacter, and Serratia may develop resistance during prolonged therapy      -  Ciprofloxacin: R 1      -  Ertapenem: S <=0.5      -  Gentamicin: R >8      -  Imipenem: S <=1      -  Levofloxacin: I 1      -  Meropenem: S <=1      -  Nitrofurantoin: S <=32 Should not be used to treat pyelonephritis      -  Piperacillin/Tazobactam: S <=8      -  Tigecycline: S <=2      -  Tobramycin: I 8      -  Trimethoprim/Sulfamethoxazole: R >2/38

## 2024-02-17 NOTE — PROGRESS NOTE ADULT - SUBJECTIVE AND OBJECTIVE BOX
INTERVAL HPI/OVERNIGHT EVENTS:    Patient evaluated at bedside. No acute distress. No acute events overnight.  Pain better controlled  tolerating FLD      MEDICATIONS  (STANDING):  atorvastatin 40 milliGRAM(s) Oral at bedtime  budesonide  80 MICROgram(s)/formoterol 4.5 MICROgram(s) Inhaler 2 Puff(s) Inhalation two times a day  fluconAZOLE IVPB      fluconAZOLE IVPB 400 milliGRAM(s) IV Intermittent every 24 hours  heparin   Injectable 5000 Unit(s) SubCutaneous every 8 hours  ketorolac   Injectable 15 milliGRAM(s) IV Push every 6 hours  levothyroxine Injectable 37.5 MICROGram(s) IV Push at bedtime  losartan 100 milliGRAM(s) Oral daily  metoclopramide Injectable 5 milliGRAM(s) IV Push daily  metoprolol succinate ER 25 milliGRAM(s) Oral daily  nicotine -  14 mG/24Hr(s) Patch 1 Patch Transdermal daily  pantoprazole  Injectable 40 milliGRAM(s) IV Push with breakfast  piperacillin/tazobactam IVPB.. 3.375 Gram(s) IV Intermittent every 8 hours  potassium chloride    Tablet ER 20 milliEquivalent(s) Oral every 2 hours  sertraline 200 milliGRAM(s) Oral daily    MEDICATIONS  (PRN):  acetaminophen   IVPB .. 1000 milliGRAM(s) IV Intermittent every 6 hours PRN Mild Pain (1 - 3), Moderate Pain (4 - 6), Severe Pain (7 - 10)  albuterol/ipratropium for Nebulization. 3 milliLiter(s) Nebulizer once PRN Shortness of Breath and/or Wheezing  ALPRAZolam 0.5 milliGRAM(s) Oral three times a day PRN anxiety  melatonin 3 milliGRAM(s) Oral at bedtime PRN Insomnia  ondansetron Injectable 4 milliGRAM(s) IV Push every 6 hours PRN Nausea and/or Vomiting      Vital Signs Last 24 Hrs  T(C): 36.7 (16 Feb 2024 23:53), Max: 36.7 (16 Feb 2024 04:49)  T(F): 98.1 (16 Feb 2024 23:53), Max: 98.1 (16 Feb 2024 04:49)  HR: 86 (16 Feb 2024 23:53) (75 - 86)  BP: 125/79 (16 Feb 2024 23:53) (122/83 - 174/95)  BP(mean): --  RR: 18 (16 Feb 2024 23:53) (16 - 19)  SpO2: 96% (16 Feb 2024 23:53) (95% - 98%)    Parameters below as of 16 Feb 2024 23:53  Patient On (Oxygen Delivery Method): room air        Physical Exam:    Constitutional: NAD  HEENT: PERRL, EOMI  Respiratory: Respirations non-labored, no accessory muscle use  Gastrointestinal: Soft, appropriately tender, non-distended, preveena in place, ostomy with good output, drain with serous output  Neurological: A&O x 3      I&O's Detail    15 Feb 2024 07:01  -  16 Feb 2024 07:00  --------------------------------------------------------  IN:  Total IN: 0 mL    OUT:    Bulb (mL): 23 mL    Colostomy (mL): 450 mL    Voided (mL): 2950 mL  Total OUT: 3423 mL    Total NET: -3423 mL      16 Feb 2024 07:01  -  17 Feb 2024 02:38  --------------------------------------------------------  IN:  Total IN: 0 mL    OUT:    Bulb (mL): 5 mL    Colostomy (mL): 365 mL    Voided (mL): 1150 mL  Total OUT: 1520 mL    Total NET: -1520 mL          LABS:                        9.8    8.96  )-----------( 442      ( 16 Feb 2024 01:45 )             29.8     02-16    137  |  102  |  7.5<L>  ----------------------------<  114<H>  3.5   |  27.0  |  0.89    Ca    8.0<L>      16 Feb 2024 01:45  Phos  2.8     02-16  Mg     1.7     02-16        Urinalysis Basic - ( 16 Feb 2024 01:45 )    Color: x / Appearance: x / SG: x / pH: x  Gluc: 114 mg/dL / Ketone: x  / Bili: x / Urobili: x   Blood: x / Protein: x / Nitrite: x   Leuk Esterase: x / RBC: x / WBC x   Sq Epi: x / Non Sq Epi: x / Bacteria: x        RADIOLOGY & ADDITIONAL STUDIES:

## 2024-02-17 NOTE — PROGRESS NOTE ADULT - ASSESSMENT
79yo F admitted for septic enterocolitis having continued abdominal pain and distension. Pt admitted to medicine service 1/30 presenting with abdominal pain, watery diarrhea, Tmax 105, tachycardia with diffuse small bowel and colonic inflammation most pronounced in the lower quadrant. CT A/P revealed a  distended stomach with dilated air-fluid filled small bowel loops with at least one transition in the right lower quadrant with colonic diverticulosis. Pt failed conservative management and was taken to OR on 2/10/24 for Ex. Lap. In the OR patient was found with severe soft adhesions, collection entered and cultured and appears secondary to perforated diverticulitis with collection causing obstruction. 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 OR cultures + e.faecalis and yeast. Patient with post op leukocytosis and drop in h/H    - bcx ngtd  - OR cx e.faecalis amp sensitive, candida dubliniensis, + Pseudomonas (S) to zosyn  - Continue zosyn and fluconazole   - trend H/H- s/p PRBC, now stable  - now on clears  - Trend Fever  - Trend Leukocytosis- improving. may need repeat Ct if worsening     d/w RN  Will Follow

## 2024-02-17 NOTE — PROGRESS NOTE ADULT - ASSESSMENT
77yo s/p ex lap, ANGELA Hartmanns for SBO. Post-operative course complicated by pain and anemia, now both have improved. On 2/13, patient had episode of AMS, concerning for possible CVA, however w/u negative for CVA. Trops elevated with EKG demonstrating NSR at time of RRT, cardiology following. Ostomy now productive, nGT removed 2/15    Plan:   - AM labs and repletions/monitor trops  - advance diet to Low fiber diet  - Monitor ostomy output  -  zosyn and flucanazole   - DVT PPx: SCD, JURGEN

## 2024-02-17 NOTE — CHART NOTE - NSCHARTNOTEFT_GEN_A_CORE
Patient's BP is better controlled on increased dose of losartan. No further inpatient cardiac workup. Further ischemic workup as an outpatient.

## 2024-02-18 LAB
ANION GAP SERPL CALC-SCNC: 10 MMOL/L — SIGNIFICANT CHANGE UP (ref 5–17)
BASOPHILS # BLD AUTO: 0.08 K/UL — SIGNIFICANT CHANGE UP (ref 0–0.2)
BASOPHILS NFR BLD AUTO: 0.8 % — SIGNIFICANT CHANGE UP (ref 0–2)
BUN SERPL-MCNC: 14.7 MG/DL — SIGNIFICANT CHANGE UP (ref 8–20)
CALCIUM SERPL-MCNC: 8.3 MG/DL — LOW (ref 8.4–10.5)
CHLORIDE SERPL-SCNC: 104 MMOL/L — SIGNIFICANT CHANGE UP (ref 96–108)
CO2 SERPL-SCNC: 24 MMOL/L — SIGNIFICANT CHANGE UP (ref 22–29)
CREAT SERPL-MCNC: 0.79 MG/DL — SIGNIFICANT CHANGE UP (ref 0.5–1.3)
CULTURE RESULTS: SIGNIFICANT CHANGE UP
CULTURE RESULTS: SIGNIFICANT CHANGE UP
EGFR: 77 ML/MIN/1.73M2 — SIGNIFICANT CHANGE UP
EOSINOPHIL # BLD AUTO: 0.16 K/UL — SIGNIFICANT CHANGE UP (ref 0–0.5)
EOSINOPHIL NFR BLD AUTO: 1.6 % — SIGNIFICANT CHANGE UP (ref 0–6)
GLUCOSE BLDC GLUCOMTR-MCNC: 109 MG/DL — HIGH (ref 70–99)
GLUCOSE BLDC GLUCOMTR-MCNC: 117 MG/DL — HIGH (ref 70–99)
GLUCOSE BLDC GLUCOMTR-MCNC: 120 MG/DL — HIGH (ref 70–99)
GLUCOSE BLDC GLUCOMTR-MCNC: 130 MG/DL — HIGH (ref 70–99)
GLUCOSE BLDC GLUCOMTR-MCNC: 92 MG/DL — SIGNIFICANT CHANGE UP (ref 70–99)
GLUCOSE SERPL-MCNC: 79 MG/DL — SIGNIFICANT CHANGE UP (ref 70–99)
HCT VFR BLD CALC: 32.5 % — LOW (ref 34.5–45)
HGB BLD-MCNC: 10.4 G/DL — LOW (ref 11.5–15.5)
IMM GRANULOCYTES NFR BLD AUTO: 0.5 % — SIGNIFICANT CHANGE UP (ref 0–0.9)
LYMPHOCYTES # BLD AUTO: 0.86 K/UL — LOW (ref 1–3.3)
LYMPHOCYTES # BLD AUTO: 8.7 % — LOW (ref 13–44)
MAGNESIUM SERPL-MCNC: 1.8 MG/DL — SIGNIFICANT CHANGE UP (ref 1.8–2.6)
MCHC RBC-ENTMCNC: 30.4 PG — SIGNIFICANT CHANGE UP (ref 27–34)
MCHC RBC-ENTMCNC: 32 GM/DL — SIGNIFICANT CHANGE UP (ref 32–36)
MCV RBC AUTO: 95 FL — SIGNIFICANT CHANGE UP (ref 80–100)
MONOCYTES # BLD AUTO: 0.87 K/UL — SIGNIFICANT CHANGE UP (ref 0–0.9)
MONOCYTES NFR BLD AUTO: 8.8 % — SIGNIFICANT CHANGE UP (ref 2–14)
NEUTROPHILS # BLD AUTO: 7.86 K/UL — HIGH (ref 1.8–7.4)
NEUTROPHILS NFR BLD AUTO: 79.6 % — HIGH (ref 43–77)
PHOSPHATE SERPL-MCNC: 3.2 MG/DL — SIGNIFICANT CHANGE UP (ref 2.4–4.7)
PLATELET # BLD AUTO: 413 K/UL — HIGH (ref 150–400)
POTASSIUM SERPL-MCNC: 4 MMOL/L — SIGNIFICANT CHANGE UP (ref 3.5–5.3)
POTASSIUM SERPL-SCNC: 4 MMOL/L — SIGNIFICANT CHANGE UP (ref 3.5–5.3)
RBC # BLD: 3.42 M/UL — LOW (ref 3.8–5.2)
RBC # FLD: 14.5 % — SIGNIFICANT CHANGE UP (ref 10.3–14.5)
SODIUM SERPL-SCNC: 138 MMOL/L — SIGNIFICANT CHANGE UP (ref 135–145)
SPECIMEN SOURCE: SIGNIFICANT CHANGE UP
SPECIMEN SOURCE: SIGNIFICANT CHANGE UP
WBC # BLD: 9.88 K/UL — SIGNIFICANT CHANGE UP (ref 3.8–10.5)
WBC # FLD AUTO: 9.88 K/UL — SIGNIFICANT CHANGE UP (ref 3.8–10.5)

## 2024-02-18 RX ORDER — PIPERACILLIN AND TAZOBACTAM 4; .5 G/20ML; G/20ML
3.38 INJECTION, POWDER, LYOPHILIZED, FOR SOLUTION INTRAVENOUS EVERY 8 HOURS
Refills: 0 | Status: DISCONTINUED | OUTPATIENT
Start: 2024-02-18 | End: 2024-02-21

## 2024-02-18 RX ORDER — MAGNESIUM SULFATE 500 MG/ML
2 VIAL (ML) INJECTION ONCE
Refills: 0 | Status: COMPLETED | OUTPATIENT
Start: 2024-02-18 | End: 2024-02-18

## 2024-02-18 RX ADMIN — Medication 15 MILLIGRAM(S): at 13:03

## 2024-02-18 RX ADMIN — Medication 1 PATCH: at 07:04

## 2024-02-18 RX ADMIN — PIPERACILLIN AND TAZOBACTAM 25 GRAM(S): 4; .5 INJECTION, POWDER, LYOPHILIZED, FOR SOLUTION INTRAVENOUS at 21:18

## 2024-02-18 RX ADMIN — SERTRALINE 200 MILLIGRAM(S): 25 TABLET, FILM COATED ORAL at 12:14

## 2024-02-18 RX ADMIN — ATORVASTATIN CALCIUM 40 MILLIGRAM(S): 80 TABLET, FILM COATED ORAL at 21:19

## 2024-02-18 RX ADMIN — Medication 15 MILLIGRAM(S): at 01:30

## 2024-02-18 RX ADMIN — Medication 5 MILLIGRAM(S): at 12:04

## 2024-02-18 RX ADMIN — Medication 15 MILLIGRAM(S): at 22:20

## 2024-02-18 RX ADMIN — HEPARIN SODIUM 5000 UNIT(S): 5000 INJECTION INTRAVENOUS; SUBCUTANEOUS at 12:03

## 2024-02-18 RX ADMIN — Medication 15 MILLIGRAM(S): at 12:03

## 2024-02-18 RX ADMIN — LOSARTAN POTASSIUM 100 MILLIGRAM(S): 100 TABLET, FILM COATED ORAL at 05:55

## 2024-02-18 RX ADMIN — Medication 15 MILLIGRAM(S): at 05:56

## 2024-02-18 RX ADMIN — Medication 0.25 MILLIGRAM(S): at 15:21

## 2024-02-18 RX ADMIN — Medication 15 MILLIGRAM(S): at 06:56

## 2024-02-18 RX ADMIN — FLUCONAZOLE 100 MILLIGRAM(S): 150 TABLET ORAL at 05:55

## 2024-02-18 RX ADMIN — Medication 1 PATCH: at 19:39

## 2024-02-18 RX ADMIN — PANTOPRAZOLE SODIUM 40 MILLIGRAM(S): 20 TABLET, DELAYED RELEASE ORAL at 12:04

## 2024-02-18 RX ADMIN — Medication 15 MILLIGRAM(S): at 17:51

## 2024-02-18 RX ADMIN — Medication 25 GRAM(S): at 19:06

## 2024-02-18 RX ADMIN — Medication 15 MILLIGRAM(S): at 18:50

## 2024-02-18 RX ADMIN — Medication 15 MILLIGRAM(S): at 23:28

## 2024-02-18 RX ADMIN — HEPARIN SODIUM 5000 UNIT(S): 5000 INJECTION INTRAVENOUS; SUBCUTANEOUS at 21:20

## 2024-02-18 RX ADMIN — Medication 3 MILLIGRAM(S): at 21:19

## 2024-02-18 RX ADMIN — Medication 25 MILLIGRAM(S): at 05:55

## 2024-02-18 RX ADMIN — HEPARIN SODIUM 5000 UNIT(S): 5000 INJECTION INTRAVENOUS; SUBCUTANEOUS at 00:30

## 2024-02-18 RX ADMIN — Medication 37.5 MICROGRAM(S): at 21:18

## 2024-02-18 RX ADMIN — BUDESONIDE AND FORMOTEROL FUMARATE DIHYDRATE 2 PUFF(S): 160; 4.5 AEROSOL RESPIRATORY (INHALATION) at 21:24

## 2024-02-18 RX ADMIN — Medication 1 PATCH: at 13:05

## 2024-02-18 RX ADMIN — Medication 15 MILLIGRAM(S): at 00:30

## 2024-02-18 RX ADMIN — Medication 1 PATCH: at 12:05

## 2024-02-18 RX ADMIN — Medication 81 MILLIGRAM(S): at 12:05

## 2024-02-18 NOTE — PROGRESS NOTE ADULT - ASSESSMENT
79yo s/p ex lap, ANGELA Hartmanns for SBO. Post-operative course complicated by pain and anemia, now both have improved. On 2/13, patient had episode of AMS, concerning for possible CVA, however w/u negative for CVA. Trops elevated with EKG demonstrating NSR at time of RRT, cardiology following. Ostomy now productive, nGT removed 2/15, diet advanced yesterday. Xanax being weaned    Plan:  -wean xanax further today  -?CATA drain removal  -continue antibiotics as per ID  -continue to monitor ROBF  -pain control  -strict Is/Os  -continue home meds  -trend labs, replete electrolytes as needed  -encourage OOB  -incentive spirometry  -DVT ppx: SCDs, HSQ

## 2024-02-18 NOTE — PROGRESS NOTE ADULT - SUBJECTIVE AND OBJECTIVE BOX
Patient seen and examined at bedside. No acute events overnight. Slowly weaning xanax dose, advanced to regular diet yesterday, tolerating    Vitals:  Vital Signs Last 24 Hrs  T(C): 36.6 (17 Feb 2024 23:00), Max: 36.7 (17 Feb 2024 04:19)  T(F): 97.9 (17 Feb 2024 23:00), Max: 98 (17 Feb 2024 04:19)  HR: 87 (17 Feb 2024 23:00) (80 - 88)  BP: 120/77 (17 Feb 2024 23:00) (120/77 - 135/85)  BP(mean): --  RR: 18 (17 Feb 2024 23:00) (18 - 18)  SpO2: 95% (17 Feb 2024 23:00) (92% - 98%)    Parameters below as of 17 Feb 2024 23:00  Patient On (Oxygen Delivery Method): room air        Labs:  02-16    137  |  102  |  7.5<L>  ----------------------------<  114<H>  3.5   |  27.0  |  0.89    Ca    8.0<L>      16 Feb 2024 01:45  Phos  2.8     02-16  Mg     1.7     02-16                              9.8    8.96  )-----------( 442      ( 16 Feb 2024 01:45 )             29.8       Exam:  Gen: pt lying in bed, alert, in NAD  Resp: unlabored  CVS: RRR  Abd: soft, ND, appropriately TTP, ostomy productive with gas and stool, CATA serous output  Ext: moving all extremities spontaneously, sensation intact, pulses 2+

## 2024-02-19 LAB
ANION GAP SERPL CALC-SCNC: 14 MMOL/L — SIGNIFICANT CHANGE UP (ref 5–17)
BASOPHILS # BLD AUTO: 0.07 K/UL — SIGNIFICANT CHANGE UP (ref 0–0.2)
BASOPHILS NFR BLD AUTO: 0.7 % — SIGNIFICANT CHANGE UP (ref 0–2)
BUN SERPL-MCNC: 11.9 MG/DL — SIGNIFICANT CHANGE UP (ref 8–20)
CALCIUM SERPL-MCNC: 8.3 MG/DL — LOW (ref 8.4–10.5)
CHLORIDE SERPL-SCNC: 102 MMOL/L — SIGNIFICANT CHANGE UP (ref 96–108)
CO2 SERPL-SCNC: 21 MMOL/L — LOW (ref 22–29)
CREAT SERPL-MCNC: 0.92 MG/DL — SIGNIFICANT CHANGE UP (ref 0.5–1.3)
EGFR: 65 ML/MIN/1.73M2 — SIGNIFICANT CHANGE UP
EOSINOPHIL # BLD AUTO: 0.16 K/UL — SIGNIFICANT CHANGE UP (ref 0–0.5)
EOSINOPHIL NFR BLD AUTO: 1.6 % — SIGNIFICANT CHANGE UP (ref 0–6)
GLUCOSE BLDC GLUCOMTR-MCNC: 108 MG/DL — HIGH (ref 70–99)
GLUCOSE BLDC GLUCOMTR-MCNC: 146 MG/DL — HIGH (ref 70–99)
GLUCOSE BLDC GLUCOMTR-MCNC: 153 MG/DL — HIGH (ref 70–99)
GLUCOSE SERPL-MCNC: 85 MG/DL — SIGNIFICANT CHANGE UP (ref 70–99)
HCT VFR BLD CALC: 30.5 % — LOW (ref 34.5–45)
HGB BLD-MCNC: 9.5 G/DL — LOW (ref 11.5–15.5)
IMM GRANULOCYTES NFR BLD AUTO: 0.5 % — SIGNIFICANT CHANGE UP (ref 0–0.9)
LYMPHOCYTES # BLD AUTO: 0.89 K/UL — LOW (ref 1–3.3)
LYMPHOCYTES # BLD AUTO: 8.8 % — LOW (ref 13–44)
MAGNESIUM SERPL-MCNC: 2 MG/DL — SIGNIFICANT CHANGE UP (ref 1.6–2.6)
MCHC RBC-ENTMCNC: 29.5 PG — SIGNIFICANT CHANGE UP (ref 27–34)
MCHC RBC-ENTMCNC: 31.1 GM/DL — LOW (ref 32–36)
MCV RBC AUTO: 94.7 FL — SIGNIFICANT CHANGE UP (ref 80–100)
MONOCYTES # BLD AUTO: 0.87 K/UL — SIGNIFICANT CHANGE UP (ref 0–0.9)
MONOCYTES NFR BLD AUTO: 8.6 % — SIGNIFICANT CHANGE UP (ref 2–14)
NEUTROPHILS # BLD AUTO: 8.12 K/UL — HIGH (ref 1.8–7.4)
NEUTROPHILS NFR BLD AUTO: 79.8 % — HIGH (ref 43–77)
PHOSPHATE SERPL-MCNC: 3.5 MG/DL — SIGNIFICANT CHANGE UP (ref 2.4–4.7)
PLATELET # BLD AUTO: 358 K/UL — SIGNIFICANT CHANGE UP (ref 150–400)
POTASSIUM SERPL-MCNC: 4.5 MMOL/L — SIGNIFICANT CHANGE UP (ref 3.5–5.3)
POTASSIUM SERPL-SCNC: 4.5 MMOL/L — SIGNIFICANT CHANGE UP (ref 3.5–5.3)
RBC # BLD: 3.22 M/UL — LOW (ref 3.8–5.2)
RBC # FLD: 14.6 % — HIGH (ref 10.3–14.5)
SODIUM SERPL-SCNC: 137 MMOL/L — SIGNIFICANT CHANGE UP (ref 135–145)
WBC # BLD: 10.16 K/UL — SIGNIFICANT CHANGE UP (ref 3.8–10.5)
WBC # FLD AUTO: 10.16 K/UL — SIGNIFICANT CHANGE UP (ref 3.8–10.5)

## 2024-02-19 RX ADMIN — LOSARTAN POTASSIUM 100 MILLIGRAM(S): 100 TABLET, FILM COATED ORAL at 05:31

## 2024-02-19 RX ADMIN — ATORVASTATIN CALCIUM 40 MILLIGRAM(S): 80 TABLET, FILM COATED ORAL at 22:30

## 2024-02-19 RX ADMIN — HEPARIN SODIUM 5000 UNIT(S): 5000 INJECTION INTRAVENOUS; SUBCUTANEOUS at 05:33

## 2024-02-19 RX ADMIN — Medication 15 MILLIGRAM(S): at 18:48

## 2024-02-19 RX ADMIN — BUDESONIDE AND FORMOTEROL FUMARATE DIHYDRATE 2 PUFF(S): 160; 4.5 AEROSOL RESPIRATORY (INHALATION) at 22:31

## 2024-02-19 RX ADMIN — PIPERACILLIN AND TAZOBACTAM 25 GRAM(S): 4; .5 INJECTION, POWDER, LYOPHILIZED, FOR SOLUTION INTRAVENOUS at 06:52

## 2024-02-19 RX ADMIN — BUDESONIDE AND FORMOTEROL FUMARATE DIHYDRATE 2 PUFF(S): 160; 4.5 AEROSOL RESPIRATORY (INHALATION) at 09:33

## 2024-02-19 RX ADMIN — PIPERACILLIN AND TAZOBACTAM 25 GRAM(S): 4; .5 INJECTION, POWDER, LYOPHILIZED, FOR SOLUTION INTRAVENOUS at 18:49

## 2024-02-19 RX ADMIN — Medication 25 MILLIGRAM(S): at 05:32

## 2024-02-19 RX ADMIN — Medication 37.5 MICROGRAM(S): at 22:29

## 2024-02-19 RX ADMIN — Medication 5 MILLIGRAM(S): at 12:23

## 2024-02-19 RX ADMIN — HEPARIN SODIUM 5000 UNIT(S): 5000 INJECTION INTRAVENOUS; SUBCUTANEOUS at 22:45

## 2024-02-19 RX ADMIN — Medication 15 MILLIGRAM(S): at 12:20

## 2024-02-19 RX ADMIN — Medication 15 MILLIGRAM(S): at 06:17

## 2024-02-19 RX ADMIN — SERTRALINE 200 MILLIGRAM(S): 25 TABLET, FILM COATED ORAL at 12:24

## 2024-02-19 RX ADMIN — HEPARIN SODIUM 5000 UNIT(S): 5000 INJECTION INTRAVENOUS; SUBCUTANEOUS at 14:57

## 2024-02-19 RX ADMIN — FLUCONAZOLE 100 MILLIGRAM(S): 150 TABLET ORAL at 05:01

## 2024-02-19 RX ADMIN — Medication 15 MILLIGRAM(S): at 05:32

## 2024-02-19 RX ADMIN — Medication 15 MILLIGRAM(S): at 12:23

## 2024-02-19 RX ADMIN — PANTOPRAZOLE SODIUM 40 MILLIGRAM(S): 20 TABLET, DELAYED RELEASE ORAL at 09:32

## 2024-02-19 RX ADMIN — Medication 81 MILLIGRAM(S): at 12:24

## 2024-02-19 NOTE — PROGRESS NOTE ADULT - ASSESSMENT
77yo s/p ex lap, ANGELA Hartmanns for SBO. Post-operative course complicated by pain and anemia, now both have improved. On 2/13, patient had episode of AMS, concerning for possible CVA, however w/u negative for CVA. Trops elevated with EKG demonstrating NSR at time of RRT, cardiology following. Ostomy now productive, nGT removed 2/15, diet advanced yesterday. Xanax being weaned.  Prevena was removed this AM, midline incision with no erythema nor drainage.    Plan:  -wean xanax further today  -?CATA drain removal  -continue antibiotics as per ID  -continue to monitor ROBF  -pain control  -strict Is/Os  -continue home meds  -trend labs, replete electrolytes as needed  -encourage OOB  -incentive spirometry  -DVT ppx: SCDs, HSQ

## 2024-02-19 NOTE — PROGRESS NOTE ADULT - SUBJECTIVE AND OBJECTIVE BOX
HPI/OVERNIGHT EVENTS: Patient seen and examined at bedside this AM. No overnight events. No complaints. Denies fever, chills, nausea, vomiting, chest pain, SOB, dizziness, abd pain or any other concerning symptoms.    Vital Signs Last 24 Hrs  T(C): 37.1 (19 Feb 2024 05:32), Max: 37.1 (18 Feb 2024 10:13)  T(F): 98.8 (19 Feb 2024 05:32), Max: 98.8 (19 Feb 2024 05:32)  HR: 85 (19 Feb 2024 05:32) (84 - 94)  BP: 147/92 (19 Feb 2024 05:32) (131/78 - 160/98)  BP(mean): --  RR: 18 (19 Feb 2024 05:32) (17 - 18)  SpO2: 92% (19 Feb 2024 05:32) (89% - 94%)    Parameters below as of 19 Feb 2024 05:32  Patient On (Oxygen Delivery Method): room air        I&O's Detail    18 Feb 2024 07:01  -  19 Feb 2024 07:00  --------------------------------------------------------  IN:    IV PiggyBack: 400 mL    Oral Fluid: 570 mL  Total IN: 970 mL    OUT:    Bulb (mL): 3 mL    Colostomy (mL): 550 mL    Voided (mL): 900 mL  Total OUT: 1453 mL    Total NET: -483 mL          Constitutional: patient resting comfortably in bed, in no acute distress  HEENT: EOMI, PERRLA, MMM.  Respiratory: Non labored breathing on RA  Cardiovascular: RRR  Gastrointestinal: Abdomen soft, non-tender, non-distended, no rebound tenderness / guarding  Musculoskeletal: No joint pain, swelling or deformity; no limitation of movement  Vascular: Extremities warm and well perfused.     LABS:                        10.4   9.88  )-----------( 413      ( 18 Feb 2024 07:07 )             32.5     02-18    138  |  104  |  14.7  ----------------------------<  79  4.0   |  24.0  |  0.79    Ca    8.3<L>      18 Feb 2024 07:07  Phos  3.2     02-18  Mg     1.8     02-18        Urinalysis Basic - ( 18 Feb 2024 07:07 )    Color: x / Appearance: x / SG: x / pH: x  Gluc: 79 mg/dL / Ketone: x  / Bili: x / Urobili: x   Blood: x / Protein: x / Nitrite: x   Leuk Esterase: x / RBC: x / WBC x   Sq Epi: x / Non Sq Epi: x / Bacteria: x        MEDICATIONS  (STANDING):  aspirin enteric coated 81 milliGRAM(s) Oral daily  atorvastatin 40 milliGRAM(s) Oral at bedtime  budesonide  80 MICROgram(s)/formoterol 4.5 MICROgram(s) Inhaler 2 Puff(s) Inhalation two times a day  fluconAZOLE IVPB      fluconAZOLE IVPB 400 milliGRAM(s) IV Intermittent every 24 hours  heparin   Injectable 5000 Unit(s) SubCutaneous every 8 hours  ketorolac   Injectable 15 milliGRAM(s) IV Push every 6 hours  levothyroxine Injectable 37.5 MICROGram(s) IV Push at bedtime  losartan 100 milliGRAM(s) Oral daily  metoclopramide Injectable 5 milliGRAM(s) IV Push daily  metoprolol succinate ER 25 milliGRAM(s) Oral daily  nicotine -  14 mG/24Hr(s) Patch 1 Patch Transdermal daily  pantoprazole  Injectable 40 milliGRAM(s) IV Push with breakfast  piperacillin/tazobactam IVPB.. 3.375 Gram(s) IV Intermittent every 8 hours  sertraline 200 milliGRAM(s) Oral daily    MEDICATIONS  (PRN):  albuterol/ipratropium for Nebulization. 3 milliLiter(s) Nebulizer once PRN Shortness of Breath and/or Wheezing  ALPRAZolam 0.25 milliGRAM(s) Oral every 12 hours PRN anxiety  melatonin 3 milliGRAM(s) Oral at bedtime PRN Insomnia  ondansetron Injectable 4 milliGRAM(s) IV Push every 6 hours PRN Nausea and/or Vomiting      MICRO:   Cultures     STUDIES:   EKG, CXR, U/S, CT, MRI

## 2024-02-20 LAB
ANION GAP SERPL CALC-SCNC: 11 MMOL/L — SIGNIFICANT CHANGE UP (ref 5–17)
BUN SERPL-MCNC: 15.5 MG/DL — SIGNIFICANT CHANGE UP (ref 8–20)
CALCIUM SERPL-MCNC: 7.9 MG/DL — LOW (ref 8.4–10.5)
CHLORIDE SERPL-SCNC: 105 MMOL/L — SIGNIFICANT CHANGE UP (ref 96–108)
CO2 SERPL-SCNC: 24 MMOL/L — SIGNIFICANT CHANGE UP (ref 22–29)
CREAT SERPL-MCNC: 1.08 MG/DL — SIGNIFICANT CHANGE UP (ref 0.5–1.3)
EGFR: 53 ML/MIN/1.73M2 — LOW
GLUCOSE SERPL-MCNC: 121 MG/DL — HIGH (ref 70–99)
HCT VFR BLD CALC: 25.6 % — LOW (ref 34.5–45)
HCT VFR BLD CALC: 26.5 % — LOW (ref 34.5–45)
HGB BLD-MCNC: 8.2 G/DL — LOW (ref 11.5–15.5)
HGB BLD-MCNC: 8.6 G/DL — LOW (ref 11.5–15.5)
MAGNESIUM SERPL-MCNC: 1.7 MG/DL — SIGNIFICANT CHANGE UP (ref 1.6–2.6)
MCHC RBC-ENTMCNC: 29.9 PG — SIGNIFICANT CHANGE UP (ref 27–34)
MCHC RBC-ENTMCNC: 32.5 GM/DL — SIGNIFICANT CHANGE UP (ref 32–36)
MCV RBC AUTO: 92 FL — SIGNIFICANT CHANGE UP (ref 80–100)
PHOSPHATE SERPL-MCNC: 3.2 MG/DL — SIGNIFICANT CHANGE UP (ref 2.4–4.7)
PLATELET # BLD AUTO: 389 K/UL — SIGNIFICANT CHANGE UP (ref 150–400)
POTASSIUM SERPL-MCNC: 4 MMOL/L — SIGNIFICANT CHANGE UP (ref 3.5–5.3)
POTASSIUM SERPL-SCNC: 4 MMOL/L — SIGNIFICANT CHANGE UP (ref 3.5–5.3)
RBC # BLD: 2.88 M/UL — LOW (ref 3.8–5.2)
RBC # FLD: 14.4 % — SIGNIFICANT CHANGE UP (ref 10.3–14.5)
SODIUM SERPL-SCNC: 140 MMOL/L — SIGNIFICANT CHANGE UP (ref 135–145)
SURGICAL PATHOLOGY STUDY: SIGNIFICANT CHANGE UP
WBC # BLD: 9.86 K/UL — SIGNIFICANT CHANGE UP (ref 3.8–10.5)
WBC # FLD AUTO: 9.86 K/UL — SIGNIFICANT CHANGE UP (ref 3.8–10.5)

## 2024-02-20 PROCEDURE — 99232 SBSQ HOSP IP/OBS MODERATE 35: CPT

## 2024-02-20 PROCEDURE — 93010 ELECTROCARDIOGRAM REPORT: CPT

## 2024-02-20 RX ORDER — MAGNESIUM SULFATE 500 MG/ML
1 VIAL (ML) INJECTION ONCE
Refills: 0 | Status: COMPLETED | OUTPATIENT
Start: 2024-02-20 | End: 2024-02-20

## 2024-02-20 RX ADMIN — SERTRALINE 200 MILLIGRAM(S): 25 TABLET, FILM COATED ORAL at 11:42

## 2024-02-20 RX ADMIN — Medication 15 MILLIGRAM(S): at 23:43

## 2024-02-20 RX ADMIN — BUDESONIDE AND FORMOTEROL FUMARATE DIHYDRATE 2 PUFF(S): 160; 4.5 AEROSOL RESPIRATORY (INHALATION) at 08:03

## 2024-02-20 RX ADMIN — Medication 15 MILLIGRAM(S): at 12:47

## 2024-02-20 RX ADMIN — HEPARIN SODIUM 5000 UNIT(S): 5000 INJECTION INTRAVENOUS; SUBCUTANEOUS at 16:29

## 2024-02-20 RX ADMIN — Medication 15 MILLIGRAM(S): at 11:42

## 2024-02-20 RX ADMIN — PIPERACILLIN AND TAZOBACTAM 25 GRAM(S): 4; .5 INJECTION, POWDER, LYOPHILIZED, FOR SOLUTION INTRAVENOUS at 21:52

## 2024-02-20 RX ADMIN — LOSARTAN POTASSIUM 100 MILLIGRAM(S): 100 TABLET, FILM COATED ORAL at 05:02

## 2024-02-20 RX ADMIN — Medication 3 MILLIGRAM(S): at 21:52

## 2024-02-20 RX ADMIN — Medication 5 MILLIGRAM(S): at 11:42

## 2024-02-20 RX ADMIN — HEPARIN SODIUM 5000 UNIT(S): 5000 INJECTION INTRAVENOUS; SUBCUTANEOUS at 05:04

## 2024-02-20 RX ADMIN — BUDESONIDE AND FORMOTEROL FUMARATE DIHYDRATE 2 PUFF(S): 160; 4.5 AEROSOL RESPIRATORY (INHALATION) at 22:03

## 2024-02-20 RX ADMIN — Medication 15 MILLIGRAM(S): at 02:02

## 2024-02-20 RX ADMIN — ATORVASTATIN CALCIUM 40 MILLIGRAM(S): 80 TABLET, FILM COATED ORAL at 21:52

## 2024-02-20 RX ADMIN — Medication 15 MILLIGRAM(S): at 17:54

## 2024-02-20 RX ADMIN — FLUCONAZOLE 100 MILLIGRAM(S): 150 TABLET ORAL at 05:29

## 2024-02-20 RX ADMIN — Medication 81 MILLIGRAM(S): at 11:42

## 2024-02-20 RX ADMIN — PIPERACILLIN AND TAZOBACTAM 25 GRAM(S): 4; .5 INJECTION, POWDER, LYOPHILIZED, FOR SOLUTION INTRAVENOUS at 01:15

## 2024-02-20 RX ADMIN — HEPARIN SODIUM 5000 UNIT(S): 5000 INJECTION INTRAVENOUS; SUBCUTANEOUS at 23:42

## 2024-02-20 RX ADMIN — PIPERACILLIN AND TAZOBACTAM 25 GRAM(S): 4; .5 INJECTION, POWDER, LYOPHILIZED, FOR SOLUTION INTRAVENOUS at 17:54

## 2024-02-20 RX ADMIN — PIPERACILLIN AND TAZOBACTAM 25 GRAM(S): 4; .5 INJECTION, POWDER, LYOPHILIZED, FOR SOLUTION INTRAVENOUS at 09:43

## 2024-02-20 RX ADMIN — Medication 100 GRAM(S): at 16:28

## 2024-02-20 RX ADMIN — Medication 25 MILLIGRAM(S): at 05:02

## 2024-02-20 RX ADMIN — Medication 15 MILLIGRAM(S): at 01:15

## 2024-02-20 RX ADMIN — Medication 0.25 MILLIGRAM(S): at 21:51

## 2024-02-20 RX ADMIN — Medication 37.5 MICROGRAM(S): at 21:52

## 2024-02-20 RX ADMIN — PANTOPRAZOLE SODIUM 40 MILLIGRAM(S): 20 TABLET, DELAYED RELEASE ORAL at 09:42

## 2024-02-20 RX ADMIN — Medication 15 MILLIGRAM(S): at 07:00

## 2024-02-20 NOTE — PROGRESS NOTE ADULT - ASSESSMENT
77yo s/p ex lap, ANGELA Hartmanns for SBO. Post-operative course complicated by pain and anemia, now both have improved. On 2/13, patient had episode of AMS, concerning for possible CVA, however w/u negative for CVA. Trops elevated with EKG demonstrating NSR at time of RRT, cardiology following. Ostomy now productive, nGT removed 2/15, diet advanced yesterday. Xanax being weaned.  Prevena was removed yesterday, midline incision with no erythema nor drainage.  Admits to feeling better this morning.  Does  not want to go to [facility] and states she will be staying with her sister for 2 weeks and will have assistance at her sister's house    Plan:  -CATA drain removal prior to discharge  -Will follow up ID regarding antibiotics  -pain control  -f/u ostomy output  -strict Is/Os  -continue home meds  -trend labs, replete electrolytes as needed  -encourage OOB  -incentive spirometry  -DVT ppx: SCDs,

## 2024-02-20 NOTE — PROGRESS NOTE ADULT - SUBJECTIVE AND OBJECTIVE BOX
HPI/OVERNIGHT EVENTS: Patient seen and examined at bedside this AM. No overnight events. No complaints. Denies fever, chills, nausea, vomiting, chest pain, SOB, dizziness, abd pain or any other concerning symptoms.    Vital Signs Last 24 Hrs  T(C): 36.8 (20 Feb 2024 04:56), Max: 36.9 (20 Feb 2024 00:43)  T(F): 98.3 (20 Feb 2024 04:56), Max: 98.5 (20 Feb 2024 00:43)  HR: 88 (20 Feb 2024 04:56) (83 - 88)  BP: 150/90 (20 Feb 2024 04:56) (139/75 - 154/92)  BP(mean): --  RR: 18 (20 Feb 2024 04:56) (18 - 19)  SpO2: 95% (20 Feb 2024 04:56) (92% - 95%)    Parameters below as of 20 Feb 2024 04:56  Patient On (Oxygen Delivery Method): room air        I&O's Detail    18 Feb 2024 07:01  -  19 Feb 2024 07:00  --------------------------------------------------------  IN:    IV PiggyBack: 400 mL    Oral Fluid: 570 mL  Total IN: 970 mL    OUT:    Bulb (mL): 3 mL    Colostomy (mL): 550 mL    Voided (mL): 900 mL  Total OUT: 1453 mL    Total NET: -483 mL      19 Feb 2024 07:01  -  20 Feb 2024 06:37  --------------------------------------------------------  IN:    IV PiggyBack: 400 mL    Oral Fluid: 600 mL  Total IN: 1000 mL    OUT:    Bulb (mL): 3 mL    Colostomy (mL): 100 mL    Voided (mL): 500 mL  Total OUT: 603 mL    Total NET: 397 mL          Constitutional: patient resting comfortably in bed, in no acute distress  HEENT: EOMI, PERRLA, MMM.  Respiratory: Non labored breathing on RA  Cardiovascular: RRR  Gastrointestinal: Abdomen soft, non-tender, non-distended, no rebound tenderness / guarding  Musculoskeletal: No joint pain, swelling or deformity; no limitation of movement  Vascular: Extremities warm and well perfused.     LABS:                        8.6    9.86  )-----------( 389      ( 20 Feb 2024 05:18 )             26.5     02-20    140  |  105  |  15.5  ----------------------------<  121<H>  4.0   |  24.0  |  1.08    Ca    7.9<L>      20 Feb 2024 05:18  Phos  3.2     02-20  Mg     1.7     02-20        Urinalysis Basic - ( 20 Feb 2024 05:18 )    Color: x / Appearance: x / SG: x / pH: x  Gluc: 121 mg/dL / Ketone: x  / Bili: x / Urobili: x   Blood: x / Protein: x / Nitrite: x   Leuk Esterase: x / RBC: x / WBC x   Sq Epi: x / Non Sq Epi: x / Bacteria: x        MEDICATIONS  (STANDING):  aspirin enteric coated 81 milliGRAM(s) Oral daily  atorvastatin 40 milliGRAM(s) Oral at bedtime  budesonide  80 MICROgram(s)/formoterol 4.5 MICROgram(s) Inhaler 2 Puff(s) Inhalation two times a day  fluconAZOLE IVPB 400 milliGRAM(s) IV Intermittent every 24 hours  fluconAZOLE IVPB      heparin   Injectable 5000 Unit(s) SubCutaneous every 8 hours  ketorolac   Injectable 15 milliGRAM(s) IV Push every 6 hours  levothyroxine Injectable 37.5 MICROGram(s) IV Push at bedtime  losartan 100 milliGRAM(s) Oral daily  metoclopramide Injectable 5 milliGRAM(s) IV Push daily  metoprolol succinate ER 25 milliGRAM(s) Oral daily  nicotine -  14 mG/24Hr(s) Patch 1 Patch Transdermal daily  pantoprazole  Injectable 40 milliGRAM(s) IV Push with breakfast  piperacillin/tazobactam IVPB.. 3.375 Gram(s) IV Intermittent every 8 hours  sertraline 200 milliGRAM(s) Oral daily    MEDICATIONS  (PRN):  albuterol/ipratropium for Nebulization. 3 milliLiter(s) Nebulizer once PRN Shortness of Breath and/or Wheezing  ALPRAZolam 0.25 milliGRAM(s) Oral every 12 hours PRN anxiety  melatonin 3 milliGRAM(s) Oral at bedtime PRN Insomnia  ondansetron Injectable 4 milliGRAM(s) IV Push every 6 hours PRN Nausea and/or Vomiting      MICRO:   Cultures     STUDIES:   EKG, CXR, U/S, CT, MRI

## 2024-02-20 NOTE — PROGRESS NOTE ADULT - TIME BILLING
Chart/notes review, interpretation of laboratory and imaging results,  patient evaluation, MDM, documentation
Reviewing her CT images from 01/30 and discussing the above management plan recommendations with Dr. Ward and pt's sister and nurse.
Chart/notes review, interpretation of laboratory and imaging results,  patient evaluation, MDM, documentation

## 2024-02-20 NOTE — PROGRESS NOTE ADULT - SUBJECTIVE AND OBJECTIVE BOX
[facility]                                                INFECTIOUS DISEASES  =======================================================                   Leonid Bauman#   Seamus Holman MD#    Connie Street MD*                           Mai Carrizales MD*   Nadeen Phillips MD*           Diplomates American Board of Internal Medicine & Infectious Diseases                  # [place] Office - Appt - Tel  [phone] Fax [phone]                * [place] Office - Appt - Tel [phone] Fax [phone]                                  Hospital Consult line:  [phone]  =======================================================      [facility]-[id number]  YAMILETH DAVIS   follow up for: perforated diverticulitis  y  no fever  wbc wnl  tolerating diet  patient seen and examined.       I have personally reviewed the labs and data; pertinent labs and data are listed in this note; please see below.   ===================================================  REVIEW OF SYSTEMS:  CONSTITUTIONAL:  No Fever or chills  HEENT:  No diplopia or blurred vision.  No earache, sore throat or runny nose.  CARDIOVASCULAR:  No pressure, squeezing, strangling, tightness, heaviness or aching about the chest, neck, axilla or epigastrium.  RESPIRATORY:  No cough, shortness of breath  GASTROINTESTINAL:  No nausea, vomiting or diarrhea. + abdominal cramping  GENITOURINARY:  No dysuria, frequency or urgency. No Blood in urine  MUSCULOSKELETAL:  no joint aches, no muscle pain  SKIN:  No change in skin, hair or nails.  NEUROLOGIC:  No Headaches, seizures or weakness.  PSYCHIATRIC:  No disorder of thought or mood.  ENDOCRINE:  No heat or cold intolerance  HEMATOLOGICAL:  No easy bruising or bleeding.    =======================================================  Allergies    No Known Allergies    Intolerances    Antibiotics:  fluconAZOLE IVPB 400 milliGRAM(s) IV Intermittent every 24 hours  fluconAZOLE IVPB      piperacillin/tazobactam IVPB.. 3.375 Gram(s) IV Intermittent every 8 hours    Other medications:  atorvastatin 40 milliGRAM(s) Oral at bedtime  budesonide  80 MICROgram(s)/formoterol 4.5 MICROgram(s) Inhaler 2 Puff(s) Inhalation two times a day  dextrose 5% + sodium chloride 0.45%. 1000 milliLiter(s) IV Continuous <Continuous>  heparin   Injectable 5000 Unit(s) SubCutaneous every 8 hours  levothyroxine Injectable 37.5 MICROGram(s) IV Push at bedtime  metoclopramide Injectable 5 milliGRAM(s) IV Push daily  nicotine -  14 mG/24Hr(s) Patch 1 Patch Transdermal daily  pantoprazole  Injectable 40 milliGRAM(s) IV Push with breakfast  sertraline 200 milliGRAM(s) Oral daily    ======================================================  Physical Exam:  ============  Vital Signs Last 24 Hrs  T(C): 36.7 (20 Feb 2024 21:35), Max: 37.1 (20 Feb 2024 09:05)  T(F): 98.1 (20 Feb 2024 21:35), Max: 98.8 (20 Feb 2024 09:05)  HR: 74 (20 Feb 2024 21:35) (63 - 88)  BP: 149/83 (20 Feb 2024 21:35) (139/75 - 150/90)  BP(mean): --  RR: 18 (20 Feb 2024 21:35) (18 - 19)  SpO2: 93% (20 Feb 2024 21:35) (90% - 95%)    Parameters below as of 20 Feb 2024 21:35  Patient On (Oxygen Delivery Method): room air          General:  No acute distress, frail elderly lady sitting up in chair  Eye: no conjunctival pallor, no scleral icterus  Neck: Supple, No lymphadenopathy.  Respiratory: Lungs are clear to auscultation, Respirations are non-labored.  Cardiovascular: Normal rate, Regular rhythm,  s1+s2 +lubna  Gastrointestinal: Soft, Non-tender, Non-distended, Normal bowel sounds. midline dressing intact, RLQ CATA serous, LLQ ostomy  Integumentary: No rash.  Neurologic: Alert, Oriented, No focal deficits  Psychiatric: Appropriate mood & affect.  =======================================================  Labs:                                                                             8.2    x     )-----------( x        ( 20 Feb 2024 12:33 )             25.6       02-20    140  |  105  |  15.5  ----------------------------<  121<H>  4.0   |  24.0  |  1.08    Ca    7.9<L>      20 Feb 2024 05:18  Phos  3.2     02-20  Mg     1.7     02-20                Urinalysis Basic - ( 20 Feb 2024 05:18 )    Color: x / Appearance: x / SG: x / pH: x  Gluc: 121 mg/dL / Ketone: x  / Bili: x / Urobili: x   Blood: x / Protein: x / Nitrite: x   Leuk Esterase: x / RBC: x / WBC x   Sq Epi: x / Non Sq Epi: x / Bacteria: x                  CAPILLARY BLOOD GLUCOSE      POCT Blood Glucose.: 108 mg/dL (19 Feb 2024 21:41)                        Urinalysis Basic - ( 16 Feb 2024 01:45 )    Color: x / Appearance: x / SG: x / pH: x  Gluc: 114 mg/dL / Ketone: x  / Bili: x / Urobili: x   Blood: x / Protein: x / Nitrite: x   Leuk Esterase: x / RBC: x / WBC x   Sq Epi: x / Non Sq Epi: x / Bacteria: x                  CAPILLARY BLOOD GLUCOSE      POCT Blood Glucose.: 101 mg/dL (17 Feb 2024 12:04)              Urinalysis Basic - ( 16 Feb 2024 01:45 )    Color: x / Appearance: x / SG: x / pH: x  Gluc: 114 mg/dL / Ketone: x  / Bili: x / Urobili: x   Blood: x / Protein: x / Nitrite: x   Leuk Esterase: x / RBC: x / WBC x   Sq Epi: x / Non Sq Epi: x / Bacteria: x                  CAPILLARY BLOOD GLUCOSE      POCT Blood Glucose.: 119 mg/dL (15 Feb 2024 06:15)                  Urinalysis Basic - ( 15 Feb 2024 05:30 )    Color: x / Appearance: x / SG: x / pH: x  Gluc: 123 mg/dL / Ketone: x  / Bili: x / Urobili: x   Blood: x / Protein: x / Nitrite: x   Leuk Esterase: x / RBC: x / WBC x   Sq Epi: x / Non Sq Epi: x / Bacteria: x                  CAPILLARY BLOOD GLUCOSE      POCT Blood Glucose.: 119 mg/dL (15 Feb 2024 06:15)              Urinalysis Basic - ( 14 Feb 2024 06:15 )    Color: x / Appearance: x / SG: x / pH: x  Gluc: 110 mg/dL / Ketone: x  / Bili: x / Urobili: x   Blood: x / Protein: x / Nitrite: x   Leuk Esterase: x / RBC: x / WBC x   Sq Epi: x / Non Sq Epi: x / Bacteria: x        PT/INR - ( 13 Feb 2024 03:33 )   PT: 16.6 sec;   INR: 1.51 ratio         PTT - ( 13 Feb 2024 03:33 )  PTT:37.3 sec    CARDIAC MARKERS ( 13 Feb 2024 03:33 )  x     / x     / 27 U/L / x     / x            CAPILLARY BLOOD GLUCOSE  91 (13 Feb 2024 03:36)      POCT Blood Glucose.: 111 mg/dL (14 Feb 2024 06:32)            Culture - Abscess with Gram Stain (collected 02-10-24 @ 11:30)  Source: .Abscess Abdominal Abscess  Gram Stain (02-11-24 @ 01:06):    Few polymorphonuclear leukocytes seen per low power field    Few Gram positive cocci in pairs seen per oil power field    Rare Yeast like cells seen per oil power field  Preliminary Report (02-12-24 @ 20:32):    Moderate Enterococcus faecalis    Few Yeast Identification to follow.  Organism: Enterococcus faecalis (02-12-24 @ 19:34)  Organism: Enterococcus faecalis (02-12-24 @ 19:34)    Sensitivities:      Method Type: MARIEL      -  Ampicillin: S <=2 Predicts results to ampicillin/sulbactam, amoxacillin-clavulanate and  piperacillin-tazobactam.      -  Vancomycin: S 2    Culture - Urine (collected 01-30-24 @ 15:40)  Source: Clean Catch Clean Catch (Midstream)  Final Report (02-01-24 @ 00:37):    <10,000 CFU/mL Normal Urogenital Nimco    Culture - Blood (collected 01-30-24 @ 15:22)  Source: .Blood Blood  Final Report (02-04-24 @ 22:00):    No growth at 5 days    Culture - Blood (collected 01-30-24 @ 15:17)  Source: .Blood Blood  Final Report (02-04-24 @ 22:00):    No growth at 5 days    Culture - Urine (collected 07-30-22 @ 03:45)  Source: Clean Catch Clean Catch (Midstream)  Final Report (08-01-22 @ 21:53):    >100,000 CFU/ml Escherichia coli  Organism: Escherichia coli (08-01-22 @ 21:53)  Organism: Escherichia coli (08-01-22 @ 21:53)    Sensitivities:      Method Type: MARIEL      -  Amikacin: S <=16      -  Amoxicillin/Clavulanic Acid: S <=8/4 Consider reserving for cystitis when ampicillin/sulbactam is resistant      -  Ampicillin: R >16 These ampicillin results predict results for amoxicillin      -  Ampicillin/Sulbactam: R >16/8 Enterobacter, Klebsiella aerogenes, Citrobacter, and Serratia may develop resistance during prolonged therapy (3-4 days)      -  Aztreonam: S <=4      -  Cefazolin: S 8 (MIC_CL_COM_ENTERIC_CEFAZU) For uncomplicated UTI with K. pneumoniae, E. coli, or P. mirablis: MARIEL <=16 is sensitive and MARIEL >=32 is resistant. This also predicts results for oral agents cefaclor, cefdinir, cefpodoxime, cefprozil, cefuroxime axetil, cephalexin and locarbef for uncomplicated UTI. Note that some isolates may be susceptible to these agents while testing resistant to cefazolin.      -  Cefepime: S <=2      -  Cefoxitin: S <=8      -  Ceftriaxone: S <=1 Enterobacter, Klebsiella aerogenes, Citrobacter, and Serratia may develop resistance during prolonged therapy      -  Ciprofloxacin: R 1      -  Ertapenem: S <=0.5      -  Gentamicin: R >8      -  Imipenem: S <=1      -  Levofloxacin: I 1      -  Meropenem: S <=1      -  Nitrofurantoin: S <=32 Should not be used to treat pyelonephritis      -  Piperacillin/Tazobactam: S <=8      -  Tigecycline: S <=2      -  Tobramycin: I 8      -  Trimethoprim/Sulfamethoxazole: R >2/38

## 2024-02-20 NOTE — PROGRESS NOTE ADULT - ASSESSMENT
77yo F admitted for septic enterocolitis having continued abdominal pain and distension. Pt admitted to medicine service 1/30 presenting with abdominal pain, watery diarrhea, Tmax 105, tachycardia with diffuse small bowel and colonic inflammation most pronounced in the lower quadrant. CT A/P revealed a  distended stomach with dilated air-fluid filled small bowel loops with at least one transition in the right lower quadrant with colonic diverticulosis. Pt failed conservative management and was taken to OR on 2/10/24 for Ex. Lap. In the OR patient was found with severe soft adhesions, collection entered and cultured and appears secondary to perforated diverticulitis with collection causing obstruction. 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 OR cultures + e.faecalis and yeast. Patient with post op leukocytosis and drop in h/H    - bcx ngtd  - OR cx e.faecalis amp sensitive, candida dubliniensis, + Pseudomonas (S) to zosyn  - Continue zosyn and fluconazole   - trend H/H- s/p PRBC, now stable  - tolerating diet  - prevena removed  - per surgery plan to remove CATA prior to DC  - on zosyn since 2/4  - dc zosyn once Cata removed  - c/w fluconazole 400mg daily through 2/24/24, this can be changed to po upon discharge  - ekg repeated, qtc 475      please call with questions

## 2024-02-21 ENCOUNTER — TRANSCRIPTION ENCOUNTER (OUTPATIENT)
Age: 79
End: 2024-02-21

## 2024-02-21 DIAGNOSIS — K57.92 DIVERTICULITIS OF INTESTINE, PART UNSPECIFIED, WITHOUT PERFORATION OR ABSCESS WITHOUT BLEEDING: ICD-10-CM

## 2024-02-21 LAB
ANION GAP SERPL CALC-SCNC: 13 MMOL/L — SIGNIFICANT CHANGE UP (ref 5–17)
BASOPHILS # BLD AUTO: 0.1 K/UL — SIGNIFICANT CHANGE UP (ref 0–0.2)
BASOPHILS NFR BLD AUTO: 0.9 % — SIGNIFICANT CHANGE UP (ref 0–2)
BUN SERPL-MCNC: 12.3 MG/DL — SIGNIFICANT CHANGE UP (ref 8–20)
CALCIUM SERPL-MCNC: 8.4 MG/DL — SIGNIFICANT CHANGE UP (ref 8.4–10.5)
CHLORIDE SERPL-SCNC: 103 MMOL/L — SIGNIFICANT CHANGE UP (ref 96–108)
CO2 SERPL-SCNC: 24 MMOL/L — SIGNIFICANT CHANGE UP (ref 22–29)
CREAT SERPL-MCNC: 0.94 MG/DL — SIGNIFICANT CHANGE UP (ref 0.5–1.3)
EGFR: 63 ML/MIN/1.73M2 — SIGNIFICANT CHANGE UP
EOSINOPHIL # BLD AUTO: 0.16 K/UL — SIGNIFICANT CHANGE UP (ref 0–0.5)
EOSINOPHIL NFR BLD AUTO: 1.4 % — SIGNIFICANT CHANGE UP (ref 0–6)
GLUCOSE SERPL-MCNC: 103 MG/DL — HIGH (ref 70–99)
HCT VFR BLD CALC: 29.6 % — LOW (ref 34.5–45)
HGB BLD-MCNC: 9.4 G/DL — LOW (ref 11.5–15.5)
IMM GRANULOCYTES NFR BLD AUTO: 0.5 % — SIGNIFICANT CHANGE UP (ref 0–0.9)
LYMPHOCYTES # BLD AUTO: 0.68 K/UL — LOW (ref 1–3.3)
LYMPHOCYTES # BLD AUTO: 6.1 % — LOW (ref 13–44)
MAGNESIUM SERPL-MCNC: 1.8 MG/DL — SIGNIFICANT CHANGE UP (ref 1.8–2.6)
MCHC RBC-ENTMCNC: 29.7 PG — SIGNIFICANT CHANGE UP (ref 27–34)
MCHC RBC-ENTMCNC: 31.8 GM/DL — LOW (ref 32–36)
MCV RBC AUTO: 93.7 FL — SIGNIFICANT CHANGE UP (ref 80–100)
MONOCYTES # BLD AUTO: 1.05 K/UL — HIGH (ref 0–0.9)
MONOCYTES NFR BLD AUTO: 9.5 % — SIGNIFICANT CHANGE UP (ref 2–14)
NEUTROPHILS # BLD AUTO: 9.03 K/UL — HIGH (ref 1.8–7.4)
NEUTROPHILS NFR BLD AUTO: 81.6 % — HIGH (ref 43–77)
PHOSPHATE SERPL-MCNC: 3.6 MG/DL — SIGNIFICANT CHANGE UP (ref 2.4–4.7)
PLATELET # BLD AUTO: 435 K/UL — HIGH (ref 150–400)
POTASSIUM SERPL-MCNC: 4.2 MMOL/L — SIGNIFICANT CHANGE UP (ref 3.5–5.3)
POTASSIUM SERPL-SCNC: 4.2 MMOL/L — SIGNIFICANT CHANGE UP (ref 3.5–5.3)
RBC # BLD: 3.16 M/UL — LOW (ref 3.8–5.2)
RBC # FLD: 14.5 % — SIGNIFICANT CHANGE UP (ref 10.3–14.5)
SODIUM SERPL-SCNC: 140 MMOL/L — SIGNIFICANT CHANGE UP (ref 135–145)
WBC # BLD: 11.08 K/UL — HIGH (ref 3.8–10.5)
WBC # FLD AUTO: 11.08 K/UL — HIGH (ref 3.8–10.5)

## 2024-02-21 RX ORDER — FLUCONAZOLE 150 MG/1
2 TABLET ORAL
Qty: 8 | Refills: 0
Start: 2024-02-21 | End: 2024-02-24

## 2024-02-21 RX ORDER — LOSARTAN POTASSIUM 100 MG/1
1 TABLET, FILM COATED ORAL
Qty: 30 | Refills: 0
Start: 2024-02-21 | End: 2024-03-21

## 2024-02-21 RX ORDER — NICOTINE POLACRILEX 2 MG
1 GUM BUCCAL
Qty: 0 | Refills: 0 | DISCHARGE
Start: 2024-02-21

## 2024-02-21 RX ORDER — LANOLIN ALCOHOL/MO/W.PET/CERES
1 CREAM (GRAM) TOPICAL
Qty: 0 | Refills: 0 | DISCHARGE
Start: 2024-02-21

## 2024-02-21 RX ORDER — MAGNESIUM SULFATE 500 MG/ML
2 VIAL (ML) INJECTION ONCE
Refills: 0 | Status: COMPLETED | OUTPATIENT
Start: 2024-02-21 | End: 2024-02-21

## 2024-02-21 RX ORDER — ALPRAZOLAM 0.25 MG
1 TABLET ORAL
Qty: 0 | Refills: 0 | DISCHARGE
Start: 2024-02-21

## 2024-02-21 RX ORDER — SODIUM CHLORIDE 9 MG/ML
1000 INJECTION, SOLUTION INTRAVENOUS
Refills: 0 | Status: DISCONTINUED | OUTPATIENT
Start: 2024-02-21 | End: 2024-02-21

## 2024-02-21 RX ORDER — SIMETHICONE 80 MG/1
80 TABLET, CHEWABLE ORAL ONCE
Refills: 0 | Status: COMPLETED | OUTPATIENT
Start: 2024-02-21 | End: 2024-02-21

## 2024-02-21 RX ORDER — METOCLOPRAMIDE HCL 10 MG
5 TABLET ORAL ONCE
Refills: 0 | Status: DISCONTINUED | OUTPATIENT
Start: 2024-02-21 | End: 2024-02-22

## 2024-02-21 RX ORDER — METOPROLOL TARTRATE 50 MG
1 TABLET ORAL
Qty: 30 | Refills: 0
Start: 2024-02-21 | End: 2024-03-21

## 2024-02-21 RX ADMIN — SIMETHICONE 80 MILLIGRAM(S): 80 TABLET, CHEWABLE ORAL at 22:11

## 2024-02-21 RX ADMIN — Medication 25 MILLIGRAM(S): at 05:17

## 2024-02-21 RX ADMIN — LOSARTAN POTASSIUM 100 MILLIGRAM(S): 100 TABLET, FILM COATED ORAL at 05:16

## 2024-02-21 RX ADMIN — HEPARIN SODIUM 5000 UNIT(S): 5000 INJECTION INTRAVENOUS; SUBCUTANEOUS at 15:05

## 2024-02-21 RX ADMIN — HEPARIN SODIUM 5000 UNIT(S): 5000 INJECTION INTRAVENOUS; SUBCUTANEOUS at 06:27

## 2024-02-21 RX ADMIN — FLUCONAZOLE 100 MILLIGRAM(S): 150 TABLET ORAL at 06:22

## 2024-02-21 RX ADMIN — BUDESONIDE AND FORMOTEROL FUMARATE DIHYDRATE 2 PUFF(S): 160; 4.5 AEROSOL RESPIRATORY (INHALATION) at 08:07

## 2024-02-21 RX ADMIN — Medication 5 MILLIGRAM(S): at 12:51

## 2024-02-21 RX ADMIN — Medication 15 MILLIGRAM(S): at 05:17

## 2024-02-21 RX ADMIN — PIPERACILLIN AND TAZOBACTAM 25 GRAM(S): 4; .5 INJECTION, POWDER, LYOPHILIZED, FOR SOLUTION INTRAVENOUS at 05:17

## 2024-02-21 RX ADMIN — SERTRALINE 200 MILLIGRAM(S): 25 TABLET, FILM COATED ORAL at 12:51

## 2024-02-21 RX ADMIN — Medication 15 MILLIGRAM(S): at 12:51

## 2024-02-21 RX ADMIN — Medication 15 MILLIGRAM(S): at 17:04

## 2024-02-21 RX ADMIN — ONDANSETRON 4 MILLIGRAM(S): 8 TABLET, FILM COATED ORAL at 08:16

## 2024-02-21 RX ADMIN — BUDESONIDE AND FORMOTEROL FUMARATE DIHYDRATE 2 PUFF(S): 160; 4.5 AEROSOL RESPIRATORY (INHALATION) at 22:10

## 2024-02-21 RX ADMIN — PANTOPRAZOLE SODIUM 40 MILLIGRAM(S): 20 TABLET, DELAYED RELEASE ORAL at 08:08

## 2024-02-21 RX ADMIN — ATORVASTATIN CALCIUM 40 MILLIGRAM(S): 80 TABLET, FILM COATED ORAL at 22:11

## 2024-02-21 RX ADMIN — Medication 15 MILLIGRAM(S): at 00:43

## 2024-02-21 RX ADMIN — Medication 25 GRAM(S): at 12:51

## 2024-02-21 RX ADMIN — Medication 81 MILLIGRAM(S): at 12:51

## 2024-02-21 NOTE — DISCHARGE NOTE PROVIDER - NSDCCPCAREPLAN_GEN_ALL_CORE_FT
PRINCIPAL DISCHARGE DIAGNOSIS  Diagnosis: Acute diverticulitis  Assessment and Plan of Treatment: Follow up with your surgeon in 1 week. No lifting anything heavier than 10 pounds. Do not submerge the wound in water until cleared by a physician (IE bathing, swimming, etc). Resume usual diet. Contact a physician or present to the nearest emergency room for fever, chills, or pain not relieved by medications. Do not drive while taking prescription pain medications.  Tkae antifungal medications until completed.      SECONDARY DISCHARGE DIAGNOSES  Diagnosis: Paroxysmal SVT (supraventricular tachycardia)  Assessment and Plan of Treatment: Follow up with cardiology within 2 weeks of discharge. Continue Toprol XL and Los[place]aan    Diagnosis: MERVAT (acute kidney injury)  Assessment and Plan of Treatment:

## 2024-02-21 NOTE — DISCHARGE NOTE PROVIDER - NSDCFUSCHEDAPPT_GEN_ALL_CORE_FT
Elebiary, Yeon J  [facility]  ORTHOSURG [address]  Scheduled Appointment: 03/05/2024

## 2024-02-21 NOTE — CHART NOTE - NSCHARTNOTEFT_GEN_A_CORE
Source: Patient [ x]  Family [ ]   other [x ]    Current Diet: Diet, Regular:   Ensure Enlive Cans or Servings Per Day:  3       Frequency:  Three Times a day (02-17-24 @ 14:28)    Patient reports [x ] nausea  [ x] vomiting [ ] diarrhea [ ] constipation  [ ]chewing problems [ ] swallowing issues  [ ] other:     PO intake:  < 50% [x ]   50-75%  [ ]   %  [ ]  other :    Current Weight:   (2/21) 123.8 lbs  (2/2) 136.4 lbs  Possible 12.6 lb (9.2%) wt loss since admission, continue to trend weights    Pertinent Medications: MEDICATIONS  (STANDING):  atorvastatin 40 milliGRAM(s) Oral at bedtime  levothyroxine Injectable 37.5 MICROGram(s) IV Push at bedtime  losartan 100 milliGRAM(s) Oral daily  metoclopramide Injectable 5 milliGRAM(s) IV Push daily  metoprolol succinate ER 25 milliGRAM(s) Oral daily  pantoprazole  Injectable 40 milliGRAM(s) IV Push with breakfast  sertraline 200 milliGRAM(s) Oral daily    MEDICATIONS  (PRN):  ALPRAZolam 0.25 milliGRAM(s) Oral every 12 hours PRN anxiety  ondansetron Injectable 4 milliGRAM(s) IV Push every 6 hours PRN Nausea and/or Vomiting    Pertinent Labs: CBC Full  -  ( 21 Feb 2024 07:54 )  WBC Count : 11.08 K/uL  RBC Count : 3.16 M/uL  Hemoglobin : 9.4 g/dL  Hematocrit : 29.6 %    Skin: Surgical incision to abdomen per documentation  [place]: 17    Nutrition focused physical exam conducted previously - with signs of malnutrition present    Subcutaneous fat loss: [x ] Orbital fat pads region, [x ]Buccal fat region, [ ]Triceps region,  [ ]Ribs region    Muscle wasting: [ x]Temples region, [ x]Clavicle region, [x ]Shoulder region, [ ]Scapula region, [ ]Interosseous region,  [ ]thigh region, [ ]Calf region    Estimated Needs:   [x ] no change since previous assessment  [ ] recalculated:     Current Nutrition Diagnosis: Pt remains at high nutrition risk secondary to malnutrition (moderate, chronic) related to inability to meet sufficient protein-energy in setting of s/p ex lap, ANGELA [place] for SBO, complicated by pain and anemia as evidenced by 20% weight loss, meets<75%EER>1 mo, mod fat/muscle depletion. Pt now on a regular diet and receiving Ensure Plus High Protein TID. Pt reports she is nauseous and had an episode of vomiting this morning after receiving medications. Pt with poor po intake. RD to follow up as feasible.    Recommendations:   1) Consider change diet to low fiber.   2) Continue Ensure Plus High Protein TID (350 kcal, 20g protein per serving).  3) Rx: MVI daily.   4) Continue Reglan and Zofran PRN for nausea.   5) Encourage po intake, monitor diet tolerance, and provide assistance at meals as needed.  6) Obtain daily weights to monitor trends.     Monitoring and Evaluation:   [x ] PO intake [ x] Tolerance to diet prescription [X] Weights  [X] Follow up per protocol [X] Labs: chem 8, mg, phos, H/H.

## 2024-02-21 NOTE — DISCHARGE NOTE PROVIDER - HOSPITAL COURSE
78F admitted with enterocolitis. Hospital course complicated by SBO, diverticulitis, SVT, troponin leak, episode of altered mental status. Pt was taken to the OR on 2/10 and underwent a garrett's and ANGELA. diet was advanced as clinically indicated until she was on a regular diet. Cardiology did evaluate patient and in the end recommended starting toprol XL as well as losartan, further ischemic workup to be performed in the outpatient setting. Neurology did evaluate the patient for the change in mental status and deemed it unlikely to be an acute stroke. Did recommend starting aspirin therapy - it was started while inpatient. PT evaluated the patient and recommended DC to [facility] but the patient opted for DC home. While in house ID treated the patient with zosyn and fluconazole. On DC the course of zosyn had been completed but recommended the fluconazole be continued until 2/24.

## 2024-02-21 NOTE — DISCHARGE NOTE PROVIDER - PROVIDER TOKENS
PROVIDER:[TOKEN:[[id number]],FOLLOWUP:[1 week]],PROVIDER:[TOKEN:[[id number]],FOLLOWUP:[2 weeks]]

## 2024-02-21 NOTE — DISCHARGE NOTE PROVIDER - CARE PROVIDER_API CALL
Ricky Miller  Vascular Surgery  [address] [facility] [address], [address]  [place] [address]  Phone: [phone]  Fax: [phone]  Follow Up Time: 1 week    Ayla Berrios  Cardiology  [address] [place] [address]  Phone: [phone]  Fax: [phone]  Follow Up Time: 2 weeks

## 2024-02-21 NOTE — DISCHARGE NOTE PROVIDER - NSDCMRMEDTOKEN_GEN_ALL_CORE_FT
ALPRAZolam 0.25 mg oral tablet: 1 tab(s) orally every 12 hours As needed anxiety  aspirin 81 mg oral tablet: 1 tab(s) orally once a day  atorvastatin 40 mg oral tablet: 1 tab(s) orally once a day  cilostazol 100 mg oral tablet: 1 tab(s) orally 2 times a day  Diflucan 200 mg oral tablet: 2 tab(s) orally once a day  famotidine 20 mg oral tablet: 1 tab(s) orally 2 times a day  levothyroxine 75 mcg (0.075 mg) oral tablet: 1 tab(s) orally once a day  losartan 100 mg oral tablet: 1 tab(s) orally once a day  melatonin 3 mg oral tablet: 1 tab(s) orally once a day (at bedtime) As needed Insomnia  meloxicam 15 mg oral tablet: 1 tab(s) orally once a day  metoprolol succinate 25 mg oral tablet, extended release: 1 tab(s) orally once a day  Myrbetriq 50 mg oral tablet, extended release: 1 tab(s) orally once a day  nicotine 14 mg/24 hr transdermal film, extended release: 1 film(s) transdermal once a day  Rolling walker: use while ambulating  sertraline 100 mg oral tablet: 2 tab(s) orally once a day   ALPRAZolam 0.25 mg oral tablet: 1 tab(s) orally every 12 hours As needed anxiety  aspirin 81 mg oral tablet: 1 tab(s) orally once a day  atorvastatin 40 mg oral tablet: 1 tab(s) orally once a day  cilostazol 100 mg oral tablet: 1 tab(s) orally 2 times a day  famotidine 20 mg oral tablet: 1 tab(s) orally 2 times a day  levothyroxine 75 mcg (0.075 mg) oral tablet: 1 tab(s) orally once a day  losartan 100 mg oral tablet: 1 tab(s) orally once a day  melatonin 3 mg oral tablet: 1 tab(s) orally once a day (at bedtime) As needed Insomnia  meloxicam 15 mg oral tablet: 1 tab(s) orally once a day  metoprolol succinate 25 mg oral tablet, extended release: 1 tab(s) orally once a day  Myrbetriq 50 mg oral tablet, extended release: 1 tab(s) orally once a day  nicotine 14 mg/24 hr transdermal film, extended release: 1 film(s) transdermal once a day  Rolling walker: use while ambulating  sertraline 100 mg oral tablet: 2 tab(s) orally once a day

## 2024-02-21 NOTE — PROGRESS NOTE ADULT - SUBJECTIVE AND OBJECTIVE BOX
Subjective: Patient was seen and examined at bedside. Patient states she wells but had an episode of vomiting while taking the AM medications. CATA drain was removed yesterday and zosyn stopped as pert ID recs.       MEDICATIONS  (STANDING):  aspirin enteric coated 81 milliGRAM(s) Oral daily  atorvastatin 40 milliGRAM(s) Oral at bedtime  budesonide  80 MICROgram(s)/formoterol 4.5 MICROgram(s) Inhaler 2 Puff(s) Inhalation two times a day  fluconAZOLE IVPB      fluconAZOLE IVPB 400 milliGRAM(s) IV Intermittent every 24 hours  heparin   Injectable 5000 Unit(s) SubCutaneous every 8 hours  ketorolac   Injectable 15 milliGRAM(s) IV Push every 6 hours  levothyroxine Injectable 37.5 MICROGram(s) IV Push at bedtime  losartan 100 milliGRAM(s) Oral daily  metoclopramide Injectable 5 milliGRAM(s) IV Push daily  metoprolol succinate ER 25 milliGRAM(s) Oral daily  nicotine -  14 mG/24Hr(s) Patch 1 Patch Transdermal daily  pantoprazole  Injectable 40 milliGRAM(s) IV Push with breakfast  sertraline 200 milliGRAM(s) Oral daily    MEDICATIONS  (PRN):  albuterol/ipratropium for Nebulization. 3 milliLiter(s) Nebulizer once PRN Shortness of Breath and/or Wheezing  ALPRAZolam 0.25 milliGRAM(s) Oral every 12 hours PRN anxiety  melatonin 3 milliGRAM(s) Oral at bedtime PRN Insomnia  ondansetron Injectable 4 milliGRAM(s) IV Push every 6 hours PRN Nausea and/or Vomiting      Vital Signs Last 24 Hrs  T(C): 36.8 (21 Feb 2024 05:03), Max: 37.1 (20 Feb 2024 09:05)  T(F): 98.3 (21 Feb 2024 05:03), Max: 98.8 (20 Feb 2024 09:05)  HR: 89 (21 Feb 2024 05:03) (63 - 89)  BP: 151/79 (21 Feb 2024 05:03) (139/80 - 151/79)  BP(mean): --  RR: 18 (21 Feb 2024 05:03) (18 - 19)  SpO2: 95% (21 Feb 2024 05:03) (90% - 96%)    Parameters below as of 21 Feb 2024 05:03  Patient On (Oxygen Delivery Method): room air        Physical Exam:    Constitutional: NAD  HEENT: PERRL, EOMI  Neck: No JVD, FROM without pain  Respiratory: Respirations non-labored, no accessory muscle use  Gastrointestinal: Soft, non- distended, incisional TTP       LABS:                        8.2    x     )-----------( x        ( 20 Feb 2024 12:33 )             25.6     02-20    140  |  105  |  15.5  ----------------------------<  121<H>  4.0   |  24.0  |  1.08    Ca    7.9<L>      20 Feb 2024 05:18  Phos  3.2     02-20  Mg     1.7     02-20        Urinalysis Basic - ( 20 Feb 2024 05:18 )    Color: x / Appearance: x / SG: x / pH: x  Gluc: 121 mg/dL / Ketone: x  / Bili: x / Urobili: x   Blood: x / Protein: x / Nitrite: x   Leuk Esterase: x / RBC: x / WBC x   Sq Epi: x / Non Sq Epi: x / Bacteria: x        Assessment: 79 yo  s/p ex ANGELA barrios for SBO. Post-operative course complicated by pain and anemia, now both have improved. Patient is tolerating diet and having bowel function. Patient is ready for discharge but does not want to go to, she will be going to her sister's house.     Plan:   - HSQ/ASA  - regular diet   - DC planning for possibly today  - pain control  - encourage ambulation/ IS

## 2024-02-21 NOTE — PROGRESS NOTE ADULT - SUBJECTIVE AND OBJECTIVE BOX
Feels ok   afebrile VSS  abd soft nontender inc c/d   colostomy good function  pt recommended to og to subacute  she pondering the idea   supportive care

## 2024-02-21 NOTE — CHART NOTE - NSCHARTNOTESELECT_GEN_ALL_CORE
Cardiology/Off Service Note
Event Note
Nutrition Services
hospitalist/Event Note
Carotid duplex result/Event Note
Event Note
Event Note
Gastroenterology/Event Note
Nutrition Services
Nutrition Services
postoperative evaluation

## 2024-02-21 NOTE — DISCHARGE NOTE PROVIDER - CARE PROVIDERS DIRECT ADDRESSES
,DirectAddress_Unknown,[email or web address], [email or web address]

## 2024-02-22 LAB
ANION GAP SERPL CALC-SCNC: 10 MMOL/L — SIGNIFICANT CHANGE UP (ref 5–17)
BUN SERPL-MCNC: 13.3 MG/DL — SIGNIFICANT CHANGE UP (ref 8–20)
CALCIUM SERPL-MCNC: 8 MG/DL — LOW (ref 8.4–10.5)
CHLORIDE SERPL-SCNC: 105 MMOL/L — SIGNIFICANT CHANGE UP (ref 96–108)
CO2 SERPL-SCNC: 25 MMOL/L — SIGNIFICANT CHANGE UP (ref 22–29)
CREAT SERPL-MCNC: 0.94 MG/DL — SIGNIFICANT CHANGE UP (ref 0.5–1.3)
EGFR: 63 ML/MIN/1.73M2 — SIGNIFICANT CHANGE UP
GLUCOSE SERPL-MCNC: 76 MG/DL — SIGNIFICANT CHANGE UP (ref 70–99)
HCT VFR BLD CALC: 25.4 % — LOW (ref 34.5–45)
HGB BLD-MCNC: 8 G/DL — LOW (ref 11.5–15.5)
MAGNESIUM SERPL-MCNC: 1.8 MG/DL — SIGNIFICANT CHANGE UP (ref 1.8–2.6)
MCHC RBC-ENTMCNC: 29.4 PG — SIGNIFICANT CHANGE UP (ref 27–34)
MCHC RBC-ENTMCNC: 31.5 GM/DL — LOW (ref 32–36)
MCV RBC AUTO: 93.4 FL — SIGNIFICANT CHANGE UP (ref 80–100)
PHOSPHATE SERPL-MCNC: 3.2 MG/DL — SIGNIFICANT CHANGE UP (ref 2.4–4.7)
PLATELET # BLD AUTO: 413 K/UL — HIGH (ref 150–400)
POTASSIUM SERPL-MCNC: 3.9 MMOL/L — SIGNIFICANT CHANGE UP (ref 3.5–5.3)
POTASSIUM SERPL-SCNC: 3.9 MMOL/L — SIGNIFICANT CHANGE UP (ref 3.5–5.3)
RBC # BLD: 2.72 M/UL — LOW (ref 3.8–5.2)
RBC # FLD: 14.6 % — HIGH (ref 10.3–14.5)
SODIUM SERPL-SCNC: 140 MMOL/L — SIGNIFICANT CHANGE UP (ref 135–145)
WBC # BLD: 9.46 K/UL — SIGNIFICANT CHANGE UP (ref 3.8–10.5)
WBC # FLD AUTO: 9.46 K/UL — SIGNIFICANT CHANGE UP (ref 3.8–10.5)

## 2024-02-22 PROCEDURE — 74018 RADEX ABDOMEN 1 VIEW: CPT | Mod: 26

## 2024-02-22 RX ORDER — METOCLOPRAMIDE HCL 10 MG
5 TABLET ORAL EVERY 6 HOURS
Refills: 0 | Status: DISCONTINUED | OUTPATIENT
Start: 2024-02-22 | End: 2024-02-24

## 2024-02-22 RX ORDER — POTASSIUM CHLORIDE 20 MEQ
10 PACKET (EA) ORAL ONCE
Refills: 0 | Status: COMPLETED | OUTPATIENT
Start: 2024-02-22 | End: 2024-02-22

## 2024-02-22 RX ORDER — MAGNESIUM SULFATE 500 MG/ML
2 VIAL (ML) INJECTION ONCE
Refills: 0 | Status: COMPLETED | OUTPATIENT
Start: 2024-02-22 | End: 2024-02-22

## 2024-02-22 RX ORDER — SIMETHICONE 80 MG/1
80 TABLET, CHEWABLE ORAL EVERY 6 HOURS
Refills: 0 | Status: DISCONTINUED | OUTPATIENT
Start: 2024-02-22 | End: 2024-02-24

## 2024-02-22 RX ADMIN — Medication 81 MILLIGRAM(S): at 12:36

## 2024-02-22 RX ADMIN — Medication 25 GRAM(S): at 10:23

## 2024-02-22 RX ADMIN — BUDESONIDE AND FORMOTEROL FUMARATE DIHYDRATE 2 PUFF(S): 160; 4.5 AEROSOL RESPIRATORY (INHALATION) at 09:24

## 2024-02-22 RX ADMIN — Medication 0.25 MILLIGRAM(S): at 22:11

## 2024-02-22 RX ADMIN — PANTOPRAZOLE SODIUM 40 MILLIGRAM(S): 20 TABLET, DELAYED RELEASE ORAL at 09:21

## 2024-02-22 RX ADMIN — HEPARIN SODIUM 5000 UNIT(S): 5000 INJECTION INTRAVENOUS; SUBCUTANEOUS at 15:34

## 2024-02-22 RX ADMIN — FLUCONAZOLE 100 MILLIGRAM(S): 150 TABLET ORAL at 06:34

## 2024-02-22 RX ADMIN — Medication 25 MILLIGRAM(S): at 06:34

## 2024-02-22 RX ADMIN — Medication 37.5 MICROGRAM(S): at 22:12

## 2024-02-22 RX ADMIN — LOSARTAN POTASSIUM 100 MILLIGRAM(S): 100 TABLET, FILM COATED ORAL at 06:34

## 2024-02-22 RX ADMIN — Medication 100 MILLIEQUIVALENT(S): at 10:22

## 2024-02-22 RX ADMIN — HEPARIN SODIUM 5000 UNIT(S): 5000 INJECTION INTRAVENOUS; SUBCUTANEOUS at 06:34

## 2024-02-22 RX ADMIN — SIMETHICONE 80 MILLIGRAM(S): 80 TABLET, CHEWABLE ORAL at 18:05

## 2024-02-22 RX ADMIN — Medication 1 PATCH: at 12:37

## 2024-02-22 RX ADMIN — SERTRALINE 200 MILLIGRAM(S): 25 TABLET, FILM COATED ORAL at 12:36

## 2024-02-22 RX ADMIN — ONDANSETRON 4 MILLIGRAM(S): 8 TABLET, FILM COATED ORAL at 09:21

## 2024-02-22 RX ADMIN — Medication 5 MILLIGRAM(S): at 12:36

## 2024-02-22 RX ADMIN — ATORVASTATIN CALCIUM 40 MILLIGRAM(S): 80 TABLET, FILM COATED ORAL at 22:11

## 2024-02-22 RX ADMIN — BUDESONIDE AND FORMOTEROL FUMARATE DIHYDRATE 2 PUFF(S): 160; 4.5 AEROSOL RESPIRATORY (INHALATION) at 22:11

## 2024-02-22 RX ADMIN — Medication 1 PATCH: at 19:42

## 2024-02-22 RX ADMIN — Medication 5 MILLIGRAM(S): at 18:01

## 2024-02-22 RX ADMIN — SIMETHICONE 80 MILLIGRAM(S): 80 TABLET, CHEWABLE ORAL at 10:52

## 2024-02-22 NOTE — PROGRESS NOTE ADULT - SUBJECTIVE AND OBJECTIVE BOX
Subjective: patient evaluated and examined at the bedside. Pt reports 1 episode of emesis early this AM preceded by nausea. Her nausea has since resolved. She has been only drinking clear liquids d/t fear of vomiting. Pt otherwise denies any fever, chills, abdominal pain, SOB.     STATUS POST:  ex-lap, [facility] [place]     POST OPERATIVE DAY #: 12    MEDICATIONS  (STANDING):  aspirin enteric coated 81 milliGRAM(s) Oral daily  atorvastatin 40 milliGRAM(s) Oral at bedtime  budesonide  80 MICROgram(s)/formoterol 4.5 MICROgram(s) Inhaler 2 Puff(s) Inhalation two times a day  fluconAZOLE IVPB      fluconAZOLE IVPB 400 milliGRAM(s) IV Intermittent every 24 hours  heparin   Injectable 5000 Unit(s) SubCutaneous every 8 hours  levothyroxine Injectable 37.5 MICROGram(s) IV Push at bedtime  losartan 100 milliGRAM(s) Oral daily  metoclopramide 5 milliGRAM(s) Oral once  metoclopramide Injectable 5 milliGRAM(s) IV Push daily  metoprolol succinate ER 25 milliGRAM(s) Oral daily  nicotine -  14 mG/24Hr(s) Patch 1 Patch Transdermal daily  pantoprazole  Injectable 40 milliGRAM(s) IV Push with breakfast  sertraline 200 milliGRAM(s) Oral daily    MEDICATIONS  (PRN):  albuterol/ipratropium for Nebulization. 3 milliLiter(s) Nebulizer once PRN Shortness of Breath and/or Wheezing  ALPRAZolam 0.25 milliGRAM(s) Oral every 12 hours PRN anxiety  melatonin 3 milliGRAM(s) Oral at bedtime PRN Insomnia  ondansetron Injectable 4 milliGRAM(s) IV Push every 6 hours PRN Nausea and/or Vomiting      Vital Signs Last 24 Hrs  T(C): 36.8 (22 Feb 2024 04:30), Max: 37.3 (21 Feb 2024 21:43)  T(F): 98.3 (22 Feb 2024 04:30), Max: 99.2 (21 Feb 2024 21:43)  HR: 92 (22 Feb 2024 04:30) (88 - 98)  BP: 157/80 (22 Feb 2024 04:30) (143/81 - 157/80)  BP(mean): --  RR: 18 (22 Feb 2024 04:30) (18 - 19)  SpO2: 92% (22 Feb 2024 04:30) (92% - 94%)    Parameters below as of 22 Feb 2024 04:30  Patient On (Oxygen Delivery Method): room air        Physical Exam:    Constitutional: NAD  Respiratory: Respirations non-labored, no accessory muscle use  Gastrointestinal: Midline dressing c/d/i; abdomen soft, non-tender, non-distended  Neurological: A&O x 3      LABS:                        9.4    11.08 )-----------( 435      ( 21 Feb 2024 07:54 )             29.6     02-21    140  |  103  |  12.3  ----------------------------<  103<H>  4.2   |  24.0  |  0.94    Ca    8.4      21 Feb 2024 07:54  Phos  3.6     02-21  Mg     1.8     02-21        Urinalysis Basic - ( 21 Feb 2024 07:54 )    Color: x / Appearance: x / SG: x / pH: x  Gluc: 103 mg/dL / Ketone: x  / Bili: x / Urobili: x   Blood: x / Protein: x / Nitrite: x   Leuk Esterase: x / RBC: x / WBC x   Sq Epi: x / Non Sq Epi: x / Bacteria: x      A: 78F POD#10 s/p ex lap, Hartmanns procedure for SBO. Pt with 1 episode of emesis this AM with resolution of nausea. She remains afebrile and HD stable. She continues to have bowel function and abdominal pain is otherwise well-controlled.     Plan:   - dvt ppx: HSQ, ASA   - diet: regular with ensures   - encourage oob and IS   - antiemetic control   - dispo planning: d/c home with RW and shower chair   - ID: Diflucan   - cont home medications

## 2024-02-23 LAB
ANION GAP SERPL CALC-SCNC: 10 MMOL/L — SIGNIFICANT CHANGE UP (ref 5–17)
BASOPHILS # BLD AUTO: 0.05 K/UL — SIGNIFICANT CHANGE UP (ref 0–0.2)
BASOPHILS NFR BLD AUTO: 0.6 % — SIGNIFICANT CHANGE UP (ref 0–2)
BUN SERPL-MCNC: 13.2 MG/DL — SIGNIFICANT CHANGE UP (ref 8–20)
CALCIUM SERPL-MCNC: 8.2 MG/DL — LOW (ref 8.4–10.5)
CHLORIDE SERPL-SCNC: 104 MMOL/L — SIGNIFICANT CHANGE UP (ref 96–108)
CO2 SERPL-SCNC: 26 MMOL/L — SIGNIFICANT CHANGE UP (ref 22–29)
CREAT SERPL-MCNC: 0.87 MG/DL — SIGNIFICANT CHANGE UP (ref 0.5–1.3)
EGFR: 69 ML/MIN/1.73M2 — SIGNIFICANT CHANGE UP
EOSINOPHIL # BLD AUTO: 0.11 K/UL — SIGNIFICANT CHANGE UP (ref 0–0.5)
EOSINOPHIL NFR BLD AUTO: 1.3 % — SIGNIFICANT CHANGE UP (ref 0–6)
GLUCOSE SERPL-MCNC: 94 MG/DL — SIGNIFICANT CHANGE UP (ref 70–99)
HCT VFR BLD CALC: 26.9 % — LOW (ref 34.5–45)
HGB BLD-MCNC: 8.5 G/DL — LOW (ref 11.5–15.5)
IMM GRANULOCYTES NFR BLD AUTO: 0.5 % — SIGNIFICANT CHANGE UP (ref 0–0.9)
LYMPHOCYTES # BLD AUTO: 0.98 K/UL — LOW (ref 1–3.3)
LYMPHOCYTES # BLD AUTO: 11.4 % — LOW (ref 13–44)
MAGNESIUM SERPL-MCNC: 2.2 MG/DL — SIGNIFICANT CHANGE UP (ref 1.6–2.6)
MCHC RBC-ENTMCNC: 29.8 PG — SIGNIFICANT CHANGE UP (ref 27–34)
MCHC RBC-ENTMCNC: 31.6 GM/DL — LOW (ref 32–36)
MCV RBC AUTO: 94.4 FL — SIGNIFICANT CHANGE UP (ref 80–100)
MONOCYTES # BLD AUTO: 0.97 K/UL — HIGH (ref 0–0.9)
MONOCYTES NFR BLD AUTO: 11.3 % — SIGNIFICANT CHANGE UP (ref 2–14)
NEUTROPHILS # BLD AUTO: 6.44 K/UL — SIGNIFICANT CHANGE UP (ref 1.8–7.4)
NEUTROPHILS NFR BLD AUTO: 74.9 % — SIGNIFICANT CHANGE UP (ref 43–77)
PHOSPHATE SERPL-MCNC: 2.8 MG/DL — SIGNIFICANT CHANGE UP (ref 2.4–4.7)
PLATELET # BLD AUTO: 426 K/UL — HIGH (ref 150–400)
POTASSIUM SERPL-MCNC: 3.6 MMOL/L — SIGNIFICANT CHANGE UP (ref 3.5–5.3)
POTASSIUM SERPL-SCNC: 3.6 MMOL/L — SIGNIFICANT CHANGE UP (ref 3.5–5.3)
RBC # BLD: 2.85 M/UL — LOW (ref 3.8–5.2)
RBC # FLD: 14.6 % — HIGH (ref 10.3–14.5)
SODIUM SERPL-SCNC: 140 MMOL/L — SIGNIFICANT CHANGE UP (ref 135–145)
WBC # BLD: 8.59 K/UL — SIGNIFICANT CHANGE UP (ref 3.8–10.5)
WBC # FLD AUTO: 8.59 K/UL — SIGNIFICANT CHANGE UP (ref 3.8–10.5)

## 2024-02-23 RX ORDER — POTASSIUM PHOSPHATE, MONOBASIC POTASSIUM PHOSPHATE, DIBASIC 236; 224 MG/ML; MG/ML
15 INJECTION, SOLUTION INTRAVENOUS ONCE
Refills: 0 | Status: COMPLETED | OUTPATIENT
Start: 2024-02-23 | End: 2024-02-23

## 2024-02-23 RX ADMIN — Medication 1 PATCH: at 20:15

## 2024-02-23 RX ADMIN — Medication 37.5 MICROGRAM(S): at 22:58

## 2024-02-23 RX ADMIN — LOSARTAN POTASSIUM 100 MILLIGRAM(S): 100 TABLET, FILM COATED ORAL at 05:58

## 2024-02-23 RX ADMIN — Medication 1 PATCH: at 06:43

## 2024-02-23 RX ADMIN — Medication 5 MILLIGRAM(S): at 23:01

## 2024-02-23 RX ADMIN — HEPARIN SODIUM 5000 UNIT(S): 5000 INJECTION INTRAVENOUS; SUBCUTANEOUS at 22:58

## 2024-02-23 RX ADMIN — HEPARIN SODIUM 5000 UNIT(S): 5000 INJECTION INTRAVENOUS; SUBCUTANEOUS at 14:48

## 2024-02-23 RX ADMIN — SERTRALINE 200 MILLIGRAM(S): 25 TABLET, FILM COATED ORAL at 12:44

## 2024-02-23 RX ADMIN — Medication 5 MILLIGRAM(S): at 05:59

## 2024-02-23 RX ADMIN — Medication 5 MILLIGRAM(S): at 18:10

## 2024-02-23 RX ADMIN — BUDESONIDE AND FORMOTEROL FUMARATE DIHYDRATE 2 PUFF(S): 160; 4.5 AEROSOL RESPIRATORY (INHALATION) at 20:57

## 2024-02-23 RX ADMIN — HEPARIN SODIUM 5000 UNIT(S): 5000 INJECTION INTRAVENOUS; SUBCUTANEOUS at 06:49

## 2024-02-23 RX ADMIN — ATORVASTATIN CALCIUM 40 MILLIGRAM(S): 80 TABLET, FILM COATED ORAL at 22:58

## 2024-02-23 RX ADMIN — Medication 5 MILLIGRAM(S): at 12:43

## 2024-02-23 RX ADMIN — PANTOPRAZOLE SODIUM 40 MILLIGRAM(S): 20 TABLET, DELAYED RELEASE ORAL at 08:33

## 2024-02-23 RX ADMIN — POTASSIUM PHOSPHATE, MONOBASIC POTASSIUM PHOSPHATE, DIBASIC 62.5 MILLIMOLE(S): 236; 224 INJECTION, SOLUTION INTRAVENOUS at 10:22

## 2024-02-23 RX ADMIN — FLUCONAZOLE 100 MILLIGRAM(S): 150 TABLET ORAL at 06:01

## 2024-02-23 RX ADMIN — Medication 1 PATCH: at 14:45

## 2024-02-23 RX ADMIN — SIMETHICONE 80 MILLIGRAM(S): 80 TABLET, CHEWABLE ORAL at 18:08

## 2024-02-23 RX ADMIN — Medication 25 MILLIGRAM(S): at 05:58

## 2024-02-23 RX ADMIN — Medication 81 MILLIGRAM(S): at 12:44

## 2024-02-23 RX ADMIN — Medication 3 MILLIGRAM(S): at 22:58

## 2024-02-23 RX ADMIN — BUDESONIDE AND FORMOTEROL FUMARATE DIHYDRATE 2 PUFF(S): 160; 4.5 AEROSOL RESPIRATORY (INHALATION) at 08:33

## 2024-02-23 RX ADMIN — Medication 1 PATCH: at 01:20

## 2024-02-23 RX ADMIN — SIMETHICONE 80 MILLIGRAM(S): 80 TABLET, CHEWABLE ORAL at 06:53

## 2024-02-23 NOTE — PROGRESS NOTE ADULT - SUBJECTIVE AND OBJECTIVE BOX
Subjective:  Pt c/o episodes of small volume emesis overnight. Denies nausea at this time, abdominal pain, chest pain, fever/chills, shortness of breath, diarrhea, headache. States that oral intake does not cause nausea.    MEDICATIONS  (STANDING):  aspirin enteric coated 81 milliGRAM(s) Oral daily  atorvastatin 40 milliGRAM(s) Oral at bedtime  budesonide  80 MICROgram(s)/formoterol 4.5 MICROgram(s) Inhaler 2 Puff(s) Inhalation two times a day  fluconAZOLE IVPB 400 milliGRAM(s) IV Intermittent every 24 hours  fluconAZOLE IVPB      heparin   Injectable 5000 Unit(s) SubCutaneous every 8 hours  levothyroxine Injectable 37.5 MICROGram(s) IV Push at bedtime  losartan 100 milliGRAM(s) Oral daily  metoclopramide Injectable 5 milliGRAM(s) IV Push every 6 hours  metoprolol succinate ER 25 milliGRAM(s) Oral daily  nicotine -  14 mG/24Hr(s) Patch 1 Patch Transdermal daily  pantoprazole  Injectable 40 milliGRAM(s) IV Push with breakfast  sertraline 200 milliGRAM(s) Oral daily    MEDICATIONS  (PRN):  albuterol/ipratropium for Nebulization. 3 milliLiter(s) Nebulizer once PRN Shortness of Breath and/or Wheezing  ALPRAZolam 0.25 milliGRAM(s) Oral every 12 hours PRN anxiety  melatonin 3 milliGRAM(s) Oral at bedtime PRN Insomnia  ondansetron Injectable 4 milliGRAM(s) IV Push every 6 hours PRN Nausea and/or Vomiting  simethicone 80 milliGRAM(s) Chew every 6 hours PRN Gas      Vital Signs Last 24 Hrs  T(C): 36.9 (22 Feb 2024 22:18), Max: 36.9 (22 Feb 2024 22:18)  T(F): 98.4 (22 Feb 2024 22:18), Max: 98.4 (22 Feb 2024 22:18)  HR: 91 (23 Feb 2024 04:50) (83 - 91)  BP: 152/80 (23 Feb 2024 04:50) (143/88 - 152/80)  BP(mean): --  RR: 18 (23 Feb 2024 04:50) (18 - 19)  SpO2: 96% (23 Feb 2024 04:50) (92% - 96%)    Parameters below as of 23 Feb 2024 04:50  Patient On (Oxygen Delivery Method): room air        Physical Exam:    Constitutional: NAD  HEENT: PERRL, EOMI  Neck: No JVD, FROM without pain  Respiratory: Respirations non-labored, no accessory muscle use  Gastrointestinal: Soft, non-tender, non-distended, ostomy appliance recently changed +sweat  Neurological: A&O x 3; without gross deficit      LABS:           Pending        A: 78F POD 13 garrett's procedure who was admitted with pSBO. Tolerating diet with intermittent episodes of small volume emesis    Plan:   -Continue diet  -OOB  -IS  -DVT ppx  -likely discharge planning if patient has no episodes of vomiting today    Will discuss with Dr. Miller

## 2024-02-24 ENCOUNTER — TRANSCRIPTION ENCOUNTER (OUTPATIENT)
Age: 79
End: 2024-02-24

## 2024-02-24 VITALS
RESPIRATION RATE: 17 BRPM | SYSTOLIC BLOOD PRESSURE: 146 MMHG | DIASTOLIC BLOOD PRESSURE: 80 MMHG | OXYGEN SATURATION: 94 % | HEART RATE: 85 BPM | TEMPERATURE: 98 F

## 2024-02-24 PROCEDURE — 97116 GAIT TRAINING THERAPY: CPT

## 2024-02-24 PROCEDURE — 84145 PROCALCITONIN (PCT): CPT

## 2024-02-24 PROCEDURE — 80048 BASIC METABOLIC PNL TOTAL CA: CPT

## 2024-02-24 PROCEDURE — 86901 BLOOD TYPING SEROLOGIC RH(D): CPT

## 2024-02-24 PROCEDURE — 83735 ASSAY OF MAGNESIUM: CPT

## 2024-02-24 PROCEDURE — 97530 THERAPEUTIC ACTIVITIES: CPT

## 2024-02-24 PROCEDURE — 96375 TX/PRO/DX INJ NEW DRUG ADDON: CPT

## 2024-02-24 PROCEDURE — 85014 HEMATOCRIT: CPT

## 2024-02-24 PROCEDURE — 82330 ASSAY OF CALCIUM: CPT

## 2024-02-24 PROCEDURE — 93308 TTE F-UP OR LMTD: CPT

## 2024-02-24 PROCEDURE — 83690 ASSAY OF LIPASE: CPT

## 2024-02-24 PROCEDURE — 87205 SMEAR GRAM STAIN: CPT

## 2024-02-24 PROCEDURE — 93321 DOPPLER ECHO F-UP/LMTD STD: CPT

## 2024-02-24 PROCEDURE — 82803 BLOOD GASES ANY COMBINATION: CPT

## 2024-02-24 PROCEDURE — 84132 ASSAY OF SERUM POTASSIUM: CPT

## 2024-02-24 PROCEDURE — 74018 RADEX ABDOMEN 1 VIEW: CPT

## 2024-02-24 PROCEDURE — 84436 ASSAY OF TOTAL THYROXINE: CPT

## 2024-02-24 PROCEDURE — 85652 RBC SED RATE AUTOMATED: CPT

## 2024-02-24 PROCEDURE — 86140 C-REACTIVE PROTEIN: CPT

## 2024-02-24 PROCEDURE — 85730 THROMBOPLASTIN TIME PARTIAL: CPT

## 2024-02-24 PROCEDURE — 94640 AIRWAY INHALATION TREATMENT: CPT

## 2024-02-24 PROCEDURE — 86803 HEPATITIS C AB TEST: CPT

## 2024-02-24 PROCEDURE — C1889: CPT

## 2024-02-24 PROCEDURE — 84100 ASSAY OF PHOSPHORUS: CPT

## 2024-02-24 PROCEDURE — 85610 PROTHROMBIN TIME: CPT

## 2024-02-24 PROCEDURE — 36415 COLL VENOUS BLD VENIPUNCTURE: CPT

## 2024-02-24 PROCEDURE — 85027 COMPLETE CBC AUTOMATED: CPT

## 2024-02-24 PROCEDURE — 74177 CT ABD & PELVIS W/CONTRAST: CPT

## 2024-02-24 PROCEDURE — 0042T: CPT

## 2024-02-24 PROCEDURE — 80061 LIPID PANEL: CPT

## 2024-02-24 PROCEDURE — 87070 CULTURE OTHR SPECIMN AEROBIC: CPT

## 2024-02-24 PROCEDURE — 70450 CT HEAD/BRAIN W/O DYE: CPT

## 2024-02-24 PROCEDURE — 70498 CT ANGIOGRAPHY NECK: CPT

## 2024-02-24 PROCEDURE — 70496 CT ANGIOGRAPHY HEAD: CPT

## 2024-02-24 PROCEDURE — 87507 IADNA-DNA/RNA PROBE TQ 12-25: CPT

## 2024-02-24 PROCEDURE — P9045: CPT

## 2024-02-24 PROCEDURE — 87040 BLOOD CULTURE FOR BACTERIA: CPT

## 2024-02-24 PROCEDURE — 82550 ASSAY OF CK (CPK): CPT

## 2024-02-24 PROCEDURE — 97163 PT EVAL HIGH COMPLEX 45 MIN: CPT

## 2024-02-24 PROCEDURE — 87186 SC STD MICRODIL/AGAR DIL: CPT

## 2024-02-24 PROCEDURE — 83993 ASSAY FOR CALPROTECTIN FECAL: CPT

## 2024-02-24 PROCEDURE — 88307 TISSUE EXAM BY PATHOLOGIST: CPT

## 2024-02-24 PROCEDURE — 84443 ASSAY THYROID STIM HORMONE: CPT

## 2024-02-24 PROCEDURE — 87075 CULTR BACTERIA EXCEPT BLOOD: CPT

## 2024-02-24 PROCEDURE — 82962 GLUCOSE BLOOD TEST: CPT

## 2024-02-24 PROCEDURE — 81001 URINALYSIS AUTO W/SCOPE: CPT

## 2024-02-24 PROCEDURE — 93880 EXTRACRANIAL BILAT STUDY: CPT

## 2024-02-24 PROCEDURE — 85025 COMPLETE CBC W/AUTO DIFF WBC: CPT

## 2024-02-24 PROCEDURE — 87086 URINE CULTURE/COLONY COUNT: CPT

## 2024-02-24 PROCEDURE — 80053 COMPREHEN METABOLIC PANEL: CPT

## 2024-02-24 PROCEDURE — C9399: CPT

## 2024-02-24 PROCEDURE — 71045 X-RAY EXAM CHEST 1 VIEW: CPT

## 2024-02-24 PROCEDURE — 36430 TRANSFUSION BLD/BLD COMPNT: CPT

## 2024-02-24 PROCEDURE — 99285 EMERGENCY DEPT VISIT HI MDM: CPT

## 2024-02-24 PROCEDURE — 96365 THER/PROPH/DIAG IV INF INIT: CPT

## 2024-02-24 PROCEDURE — 82040 ASSAY OF SERUM ALBUMIN: CPT

## 2024-02-24 PROCEDURE — 80076 HEPATIC FUNCTION PANEL: CPT

## 2024-02-24 PROCEDURE — 85018 HEMOGLOBIN: CPT

## 2024-02-24 PROCEDURE — 84295 ASSAY OF SERUM SODIUM: CPT

## 2024-02-24 PROCEDURE — 82947 ASSAY GLUCOSE BLOOD QUANT: CPT

## 2024-02-24 PROCEDURE — 93005 ELECTROCARDIOGRAM TRACING: CPT

## 2024-02-24 PROCEDURE — 86922 COMPATIBILITY TEST ANTIGLOB: CPT

## 2024-02-24 PROCEDURE — P9016: CPT

## 2024-02-24 PROCEDURE — 93306 TTE W/DOPPLER COMPLETE: CPT

## 2024-02-24 PROCEDURE — 84484 ASSAY OF TROPONIN QUANT: CPT

## 2024-02-24 PROCEDURE — 87077 CULTURE AEROBIC IDENTIFY: CPT

## 2024-02-24 PROCEDURE — 86900 BLOOD TYPING SEROLOGIC ABO: CPT

## 2024-02-24 PROCEDURE — 83605 ASSAY OF LACTIC ACID: CPT

## 2024-02-24 PROCEDURE — 82435 ASSAY OF BLOOD CHLORIDE: CPT

## 2024-02-24 PROCEDURE — 86850 RBC ANTIBODY SCREEN: CPT

## 2024-02-24 PROCEDURE — 87637 SARSCOV2&INF A&B&RSV AMP PRB: CPT

## 2024-02-24 RX ADMIN — SERTRALINE 200 MILLIGRAM(S): 25 TABLET, FILM COATED ORAL at 11:18

## 2024-02-24 RX ADMIN — HEPARIN SODIUM 5000 UNIT(S): 5000 INJECTION INTRAVENOUS; SUBCUTANEOUS at 05:52

## 2024-02-24 RX ADMIN — FLUCONAZOLE 100 MILLIGRAM(S): 150 TABLET ORAL at 05:53

## 2024-02-24 RX ADMIN — Medication 25 MILLIGRAM(S): at 05:52

## 2024-02-24 RX ADMIN — Medication 81 MILLIGRAM(S): at 11:18

## 2024-02-24 RX ADMIN — PANTOPRAZOLE SODIUM 40 MILLIGRAM(S): 20 TABLET, DELAYED RELEASE ORAL at 08:01

## 2024-02-24 RX ADMIN — Medication 1 PATCH: at 11:18

## 2024-02-24 RX ADMIN — LOSARTAN POTASSIUM 100 MILLIGRAM(S): 100 TABLET, FILM COATED ORAL at 05:52

## 2024-02-24 RX ADMIN — SIMETHICONE 80 MILLIGRAM(S): 80 TABLET, CHEWABLE ORAL at 11:29

## 2024-02-24 RX ADMIN — BUDESONIDE AND FORMOTEROL FUMARATE DIHYDRATE 2 PUFF(S): 160; 4.5 AEROSOL RESPIRATORY (INHALATION) at 08:00

## 2024-02-24 RX ADMIN — Medication 1000 MILLIGRAM(S): at 02:07

## 2024-02-24 RX ADMIN — Medication 0.25 MILLIGRAM(S): at 14:50

## 2024-02-24 RX ADMIN — Medication 5 MILLIGRAM(S): at 05:50

## 2024-02-24 RX ADMIN — Medication 5 MILLIGRAM(S): at 11:18

## 2024-02-24 NOTE — DISCHARGE NOTE NURSING/CASE MANAGEMENT/SOCIAL WORK - NSDCVIVACCINE_GEN_ALL_CORE_FT
Tdap; 21-Apr-2022 10:15; Luz Mcintyre (RN); [employer]; [id number] (Exp. Date: 02-Feb-2024); IntraMuscular; Deltoid Right.; 0.5 milliLiter(s); VIS (VIS Published: 09-May-2013, VIS Presented: 21-Apr-2022);

## 2024-02-24 NOTE — DISCHARGE NOTE NURSING/CASE MANAGEMENT/SOCIAL WORK - PATIENT PORTAL LINK FT
You can access the FollowMyHealth Patient Portal offered by [facility] by registering at the following website: [email or web address]. By joining [facility]’s FollowMyHealth portal, you will also be able to view your health information using other applications (apps) compatible with our system.

## 2024-02-24 NOTE — PROGRESS NOTE ADULT - PROVIDER SPECIALTY LIST ADULT
Cardiology
Gastroenterology
Hospitalist
Infectious Disease
Infectious Disease
Neurology
Neurology
Surgery
Hospitalist
Hospitalist
Infectious Disease
Infectious Disease
Surgery
Gastroenterology
Hospitalist
Surgery
Gastroenterology
Gastroenterology
Internal Medicine
Internal Medicine
Surgery
Hospitalist
Cardiology
Infectious Disease
Infectious Disease
Gastroenterology

## 2024-02-24 NOTE — PROGRESS NOTE ADULT - NUTRITIONAL ASSESSMENT
This patient has been assessed with a concern for Malnutrition and has been determined to have a diagnosis/diagnoses of Moderate protein-calorie malnutrition.    This patient is being managed with:   Diet NPO with Tube Feed-  Tube Feeding Modality: Nasogastric  Jevity 1.5 Mason (JEVITY1.5)  Total Volume for 24 Hours (mL): 240  Continuous  Starting Tube Feed Rate {mL per Hour}: 10  Increase Tube Feed Rate by (mL): 0  Until Goal Tube Feed Rate (mL per Hour): 10  Tube Feed Duration (in Hours): 24  Tube Feed Start Time: 11:30  Entered: Feb 13 2024 11:14AM  
This patient has been assessed with a concern for Malnutrition and has been determined to have a diagnosis/diagnoses of Moderate protein-calorie malnutrition.    This patient is being managed with:   Diet NPO-  Entered: Feb 21 2024 10:07AM  
This patient has been assessed with a concern for Malnutrition and has been determined to have a diagnosis/diagnoses of Moderate protein-calorie malnutrition.    This patient is being managed with:   Diet NPO-  Except Medications  Entered: Feb 4 2024  2:30PM  
This patient has been assessed with a concern for Malnutrition and has been determined to have a diagnosis/diagnoses of Moderate protein-calorie malnutrition.    This patient is being managed with:   Diet Regular-  Ensure Enlive Cans or Servings Per Day:  3       Frequency:  Three Times a day  Entered: Feb 17 2024  2:27PM  
This patient has been assessed with a concern for Malnutrition and has been determined to have a diagnosis/diagnoses of Moderate protein-calorie malnutrition.    This patient is being managed with:   Diet Regular-  Fiber/Residue Restricted (LOWFIBER)  Lactose Restricted (Milk Sugar Intoler.)  Entered: Feb 1 2024  9:14AM  
This patient has been assessed with a concern for Malnutrition and has been determined to have a diagnosis/diagnoses of Moderate protein-calorie malnutrition.    This patient is being managed with:   Diet Clear Liquid-  Supplement Feeding Modality:  Oral  Ensure Clear Cans or Servings Per Day:  3       Frequency:  Three Times a day  Entered: Feb 15 2024 10:12AM  
This patient has been assessed with a concern for Malnutrition and has been determined to have a diagnosis/diagnoses of Moderate protein-calorie malnutrition.    This patient is being managed with:   Diet Full Liquid-  Supplement Feeding Modality:  Oral  Ensure Enlive Cans or Servings Per Day:  3       Frequency:  Three Times a day  Entered: Feb 16 2024  8:16AM  
This patient has been assessed with a concern for Malnutrition and has been determined to have a diagnosis/diagnoses of Moderate protein-calorie malnutrition.    This patient is being managed with:   Diet NPO-  Entered: Feb 5 2024 12:02PM  
This patient has been assessed with a concern for Malnutrition and has been determined to have a diagnosis/diagnoses of Moderate protein-calorie malnutrition.    This patient is being managed with:   Diet NPO-  Except Medications  Entered: Feb 10 2024  1:26PM  
This patient has been assessed with a concern for Malnutrition and has been determined to have a diagnosis/diagnoses of Moderate protein-calorie malnutrition.    This patient is being managed with:   Diet Regular-  Ensure Enlive Cans or Servings Per Day:  3       Frequency:  Three Times a day  Entered: Feb 17 2024  2:27PM  
This patient has been assessed with a concern for Malnutrition and has been determined to have a diagnosis/diagnoses of Moderate protein-calorie malnutrition.    This patient is being managed with:   Diet Regular-  Supplement Feeding Modality:  Oral  Ensure Enlive Cans or Servings Per Day:  3       Frequency:  Three Times a day  Entered: Feb 21 2024 11:28AM  
This patient has been assessed with a concern for Malnutrition and has been determined to have a diagnosis/diagnoses of Moderate protein-calorie malnutrition.    This patient is being managed with:   Diet Full Liquid-  Supplement Feeding Modality:  Oral  Ensure Enlive Cans or Servings Per Day:  3       Frequency:  Three Times a day  Entered: Feb 16 2024  8:16AM  
This patient has been assessed with a concern for Malnutrition and has been determined to have a diagnosis/diagnoses of Moderate protein-calorie malnutrition.    This patient is being managed with:   Diet NPO after Midnight-     NPO Start Date: 08-Feb-2024   NPO Start Time: 23:59  Entered: Feb 8 2024 10:17AM    Diet NPO-  Entered: Feb 5 2024 12:02PM  
This patient has been assessed with a concern for Malnutrition and has been determined to have a diagnosis/diagnoses of Moderate protein-calorie malnutrition.    This patient is being managed with:   Diet NPO with Tube Feed-  Tube Feeding Modality: Nasogastric  Jevity 1.5 Mason (JEVITY1.5)  Total Volume for 24 Hours (mL): 240  Continuous  Starting Tube Feed Rate {mL per Hour}: 10  Increase Tube Feed Rate by (mL): 0  Until Goal Tube Feed Rate (mL per Hour): 10  Tube Feed Duration (in Hours): 24  Tube Feed Start Time: 11:30  Entered: Feb 13 2024 11:14AM  
This patient has been assessed with a concern for Malnutrition and has been determined to have a diagnosis/diagnoses of Moderate protein-calorie malnutrition.    This patient is being managed with:   Diet NPO with Tube Feed-  Tube Feeding Modality: Nasogastric  Jevity 1.5 Mason (JEVITY1.5)  Total Volume for 24 Hours (mL): 240  Continuous  Starting Tube Feed Rate {mL per Hour}: 10  Increase Tube Feed Rate by (mL): 0  Until Goal Tube Feed Rate (mL per Hour): 10  Tube Feed Duration (in Hours): 24  Tube Feed Start Time: 11:30  Entered: Feb 13 2024 11:14AM  
This patient has been assessed with a concern for Malnutrition and has been determined to have a diagnosis/diagnoses of Moderate protein-calorie malnutrition.    This patient is being managed with:   Diet NPO-  Entered: Feb 5 2024 12:02PM  
This patient has been assessed with a concern for Malnutrition and has been determined to have a diagnosis/diagnoses of Moderate protein-calorie malnutrition.    This patient is being managed with:   Diet NPO-  Except Medications  Entered: Feb 10 2024  1:26PM  
This patient has been assessed with a concern for Malnutrition and has been determined to have a diagnosis/diagnoses of Moderate protein-calorie malnutrition.    This patient is being managed with:   Diet Regular-  Ensure Enlive Cans or Servings Per Day:  3       Frequency:  Three Times a day  Entered: Feb 17 2024  2:27PM  
This patient has been assessed with a concern for Malnutrition and has been determined to have a diagnosis/diagnoses of Moderate protein-calorie malnutrition.    This patient is being managed with:   Diet Regular-  Fiber/Residue Restricted (LOWFIBER)  Lactose Restricted (Milk Sugar Intoler.)  Entered: Feb 1 2024  9:14AM  
This patient has been assessed with a concern for Malnutrition and has been determined to have a diagnosis/diagnoses of Moderate protein-calorie malnutrition.    This patient is being managed with:   Diet Regular-  Supplement Feeding Modality:  Oral  Ensure Enlive Cans or Servings Per Day:  3       Frequency:  Three Times a day  Entered: Feb 21 2024 11:28AM  
This patient has been assessed with a concern for Malnutrition and has been determined to have a diagnosis/diagnoses of Moderate protein-calorie malnutrition.    This patient is being managed with:   Diet Regular-  Supplement Feeding Modality:  Oral  Ensure Enlive Cans or Servings Per Day:  3       Frequency:  Three Times a day  Entered: Feb 21 2024 11:28AM  
This patient has been assessed with a concern for Malnutrition and has been determined to have a diagnosis/diagnoses of Moderate protein-calorie malnutrition.    This patient is being managed with:   Diet NPO after Midnight-     NPO Start Date: 08-Feb-2024   NPO Start Time: 23:59  Entered: Feb 8 2024 10:17AM    Diet NPO-  Entered: Feb 5 2024 12:02PM  
This patient has been assessed with a concern for Malnutrition and has been determined to have a diagnosis/diagnoses of Moderate protein-calorie malnutrition.    This patient is being managed with:   Diet NPO after Midnight-     NPO Start Date: 08-Feb-2024   NPO Start Time: 23:59  Entered: Feb 8 2024 10:17AM    Diet NPO-  Entered: Feb 5 2024 12:02PM  
This patient has been assessed with a concern for Malnutrition and has been determined to have a diagnosis/diagnoses of Moderate protein-calorie malnutrition.    This patient is being managed with:   Diet NPO-  Entered: Feb 5 2024 12:02PM  
This patient has been assessed with a concern for Malnutrition and has been determined to have a diagnosis/diagnoses of Moderate protein-calorie malnutrition.    This patient is being managed with:   Diet NPO after Midnight-     NPO Start Date: 08-Feb-2024   NPO Start Time: 23:59  Entered: Feb 8 2024 10:17AM    Diet NPO-  Entered: Feb 5 2024 12:02PM  
This patient has been assessed with a concern for Malnutrition and has been determined to have a diagnosis/diagnoses of Moderate protein-calorie malnutrition.    This patient is being managed with:   Diet NPO after Midnight-     NPO Start Date: 08-Feb-2024   NPO Start Time: 23:59  Entered: Feb 8 2024 10:17AM    Diet NPO-  Entered: Feb 5 2024 12:02PM  
This patient has been assessed with a concern for Malnutrition and has been determined to have a diagnosis/diagnoses of Moderate protein-calorie malnutrition.    This patient is being managed with:   Diet NPO-     Special Instructions for Nursing:  CODE STROKE; NPO until Dysphagia screen or Speech Bedside Swallow Evaluation completed and passed  Entered: Feb 13 2024  3:34AM    This patient has been assessed with a concern for Malnutrition and has been determined to have a diagnosis/diagnoses of Moderate protein-calorie malnutrition.    This patient is being managed with:   Diet NPO-     Special Instructions for Nursing:  CODE STROKE; NPO until Dysphagia screen or Speech Bedside Swallow Evaluation completed and passed  Entered: Feb 13 2024  3:34AM  
This patient has been assessed with a concern for Malnutrition and has been determined to have a diagnosis/diagnoses of Moderate protein-calorie malnutrition.    This patient is being managed with:   Diet NPO-  Entered: Feb 5 2024 12:02PM

## 2024-02-24 NOTE — PROGRESS NOTE ADULT - REASON FOR ADMISSION
Sepsis due to enterocolitis

## 2024-02-24 NOTE — PROGRESS NOTE ADULT - ASSESSMENT
A: 78F POD 13 garrett's procedure who was admitted with pSBO. Tolerating diet with intermittent episodes of small volume emesis. Doing well today.     Plan:   -Continue diet  -OOB  -IS  -DVT ppx  -Tentative discharge planning if patient has no episodes of vomiting today 2/24

## 2024-02-24 NOTE — PROGRESS NOTE ADULT - SUBJECTIVE AND OBJECTIVE BOX
Subjective: PAtient resting in bed this morning. Doing well overnight. No acute events.     STATUS POST: ex-lap/Hartmanns    POST OPERATIVE DAY #: 14    MEDICATIONS  (STANDING):  aspirin enteric coated 81 milliGRAM(s) Oral daily  atorvastatin 40 milliGRAM(s) Oral at bedtime  budesonide  80 MICROgram(s)/formoterol 4.5 MICROgram(s) Inhaler 2 Puff(s) Inhalation two times a day  fluconAZOLE IVPB      fluconAZOLE IVPB 400 milliGRAM(s) IV Intermittent every 24 hours  heparin   Injectable 5000 Unit(s) SubCutaneous every 8 hours  levothyroxine Injectable 37.5 MICROGram(s) IV Push at bedtime  losartan 100 milliGRAM(s) Oral daily  metoclopramide Injectable 5 milliGRAM(s) IV Push every 6 hours  metoprolol succinate ER 25 milliGRAM(s) Oral daily  nicotine -  14 mG/24Hr(s) Patch 1 Patch Transdermal daily  pantoprazole  Injectable 40 milliGRAM(s) IV Push with breakfast  sertraline 200 milliGRAM(s) Oral daily    MEDICATIONS  (PRN):  albuterol/ipratropium for Nebulization. 3 milliLiter(s) Nebulizer once PRN Shortness of Breath and/or Wheezing  ALPRAZolam 0.25 milliGRAM(s) Oral every 12 hours PRN anxiety  melatonin 3 milliGRAM(s) Oral at bedtime PRN Insomnia  ondansetron Injectable 4 milliGRAM(s) IV Push every 6 hours PRN Nausea and/or Vomiting  simethicone 80 milliGRAM(s) Chew every 6 hours PRN Gas      Vital Signs Last 24 Hrs  T(C): 36.6 (24 Feb 2024 00:07), Max: 36.9 (23 Feb 2024 20:50)  T(F): 97.9 (24 Feb 2024 00:07), Max: 98.4 (23 Feb 2024 20:50)  HR: 89 (24 Feb 2024 00:07) (89 - 91)  BP: 136/87 (24 Feb 2024 00:07) (126/80 - 147/87)  BP(mean): --  RR: 18 (24 Feb 2024 00:07) (16 - 18)  SpO2: 93% (24 Feb 2024 00:07) (93% - 100%)    Parameters below as of 24 Feb 2024 00:07  Patient On (Oxygen Delivery Method): room air        Physical Exam:  Constitutional: NAD  Neck: No JVD  Respiratory: Respirations non-labored, no accessory muscle use  Gastrointestinal: Soft, non-tender, non-distended, ostomy appliance recently changed +sweat  Extremities: moving all extremities spontaneously      LABS:                        8.5    8.59  )-----------( 426      ( 23 Feb 2024 06:59 )             26.9     02-23    140  |  104  |  13.2  ----------------------------<  94  3.6   |  26.0  |  0.87    Ca    8.2<L>      23 Feb 2024 06:59  Phos  2.8     02-23  Mg     2.2     02-23        Urinalysis Basic - ( 23 Feb 2024 06:59 )    Color: x / Appearance: x / SG: x / pH: x  Gluc: 94 mg/dL / Ketone: x  / Bili: x / Urobili: x   Blood: x / Protein: x / Nitrite: x   Leuk Esterase: x / RBC: x / WBC x   Sq Epi: x / Non Sq Epi: x / Bacteria: x        A:     Plan:   -

## 2024-02-24 NOTE — DISCHARGE NOTE NURSING/CASE MANAGEMENT/SOCIAL WORK - NSDCPEFALRISK_GEN_ALL_CORE
For information on Fall & Injury Prevention, visit: [email or web address] OR  [email or web address] OR  https://www.cdc.gov/steadi/patient.html

## 2024-03-05 ENCOUNTER — APPOINTMENT (OUTPATIENT)
Dept: ORTHOPEDIC SURGERY | Facility: CLINIC | Age: 79
End: 2024-03-05

## 2024-05-26 PROBLEM — F17.210 NICOTINE DEPENDENCE, CIGARETTES, UNCOMPLICATED: Chronic | Status: ACTIVE | Noted: 2024-01-31

## 2024-05-26 PROBLEM — F32.9 MAJOR DEPRESSIVE DISORDER, SINGLE EPISODE, UNSPECIFIED: Chronic | Status: ACTIVE | Noted: 2024-01-31

## 2024-05-26 PROBLEM — E78.5 HYPERLIPIDEMIA, UNSPECIFIED: Chronic | Status: ACTIVE | Noted: 2024-01-31

## 2024-05-26 PROBLEM — M19.90 UNSPECIFIED OSTEOARTHRITIS, UNSPECIFIED SITE: Chronic | Status: ACTIVE | Noted: 2024-01-31

## 2024-05-26 PROBLEM — I10 ESSENTIAL (PRIMARY) HYPERTENSION: Chronic | Status: ACTIVE | Noted: 2024-01-31

## 2024-05-26 PROBLEM — I73.9 PERIPHERAL VASCULAR DISEASE, UNSPECIFIED: Chronic | Status: ACTIVE | Noted: 2024-01-31

## 2024-05-31 ENCOUNTER — OUTPATIENT (OUTPATIENT)
Dept: OUTPATIENT SERVICES | Facility: HOSPITAL | Age: 79
LOS: 1 days | End: 2024-05-31
Payer: MEDICARE

## 2024-05-31 VITALS
HEART RATE: 50 BPM | TEMPERATURE: 98 F | RESPIRATION RATE: 20 BRPM | WEIGHT: 113.76 LBS | HEIGHT: 64 IN | OXYGEN SATURATION: 98 % | SYSTOLIC BLOOD PRESSURE: 110 MMHG | DIASTOLIC BLOOD PRESSURE: 70 MMHG

## 2024-05-31 DIAGNOSIS — Z98.890 OTHER SPECIFIED POSTPROCEDURAL STATES: Chronic | ICD-10-CM

## 2024-05-31 DIAGNOSIS — Z29.9 ENCOUNTER FOR PROPHYLACTIC MEASURES, UNSPECIFIED: ICD-10-CM

## 2024-05-31 DIAGNOSIS — Z01.818 ENCOUNTER FOR OTHER PREPROCEDURAL EXAMINATION: ICD-10-CM

## 2024-05-31 DIAGNOSIS — Z93.3 COLOSTOMY STATUS: ICD-10-CM

## 2024-05-31 DIAGNOSIS — I10 ESSENTIAL (PRIMARY) HYPERTENSION: ICD-10-CM

## 2024-05-31 LAB
A1C WITH ESTIMATED AVERAGE GLUCOSE RESULT: 5.3 % — SIGNIFICANT CHANGE UP (ref 4–5.6)
ALBUMIN SERPL ELPH-MCNC: 4 G/DL — SIGNIFICANT CHANGE UP (ref 3.3–5.2)
ALP SERPL-CCNC: 141 U/L — HIGH (ref 40–120)
ALT FLD-CCNC: 13 U/L — SIGNIFICANT CHANGE UP
ANION GAP SERPL CALC-SCNC: 11 MMOL/L — SIGNIFICANT CHANGE UP (ref 5–17)
APTT BLD: 33.6 SEC — SIGNIFICANT CHANGE UP (ref 24.5–35.6)
AST SERPL-CCNC: 17 U/L — SIGNIFICANT CHANGE UP
BASOPHILS # BLD AUTO: 0.06 K/UL — SIGNIFICANT CHANGE UP (ref 0–0.2)
BASOPHILS NFR BLD AUTO: 0.7 % — SIGNIFICANT CHANGE UP (ref 0–2)
BILIRUB SERPL-MCNC: <0.2 MG/DL — LOW (ref 0.4–2)
BLD GP AB SCN SERPL QL: SIGNIFICANT CHANGE UP
BUN SERPL-MCNC: 25.5 MG/DL — HIGH (ref 8–20)
CALCIUM SERPL-MCNC: 9.4 MG/DL — SIGNIFICANT CHANGE UP (ref 8.4–10.5)
CHLORIDE SERPL-SCNC: 101 MMOL/L — SIGNIFICANT CHANGE UP (ref 96–108)
CO2 SERPL-SCNC: 25 MMOL/L — SIGNIFICANT CHANGE UP (ref 22–29)
COMMENT - BLOOD BANK: SIGNIFICANT CHANGE UP
CREAT SERPL-MCNC: 0.99 MG/DL — SIGNIFICANT CHANGE UP (ref 0.5–1.3)
EGFR: 59 ML/MIN/1.73M2 — LOW
EOSINOPHIL # BLD AUTO: 0.13 K/UL — SIGNIFICANT CHANGE UP (ref 0–0.5)
EOSINOPHIL NFR BLD AUTO: 1.4 % — SIGNIFICANT CHANGE UP (ref 0–6)
ESTIMATED AVERAGE GLUCOSE: 105 MG/DL — SIGNIFICANT CHANGE UP (ref 68–114)
GLUCOSE SERPL-MCNC: 94 MG/DL — SIGNIFICANT CHANGE UP (ref 70–99)
HCT VFR BLD CALC: 39.7 % — SIGNIFICANT CHANGE UP (ref 34.5–45)
HGB BLD-MCNC: 12.7 G/DL — SIGNIFICANT CHANGE UP (ref 11.5–15.5)
IMM GRANULOCYTES NFR BLD AUTO: 0.4 % — SIGNIFICANT CHANGE UP (ref 0–0.9)
INR BLD: 1 RATIO — SIGNIFICANT CHANGE UP (ref 0.85–1.18)
LYMPHOCYTES # BLD AUTO: 1.59 K/UL — SIGNIFICANT CHANGE UP (ref 1–3.3)
LYMPHOCYTES # BLD AUTO: 17.3 % — SIGNIFICANT CHANGE UP (ref 13–44)
MCHC RBC-ENTMCNC: 30.2 PG — SIGNIFICANT CHANGE UP (ref 27–34)
MCHC RBC-ENTMCNC: 32 GM/DL — SIGNIFICANT CHANGE UP (ref 32–36)
MCV RBC AUTO: 94.5 FL — SIGNIFICANT CHANGE UP (ref 80–100)
MONOCYTES # BLD AUTO: 0.69 K/UL — SIGNIFICANT CHANGE UP (ref 0–0.9)
MONOCYTES NFR BLD AUTO: 7.5 % — SIGNIFICANT CHANGE UP (ref 2–14)
NEUTROPHILS # BLD AUTO: 6.68 K/UL — SIGNIFICANT CHANGE UP (ref 1.8–7.4)
NEUTROPHILS NFR BLD AUTO: 72.7 % — SIGNIFICANT CHANGE UP (ref 43–77)
PLATELET # BLD AUTO: 243 K/UL — SIGNIFICANT CHANGE UP (ref 150–400)
POTASSIUM SERPL-MCNC: 4.3 MMOL/L — SIGNIFICANT CHANGE UP (ref 3.5–5.3)
POTASSIUM SERPL-SCNC: 4.3 MMOL/L — SIGNIFICANT CHANGE UP (ref 3.5–5.3)
PROT SERPL-MCNC: 7.7 G/DL — SIGNIFICANT CHANGE UP (ref 6.6–8.7)
PROTHROM AB SERPL-ACNC: 11.1 SEC — SIGNIFICANT CHANGE UP (ref 9.5–13)
RBC # BLD: 4.2 M/UL — SIGNIFICANT CHANGE UP (ref 3.8–5.2)
RBC # FLD: 14.1 % — SIGNIFICANT CHANGE UP (ref 10.3–14.5)
SODIUM SERPL-SCNC: 136 MMOL/L — SIGNIFICANT CHANGE UP (ref 135–145)
WBC # BLD: 9.19 K/UL — SIGNIFICANT CHANGE UP (ref 3.8–10.5)
WBC # FLD AUTO: 9.19 K/UL — SIGNIFICANT CHANGE UP (ref 3.8–10.5)

## 2024-05-31 PROCEDURE — 80053 COMPREHEN METABOLIC PANEL: CPT

## 2024-05-31 PROCEDURE — 93010 ELECTROCARDIOGRAM REPORT: CPT

## 2024-05-31 PROCEDURE — 86901 BLOOD TYPING SEROLOGIC RH(D): CPT

## 2024-05-31 PROCEDURE — 86900 BLOOD TYPING SEROLOGIC ABO: CPT

## 2024-05-31 PROCEDURE — 83036 HEMOGLOBIN GLYCOSYLATED A1C: CPT

## 2024-05-31 PROCEDURE — 86850 RBC ANTIBODY SCREEN: CPT

## 2024-05-31 PROCEDURE — 85025 COMPLETE CBC W/AUTO DIFF WBC: CPT

## 2024-05-31 PROCEDURE — 85610 PROTHROMBIN TIME: CPT

## 2024-05-31 PROCEDURE — 93005 ELECTROCARDIOGRAM TRACING: CPT

## 2024-05-31 PROCEDURE — 85730 THROMBOPLASTIN TIME PARTIAL: CPT

## 2024-05-31 PROCEDURE — G0463: CPT

## 2024-05-31 PROCEDURE — 36415 COLL VENOUS BLD VENIPUNCTURE: CPT

## 2024-05-31 NOTE — H&P PST ADULT - HISTORY OF PRESENT ILLNESS
Pt is a 78 years old female seen today pre-op for Colostomy Reversal. Pt  Pt is a 78 years old female seen today pre-op for Colostomy Reversal. Pt s/p Open partial sigmoidectomy 2/10/24, colostomy in situ for perforated diverticulitis. Pt medical hx includes HTN, HLD, GERD, Depression, anxiety, hypothyroidism. Pt denies fever/chills, denies any change in bowel, denies pain. Scheduled for this surgery on 6/5/24 with Dr. Mliler.

## 2024-05-31 NOTE — H&P PST ADULT - ASSESSMENT
Pt is a 78 years old female seen today pre-op for Colostomy Reversal. Pt s/p Open partial sigmoidectomy 2/10/24, colostomy in situ for perforated diverticulitis. Pt medical hx includes HTN, HLD, GERD, Depression, anxiety, hypothyroidism. Pt denies fever/chills, denies any change in bowel, denies pain. Scheduled for this surgery on 24 with Dr. Miller. Surgery protocol reviewed with Pt today. Pt to follow-up with PCP for medical clearance and recommendation for ASA prior to this surgery   CAPRINI VTE 2.0 SCORE [CLOT updated 2019]    AGE RELATED RISK FACTORS                                                       MOBILITY RELATED FACTORS  [ ] Age 41-60 years                                            (1 Point)                    [ ] Bed rest                                                        (1 Point)  [ ] Age: 61-74 years                                           (2 Points)                  [ ] Plaster cast                                                   (2 Points)  [ x] Age= 75 years                                              (3 Points)                    [ ] Bed bound for more than 72 hours                 (2 Points)    DISEASE RELATED RISK FACTORS                                               GENDER SPECIFIC FACTORS  [ ] Edema in the lower extremities                       (1 Point)              [ ] Pregnancy                                                     (1 Point)  [ ] Varicose veins                                               (1 Point)                     [ ] Post-partum < 6 weeks                                   (1 Point)             [ ] BMI > 25 Kg/m2                                            (1 Point)                     [ ] Hormonal therapy  or oral contraception          (1 Point)                 [ ] Sepsis (in the previous month)                        (1 Point)               [ ] History of pregnancy complications                 (1 point)  [ ] Pneumonia or serious lung disease                                               [ ] Unexplained or recurrent                     (1 Point)           (in the previous month)                               (1 Point)  [ ] Abnormal pulmonary function test                     (1 Point)                 SURGERY RELATED RISK FACTORS  [ ] Acute myocardial infarction                              (1 Point)               [ ]  Section                                             (1 Point)  [ ] Congestive heart failure (in the previous month)  (1 Point)      [ ] Minor surgery                                                  (1 Point)   [ ] Inflammatory bowel disease                             (1 Point)               [ ] Arthroscopic surgery                                        (2 Points)  [ ] Central venous access                                      (2 Points)                [ x] General surgery lasting more than 45 minutes (2 points)  [ ] Malignancy- Present or previous                   (2 Points)                [ ] Elective arthroplasty                                         (5 points)    [ ] Stroke (in the previous month)                          (5 Points)                                                                                                                                                           HEMATOLOGY RELATED FACTORS                                                 TRAUMA RELATED RISK FACTORS  [ ] Prior episodes of VTE                                     (3 Points)                [ ] Fracture of the hip, pelvis, or leg                       (5 Points)  [ ] Positive family history for VTE                         (3 Points)             [ ] Acute spinal cord injury (in the previous month)  (5 Points)  [ ] Prothrombin 83536 A                                     (3 Points)               [ ] Paralysis  (less than 1 month)                             (5 Points)  [ ] Factor V Leiden                                             (3 Points)                  [ ] Multiple Trauma within 1 month                        (5 Points)  [ ] Lupus anticoagulants                                     (3 Points)                                                           [ ] Anticardiolipin antibodies                               (3 Points)                                                       [ ] High homocysteine in the blood                      (3 Points)                                             [ ] Other congenital or acquired thrombophilia      (3 Points)                                                [ ] Heparin induced thrombocytopenia                  (3 Points)                                     Total Score [    5      ]  OPIOID RISK TOOL    CURLY EACH BOX THAT APPLIES AND ADD TOTALS AT THE END    FAMILY HISTORY OF SUBSTANCE ABUSE                 FEMALE         MALE                                                Alcohol                             [  ]1 pt          [  ]3pts                                               Illegal Durgs                     [  ]2 pts        [  ]3pts                                               Rx Drugs                           [  ]4 pts        [  ]4 pts    PERSONAL HISTORY OF SUBSTANCE ABUSE                                                                                          Alcohol                             [  ]3 pts       [  ]3 pts                                               Illegal Drugs                     [  ]4 pts        [  ]4 pts                                               Rx Drugs                           [  ]5 pts        [  ]5 pts    AGE BETWEEN 16-45 YEARS                                      [  ]1 pt         [  ]1 pt    HISTORY OF PREADOLESCENT   SEXUAL ABUSE                                                             [  ]3 pts        [  ]0pts    PSYCHOLOGICAL DISEASE                     ADD, OCD, Bipolar, Schizophrenia        [  ]2 pts         [  ]2 pts                      Depression                                               [x  ]1 pt           [  ]1 pt           SCORING TOTAL   (add numbers and type here)              (**1*)                                     A score of 3 or lower indicated LOW risk for future opioid abuse  A score of 4 to 7 indicated moderate risk for future opioid abuse  A score of 8 or higher indicates a high risk for opioid abuse

## 2024-05-31 NOTE — H&P PST ADULT - NSICDXPASTMEDICALHX_GEN_ALL_CORE_FT
PAST MEDICAL HISTORY:  Cigarette smoker     Hyperlipidemia     Hypertension     Hypothyroid     Major depression     Osteoarthritis     Perforation of sigmoid colon due to diverticulitis     Peripheral arterial disease

## 2024-05-31 NOTE — H&P PST ADULT - NSANTHOSAYNRD_GEN_A_CORE
No. OLIVA screening performed.  STOP BANG Legend: 0-2 = LOW Risk; 3-4 = INTERMEDIATE Risk; 5-8 = HIGH Risk

## 2024-05-31 NOTE — H&P PST ADULT - GASTROINTESTINAL
details… soft/nontender/nondistended/normal active bowel sounds/no guarding/no rigidity/no organomegaly/no palpable wanda/no masses palpable

## 2024-05-31 NOTE — H&P PST ADULT - NSICDXPASTSURGICALHX_GEN_ALL_CORE_FT
PAST SURGICAL HISTORY:  History of open sigmoidectomy     S/P ORIF (open reduction internal fixation) fracture

## 2024-06-13 ENCOUNTER — TRANSCRIPTION ENCOUNTER (OUTPATIENT)
Age: 79
End: 2024-06-13

## 2024-06-14 ENCOUNTER — INPATIENT (INPATIENT)
Facility: HOSPITAL | Age: 79
LOS: 9 days | Discharge: HOME | DRG: 392 | End: 2024-06-24
Attending: SURGERY | Admitting: SURGERY
Payer: MEDICARE

## 2024-06-14 VITALS
SYSTOLIC BLOOD PRESSURE: 118 MMHG | OXYGEN SATURATION: 98 % | TEMPERATURE: 99 F | HEART RATE: 72 BPM | HEIGHT: 64 IN | WEIGHT: 113.76 LBS | DIASTOLIC BLOOD PRESSURE: 79 MMHG | RESPIRATION RATE: 16 BRPM

## 2024-06-14 DIAGNOSIS — Z98.890 OTHER SPECIFIED POSTPROCEDURAL STATES: Chronic | ICD-10-CM

## 2024-06-14 DIAGNOSIS — K57.20 DIVERTICULITIS OF LARGE INTESTINE WITH PERFORATION AND ABSCESS WITHOUT BLEEDING: ICD-10-CM

## 2024-06-14 PROCEDURE — 88304 TISSUE EXAM BY PATHOLOGIST: CPT | Mod: 26

## 2024-06-14 DEVICE — CLIP APPLIER COVIDIEN SURGICLIP 13" LARGE: Type: IMPLANTABLE DEVICE | Status: FUNCTIONAL

## 2024-06-14 DEVICE — ARISTA 3GR: Type: IMPLANTABLE DEVICE | Status: FUNCTIONAL

## 2024-06-14 RX ORDER — LABETALOL HYDROCHLORIDE 300 MG/1
20 TABLET ORAL
Refills: 0 | Status: DISCONTINUED | OUTPATIENT
Start: 2024-06-14 | End: 2024-06-14

## 2024-06-14 RX ORDER — CEFOTETAN DISODIUM 2 G
2 VIAL (EA) INJECTION ONCE
Refills: 0 | Status: COMPLETED | OUTPATIENT
Start: 2024-06-14 | End: 2024-06-14

## 2024-06-14 RX ORDER — ASPIRIN/CALCIUM CARB/MAGNESIUM 324 MG
1 TABLET ORAL
Refills: 0 | DISCHARGE

## 2024-06-14 RX ORDER — ATORVASTATIN CALCIUM 80 MG/1
1 TABLET, FILM COATED ORAL
Refills: 0 | DISCHARGE

## 2024-06-14 RX ORDER — ENOXAPARIN SODIUM 100 MG/ML
30 INJECTION SUBCUTANEOUS EVERY 24 HOURS
Refills: 0 | Status: DISCONTINUED | OUTPATIENT
Start: 2024-06-14 | End: 2024-06-18

## 2024-06-14 RX ORDER — LEVOTHYROXINE SODIUM 125 MCG
1 TABLET ORAL
Refills: 0 | DISCHARGE

## 2024-06-14 RX ORDER — MORPHINE SULFATE 100 MG/1
4 TABLET, EXTENDED RELEASE ORAL EVERY 4 HOURS
Refills: 0 | Status: DISCONTINUED | OUTPATIENT
Start: 2024-06-14 | End: 2024-06-17

## 2024-06-14 RX ORDER — ONDANSETRON HYDROCHLORIDE 2 MG/ML
8 INJECTION INTRAMUSCULAR; INTRAVENOUS ONCE
Refills: 0 | Status: DISCONTINUED | OUTPATIENT
Start: 2024-06-14 | End: 2024-06-14

## 2024-06-14 RX ORDER — FENTANYL CITRATE 50 UG/ML
25 INJECTION, SOLUTION INTRAMUSCULAR; INTRAVENOUS
Refills: 0 | Status: DISCONTINUED | OUTPATIENT
Start: 2024-06-14 | End: 2024-06-14

## 2024-06-14 RX ORDER — ACETAMINOPHEN 325 MG
975 TABLET ORAL ONCE
Refills: 0 | Status: COMPLETED | OUTPATIENT
Start: 2024-06-14 | End: 2024-06-14

## 2024-06-14 RX ORDER — FAMOTIDINE 10 MG/ML
1 INJECTION INTRAVENOUS
Refills: 0 | DISCHARGE

## 2024-06-14 RX ORDER — METOPROLOL TARTRATE 50 MG
5 TABLET ORAL EVERY 6 HOURS
Refills: 0 | Status: DISCONTINUED | OUTPATIENT
Start: 2024-06-14 | End: 2024-06-15

## 2024-06-14 RX ORDER — ALPRAZOLAM 0.25 MG
1 TABLET ORAL
Refills: 0 | DISCHARGE

## 2024-06-14 RX ORDER — ACETAMINOPHEN 325 MG
1000 TABLET ORAL ONCE
Refills: 0 | Status: COMPLETED | OUTPATIENT
Start: 2024-06-14 | End: 2024-06-14

## 2024-06-14 RX ORDER — FAMOTIDINE 40 MG
20 TABLET ORAL
Refills: 0 | Status: DISCONTINUED | OUTPATIENT
Start: 2024-06-14 | End: 2024-06-15

## 2024-06-14 RX ORDER — SERTRALINE 25 MG/1
2 TABLET, FILM COATED ORAL
Refills: 0 | DISCHARGE

## 2024-06-14 RX ORDER — MORPHINE SULFATE 100 MG/1
2 TABLET, EXTENDED RELEASE ORAL EVERY 4 HOURS
Refills: 0 | Status: DISCONTINUED | OUTPATIENT
Start: 2024-06-14 | End: 2024-06-17

## 2024-06-14 RX ORDER — LEVOTHYROXINE SODIUM 25 MCG
50 TABLET ORAL AT BEDTIME
Refills: 0 | Status: DISCONTINUED | OUTPATIENT
Start: 2024-06-14 | End: 2024-06-15

## 2024-06-14 RX ORDER — CELECOXIB 100 MG/1
400 CAPSULE ORAL ONCE
Refills: 0 | Status: COMPLETED | OUTPATIENT
Start: 2024-06-14 | End: 2024-06-14

## 2024-06-14 RX ORDER — HYDROMORPHONE HCL 0.2 MG/ML
0.25 INJECTION, SOLUTION INTRAVENOUS
Refills: 0 | Status: DISCONTINUED | OUTPATIENT
Start: 2024-06-14 | End: 2024-06-14

## 2024-06-14 RX ORDER — MIRABEGRON 50 MG/1
1 TABLET, EXTENDED RELEASE ORAL
Refills: 0 | DISCHARGE

## 2024-06-14 RX ORDER — DEXTROSE MONOHYDRATE AND SODIUM CHLORIDE 5; .3 G/100ML; G/100ML
1000 INJECTION, SOLUTION INTRAVENOUS
Refills: 0 | Status: DISCONTINUED | OUTPATIENT
Start: 2024-06-14 | End: 2024-06-14

## 2024-06-14 RX ORDER — ACETAMINOPHEN 325 MG
1000 TABLET ORAL EVERY 6 HOURS
Refills: 0 | Status: COMPLETED | OUTPATIENT
Start: 2024-06-14 | End: 2024-06-15

## 2024-06-14 RX ORDER — ONDANSETRON HYDROCHLORIDE 2 MG/ML
4 INJECTION INTRAMUSCULAR; INTRAVENOUS EVERY 6 HOURS
Refills: 0 | Status: DISCONTINUED | OUTPATIENT
Start: 2024-06-14 | End: 2024-06-24

## 2024-06-14 RX ORDER — MELOXICAM 15 MG/1
1 TABLET ORAL
Refills: 0 | DISCHARGE

## 2024-06-14 RX ORDER — DEXTROSE MONOHYDRATE AND SODIUM CHLORIDE 5; .3 G/100ML; G/100ML
1000 INJECTION, SOLUTION INTRAVENOUS
Refills: 0 | Status: DISCONTINUED | OUTPATIENT
Start: 2024-06-14 | End: 2024-06-18

## 2024-06-14 RX ORDER — CILOSTAZOL 100 MG/1
1 TABLET ORAL
Refills: 0 | DISCHARGE

## 2024-06-14 RX ADMIN — HYDROMORPHONE HCL 0.25 MILLIGRAM(S): 0.2 INJECTION, SOLUTION INTRAVENOUS at 19:05

## 2024-06-14 RX ADMIN — HYDROMORPHONE HCL 0.25 MILLIGRAM(S): 0.2 INJECTION, SOLUTION INTRAVENOUS at 19:15

## 2024-06-14 RX ADMIN — HYDROMORPHONE HCL 0.25 MILLIGRAM(S): 0.2 INJECTION, SOLUTION INTRAVENOUS at 18:55

## 2024-06-14 RX ADMIN — Medication 100 GRAM(S): at 15:30

## 2024-06-14 RX ADMIN — Medication 975 MILLIGRAM(S): at 11:29

## 2024-06-14 RX ADMIN — Medication 1000 MILLIGRAM(S): at 19:30

## 2024-06-14 RX ADMIN — CELECOXIB 400 MILLIGRAM(S): 100 CAPSULE ORAL at 11:30

## 2024-06-14 RX ADMIN — HYDROMORPHONE HCL 0.25 MILLIGRAM(S): 0.2 INJECTION, SOLUTION INTRAVENOUS at 18:45

## 2024-06-14 RX ADMIN — Medication 400 MILLIGRAM(S): at 19:05

## 2024-06-14 RX ADMIN — HYDROMORPHONE HCL 0.25 MILLIGRAM(S): 0.2 INJECTION, SOLUTION INTRAVENOUS at 18:35

## 2024-06-15 LAB
ANION GAP SERPL CALC-SCNC: 13 MMOL/L — SIGNIFICANT CHANGE UP (ref 5–17)
ANION GAP SERPL CALC-SCNC: 17 MMOL/L — SIGNIFICANT CHANGE UP (ref 5–17)
BASOPHILS # BLD AUTO: 0.04 K/UL — SIGNIFICANT CHANGE UP (ref 0–0.2)
BASOPHILS NFR BLD AUTO: 0.3 % — SIGNIFICANT CHANGE UP (ref 0–2)
BUN SERPL-MCNC: 21.4 MG/DL — HIGH (ref 8–20)
BUN SERPL-MCNC: 26.5 MG/DL — HIGH (ref 8–20)
CALCIUM SERPL-MCNC: 8.3 MG/DL — LOW (ref 8.4–10.5)
CALCIUM SERPL-MCNC: 8.6 MG/DL — SIGNIFICANT CHANGE UP (ref 8.4–10.5)
CHLORIDE SERPL-SCNC: 101 MMOL/L — SIGNIFICANT CHANGE UP (ref 96–108)
CHLORIDE SERPL-SCNC: 99 MMOL/L — SIGNIFICANT CHANGE UP (ref 96–108)
CO2 SERPL-SCNC: 18 MMOL/L — LOW (ref 22–29)
CO2 SERPL-SCNC: 23 MMOL/L — SIGNIFICANT CHANGE UP (ref 22–29)
CREAT SERPL-MCNC: 0.86 MG/DL — SIGNIFICANT CHANGE UP (ref 0.5–1.3)
CREAT SERPL-MCNC: 0.97 MG/DL — SIGNIFICANT CHANGE UP (ref 0.5–1.3)
EGFR: 61 ML/MIN/1.73M2 — SIGNIFICANT CHANGE UP
EGFR: 70 ML/MIN/1.73M2 — SIGNIFICANT CHANGE UP
EOSINOPHIL # BLD AUTO: 0 K/UL — SIGNIFICANT CHANGE UP (ref 0–0.5)
EOSINOPHIL NFR BLD AUTO: 0 % — SIGNIFICANT CHANGE UP (ref 0–6)
GLUCOSE SERPL-MCNC: 133 MG/DL — HIGH (ref 70–99)
GLUCOSE SERPL-MCNC: 94 MG/DL — SIGNIFICANT CHANGE UP (ref 70–99)
HCT VFR BLD CALC: 36.6 % — SIGNIFICANT CHANGE UP (ref 34.5–45)
HGB BLD-MCNC: 11.9 G/DL — SIGNIFICANT CHANGE UP (ref 11.5–15.5)
IMM GRANULOCYTES NFR BLD AUTO: 0.3 % — SIGNIFICANT CHANGE UP (ref 0–0.9)
LYMPHOCYTES # BLD AUTO: 0.82 K/UL — LOW (ref 1–3.3)
LYMPHOCYTES # BLD AUTO: 5.7 % — LOW (ref 13–44)
MAGNESIUM SERPL-MCNC: 1.9 MG/DL — SIGNIFICANT CHANGE UP (ref 1.6–2.6)
MCHC RBC-ENTMCNC: 30.2 PG — SIGNIFICANT CHANGE UP (ref 27–34)
MCHC RBC-ENTMCNC: 32.5 GM/DL — SIGNIFICANT CHANGE UP (ref 32–36)
MCV RBC AUTO: 92.9 FL — SIGNIFICANT CHANGE UP (ref 80–100)
MONOCYTES # BLD AUTO: 0.91 K/UL — HIGH (ref 0–0.9)
MONOCYTES NFR BLD AUTO: 6.3 % — SIGNIFICANT CHANGE UP (ref 2–14)
NEUTROPHILS # BLD AUTO: 12.53 K/UL — HIGH (ref 1.8–7.4)
NEUTROPHILS NFR BLD AUTO: 87.4 % — HIGH (ref 43–77)
PHOSPHATE SERPL-MCNC: 4.5 MG/DL — SIGNIFICANT CHANGE UP (ref 2.4–4.7)
PLATELET # BLD AUTO: 184 K/UL — SIGNIFICANT CHANGE UP (ref 150–400)
POTASSIUM SERPL-MCNC: 4.5 MMOL/L — SIGNIFICANT CHANGE UP (ref 3.5–5.3)
POTASSIUM SERPL-MCNC: 4.6 MMOL/L — SIGNIFICANT CHANGE UP (ref 3.5–5.3)
POTASSIUM SERPL-SCNC: 4.5 MMOL/L — SIGNIFICANT CHANGE UP (ref 3.5–5.3)
POTASSIUM SERPL-SCNC: 4.6 MMOL/L — SIGNIFICANT CHANGE UP (ref 3.5–5.3)
RBC # BLD: 3.94 M/UL — SIGNIFICANT CHANGE UP (ref 3.8–5.2)
RBC # FLD: 13.3 % — SIGNIFICANT CHANGE UP (ref 10.3–14.5)
SODIUM SERPL-SCNC: 134 MMOL/L — LOW (ref 135–145)
SODIUM SERPL-SCNC: 137 MMOL/L — SIGNIFICANT CHANGE UP (ref 135–145)
WBC # BLD: 14.34 K/UL — HIGH (ref 3.8–10.5)
WBC # FLD AUTO: 14.34 K/UL — HIGH (ref 3.8–10.5)

## 2024-06-15 RX ORDER — METOPROLOL TARTRATE 50 MG
25 TABLET ORAL DAILY
Refills: 0 | Status: DISCONTINUED | OUTPATIENT
Start: 2024-06-15 | End: 2024-06-19

## 2024-06-15 RX ORDER — SERTRALINE HYDROCHLORIDE 100 MG/1
100 TABLET, FILM COATED ORAL DAILY
Refills: 0 | Status: DISCONTINUED | OUTPATIENT
Start: 2024-06-15 | End: 2024-06-24

## 2024-06-15 RX ORDER — CILOSTAZOL 50 MG/1
100 TABLET ORAL
Refills: 0 | Status: DISCONTINUED | OUTPATIENT
Start: 2024-06-15 | End: 2024-06-24

## 2024-06-15 RX ORDER — LEVOTHYROXINE SODIUM 25 MCG
75 TABLET ORAL DAILY
Refills: 0 | Status: DISCONTINUED | OUTPATIENT
Start: 2024-06-15 | End: 2024-06-19

## 2024-06-15 RX ORDER — SODIUM CHLORIDE 0.9 % (FLUSH) 0.9 %
1000 SYRINGE (ML) INJECTION ONCE
Refills: 0 | Status: COMPLETED | OUTPATIENT
Start: 2024-06-15 | End: 2024-06-15

## 2024-06-15 RX ORDER — ATORVASTATIN CALCIUM 20 MG/1
40 TABLET, FILM COATED ORAL AT BEDTIME
Refills: 0 | Status: DISCONTINUED | OUTPATIENT
Start: 2024-06-15 | End: 2024-06-24

## 2024-06-15 RX ORDER — FAMOTIDINE 40 MG
20 TABLET ORAL
Refills: 0 | Status: DISCONTINUED | OUTPATIENT
Start: 2024-06-15 | End: 2024-06-19

## 2024-06-15 RX ORDER — ALPRAZOLAM 2 MG/1
0.5 TABLET ORAL THREE TIMES A DAY
Refills: 0 | Status: DISCONTINUED | OUTPATIENT
Start: 2024-06-15 | End: 2024-06-22

## 2024-06-15 RX ADMIN — Medication 100 MILLIGRAM(S): at 05:35

## 2024-06-15 RX ADMIN — ALPRAZOLAM 0.5 MILLIGRAM(S): 2 TABLET ORAL at 17:20

## 2024-06-15 RX ADMIN — Medication 400 MILLIGRAM(S): at 05:35

## 2024-06-15 RX ADMIN — MORPHINE SULFATE 2 MILLIGRAM(S): 100 TABLET, EXTENDED RELEASE ORAL at 23:30

## 2024-06-15 RX ADMIN — Medication 20 MILLIGRAM(S): at 17:19

## 2024-06-15 RX ADMIN — Medication 400 MILLIGRAM(S): at 00:17

## 2024-06-15 RX ADMIN — MORPHINE SULFATE 2 MILLIGRAM(S): 100 TABLET, EXTENDED RELEASE ORAL at 19:11

## 2024-06-15 RX ADMIN — ATORVASTATIN CALCIUM 40 MILLIGRAM(S): 20 TABLET, FILM COATED ORAL at 21:37

## 2024-06-15 RX ADMIN — Medication 25 MILLIGRAM(S): at 12:10

## 2024-06-15 RX ADMIN — Medication 1000 MILLIGRAM(S): at 12:45

## 2024-06-15 RX ADMIN — ENOXAPARIN SODIUM 30 MILLIGRAM(S): 100 INJECTION SUBCUTANEOUS at 05:34

## 2024-06-15 RX ADMIN — Medication 5 MILLIGRAM(S): at 05:35

## 2024-06-15 RX ADMIN — Medication 5 MILLIGRAM(S): at 00:15

## 2024-06-15 RX ADMIN — Medication 1000 MILLILITER(S): at 08:53

## 2024-06-15 RX ADMIN — MORPHINE SULFATE 2 MILLIGRAM(S): 100 TABLET, EXTENDED RELEASE ORAL at 19:50

## 2024-06-15 RX ADMIN — SERTRALINE HYDROCHLORIDE 100 MILLIGRAM(S): 100 TABLET, FILM COATED ORAL at 12:10

## 2024-06-15 RX ADMIN — Medication 1000 MILLIGRAM(S): at 18:20

## 2024-06-15 RX ADMIN — Medication 400 MILLIGRAM(S): at 17:20

## 2024-06-15 RX ADMIN — CILOSTAZOL 100 MILLIGRAM(S): 50 TABLET ORAL at 17:20

## 2024-06-15 RX ADMIN — Medication 1000 MILLIGRAM(S): at 01:17

## 2024-06-15 RX ADMIN — Medication 1000 MILLIGRAM(S): at 06:40

## 2024-06-15 RX ADMIN — Medication 75 MICROGRAM(S): at 12:10

## 2024-06-15 RX ADMIN — Medication 400 MILLIGRAM(S): at 12:09

## 2024-06-16 LAB
ANION GAP SERPL CALC-SCNC: 11 MMOL/L — SIGNIFICANT CHANGE UP (ref 5–17)
BASOPHILS # BLD AUTO: 0.02 K/UL — SIGNIFICANT CHANGE UP (ref 0–0.2)
BASOPHILS NFR BLD AUTO: 0.2 % — SIGNIFICANT CHANGE UP (ref 0–2)
BUN SERPL-MCNC: 14 MG/DL — SIGNIFICANT CHANGE UP (ref 8–20)
CALCIUM SERPL-MCNC: 8.5 MG/DL — SIGNIFICANT CHANGE UP (ref 8.4–10.5)
CHLORIDE SERPL-SCNC: 101 MMOL/L — SIGNIFICANT CHANGE UP (ref 96–108)
CO2 SERPL-SCNC: 23 MMOL/L — SIGNIFICANT CHANGE UP (ref 22–29)
CREAT SERPL-MCNC: 0.81 MG/DL — SIGNIFICANT CHANGE UP (ref 0.5–1.3)
EGFR: 75 ML/MIN/1.73M2 — SIGNIFICANT CHANGE UP
EOSINOPHIL # BLD AUTO: 0.07 K/UL — SIGNIFICANT CHANGE UP (ref 0–0.5)
EOSINOPHIL NFR BLD AUTO: 0.8 % — SIGNIFICANT CHANGE UP (ref 0–6)
GLUCOSE SERPL-MCNC: 92 MG/DL — SIGNIFICANT CHANGE UP (ref 70–99)
HCT VFR BLD CALC: 34.7 % — SIGNIFICANT CHANGE UP (ref 34.5–45)
HGB BLD-MCNC: 11.3 G/DL — LOW (ref 11.5–15.5)
IMM GRANULOCYTES NFR BLD AUTO: 0.3 % — SIGNIFICANT CHANGE UP (ref 0–0.9)
LYMPHOCYTES # BLD AUTO: 0.99 K/UL — LOW (ref 1–3.3)
LYMPHOCYTES # BLD AUTO: 10.9 % — LOW (ref 13–44)
MAGNESIUM SERPL-MCNC: 1.5 MG/DL — LOW (ref 1.6–2.6)
MCHC RBC-ENTMCNC: 30.1 PG — SIGNIFICANT CHANGE UP (ref 27–34)
MCHC RBC-ENTMCNC: 32.6 GM/DL — SIGNIFICANT CHANGE UP (ref 32–36)
MCV RBC AUTO: 92.5 FL — SIGNIFICANT CHANGE UP (ref 80–100)
MONOCYTES # BLD AUTO: 0.68 K/UL — SIGNIFICANT CHANGE UP (ref 0–0.9)
MONOCYTES NFR BLD AUTO: 7.5 % — SIGNIFICANT CHANGE UP (ref 2–14)
NEUTROPHILS # BLD AUTO: 7.27 K/UL — SIGNIFICANT CHANGE UP (ref 1.8–7.4)
NEUTROPHILS NFR BLD AUTO: 80.3 % — HIGH (ref 43–77)
PHOSPHATE SERPL-MCNC: 2.1 MG/DL — LOW (ref 2.4–4.7)
PLATELET # BLD AUTO: 179 K/UL — SIGNIFICANT CHANGE UP (ref 150–400)
POTASSIUM SERPL-MCNC: 3.8 MMOL/L — SIGNIFICANT CHANGE UP (ref 3.5–5.3)
POTASSIUM SERPL-SCNC: 3.8 MMOL/L — SIGNIFICANT CHANGE UP (ref 3.5–5.3)
RBC # BLD: 3.75 M/UL — LOW (ref 3.8–5.2)
RBC # FLD: 13.7 % — SIGNIFICANT CHANGE UP (ref 10.3–14.5)
SODIUM SERPL-SCNC: 135 MMOL/L — SIGNIFICANT CHANGE UP (ref 135–145)
WBC # BLD: 9.06 K/UL — SIGNIFICANT CHANGE UP (ref 3.8–10.5)
WBC # FLD AUTO: 9.06 K/UL — SIGNIFICANT CHANGE UP (ref 3.8–10.5)

## 2024-06-16 RX ORDER — MAGNESIUM OXIDE 400 MG/1
400 TABLET ORAL
Refills: 0 | Status: COMPLETED | OUTPATIENT
Start: 2024-06-16 | End: 2024-06-17

## 2024-06-16 RX ORDER — MAGNESIUM SULFATE 100 %
2 POWDER (GRAM) MISCELLANEOUS ONCE
Refills: 0 | Status: COMPLETED | OUTPATIENT
Start: 2024-06-16 | End: 2024-06-16

## 2024-06-16 RX ORDER — POTASSIUM PHOSPHATE, MONOBASIC POTASSIUM PHOSPHATE, DIBASIC INJECTION, 236; 224 MG/ML; MG/ML
30 SOLUTION, CONCENTRATE INTRAVENOUS ONCE
Refills: 0 | Status: COMPLETED | OUTPATIENT
Start: 2024-06-16 | End: 2024-06-16

## 2024-06-16 RX ORDER — SOD PHOS DI, MONO/K PHOS MONO 250 MG
1 TABLET ORAL
Refills: 0 | Status: DISCONTINUED | OUTPATIENT
Start: 2024-06-16 | End: 2024-06-17

## 2024-06-16 RX ADMIN — MORPHINE SULFATE 2 MILLIGRAM(S): 100 TABLET, EXTENDED RELEASE ORAL at 13:16

## 2024-06-16 RX ADMIN — MORPHINE SULFATE 2 MILLIGRAM(S): 100 TABLET, EXTENDED RELEASE ORAL at 06:07

## 2024-06-16 RX ADMIN — Medication 75 MICROGRAM(S): at 05:06

## 2024-06-16 RX ADMIN — Medication 25 MILLIGRAM(S): at 05:06

## 2024-06-16 RX ADMIN — MORPHINE SULFATE 2 MILLIGRAM(S): 100 TABLET, EXTENDED RELEASE ORAL at 12:16

## 2024-06-16 RX ADMIN — Medication 25 GRAM(S): at 17:03

## 2024-06-16 RX ADMIN — Medication 1 PACKET(S): at 19:01

## 2024-06-16 RX ADMIN — DEXTROSE MONOHYDRATE AND SODIUM CHLORIDE 84 MILLILITER(S): 5; .3 INJECTION, SOLUTION INTRAVENOUS at 19:01

## 2024-06-16 RX ADMIN — MAGNESIUM OXIDE 400 MILLIGRAM(S): 400 TABLET ORAL at 18:58

## 2024-06-16 RX ADMIN — MORPHINE SULFATE 2 MILLIGRAM(S): 100 TABLET, EXTENDED RELEASE ORAL at 17:02

## 2024-06-16 RX ADMIN — Medication 20 MILLIGRAM(S): at 05:06

## 2024-06-16 RX ADMIN — MORPHINE SULFATE 2 MILLIGRAM(S): 100 TABLET, EXTENDED RELEASE ORAL at 05:07

## 2024-06-16 RX ADMIN — MORPHINE SULFATE 2 MILLIGRAM(S): 100 TABLET, EXTENDED RELEASE ORAL at 00:30

## 2024-06-16 RX ADMIN — MORPHINE SULFATE 2 MILLIGRAM(S): 100 TABLET, EXTENDED RELEASE ORAL at 17:53

## 2024-06-16 RX ADMIN — POTASSIUM PHOSPHATE, MONOBASIC POTASSIUM PHOSPHATE, DIBASIC INJECTION, 83.33 MILLIMOLE(S): 236; 224 SOLUTION, CONCENTRATE INTRAVENOUS at 19:04

## 2024-06-16 RX ADMIN — SERTRALINE HYDROCHLORIDE 100 MILLIGRAM(S): 100 TABLET, FILM COATED ORAL at 12:16

## 2024-06-16 RX ADMIN — Medication 20 MILLIGRAM(S): at 18:59

## 2024-06-16 RX ADMIN — MORPHINE SULFATE 2 MILLIGRAM(S): 100 TABLET, EXTENDED RELEASE ORAL at 22:15

## 2024-06-16 RX ADMIN — CILOSTAZOL 100 MILLIGRAM(S): 50 TABLET ORAL at 18:58

## 2024-06-16 RX ADMIN — CILOSTAZOL 100 MILLIGRAM(S): 50 TABLET ORAL at 05:06

## 2024-06-16 RX ADMIN — ATORVASTATIN CALCIUM 40 MILLIGRAM(S): 20 TABLET, FILM COATED ORAL at 21:22

## 2024-06-16 RX ADMIN — MORPHINE SULFATE 2 MILLIGRAM(S): 100 TABLET, EXTENDED RELEASE ORAL at 21:22

## 2024-06-16 RX ADMIN — ENOXAPARIN SODIUM 30 MILLIGRAM(S): 100 INJECTION SUBCUTANEOUS at 05:06

## 2024-06-17 LAB
ANION GAP SERPL CALC-SCNC: 12 MMOL/L — SIGNIFICANT CHANGE UP (ref 5–17)
BUN SERPL-MCNC: 9.8 MG/DL — SIGNIFICANT CHANGE UP (ref 8–20)
CALCIUM SERPL-MCNC: 8.6 MG/DL — SIGNIFICANT CHANGE UP (ref 8.4–10.5)
CHLORIDE SERPL-SCNC: 98 MMOL/L — SIGNIFICANT CHANGE UP (ref 96–108)
CO2 SERPL-SCNC: 23 MMOL/L — SIGNIFICANT CHANGE UP (ref 22–29)
CREAT SERPL-MCNC: 0.67 MG/DL — SIGNIFICANT CHANGE UP (ref 0.5–1.3)
EGFR: 91 ML/MIN/1.73M2 — SIGNIFICANT CHANGE UP
GLUCOSE SERPL-MCNC: 103 MG/DL — HIGH (ref 70–99)
HCT VFR BLD CALC: 33.6 % — LOW (ref 34.5–45)
HGB BLD-MCNC: 11.1 G/DL — LOW (ref 11.5–15.5)
MAGNESIUM SERPL-MCNC: 1.8 MG/DL — SIGNIFICANT CHANGE UP (ref 1.6–2.6)
MCHC RBC-ENTMCNC: 29.9 PG — SIGNIFICANT CHANGE UP (ref 27–34)
MCHC RBC-ENTMCNC: 33 GM/DL — SIGNIFICANT CHANGE UP (ref 32–36)
MCV RBC AUTO: 90.6 FL — SIGNIFICANT CHANGE UP (ref 80–100)
PHOSPHATE SERPL-MCNC: 3.5 MG/DL — SIGNIFICANT CHANGE UP (ref 2.4–4.7)
PLATELET # BLD AUTO: 206 K/UL — SIGNIFICANT CHANGE UP (ref 150–400)
POTASSIUM SERPL-MCNC: 3.7 MMOL/L — SIGNIFICANT CHANGE UP (ref 3.5–5.3)
POTASSIUM SERPL-SCNC: 3.7 MMOL/L — SIGNIFICANT CHANGE UP (ref 3.5–5.3)
RBC # BLD: 3.71 M/UL — LOW (ref 3.8–5.2)
RBC # FLD: 13.5 % — SIGNIFICANT CHANGE UP (ref 10.3–14.5)
SODIUM SERPL-SCNC: 133 MMOL/L — LOW (ref 135–145)
WBC # BLD: 11.89 K/UL — HIGH (ref 3.8–10.5)
WBC # FLD AUTO: 11.89 K/UL — HIGH (ref 3.8–10.5)

## 2024-06-17 RX ORDER — MAGNESIUM SULFATE 100 %
1 POWDER (GRAM) MISCELLANEOUS ONCE
Refills: 0 | Status: COMPLETED | OUTPATIENT
Start: 2024-06-17 | End: 2024-06-17

## 2024-06-17 RX ORDER — KETOROLAC TROMETHAMINE 30 MG/ML
15 INJECTION, SOLUTION INTRAMUSCULAR EVERY 6 HOURS
Refills: 0 | Status: DISCONTINUED | OUTPATIENT
Start: 2024-06-17 | End: 2024-06-22

## 2024-06-17 RX ORDER — MIRABEGRON 50 MG/1
50 TABLET, EXTENDED RELEASE ORAL DAILY
Refills: 0 | Status: DISCONTINUED | OUTPATIENT
Start: 2024-06-17 | End: 2024-06-24

## 2024-06-17 RX ORDER — POTASSIUM CHLORIDE 600 MG/1
20 TABLET, FILM COATED, EXTENDED RELEASE ORAL ONCE
Refills: 0 | Status: COMPLETED | OUTPATIENT
Start: 2024-06-17 | End: 2024-06-17

## 2024-06-17 RX ORDER — ACETAMINOPHEN 325 MG
1000 TABLET ORAL EVERY 6 HOURS
Refills: 0 | Status: COMPLETED | OUTPATIENT
Start: 2024-06-17 | End: 2024-06-18

## 2024-06-17 RX ORDER — MORPHINE SULFATE 100 MG/1
4 TABLET, EXTENDED RELEASE ORAL EVERY 4 HOURS
Refills: 0 | Status: DISCONTINUED | OUTPATIENT
Start: 2024-06-17 | End: 2024-06-24

## 2024-06-17 RX ADMIN — Medication 20 MILLIGRAM(S): at 05:53

## 2024-06-17 RX ADMIN — Medication 20 MILLIGRAM(S): at 17:30

## 2024-06-17 RX ADMIN — Medication 100 GRAM(S): at 10:40

## 2024-06-17 RX ADMIN — Medication 400 MILLIGRAM(S): at 17:28

## 2024-06-17 RX ADMIN — KETOROLAC TROMETHAMINE 15 MILLIGRAM(S): 30 INJECTION, SOLUTION INTRAMUSCULAR at 18:43

## 2024-06-17 RX ADMIN — DEXTROSE MONOHYDRATE AND SODIUM CHLORIDE 84 MILLILITER(S): 5; .3 INJECTION, SOLUTION INTRAVENOUS at 22:21

## 2024-06-17 RX ADMIN — MIRABEGRON 50 MILLIGRAM(S): 50 TABLET, EXTENDED RELEASE ORAL at 12:37

## 2024-06-17 RX ADMIN — SERTRALINE HYDROCHLORIDE 100 MILLIGRAM(S): 100 TABLET, FILM COATED ORAL at 12:15

## 2024-06-17 RX ADMIN — KETOROLAC TROMETHAMINE 15 MILLIGRAM(S): 30 INJECTION, SOLUTION INTRAMUSCULAR at 13:14

## 2024-06-17 RX ADMIN — CILOSTAZOL 100 MILLIGRAM(S): 50 TABLET ORAL at 17:29

## 2024-06-17 RX ADMIN — KETOROLAC TROMETHAMINE 15 MILLIGRAM(S): 30 INJECTION, SOLUTION INTRAMUSCULAR at 12:14

## 2024-06-17 RX ADMIN — POTASSIUM CHLORIDE 20 MILLIEQUIVALENT(S): 600 TABLET, FILM COATED, EXTENDED RELEASE ORAL at 09:19

## 2024-06-17 RX ADMIN — Medication 400 MILLIGRAM(S): at 12:13

## 2024-06-17 RX ADMIN — Medication 1000 MILLIGRAM(S): at 18:28

## 2024-06-17 RX ADMIN — Medication 75 MICROGRAM(S): at 04:44

## 2024-06-17 RX ADMIN — MAGNESIUM OXIDE 400 MILLIGRAM(S): 400 TABLET ORAL at 12:14

## 2024-06-17 RX ADMIN — ATORVASTATIN CALCIUM 40 MILLIGRAM(S): 20 TABLET, FILM COATED ORAL at 22:20

## 2024-06-17 RX ADMIN — MAGNESIUM OXIDE 400 MILLIGRAM(S): 400 TABLET ORAL at 09:19

## 2024-06-17 RX ADMIN — Medication 1 PACKET(S): at 06:09

## 2024-06-17 RX ADMIN — MORPHINE SULFATE 2 MILLIGRAM(S): 100 TABLET, EXTENDED RELEASE ORAL at 06:44

## 2024-06-17 RX ADMIN — Medication 25 MILLIGRAM(S): at 05:44

## 2024-06-17 RX ADMIN — KETOROLAC TROMETHAMINE 15 MILLIGRAM(S): 30 INJECTION, SOLUTION INTRAMUSCULAR at 19:43

## 2024-06-17 RX ADMIN — ONDANSETRON HYDROCHLORIDE 4 MILLIGRAM(S): 2 INJECTION INTRAMUSCULAR; INTRAVENOUS at 18:44

## 2024-06-17 RX ADMIN — CILOSTAZOL 100 MILLIGRAM(S): 50 TABLET ORAL at 05:45

## 2024-06-17 RX ADMIN — Medication 1000 MILLIGRAM(S): at 13:14

## 2024-06-17 RX ADMIN — MORPHINE SULFATE 2 MILLIGRAM(S): 100 TABLET, EXTENDED RELEASE ORAL at 06:08

## 2024-06-17 RX ADMIN — ONDANSETRON HYDROCHLORIDE 4 MILLIGRAM(S): 2 INJECTION INTRAMUSCULAR; INTRAVENOUS at 12:14

## 2024-06-17 RX ADMIN — ENOXAPARIN SODIUM 30 MILLIGRAM(S): 100 INJECTION SUBCUTANEOUS at 05:46

## 2024-06-17 RX ADMIN — Medication 400 MILLIGRAM(S): at 23:24

## 2024-06-18 LAB
ANION GAP SERPL CALC-SCNC: 16 MMOL/L — SIGNIFICANT CHANGE UP (ref 5–17)
BASOPHILS # BLD AUTO: 0.03 K/UL — SIGNIFICANT CHANGE UP (ref 0–0.2)
BASOPHILS NFR BLD AUTO: 0.2 % — SIGNIFICANT CHANGE UP (ref 0–2)
BUN SERPL-MCNC: 15.4 MG/DL — SIGNIFICANT CHANGE UP (ref 8–20)
CALCIUM SERPL-MCNC: 9.1 MG/DL — SIGNIFICANT CHANGE UP (ref 8.4–10.5)
CHLORIDE SERPL-SCNC: 92 MMOL/L — LOW (ref 96–108)
CO2 SERPL-SCNC: 24 MMOL/L — SIGNIFICANT CHANGE UP (ref 22–29)
CREAT SERPL-MCNC: 1.14 MG/DL — SIGNIFICANT CHANGE UP (ref 0.5–1.3)
EGFR: 50 ML/MIN/1.73M2 — LOW
EOSINOPHIL # BLD AUTO: 0.02 K/UL — SIGNIFICANT CHANGE UP (ref 0–0.5)
EOSINOPHIL NFR BLD AUTO: 0.1 % — SIGNIFICANT CHANGE UP (ref 0–6)
GLUCOSE SERPL-MCNC: 113 MG/DL — HIGH (ref 70–99)
HCT VFR BLD CALC: 34.9 % — SIGNIFICANT CHANGE UP (ref 34.5–45)
HGB BLD-MCNC: 11.6 G/DL — SIGNIFICANT CHANGE UP (ref 11.5–15.5)
IMM GRANULOCYTES NFR BLD AUTO: 0.4 % — SIGNIFICANT CHANGE UP (ref 0–0.9)
LYMPHOCYTES # BLD AUTO: 0.73 K/UL — LOW (ref 1–3.3)
LYMPHOCYTES # BLD AUTO: 5.3 % — LOW (ref 13–44)
MAGNESIUM SERPL-MCNC: 2.2 MG/DL — SIGNIFICANT CHANGE UP (ref 1.6–2.6)
MCHC RBC-ENTMCNC: 30 PG — SIGNIFICANT CHANGE UP (ref 27–34)
MCHC RBC-ENTMCNC: 33.2 GM/DL — SIGNIFICANT CHANGE UP (ref 32–36)
MCV RBC AUTO: 90.2 FL — SIGNIFICANT CHANGE UP (ref 80–100)
MONOCYTES # BLD AUTO: 0.87 K/UL — SIGNIFICANT CHANGE UP (ref 0–0.9)
MONOCYTES NFR BLD AUTO: 6.3 % — SIGNIFICANT CHANGE UP (ref 2–14)
NEUTROPHILS # BLD AUTO: 12.18 K/UL — HIGH (ref 1.8–7.4)
NEUTROPHILS NFR BLD AUTO: 87.7 % — HIGH (ref 43–77)
PHOSPHATE SERPL-MCNC: 3.9 MG/DL — SIGNIFICANT CHANGE UP (ref 2.4–4.7)
PLATELET # BLD AUTO: 248 K/UL — SIGNIFICANT CHANGE UP (ref 150–400)
POTASSIUM SERPL-MCNC: 3.8 MMOL/L — SIGNIFICANT CHANGE UP (ref 3.5–5.3)
POTASSIUM SERPL-SCNC: 3.8 MMOL/L — SIGNIFICANT CHANGE UP (ref 3.5–5.3)
RBC # BLD: 3.87 M/UL — SIGNIFICANT CHANGE UP (ref 3.8–5.2)
RBC # FLD: 13.4 % — SIGNIFICANT CHANGE UP (ref 10.3–14.5)
SODIUM SERPL-SCNC: 132 MMOL/L — LOW (ref 135–145)
SURGICAL PATHOLOGY STUDY: SIGNIFICANT CHANGE UP
WBC # BLD: 13.89 K/UL — HIGH (ref 3.8–10.5)
WBC # FLD AUTO: 13.89 K/UL — HIGH (ref 3.8–10.5)

## 2024-06-18 PROCEDURE — 74018 RADEX ABDOMEN 1 VIEW: CPT | Mod: 26

## 2024-06-18 PROCEDURE — 71045 X-RAY EXAM CHEST 1 VIEW: CPT | Mod: 26

## 2024-06-18 RX ORDER — SODIUM CHLORIDE 0.9 % (FLUSH) 0.9 %
1000 SYRINGE (ML) INJECTION
Refills: 0 | Status: DISCONTINUED | OUTPATIENT
Start: 2024-06-18 | End: 2024-06-19

## 2024-06-18 RX ORDER — POTASSIUM CHLORIDE 600 MG/1
20 TABLET, FILM COATED, EXTENDED RELEASE ORAL ONCE
Refills: 0 | Status: COMPLETED | OUTPATIENT
Start: 2024-06-18 | End: 2024-06-18

## 2024-06-18 RX ORDER — ENOXAPARIN SODIUM 100 MG/ML
40 INJECTION SUBCUTANEOUS EVERY 24 HOURS
Refills: 0 | Status: DISCONTINUED | OUTPATIENT
Start: 2024-06-18 | End: 2024-06-24

## 2024-06-18 RX ORDER — SODIUM CHLORIDE 0.9 % (FLUSH) 0.9 %
500 SYRINGE (ML) INJECTION ONCE
Refills: 0 | Status: COMPLETED | OUTPATIENT
Start: 2024-06-18 | End: 2024-06-18

## 2024-06-18 RX ORDER — LORAZEPAM 0.5 MG
0.5 TABLET ORAL ONCE
Refills: 0 | Status: DISCONTINUED | OUTPATIENT
Start: 2024-06-18 | End: 2024-06-18

## 2024-06-18 RX ORDER — METOCLOPRAMIDE 5 MG/5ML
10 SOLUTION ORAL ONCE
Refills: 0 | Status: COMPLETED | OUTPATIENT
Start: 2024-06-18 | End: 2024-06-18

## 2024-06-18 RX ADMIN — CILOSTAZOL 100 MILLIGRAM(S): 50 TABLET ORAL at 05:24

## 2024-06-18 RX ADMIN — ONDANSETRON HYDROCHLORIDE 4 MILLIGRAM(S): 2 INJECTION INTRAMUSCULAR; INTRAVENOUS at 04:50

## 2024-06-18 RX ADMIN — Medication 1000 MILLIGRAM(S): at 00:24

## 2024-06-18 RX ADMIN — POTASSIUM CHLORIDE 50 MILLIEQUIVALENT(S): 600 TABLET, FILM COATED, EXTENDED RELEASE ORAL at 19:09

## 2024-06-18 RX ADMIN — ONDANSETRON HYDROCHLORIDE 4 MILLIGRAM(S): 2 INJECTION INTRAMUSCULAR; INTRAVENOUS at 00:59

## 2024-06-18 RX ADMIN — Medication 75 MICROGRAM(S): at 04:50

## 2024-06-18 RX ADMIN — Medication 20 MILLIGRAM(S): at 05:24

## 2024-06-18 RX ADMIN — Medication 84 MILLILITER(S): at 21:55

## 2024-06-18 RX ADMIN — Medication 84 MILLILITER(S): at 10:38

## 2024-06-18 RX ADMIN — Medication 1000 MILLIGRAM(S): at 06:23

## 2024-06-18 RX ADMIN — Medication 25 MILLIGRAM(S): at 05:24

## 2024-06-18 RX ADMIN — Medication 500 MILLILITER(S): at 13:32

## 2024-06-18 RX ADMIN — Medication 0.5 MILLIGRAM(S): at 11:18

## 2024-06-18 RX ADMIN — ENOXAPARIN SODIUM 40 MILLIGRAM(S): 100 INJECTION SUBCUTANEOUS at 05:24

## 2024-06-18 RX ADMIN — METOCLOPRAMIDE 10 MILLIGRAM(S): 5 SOLUTION ORAL at 10:21

## 2024-06-18 RX ADMIN — ATORVASTATIN CALCIUM 40 MILLIGRAM(S): 20 TABLET, FILM COATED ORAL at 21:54

## 2024-06-18 RX ADMIN — Medication 400 MILLIGRAM(S): at 05:23

## 2024-06-19 LAB
ANION GAP SERPL CALC-SCNC: 19 MMOL/L — HIGH (ref 5–17)
BUN SERPL-MCNC: 21.6 MG/DL — HIGH (ref 8–20)
CALCIUM SERPL-MCNC: 8.9 MG/DL — SIGNIFICANT CHANGE UP (ref 8.4–10.5)
CHLORIDE SERPL-SCNC: 98 MMOL/L — SIGNIFICANT CHANGE UP (ref 96–108)
CO2 SERPL-SCNC: 22 MMOL/L — SIGNIFICANT CHANGE UP (ref 22–29)
CREAT SERPL-MCNC: 1.07 MG/DL — SIGNIFICANT CHANGE UP (ref 0.5–1.3)
EGFR: 54 ML/MIN/1.73M2 — LOW
GLUCOSE SERPL-MCNC: 85 MG/DL — SIGNIFICANT CHANGE UP (ref 70–99)
HCT VFR BLD CALC: 34.6 % — SIGNIFICANT CHANGE UP (ref 34.5–45)
HGB BLD-MCNC: 11.4 G/DL — LOW (ref 11.5–15.5)
MAGNESIUM SERPL-MCNC: 2.2 MG/DL — SIGNIFICANT CHANGE UP (ref 1.6–2.6)
MCHC RBC-ENTMCNC: 30.1 PG — SIGNIFICANT CHANGE UP (ref 27–34)
MCHC RBC-ENTMCNC: 32.9 GM/DL — SIGNIFICANT CHANGE UP (ref 32–36)
MCV RBC AUTO: 91.3 FL — SIGNIFICANT CHANGE UP (ref 80–100)
PHOSPHATE SERPL-MCNC: 3 MG/DL — SIGNIFICANT CHANGE UP (ref 2.4–4.7)
PLATELET # BLD AUTO: 315 K/UL — SIGNIFICANT CHANGE UP (ref 150–400)
POTASSIUM SERPL-MCNC: 4.3 MMOL/L — SIGNIFICANT CHANGE UP (ref 3.5–5.3)
POTASSIUM SERPL-SCNC: 4.3 MMOL/L — SIGNIFICANT CHANGE UP (ref 3.5–5.3)
RBC # BLD: 3.79 M/UL — LOW (ref 3.8–5.2)
RBC # FLD: 13.5 % — SIGNIFICANT CHANGE UP (ref 10.3–14.5)
SODIUM SERPL-SCNC: 138 MMOL/L — SIGNIFICANT CHANGE UP (ref 135–145)
WBC # BLD: 10.71 K/UL — HIGH (ref 3.8–10.5)
WBC # FLD AUTO: 10.71 K/UL — HIGH (ref 3.8–10.5)

## 2024-06-19 RX ORDER — FAMOTIDINE 40 MG
20 TABLET ORAL DAILY
Refills: 0 | Status: DISCONTINUED | OUTPATIENT
Start: 2024-06-19 | End: 2024-06-23

## 2024-06-19 RX ORDER — DEXTROSE MONOHYDRATE AND SODIUM CHLORIDE 5; .3 G/100ML; G/100ML
1000 INJECTION, SOLUTION INTRAVENOUS
Refills: 0 | Status: DISCONTINUED | OUTPATIENT
Start: 2024-06-19 | End: 2024-06-20

## 2024-06-19 RX ORDER — LEVOTHYROXINE SODIUM 25 MCG
25 TABLET ORAL AT BEDTIME
Refills: 0 | Status: DISCONTINUED | OUTPATIENT
Start: 2024-06-19 | End: 2024-06-19

## 2024-06-19 RX ORDER — METOPROLOL TARTRATE 50 MG
5 TABLET ORAL EVERY 6 HOURS
Refills: 0 | Status: DISCONTINUED | OUTPATIENT
Start: 2024-06-19 | End: 2024-06-21

## 2024-06-19 RX ORDER — LEVOTHYROXINE SODIUM 25 MCG
50 TABLET ORAL AT BEDTIME
Refills: 0 | Status: DISCONTINUED | OUTPATIENT
Start: 2024-06-19 | End: 2024-06-21

## 2024-06-19 RX ORDER — LORAZEPAM 0.5 MG
1 TABLET ORAL ONCE
Refills: 0 | Status: DISCONTINUED | OUTPATIENT
Start: 2024-06-19 | End: 2024-06-19

## 2024-06-19 RX ORDER — SODIUM CHLORIDE 0.9 % (FLUSH) 0.9 %
500 SYRINGE (ML) INJECTION ONCE
Refills: 0 | Status: COMPLETED | OUTPATIENT
Start: 2024-06-19 | End: 2024-06-19

## 2024-06-19 RX ORDER — SODIUM CHLORIDE 0.9 % (FLUSH) 0.9 %
1000 SYRINGE (ML) INJECTION ONCE
Refills: 0 | Status: COMPLETED | OUTPATIENT
Start: 2024-06-19 | End: 2024-06-19

## 2024-06-19 RX ADMIN — KETOROLAC TROMETHAMINE 15 MILLIGRAM(S): 30 INJECTION, SOLUTION INTRAMUSCULAR at 18:02

## 2024-06-19 RX ADMIN — KETOROLAC TROMETHAMINE 15 MILLIGRAM(S): 30 INJECTION, SOLUTION INTRAMUSCULAR at 03:30

## 2024-06-19 RX ADMIN — KETOROLAC TROMETHAMINE 15 MILLIGRAM(S): 30 INJECTION, SOLUTION INTRAMUSCULAR at 02:30

## 2024-06-19 RX ADMIN — ENOXAPARIN SODIUM 40 MILLIGRAM(S): 100 INJECTION SUBCUTANEOUS at 06:25

## 2024-06-19 RX ADMIN — Medication 1 MILLIGRAM(S): at 18:02

## 2024-06-19 RX ADMIN — Medication 100 MILLILITER(S): at 06:31

## 2024-06-19 RX ADMIN — Medication 20 MILLIGRAM(S): at 11:57

## 2024-06-19 RX ADMIN — Medication 1000 MILLILITER(S): at 07:07

## 2024-06-19 RX ADMIN — DEXTROSE MONOHYDRATE AND SODIUM CHLORIDE 100 MILLILITER(S): 5; .3 INJECTION, SOLUTION INTRAVENOUS at 21:18

## 2024-06-19 RX ADMIN — Medication 5 MILLIGRAM(S): at 06:25

## 2024-06-19 RX ADMIN — ONDANSETRON HYDROCHLORIDE 4 MILLIGRAM(S): 2 INJECTION INTRAMUSCULAR; INTRAVENOUS at 02:29

## 2024-06-19 RX ADMIN — Medication 5 MILLIGRAM(S): at 23:25

## 2024-06-19 RX ADMIN — Medication 5 MILLIGRAM(S): at 18:01

## 2024-06-19 RX ADMIN — KETOROLAC TROMETHAMINE 15 MILLIGRAM(S): 30 INJECTION, SOLUTION INTRAMUSCULAR at 19:02

## 2024-06-19 RX ADMIN — Medication 500 MILLILITER(S): at 02:17

## 2024-06-19 RX ADMIN — Medication 50 MICROGRAM(S): at 21:18

## 2024-06-19 RX ADMIN — Medication 5 MILLIGRAM(S): at 11:57

## 2024-06-20 LAB
ANION GAP SERPL CALC-SCNC: 16 MMOL/L — SIGNIFICANT CHANGE UP (ref 5–17)
BASOPHILS # BLD AUTO: 0.03 K/UL — SIGNIFICANT CHANGE UP (ref 0–0.2)
BASOPHILS NFR BLD AUTO: 0.3 % — SIGNIFICANT CHANGE UP (ref 0–2)
BUN SERPL-MCNC: 20.3 MG/DL — HIGH (ref 8–20)
CALCIUM SERPL-MCNC: 8.2 MG/DL — LOW (ref 8.4–10.5)
CHLORIDE SERPL-SCNC: 101 MMOL/L — SIGNIFICANT CHANGE UP (ref 96–108)
CO2 SERPL-SCNC: 22 MMOL/L — SIGNIFICANT CHANGE UP (ref 22–29)
CREAT SERPL-MCNC: 0.77 MG/DL — SIGNIFICANT CHANGE UP (ref 0.5–1.3)
EGFR: 80 ML/MIN/1.73M2 — SIGNIFICANT CHANGE UP
EOSINOPHIL # BLD AUTO: 0.11 K/UL — SIGNIFICANT CHANGE UP (ref 0–0.5)
EOSINOPHIL NFR BLD AUTO: 1.3 % — SIGNIFICANT CHANGE UP (ref 0–6)
GLUCOSE SERPL-MCNC: 81 MG/DL — SIGNIFICANT CHANGE UP (ref 70–99)
HCT VFR BLD CALC: 28.2 % — LOW (ref 34.5–45)
HGB BLD-MCNC: 9.3 G/DL — LOW (ref 11.5–15.5)
IMM GRANULOCYTES NFR BLD AUTO: 0.3 % — SIGNIFICANT CHANGE UP (ref 0–0.9)
LYMPHOCYTES # BLD AUTO: 0.77 K/UL — LOW (ref 1–3.3)
LYMPHOCYTES # BLD AUTO: 8.9 % — LOW (ref 13–44)
MAGNESIUM SERPL-MCNC: 1.9 MG/DL — SIGNIFICANT CHANGE UP (ref 1.6–2.6)
MCHC RBC-ENTMCNC: 30.3 PG — SIGNIFICANT CHANGE UP (ref 27–34)
MCHC RBC-ENTMCNC: 33 GM/DL — SIGNIFICANT CHANGE UP (ref 32–36)
MCV RBC AUTO: 91.9 FL — SIGNIFICANT CHANGE UP (ref 80–100)
MONOCYTES # BLD AUTO: 1.01 K/UL — HIGH (ref 0–0.9)
MONOCYTES NFR BLD AUTO: 11.7 % — SIGNIFICANT CHANGE UP (ref 2–14)
NEUTROPHILS # BLD AUTO: 6.66 K/UL — SIGNIFICANT CHANGE UP (ref 1.8–7.4)
NEUTROPHILS NFR BLD AUTO: 77.5 % — HIGH (ref 43–77)
PHOSPHATE SERPL-MCNC: 2 MG/DL — LOW (ref 2.4–4.7)
PLATELET # BLD AUTO: 257 K/UL — SIGNIFICANT CHANGE UP (ref 150–400)
POTASSIUM SERPL-MCNC: 3.7 MMOL/L — SIGNIFICANT CHANGE UP (ref 3.5–5.3)
POTASSIUM SERPL-SCNC: 3.7 MMOL/L — SIGNIFICANT CHANGE UP (ref 3.5–5.3)
RBC # BLD: 3.07 M/UL — LOW (ref 3.8–5.2)
RBC # FLD: 13.6 % — SIGNIFICANT CHANGE UP (ref 10.3–14.5)
SODIUM SERPL-SCNC: 139 MMOL/L — SIGNIFICANT CHANGE UP (ref 135–145)
WBC # BLD: 8.61 K/UL — SIGNIFICANT CHANGE UP (ref 3.8–10.5)
WBC # FLD AUTO: 8.61 K/UL — SIGNIFICANT CHANGE UP (ref 3.8–10.5)

## 2024-06-20 RX ORDER — POTASSIUM PHOSPHATE, MONOBASIC POTASSIUM PHOSPHATE, DIBASIC INJECTION, 236; 224 MG/ML; MG/ML
15 SOLUTION, CONCENTRATE INTRAVENOUS ONCE
Refills: 0 | Status: COMPLETED | OUTPATIENT
Start: 2024-06-20 | End: 2024-06-20

## 2024-06-20 RX ADMIN — Medication 5 MILLIGRAM(S): at 23:16

## 2024-06-20 RX ADMIN — DEXTROSE MONOHYDRATE AND SODIUM CHLORIDE 100 MILLILITER(S): 5; .3 INJECTION, SOLUTION INTRAVENOUS at 05:34

## 2024-06-20 RX ADMIN — Medication 5 MILLIGRAM(S): at 05:34

## 2024-06-20 RX ADMIN — ENOXAPARIN SODIUM 40 MILLIGRAM(S): 100 INJECTION SUBCUTANEOUS at 05:34

## 2024-06-20 RX ADMIN — MIRABEGRON 50 MILLIGRAM(S): 50 TABLET, EXTENDED RELEASE ORAL at 13:38

## 2024-06-20 RX ADMIN — KETOROLAC TROMETHAMINE 15 MILLIGRAM(S): 30 INJECTION, SOLUTION INTRAMUSCULAR at 23:13

## 2024-06-20 RX ADMIN — Medication 50 MICROGRAM(S): at 21:35

## 2024-06-20 RX ADMIN — DEXTROSE MONOHYDRATE AND SODIUM CHLORIDE 100 MILLILITER(S): 5; .3 INJECTION, SOLUTION INTRAVENOUS at 10:27

## 2024-06-20 RX ADMIN — Medication 20 MILLIGRAM(S): at 13:36

## 2024-06-20 RX ADMIN — SERTRALINE HYDROCHLORIDE 100 MILLIGRAM(S): 100 TABLET, FILM COATED ORAL at 13:37

## 2024-06-20 RX ADMIN — KETOROLAC TROMETHAMINE 15 MILLIGRAM(S): 30 INJECTION, SOLUTION INTRAMUSCULAR at 14:10

## 2024-06-20 RX ADMIN — CILOSTAZOL 100 MILLIGRAM(S): 50 TABLET ORAL at 17:50

## 2024-06-20 RX ADMIN — POTASSIUM PHOSPHATE, MONOBASIC POTASSIUM PHOSPHATE, DIBASIC INJECTION, 62.5 MILLIMOLE(S): 236; 224 SOLUTION, CONCENTRATE INTRAVENOUS at 10:27

## 2024-06-20 RX ADMIN — KETOROLAC TROMETHAMINE 15 MILLIGRAM(S): 30 INJECTION, SOLUTION INTRAMUSCULAR at 13:37

## 2024-06-20 RX ADMIN — ATORVASTATIN CALCIUM 40 MILLIGRAM(S): 20 TABLET, FILM COATED ORAL at 21:34

## 2024-06-20 RX ADMIN — Medication 5 MILLIGRAM(S): at 17:50

## 2024-06-20 RX ADMIN — Medication 5 MILLIGRAM(S): at 13:36

## 2024-06-21 LAB
ANION GAP SERPL CALC-SCNC: 14 MMOL/L — SIGNIFICANT CHANGE UP (ref 5–17)
BASOPHILS # BLD AUTO: 0.03 K/UL — SIGNIFICANT CHANGE UP (ref 0–0.2)
BASOPHILS NFR BLD AUTO: 0.4 % — SIGNIFICANT CHANGE UP (ref 0–2)
BUN SERPL-MCNC: 14.5 MG/DL — SIGNIFICANT CHANGE UP (ref 8–20)
CALCIUM SERPL-MCNC: 8.5 MG/DL — SIGNIFICANT CHANGE UP (ref 8.4–10.5)
CHLORIDE SERPL-SCNC: 101 MMOL/L — SIGNIFICANT CHANGE UP (ref 96–108)
CO2 SERPL-SCNC: 23 MMOL/L — SIGNIFICANT CHANGE UP (ref 22–29)
CREAT SERPL-MCNC: 0.62 MG/DL — SIGNIFICANT CHANGE UP (ref 0.5–1.3)
EGFR: 92 ML/MIN/1.73M2 — SIGNIFICANT CHANGE UP
EOSINOPHIL # BLD AUTO: 0.14 K/UL — SIGNIFICANT CHANGE UP (ref 0–0.5)
EOSINOPHIL NFR BLD AUTO: 1.8 % — SIGNIFICANT CHANGE UP (ref 0–6)
GLUCOSE SERPL-MCNC: 78 MG/DL — SIGNIFICANT CHANGE UP (ref 70–99)
HCT VFR BLD CALC: 28.3 % — LOW (ref 34.5–45)
HGB BLD-MCNC: 9.4 G/DL — LOW (ref 11.5–15.5)
IMM GRANULOCYTES NFR BLD AUTO: 0.4 % — SIGNIFICANT CHANGE UP (ref 0–0.9)
LYMPHOCYTES # BLD AUTO: 0.88 K/UL — LOW (ref 1–3.3)
LYMPHOCYTES # BLD AUTO: 11.2 % — LOW (ref 13–44)
MAGNESIUM SERPL-MCNC: 1.7 MG/DL — SIGNIFICANT CHANGE UP (ref 1.6–2.6)
MCHC RBC-ENTMCNC: 30.2 PG — SIGNIFICANT CHANGE UP (ref 27–34)
MCHC RBC-ENTMCNC: 33.2 GM/DL — SIGNIFICANT CHANGE UP (ref 32–36)
MCV RBC AUTO: 91 FL — SIGNIFICANT CHANGE UP (ref 80–100)
MONOCYTES # BLD AUTO: 0.86 K/UL — SIGNIFICANT CHANGE UP (ref 0–0.9)
MONOCYTES NFR BLD AUTO: 10.9 % — SIGNIFICANT CHANGE UP (ref 2–14)
NEUTROPHILS # BLD AUTO: 5.92 K/UL — SIGNIFICANT CHANGE UP (ref 1.8–7.4)
NEUTROPHILS NFR BLD AUTO: 75.3 % — SIGNIFICANT CHANGE UP (ref 43–77)
PHOSPHATE SERPL-MCNC: 2.3 MG/DL — LOW (ref 2.4–4.7)
PLATELET # BLD AUTO: 262 K/UL — SIGNIFICANT CHANGE UP (ref 150–400)
POTASSIUM SERPL-MCNC: 3.6 MMOL/L — SIGNIFICANT CHANGE UP (ref 3.5–5.3)
POTASSIUM SERPL-SCNC: 3.6 MMOL/L — SIGNIFICANT CHANGE UP (ref 3.5–5.3)
RBC # BLD: 3.11 M/UL — LOW (ref 3.8–5.2)
RBC # FLD: 13.4 % — SIGNIFICANT CHANGE UP (ref 10.3–14.5)
SODIUM SERPL-SCNC: 138 MMOL/L — SIGNIFICANT CHANGE UP (ref 135–145)
WBC # BLD: 7.86 K/UL — SIGNIFICANT CHANGE UP (ref 3.8–10.5)
WBC # FLD AUTO: 7.86 K/UL — SIGNIFICANT CHANGE UP (ref 3.8–10.5)

## 2024-06-21 PROCEDURE — 93010 ELECTROCARDIOGRAM REPORT: CPT

## 2024-06-21 RX ORDER — OXYMETAZOLINE HYDROCHLORIDE 0.05 G/100ML
2 SPRAY NASAL EVERY 12 HOURS
Refills: 0 | Status: DISCONTINUED | OUTPATIENT
Start: 2024-06-21 | End: 2024-06-24

## 2024-06-21 RX ORDER — MAGNESIUM SULFATE 100 %
2 POWDER (GRAM) MISCELLANEOUS ONCE
Refills: 0 | Status: COMPLETED | OUTPATIENT
Start: 2024-06-21 | End: 2024-06-21

## 2024-06-21 RX ORDER — FLUTICASONE PROPIONATE 50 UG/1
1 SPRAY, METERED NASAL
Refills: 0 | Status: DISCONTINUED | OUTPATIENT
Start: 2024-06-21 | End: 2024-06-24

## 2024-06-21 RX ORDER — METOPROLOL TARTRATE 50 MG
25 TABLET ORAL DAILY
Refills: 0 | Status: DISCONTINUED | OUTPATIENT
Start: 2024-06-21 | End: 2024-06-24

## 2024-06-21 RX ORDER — LEVOTHYROXINE SODIUM 25 MCG
75 TABLET ORAL DAILY
Refills: 0 | Status: DISCONTINUED | OUTPATIENT
Start: 2024-06-21 | End: 2024-06-24

## 2024-06-21 RX ADMIN — KETOROLAC TROMETHAMINE 15 MILLIGRAM(S): 30 INJECTION, SOLUTION INTRAMUSCULAR at 19:00

## 2024-06-21 RX ADMIN — Medication 25 GRAM(S): at 08:48

## 2024-06-21 RX ADMIN — Medication 75 MICROGRAM(S): at 05:14

## 2024-06-21 RX ADMIN — ENOXAPARIN SODIUM 40 MILLIGRAM(S): 100 INJECTION SUBCUTANEOUS at 05:48

## 2024-06-21 RX ADMIN — Medication 20 MILLIGRAM(S): at 18:29

## 2024-06-21 RX ADMIN — KETOROLAC TROMETHAMINE 15 MILLIGRAM(S): 30 INJECTION, SOLUTION INTRAMUSCULAR at 00:13

## 2024-06-21 RX ADMIN — SERTRALINE HYDROCHLORIDE 100 MILLIGRAM(S): 100 TABLET, FILM COATED ORAL at 18:29

## 2024-06-21 RX ADMIN — Medication 25 MILLIGRAM(S): at 05:48

## 2024-06-21 RX ADMIN — KETOROLAC TROMETHAMINE 15 MILLIGRAM(S): 30 INJECTION, SOLUTION INTRAMUSCULAR at 18:27

## 2024-06-21 RX ADMIN — CILOSTAZOL 100 MILLIGRAM(S): 50 TABLET ORAL at 05:49

## 2024-06-21 RX ADMIN — Medication 85 MILLIMOLE(S): at 08:48

## 2024-06-21 RX ADMIN — CILOSTAZOL 100 MILLIGRAM(S): 50 TABLET ORAL at 18:28

## 2024-06-21 RX ADMIN — MIRABEGRON 50 MILLIGRAM(S): 50 TABLET, EXTENDED RELEASE ORAL at 18:28

## 2024-06-22 LAB
ANION GAP SERPL CALC-SCNC: 11 MMOL/L — SIGNIFICANT CHANGE UP (ref 5–17)
BASOPHILS # BLD AUTO: 0.02 K/UL — SIGNIFICANT CHANGE UP (ref 0–0.2)
BASOPHILS NFR BLD AUTO: 0.3 % — SIGNIFICANT CHANGE UP (ref 0–2)
BUN SERPL-MCNC: 9.7 MG/DL — SIGNIFICANT CHANGE UP (ref 8–20)
CALCIUM SERPL-MCNC: 8.6 MG/DL — SIGNIFICANT CHANGE UP (ref 8.4–10.5)
CHLORIDE SERPL-SCNC: 99 MMOL/L — SIGNIFICANT CHANGE UP (ref 96–108)
CO2 SERPL-SCNC: 25 MMOL/L — SIGNIFICANT CHANGE UP (ref 22–29)
CREAT SERPL-MCNC: 0.69 MG/DL — SIGNIFICANT CHANGE UP (ref 0.5–1.3)
EGFR: 90 ML/MIN/1.73M2 — SIGNIFICANT CHANGE UP
EOSINOPHIL # BLD AUTO: 0.21 K/UL — SIGNIFICANT CHANGE UP (ref 0–0.5)
EOSINOPHIL NFR BLD AUTO: 3.3 % — SIGNIFICANT CHANGE UP (ref 0–6)
GLUCOSE SERPL-MCNC: 96 MG/DL — SIGNIFICANT CHANGE UP (ref 70–99)
HCT VFR BLD CALC: 29.7 % — LOW (ref 34.5–45)
HGB BLD-MCNC: 10 G/DL — LOW (ref 11.5–15.5)
IMM GRANULOCYTES NFR BLD AUTO: 0.3 % — SIGNIFICANT CHANGE UP (ref 0–0.9)
LYMPHOCYTES # BLD AUTO: 0.94 K/UL — LOW (ref 1–3.3)
LYMPHOCYTES # BLD AUTO: 14.8 % — SIGNIFICANT CHANGE UP (ref 13–44)
MAGNESIUM SERPL-MCNC: 1.8 MG/DL — SIGNIFICANT CHANGE UP (ref 1.6–2.6)
MCHC RBC-ENTMCNC: 30.1 PG — SIGNIFICANT CHANGE UP (ref 27–34)
MCHC RBC-ENTMCNC: 33.7 GM/DL — SIGNIFICANT CHANGE UP (ref 32–36)
MCV RBC AUTO: 89.5 FL — SIGNIFICANT CHANGE UP (ref 80–100)
MONOCYTES # BLD AUTO: 0.79 K/UL — SIGNIFICANT CHANGE UP (ref 0–0.9)
MONOCYTES NFR BLD AUTO: 12.4 % — SIGNIFICANT CHANGE UP (ref 2–14)
NEUTROPHILS # BLD AUTO: 4.37 K/UL — SIGNIFICANT CHANGE UP (ref 1.8–7.4)
NEUTROPHILS NFR BLD AUTO: 68.9 % — SIGNIFICANT CHANGE UP (ref 43–77)
PHOSPHATE SERPL-MCNC: 3.3 MG/DL — SIGNIFICANT CHANGE UP (ref 2.4–4.7)
PLATELET # BLD AUTO: 279 K/UL — SIGNIFICANT CHANGE UP (ref 150–400)
POTASSIUM SERPL-MCNC: 3 MMOL/L — LOW (ref 3.5–5.3)
POTASSIUM SERPL-SCNC: 3 MMOL/L — LOW (ref 3.5–5.3)
RBC # BLD: 3.32 M/UL — LOW (ref 3.8–5.2)
RBC # FLD: 13.2 % — SIGNIFICANT CHANGE UP (ref 10.3–14.5)
SODIUM SERPL-SCNC: 135 MMOL/L — SIGNIFICANT CHANGE UP (ref 135–145)
WBC # BLD: 6.35 K/UL — SIGNIFICANT CHANGE UP (ref 3.8–10.5)
WBC # FLD AUTO: 6.35 K/UL — SIGNIFICANT CHANGE UP (ref 3.8–10.5)

## 2024-06-22 RX ORDER — POTASSIUM CHLORIDE 600 MG/1
20 TABLET, FILM COATED, EXTENDED RELEASE ORAL
Refills: 0 | Status: COMPLETED | OUTPATIENT
Start: 2024-06-22 | End: 2024-06-22

## 2024-06-22 RX ORDER — MAGNESIUM SULFATE 100 %
1 POWDER (GRAM) MISCELLANEOUS ONCE
Refills: 0 | Status: COMPLETED | OUTPATIENT
Start: 2024-06-22 | End: 2024-06-22

## 2024-06-22 RX ADMIN — POTASSIUM CHLORIDE 50 MILLIEQUIVALENT(S): 600 TABLET, FILM COATED, EXTENDED RELEASE ORAL at 14:06

## 2024-06-22 RX ADMIN — Medication 100 GRAM(S): at 14:16

## 2024-06-22 RX ADMIN — FLUTICASONE PROPIONATE 1 SPRAY(S): 50 SPRAY, METERED NASAL at 18:49

## 2024-06-22 RX ADMIN — SERTRALINE HYDROCHLORIDE 100 MILLIGRAM(S): 100 TABLET, FILM COATED ORAL at 14:08

## 2024-06-22 RX ADMIN — ENOXAPARIN SODIUM 40 MILLIGRAM(S): 100 INJECTION SUBCUTANEOUS at 06:55

## 2024-06-22 RX ADMIN — ATORVASTATIN CALCIUM 40 MILLIGRAM(S): 20 TABLET, FILM COATED ORAL at 23:46

## 2024-06-22 RX ADMIN — POTASSIUM CHLORIDE 50 MILLIEQUIVALENT(S): 600 TABLET, FILM COATED, EXTENDED RELEASE ORAL at 16:01

## 2024-06-22 RX ADMIN — FLUTICASONE PROPIONATE 1 SPRAY(S): 50 SPRAY, METERED NASAL at 06:56

## 2024-06-22 RX ADMIN — Medication 20 MILLIGRAM(S): at 14:07

## 2024-06-22 RX ADMIN — Medication 75 MICROGRAM(S): at 06:55

## 2024-06-22 RX ADMIN — KETOROLAC TROMETHAMINE 15 MILLIGRAM(S): 30 INJECTION, SOLUTION INTRAMUSCULAR at 14:09

## 2024-06-22 RX ADMIN — KETOROLAC TROMETHAMINE 15 MILLIGRAM(S): 30 INJECTION, SOLUTION INTRAMUSCULAR at 01:03

## 2024-06-22 RX ADMIN — KETOROLAC TROMETHAMINE 15 MILLIGRAM(S): 30 INJECTION, SOLUTION INTRAMUSCULAR at 01:22

## 2024-06-22 RX ADMIN — CILOSTAZOL 100 MILLIGRAM(S): 50 TABLET ORAL at 06:56

## 2024-06-22 RX ADMIN — FLUTICASONE PROPIONATE 1 SPRAY(S): 50 SPRAY, METERED NASAL at 01:03

## 2024-06-22 RX ADMIN — MIRABEGRON 50 MILLIGRAM(S): 50 TABLET, EXTENDED RELEASE ORAL at 14:07

## 2024-06-22 RX ADMIN — POTASSIUM CHLORIDE 50 MILLIEQUIVALENT(S): 600 TABLET, FILM COATED, EXTENDED RELEASE ORAL at 23:38

## 2024-06-22 RX ADMIN — CILOSTAZOL 100 MILLIGRAM(S): 50 TABLET ORAL at 18:49

## 2024-06-22 RX ADMIN — ALPRAZOLAM 0.5 MILLIGRAM(S): 2 TABLET ORAL at 01:17

## 2024-06-22 RX ADMIN — ATORVASTATIN CALCIUM 40 MILLIGRAM(S): 20 TABLET, FILM COATED ORAL at 01:03

## 2024-06-22 RX ADMIN — Medication 25 MILLIGRAM(S): at 06:55

## 2024-06-23 LAB
ANION GAP SERPL CALC-SCNC: 11 MMOL/L — SIGNIFICANT CHANGE UP (ref 5–17)
BASOPHILS # BLD AUTO: 0.02 K/UL — SIGNIFICANT CHANGE UP (ref 0–0.2)
BASOPHILS NFR BLD AUTO: 0.3 % — SIGNIFICANT CHANGE UP (ref 0–2)
BUN SERPL-MCNC: 15.7 MG/DL — SIGNIFICANT CHANGE UP (ref 8–20)
CALCIUM SERPL-MCNC: 8.2 MG/DL — LOW (ref 8.4–10.5)
CHLORIDE SERPL-SCNC: 103 MMOL/L — SIGNIFICANT CHANGE UP (ref 96–108)
CO2 SERPL-SCNC: 23 MMOL/L — SIGNIFICANT CHANGE UP (ref 22–29)
CREAT SERPL-MCNC: 0.79 MG/DL — SIGNIFICANT CHANGE UP (ref 0.5–1.3)
EGFR: 77 ML/MIN/1.73M2 — SIGNIFICANT CHANGE UP
EOSINOPHIL # BLD AUTO: 0.29 K/UL — SIGNIFICANT CHANGE UP (ref 0–0.5)
EOSINOPHIL NFR BLD AUTO: 3.7 % — SIGNIFICANT CHANGE UP (ref 0–6)
GLUCOSE SERPL-MCNC: 110 MG/DL — HIGH (ref 70–99)
HCT VFR BLD CALC: 27.9 % — LOW (ref 34.5–45)
HGB BLD-MCNC: 9.3 G/DL — LOW (ref 11.5–15.5)
IMM GRANULOCYTES NFR BLD AUTO: 0.5 % — SIGNIFICANT CHANGE UP (ref 0–0.9)
LYMPHOCYTES # BLD AUTO: 1.07 K/UL — SIGNIFICANT CHANGE UP (ref 1–3.3)
LYMPHOCYTES # BLD AUTO: 13.7 % — SIGNIFICANT CHANGE UP (ref 13–44)
MAGNESIUM SERPL-MCNC: 2 MG/DL — SIGNIFICANT CHANGE UP (ref 1.8–2.6)
MCHC RBC-ENTMCNC: 30.2 PG — SIGNIFICANT CHANGE UP (ref 27–34)
MCHC RBC-ENTMCNC: 33.3 GM/DL — SIGNIFICANT CHANGE UP (ref 32–36)
MCV RBC AUTO: 90.6 FL — SIGNIFICANT CHANGE UP (ref 80–100)
MONOCYTES # BLD AUTO: 0.84 K/UL — SIGNIFICANT CHANGE UP (ref 0–0.9)
MONOCYTES NFR BLD AUTO: 10.8 % — SIGNIFICANT CHANGE UP (ref 2–14)
NEUTROPHILS # BLD AUTO: 5.55 K/UL — SIGNIFICANT CHANGE UP (ref 1.8–7.4)
NEUTROPHILS NFR BLD AUTO: 71 % — SIGNIFICANT CHANGE UP (ref 43–77)
PHOSPHATE SERPL-MCNC: 2.4 MG/DL — SIGNIFICANT CHANGE UP (ref 2.4–4.7)
PLATELET # BLD AUTO: 303 K/UL — SIGNIFICANT CHANGE UP (ref 150–400)
POTASSIUM SERPL-MCNC: 4.1 MMOL/L — SIGNIFICANT CHANGE UP (ref 3.5–5.3)
POTASSIUM SERPL-SCNC: 4.1 MMOL/L — SIGNIFICANT CHANGE UP (ref 3.5–5.3)
RBC # BLD: 3.08 M/UL — LOW (ref 3.8–5.2)
RBC # FLD: 13.2 % — SIGNIFICANT CHANGE UP (ref 10.3–14.5)
SODIUM SERPL-SCNC: 137 MMOL/L — SIGNIFICANT CHANGE UP (ref 135–145)
WBC # BLD: 7.81 K/UL — SIGNIFICANT CHANGE UP (ref 3.8–10.5)
WBC # FLD AUTO: 7.81 K/UL — SIGNIFICANT CHANGE UP (ref 3.8–10.5)

## 2024-06-23 RX ORDER — POTASSIUM PHOSPHATE, MONOBASIC POTASSIUM PHOSPHATE, DIBASIC INJECTION, 236; 224 MG/ML; MG/ML
15 SOLUTION, CONCENTRATE INTRAVENOUS ONCE
Refills: 0 | Status: COMPLETED | OUTPATIENT
Start: 2024-06-23 | End: 2024-06-23

## 2024-06-23 RX ORDER — ACETAMINOPHEN 325 MG
650 TABLET ORAL ONCE
Refills: 0 | Status: COMPLETED | OUTPATIENT
Start: 2024-06-23 | End: 2024-06-23

## 2024-06-23 RX ORDER — SOD PHOS DI, MONO/K PHOS MONO 250 MG
1 TABLET ORAL
Refills: 0 | Status: COMPLETED | OUTPATIENT
Start: 2024-06-23 | End: 2024-06-24

## 2024-06-23 RX ORDER — PANTOPRAZOLE SODIUM 40 MG/10ML
40 INJECTION, POWDER, FOR SOLUTION INTRAVENOUS
Refills: 0 | Status: DISCONTINUED | OUTPATIENT
Start: 2024-06-23 | End: 2024-06-24

## 2024-06-23 RX ADMIN — PANTOPRAZOLE SODIUM 40 MILLIGRAM(S): 40 INJECTION, POWDER, FOR SOLUTION INTRAVENOUS at 15:21

## 2024-06-23 RX ADMIN — SERTRALINE HYDROCHLORIDE 100 MILLIGRAM(S): 100 TABLET, FILM COATED ORAL at 14:26

## 2024-06-23 RX ADMIN — CILOSTAZOL 100 MILLIGRAM(S): 50 TABLET ORAL at 06:36

## 2024-06-23 RX ADMIN — Medication 1 PACKET(S): at 18:36

## 2024-06-23 RX ADMIN — FLUTICASONE PROPIONATE 1 SPRAY(S): 50 SPRAY, METERED NASAL at 06:35

## 2024-06-23 RX ADMIN — Medication 25 MILLIGRAM(S): at 06:36

## 2024-06-23 RX ADMIN — Medication 1 PACKET(S): at 14:27

## 2024-06-23 RX ADMIN — ATORVASTATIN CALCIUM 40 MILLIGRAM(S): 20 TABLET, FILM COATED ORAL at 23:32

## 2024-06-23 RX ADMIN — Medication 650 MILLIGRAM(S): at 15:21

## 2024-06-23 RX ADMIN — Medication 650 MILLIGRAM(S): at 16:20

## 2024-06-23 RX ADMIN — OXYMETAZOLINE HYDROCHLORIDE 2 SPRAY(S): 0.05 SPRAY NASAL at 14:46

## 2024-06-23 RX ADMIN — CILOSTAZOL 100 MILLIGRAM(S): 50 TABLET ORAL at 18:36

## 2024-06-23 RX ADMIN — Medication 75 MICROGRAM(S): at 06:36

## 2024-06-23 RX ADMIN — MIRABEGRON 50 MILLIGRAM(S): 50 TABLET, EXTENDED RELEASE ORAL at 14:26

## 2024-06-23 RX ADMIN — ENOXAPARIN SODIUM 40 MILLIGRAM(S): 100 INJECTION SUBCUTANEOUS at 06:40

## 2024-06-24 ENCOUNTER — TRANSCRIPTION ENCOUNTER (OUTPATIENT)
Age: 79
End: 2024-06-24

## 2024-06-24 VITALS
TEMPERATURE: 98 F | RESPIRATION RATE: 18 BRPM | HEART RATE: 94 BPM | OXYGEN SATURATION: 92 % | DIASTOLIC BLOOD PRESSURE: 78 MMHG | SYSTOLIC BLOOD PRESSURE: 124 MMHG

## 2024-06-24 LAB
ANION GAP SERPL CALC-SCNC: 13 MMOL/L — SIGNIFICANT CHANGE UP (ref 5–17)
BUN SERPL-MCNC: 16.7 MG/DL — SIGNIFICANT CHANGE UP (ref 8–20)
CALCIUM SERPL-MCNC: 8.3 MG/DL — LOW (ref 8.4–10.5)
CHLORIDE SERPL-SCNC: 102 MMOL/L — SIGNIFICANT CHANGE UP (ref 96–108)
CO2 SERPL-SCNC: 24 MMOL/L — SIGNIFICANT CHANGE UP (ref 22–29)
CREAT SERPL-MCNC: 0.9 MG/DL — SIGNIFICANT CHANGE UP (ref 0.5–1.3)
EGFR: 66 ML/MIN/1.73M2 — SIGNIFICANT CHANGE UP
GLUCOSE SERPL-MCNC: 98 MG/DL — SIGNIFICANT CHANGE UP (ref 70–99)
HCT VFR BLD CALC: 27.1 % — LOW (ref 34.5–45)
HGB BLD-MCNC: 8.9 G/DL — LOW (ref 11.5–15.5)
MAGNESIUM SERPL-MCNC: 1.8 MG/DL — SIGNIFICANT CHANGE UP (ref 1.6–2.6)
MCHC RBC-ENTMCNC: 29.7 PG — SIGNIFICANT CHANGE UP (ref 27–34)
MCHC RBC-ENTMCNC: 32.8 GM/DL — SIGNIFICANT CHANGE UP (ref 32–36)
MCV RBC AUTO: 90.3 FL — SIGNIFICANT CHANGE UP (ref 80–100)
PHOSPHATE SERPL-MCNC: 2.9 MG/DL — SIGNIFICANT CHANGE UP (ref 2.4–4.7)
PLATELET # BLD AUTO: 340 K/UL — SIGNIFICANT CHANGE UP (ref 150–400)
POTASSIUM SERPL-MCNC: 3.5 MMOL/L — SIGNIFICANT CHANGE UP (ref 3.5–5.3)
POTASSIUM SERPL-SCNC: 3.5 MMOL/L — SIGNIFICANT CHANGE UP (ref 3.5–5.3)
RBC # BLD: 3 M/UL — LOW (ref 3.8–5.2)
RBC # FLD: 13.3 % — SIGNIFICANT CHANGE UP (ref 10.3–14.5)
SODIUM SERPL-SCNC: 139 MMOL/L — SIGNIFICANT CHANGE UP (ref 135–145)
WBC # BLD: 8.12 K/UL — SIGNIFICANT CHANGE UP (ref 3.8–10.5)
WBC # FLD AUTO: 8.12 K/UL — SIGNIFICANT CHANGE UP (ref 3.8–10.5)

## 2024-06-24 PROCEDURE — 93005 ELECTROCARDIOGRAM TRACING: CPT

## 2024-06-24 PROCEDURE — 83735 ASSAY OF MAGNESIUM: CPT

## 2024-06-24 PROCEDURE — 97163 PT EVAL HIGH COMPLEX 45 MIN: CPT

## 2024-06-24 PROCEDURE — 71045 X-RAY EXAM CHEST 1 VIEW: CPT

## 2024-06-24 PROCEDURE — 97116 GAIT TRAINING THERAPY: CPT

## 2024-06-24 PROCEDURE — 88304 TISSUE EXAM BY PATHOLOGIST: CPT

## 2024-06-24 PROCEDURE — 80048 BASIC METABOLIC PNL TOTAL CA: CPT

## 2024-06-24 PROCEDURE — 97110 THERAPEUTIC EXERCISES: CPT

## 2024-06-24 PROCEDURE — C1889: CPT

## 2024-06-24 PROCEDURE — 84100 ASSAY OF PHOSPHORUS: CPT

## 2024-06-24 PROCEDURE — 36415 COLL VENOUS BLD VENIPUNCTURE: CPT

## 2024-06-24 PROCEDURE — 94640 AIRWAY INHALATION TREATMENT: CPT

## 2024-06-24 PROCEDURE — 74018 RADEX ABDOMEN 1 VIEW: CPT

## 2024-06-24 PROCEDURE — 85027 COMPLETE CBC AUTOMATED: CPT

## 2024-06-24 PROCEDURE — 85025 COMPLETE CBC W/AUTO DIFF WBC: CPT

## 2024-06-24 PROCEDURE — C9399: CPT

## 2024-06-24 RX ORDER — POTASSIUM CHLORIDE 600 MG/1
40 TABLET, FILM COATED, EXTENDED RELEASE ORAL ONCE
Refills: 0 | Status: COMPLETED | OUTPATIENT
Start: 2024-06-24 | End: 2024-06-24

## 2024-06-24 RX ADMIN — Medication 25 MILLIGRAM(S): at 05:50

## 2024-06-24 RX ADMIN — Medication 1 PACKET(S): at 10:47

## 2024-06-24 RX ADMIN — POTASSIUM CHLORIDE 40 MILLIEQUIVALENT(S): 600 TABLET, FILM COATED, EXTENDED RELEASE ORAL at 10:47

## 2024-06-24 RX ADMIN — Medication 75 MICROGRAM(S): at 05:49

## 2024-06-24 RX ADMIN — ENOXAPARIN SODIUM 40 MILLIGRAM(S): 100 INJECTION SUBCUTANEOUS at 05:49

## 2024-06-24 RX ADMIN — CILOSTAZOL 100 MILLIGRAM(S): 50 TABLET ORAL at 05:49

## 2024-06-24 RX ADMIN — PANTOPRAZOLE SODIUM 40 MILLIGRAM(S): 40 INJECTION, POWDER, FOR SOLUTION INTRAVENOUS at 05:52

## 2024-11-18 ENCOUNTER — APPOINTMENT (OUTPATIENT)
Dept: ORTHOPEDIC SURGERY | Facility: CLINIC | Age: 79
End: 2024-11-18
Payer: MEDICARE

## 2024-11-18 VITALS
DIASTOLIC BLOOD PRESSURE: 78 MMHG | HEIGHT: 64 IN | WEIGHT: 119 LBS | BODY MASS INDEX: 20.32 KG/M2 | SYSTOLIC BLOOD PRESSURE: 126 MMHG | HEART RATE: 82 BPM | TEMPERATURE: 98.1 F

## 2024-11-18 DIAGNOSIS — M17.11 UNILATERAL PRIMARY OSTEOARTHRITIS, RIGHT KNEE: ICD-10-CM

## 2024-11-18 PROCEDURE — 20610 DRAIN/INJ JOINT/BURSA W/O US: CPT | Mod: RT

## 2024-11-18 PROCEDURE — 99214 OFFICE O/P EST MOD 30 MIN: CPT | Mod: 25

## 2025-02-18 ENCOUNTER — APPOINTMENT (OUTPATIENT)
Dept: ORTHOPEDIC SURGERY | Facility: CLINIC | Age: 80
End: 2025-02-18
Payer: MEDICARE

## 2025-02-18 VITALS — HEIGHT: 64 IN | BODY MASS INDEX: 21 KG/M2 | WEIGHT: 123 LBS

## 2025-02-18 DIAGNOSIS — M17.11 UNILATERAL PRIMARY OSTEOARTHRITIS, RIGHT KNEE: ICD-10-CM

## 2025-02-18 PROCEDURE — 20610 DRAIN/INJ JOINT/BURSA W/O US: CPT | Mod: RT

## 2025-02-18 PROCEDURE — 99213 OFFICE O/P EST LOW 20 MIN: CPT | Mod: 25

## 2025-03-06 NOTE — H&P PST ADULT - PATIENT'S GENDER IDENTITY
Anesthesia Post Evaluation    Patient: Tomasa Reed    Procedure(s) Performed: Procedure(s) (LRB):  COLONOSCOPY, SCREENING, HIGH RISK PATIENT (N/A)    Final Anesthesia Type: general      Patient location during evaluation: PACU  Patient participation: Yes- Able to Participate  Level of consciousness: awake and alert  Post-procedure vital signs: reviewed and stable  Pain management: adequate  Airway patency: patent    PONV status at discharge: No PONV  Anesthetic complications: no      Cardiovascular status: stable  Respiratory status: spontaneous ventilation  Hydration status: euvolemic  Follow-up not needed.          Vitals Value Taken Time   /60 03/06/25 11:06   Temp 36.2 °C (97.2 °F) 03/06/25 10:46   Pulse 67 03/06/25 11:12   Resp 17 03/06/25 11:12   SpO2 97 % 03/06/25 11:12   Vitals shown include unfiled device data.      Event Time   Out of Recovery 11:07:00         Pain/Manny Score: Manny Score: 10 (3/6/2025 11:07 AM)           Female

## 2025-05-20 ENCOUNTER — APPOINTMENT (OUTPATIENT)
Dept: ORTHOPEDIC SURGERY | Facility: CLINIC | Age: 80
End: 2025-05-20

## 2025-05-21 ENCOUNTER — APPOINTMENT (OUTPATIENT)
Dept: ORTHOPEDIC SURGERY | Facility: CLINIC | Age: 80
End: 2025-05-21
Payer: MEDICARE

## 2025-05-21 VITALS
HEIGHT: 64 IN | DIASTOLIC BLOOD PRESSURE: 75 MMHG | WEIGHT: 123 LBS | BODY MASS INDEX: 21 KG/M2 | HEART RATE: 77 BPM | SYSTOLIC BLOOD PRESSURE: 112 MMHG

## 2025-05-21 DIAGNOSIS — M17.11 UNILATERAL PRIMARY OSTEOARTHRITIS, RIGHT KNEE: ICD-10-CM

## 2025-05-21 PROCEDURE — 20610 DRAIN/INJ JOINT/BURSA W/O US: CPT | Mod: RT

## 2025-05-21 PROCEDURE — 99214 OFFICE O/P EST MOD 30 MIN: CPT | Mod: 25

## 2025-05-21 PROCEDURE — 73564 X-RAY EXAM KNEE 4 OR MORE: CPT | Mod: RT

## 2025-08-21 ENCOUNTER — APPOINTMENT (OUTPATIENT)
Dept: ORTHOPEDIC SURGERY | Facility: CLINIC | Age: 80
End: 2025-08-21
Payer: MEDICARE

## 2025-08-21 VITALS — BODY MASS INDEX: 21.85 KG/M2 | HEIGHT: 64 IN | TEMPERATURE: 97.8 F | WEIGHT: 128 LBS

## 2025-08-21 DIAGNOSIS — M17.11 UNILATERAL PRIMARY OSTEOARTHRITIS, RIGHT KNEE: ICD-10-CM

## 2025-08-21 PROCEDURE — 99214 OFFICE O/P EST MOD 30 MIN: CPT | Mod: 25

## 2025-08-21 PROCEDURE — 20610 DRAIN/INJ JOINT/BURSA W/O US: CPT | Mod: RT

## (undated) DEVICE — GLV 7.5 PROTEXIS (WHITE)

## (undated) DEVICE — LIGASURE IMPACT

## (undated) DEVICE — ELCTR GROUNDING PAD ADULT COVIDIEN

## (undated) DEVICE — PREP BETADINE KIT

## (undated) DEVICE — Device

## (undated) DEVICE — VENODYNE/SCD SLEEVE CALF MEDIUM

## (undated) DEVICE — SUT MONOCRYL 4-0 27" PS-2 UNDYED

## (undated) DEVICE — STAPLER SKIN PROXIMATE

## (undated) DEVICE — SUT SILK 2-0 30" TIES

## (undated) DEVICE — WARMING BLANKET UPPER ADULT

## (undated) DEVICE — RETAINER VICERA FISH LG

## (undated) DEVICE — POOLE SUCTION TIP

## (undated) DEVICE — SOL IRR POUR H2O 1000ML

## (undated) DEVICE — SUT SILK 3-0 24" TIES

## (undated) DEVICE — ELCTR BOVIE TIP BLADE INSULATED 6.5" EDGE

## (undated) DEVICE — DRAPE TOWEL BLUE 17" X 24"

## (undated) DEVICE — SUT PDS II 1 48" TP-1

## (undated) DEVICE — DRAPE 3/4 SHEET 52X76"

## (undated) DEVICE — SUT VICRYL 2-0 27" CT-1 UNDYED

## (undated) DEVICE — SOL IRR POUR NS 0.9% 1000ML

## (undated) DEVICE — ELCTR BOVIE BLADE 3/4" EXTENDED LENGTH 6"

## (undated) DEVICE — DRAPE TOWEL BLUE STICKY

## (undated) DEVICE — ELCTR ROCKER SWITCH PENCIL BLUE 10FT

## (undated) DEVICE — DRAPE 1/2 SHEET 40X57"

## (undated) DEVICE — SUT VICRYL PLUS 2-0 18" SH (POP-OFF)

## (undated) DEVICE — DRAPE LAVH 124" X 30" X125"

## (undated) DEVICE — SUT SILK 2-0 30" SH (POP-OFF)

## (undated) DEVICE — PACK MINOR WITH LAP

## (undated) DEVICE — PACK MAJOR ABDOMINAL WITH LAP

## (undated) DEVICE — SUT VICRYL 3-0 27" SH UNDYED

## (undated) DEVICE — DRAPE FLUID WARMER 44 X 66"

## (undated) DEVICE — FOLEY TRAY 16FR 5CC LF UMETER CLOSED

## (undated) DEVICE — BLADE SURGICAL #15 CARBON

## (undated) DEVICE — DRSG PREVENA PEEL & PLACE KIT 13CM

## (undated) DEVICE — ELCTR BOVIE TIP BLADE INSULATED 4" EDGE

## (undated) DEVICE — BLADE SURGICAL #10 STAINLESS